# Patient Record
Sex: MALE | Race: WHITE | Employment: OTHER
[De-identification: names, ages, dates, MRNs, and addresses within clinical notes are randomized per-mention and may not be internally consistent; named-entity substitution may affect disease eponyms.]

---

## 2017-01-03 RX ORDER — NITROGLYCERIN 0.4 MG/1
TABLET SUBLINGUAL
Qty: 100 TABLET | Refills: 3 | Status: SHIPPED | OUTPATIENT
Start: 2017-01-03 | End: 2017-02-20 | Stop reason: SDUPTHER

## 2017-01-04 ENCOUNTER — TELEPHONE (OUTPATIENT)
Dept: CARDIOLOGY | Facility: CLINIC | Age: 77
End: 2017-01-04

## 2017-01-04 RX ORDER — METOPROLOL SUCCINATE 50 MG/1
50 TABLET, EXTENDED RELEASE ORAL DAILY
Qty: 14 TABLET | Refills: 0 | Status: SHIPPED | OUTPATIENT
Start: 2017-01-04 | End: 2017-01-21 | Stop reason: SDUPTHER

## 2017-01-04 RX ORDER — METOPROLOL SUCCINATE 50 MG/1
50 TABLET, EXTENDED RELEASE ORAL DAILY
Qty: 90 TABLET | Refills: 3 | Status: SHIPPED | OUTPATIENT
Start: 2017-01-04 | End: 2017-02-17

## 2017-01-12 ENCOUNTER — TELEPHONE (OUTPATIENT)
Dept: CARDIOLOGY | Facility: CLINIC | Age: 77
End: 2017-01-12

## 2017-01-19 ENCOUNTER — TELEPHONE (OUTPATIENT)
Dept: GASTROENTEROLOGY | Facility: CLINIC | Age: 77
End: 2017-01-19

## 2017-01-24 ENCOUNTER — OFFICE VISIT (OUTPATIENT)
Dept: CARDIOLOGY | Facility: CLINIC | Age: 77
End: 2017-01-24

## 2017-01-24 VITALS
BODY MASS INDEX: 25.33 KG/M2 | OXYGEN SATURATION: 98 % | RESPIRATION RATE: 16 BRPM | DIASTOLIC BLOOD PRESSURE: 65 MMHG | HEART RATE: 89 BPM | SYSTOLIC BLOOD PRESSURE: 105 MMHG | HEIGHT: 72 IN | WEIGHT: 187 LBS

## 2017-01-24 DIAGNOSIS — I50.43 ACUTE ON CHRONIC COMBINED SYSTOLIC AND DIASTOLIC CONGESTIVE HEART FAILURE, NYHA CLASS 4 (HCC): Primary | ICD-10-CM

## 2017-01-24 DIAGNOSIS — I42.8 NON-ISCHEMIC CARDIOMYOPATHY (HCC): ICD-10-CM

## 2017-01-24 DIAGNOSIS — J90 PLEURAL EFFUSION, RIGHT: ICD-10-CM

## 2017-01-24 PROCEDURE — G8427 DOCREV CUR MEDS BY ELIG CLIN: HCPCS | Performed by: FAMILY MEDICINE

## 2017-01-24 PROCEDURE — G8484 FLU IMMUNIZE NO ADMIN: HCPCS | Performed by: FAMILY MEDICINE

## 2017-01-24 PROCEDURE — 4040F PNEUMOC VAC/ADMIN/RCVD: CPT | Performed by: FAMILY MEDICINE

## 2017-01-24 PROCEDURE — 1036F TOBACCO NON-USER: CPT | Performed by: FAMILY MEDICINE

## 2017-01-24 PROCEDURE — 93289 INTERROG DEVICE EVAL HEART: CPT | Performed by: FAMILY MEDICINE

## 2017-01-24 PROCEDURE — 3017F COLORECTAL CA SCREEN DOC REV: CPT | Performed by: FAMILY MEDICINE

## 2017-01-24 PROCEDURE — 1123F ACP DISCUSS/DSCN MKR DOCD: CPT | Performed by: FAMILY MEDICINE

## 2017-01-24 PROCEDURE — G8598 ASA/ANTIPLAT THER USED: HCPCS | Performed by: FAMILY MEDICINE

## 2017-01-24 PROCEDURE — G8420 CALC BMI NORM PARAMETERS: HCPCS | Performed by: FAMILY MEDICINE

## 2017-01-24 PROCEDURE — 99214 OFFICE O/P EST MOD 30 MIN: CPT | Performed by: FAMILY MEDICINE

## 2017-01-24 RX ORDER — FUROSEMIDE 20 MG/1
20 TABLET ORAL DAILY
Qty: 30 TABLET | Refills: 3 | Status: SHIPPED | OUTPATIENT
Start: 2017-01-24 | End: 2017-02-17

## 2017-01-26 DIAGNOSIS — Z95.810 BIVENTRICULAR ICD (IMPLANTABLE CARDIOVERTER-DEFIBRILLATOR) IN PLACE: ICD-10-CM

## 2017-01-26 DIAGNOSIS — I42.8 NON-ISCHEMIC CARDIOMYOPATHY (HCC): Primary | ICD-10-CM

## 2017-01-31 ENCOUNTER — OFFICE VISIT (OUTPATIENT)
Dept: CARDIOLOGY | Facility: CLINIC | Age: 77
End: 2017-01-31

## 2017-01-31 VITALS
BODY MASS INDEX: 23.84 KG/M2 | OXYGEN SATURATION: 100 % | HEART RATE: 106 BPM | RESPIRATION RATE: 16 BRPM | SYSTOLIC BLOOD PRESSURE: 110 MMHG | HEIGHT: 72 IN | WEIGHT: 176 LBS | DIASTOLIC BLOOD PRESSURE: 70 MMHG

## 2017-01-31 DIAGNOSIS — I42.9 IDIOPATHIC CARDIOMYOPATHY (HCC): ICD-10-CM

## 2017-01-31 DIAGNOSIS — I50.41: Primary | ICD-10-CM

## 2017-01-31 DIAGNOSIS — I20.9 ANGINA, CLASS II (HCC): ICD-10-CM

## 2017-01-31 PROCEDURE — G8598 ASA/ANTIPLAT THER USED: HCPCS | Performed by: FAMILY MEDICINE

## 2017-01-31 PROCEDURE — 3017F COLORECTAL CA SCREEN DOC REV: CPT | Performed by: FAMILY MEDICINE

## 2017-01-31 PROCEDURE — G8484 FLU IMMUNIZE NO ADMIN: HCPCS | Performed by: FAMILY MEDICINE

## 2017-01-31 PROCEDURE — 1036F TOBACCO NON-USER: CPT | Performed by: FAMILY MEDICINE

## 2017-01-31 PROCEDURE — G8427 DOCREV CUR MEDS BY ELIG CLIN: HCPCS | Performed by: FAMILY MEDICINE

## 2017-01-31 PROCEDURE — 99215 OFFICE O/P EST HI 40 MIN: CPT | Performed by: FAMILY MEDICINE

## 2017-01-31 PROCEDURE — 4040F PNEUMOC VAC/ADMIN/RCVD: CPT | Performed by: FAMILY MEDICINE

## 2017-01-31 PROCEDURE — G8420 CALC BMI NORM PARAMETERS: HCPCS | Performed by: FAMILY MEDICINE

## 2017-01-31 PROCEDURE — 1123F ACP DISCUSS/DSCN MKR DOCD: CPT | Performed by: FAMILY MEDICINE

## 2017-01-31 RX ORDER — LISINOPRIL 5 MG/1
5 TABLET ORAL DAILY
Qty: 45 TABLET | Refills: 3 | Status: SHIPPED | OUTPATIENT
Start: 2017-01-31 | End: 2017-02-17

## 2017-02-20 ENCOUNTER — OFFICE VISIT (OUTPATIENT)
Dept: CARDIOLOGY | Facility: CLINIC | Age: 77
End: 2017-02-20

## 2017-02-20 VITALS
BODY MASS INDEX: 24.46 KG/M2 | WEIGHT: 180.6 LBS | HEART RATE: 105 BPM | OXYGEN SATURATION: 95 % | SYSTOLIC BLOOD PRESSURE: 116 MMHG | HEIGHT: 72 IN | RESPIRATION RATE: 16 BRPM | DIASTOLIC BLOOD PRESSURE: 73 MMHG

## 2017-02-20 DIAGNOSIS — I50.43 ACUTE ON CHRONIC COMBINED SYSTOLIC AND DIASTOLIC CONGESTIVE HEART FAILURE, NYHA CLASS 4 (HCC): Primary | ICD-10-CM

## 2017-02-20 DIAGNOSIS — J90 RECURRENT RIGHT PLEURAL EFFUSION: ICD-10-CM

## 2017-02-20 DIAGNOSIS — I42.8 NON-ISCHEMIC CARDIOMYOPATHY (HCC): ICD-10-CM

## 2017-02-20 PROBLEM — R10.13 EPIGASTRIC PAIN: Status: ACTIVE | Noted: 2017-02-20

## 2017-02-20 PROCEDURE — G8427 DOCREV CUR MEDS BY ELIG CLIN: HCPCS | Performed by: FAMILY MEDICINE

## 2017-02-20 PROCEDURE — 1036F TOBACCO NON-USER: CPT | Performed by: FAMILY MEDICINE

## 2017-02-20 PROCEDURE — G8598 ASA/ANTIPLAT THER USED: HCPCS | Performed by: FAMILY MEDICINE

## 2017-02-20 PROCEDURE — 1123F ACP DISCUSS/DSCN MKR DOCD: CPT | Performed by: FAMILY MEDICINE

## 2017-02-20 PROCEDURE — G8484 FLU IMMUNIZE NO ADMIN: HCPCS | Performed by: FAMILY MEDICINE

## 2017-02-20 PROCEDURE — 99214 OFFICE O/P EST MOD 30 MIN: CPT | Performed by: FAMILY MEDICINE

## 2017-02-20 PROCEDURE — 3017F COLORECTAL CA SCREEN DOC REV: CPT | Performed by: FAMILY MEDICINE

## 2017-02-20 PROCEDURE — 4040F PNEUMOC VAC/ADMIN/RCVD: CPT | Performed by: FAMILY MEDICINE

## 2017-02-20 PROCEDURE — 93000 ELECTROCARDIOGRAM COMPLETE: CPT | Performed by: FAMILY MEDICINE

## 2017-02-20 PROCEDURE — G8420 CALC BMI NORM PARAMETERS: HCPCS | Performed by: FAMILY MEDICINE

## 2017-02-20 RX ORDER — LISINOPRIL 2.5 MG/1
2.5 TABLET ORAL DAILY
Qty: 90 TABLET | Refills: 3 | Status: ON HOLD
Start: 2017-02-20 | End: 2017-02-24

## 2017-02-20 RX ORDER — NITROGLYCERIN 0.4 MG/1
TABLET SUBLINGUAL
Qty: 100 TABLET | Refills: 3 | Status: SHIPPED | OUTPATIENT
Start: 2017-02-20 | End: 2018-03-20 | Stop reason: SDUPTHER

## 2017-02-23 ENCOUNTER — TELEPHONE (OUTPATIENT)
Dept: CARDIOLOGY | Facility: CLINIC | Age: 77
End: 2017-02-23

## 2017-02-24 ENCOUNTER — OFFICE VISIT (OUTPATIENT)
Dept: CARDIOLOGY | Facility: CLINIC | Age: 77
End: 2017-02-24

## 2017-02-24 VITALS
WEIGHT: 184.2 LBS | SYSTOLIC BLOOD PRESSURE: 110 MMHG | HEIGHT: 72 IN | DIASTOLIC BLOOD PRESSURE: 75 MMHG | BODY MASS INDEX: 24.95 KG/M2 | HEART RATE: 95 BPM | OXYGEN SATURATION: 99 %

## 2017-02-24 DIAGNOSIS — J90 RECURRENT RIGHT PLEURAL EFFUSION: ICD-10-CM

## 2017-02-24 DIAGNOSIS — I50.43 ACUTE ON CHRONIC COMBINED SYSTOLIC AND DIASTOLIC CONGESTIVE HEART FAILURE, NYHA CLASS 4 (HCC): Primary | ICD-10-CM

## 2017-02-24 PROCEDURE — G8598 ASA/ANTIPLAT THER USED: HCPCS | Performed by: FAMILY MEDICINE

## 2017-02-24 PROCEDURE — 99215 OFFICE O/P EST HI 40 MIN: CPT | Performed by: FAMILY MEDICINE

## 2017-02-24 PROCEDURE — 4040F PNEUMOC VAC/ADMIN/RCVD: CPT | Performed by: FAMILY MEDICINE

## 2017-02-24 PROCEDURE — G8420 CALC BMI NORM PARAMETERS: HCPCS | Performed by: FAMILY MEDICINE

## 2017-02-24 PROCEDURE — 1123F ACP DISCUSS/DSCN MKR DOCD: CPT | Performed by: FAMILY MEDICINE

## 2017-02-24 PROCEDURE — G8427 DOCREV CUR MEDS BY ELIG CLIN: HCPCS | Performed by: FAMILY MEDICINE

## 2017-02-24 PROCEDURE — 1036F TOBACCO NON-USER: CPT | Performed by: FAMILY MEDICINE

## 2017-02-24 PROCEDURE — G8484 FLU IMMUNIZE NO ADMIN: HCPCS | Performed by: FAMILY MEDICINE

## 2017-02-24 PROCEDURE — 3017F COLORECTAL CA SCREEN DOC REV: CPT | Performed by: FAMILY MEDICINE

## 2017-02-27 ENCOUNTER — HOSPITAL ENCOUNTER (INPATIENT)
Age: 77
LOS: 3 days | Discharge: HOME | DRG: 265 | End: 2017-03-02
Attending: THORACIC SURGERY (CARDIOTHORACIC VASCULAR SURGERY) | Admitting: THORACIC SURGERY (CARDIOTHORACIC VASCULAR SURGERY)
Payer: MEDICARE

## 2017-02-27 ENCOUNTER — TELEPHONE (OUTPATIENT)
Dept: CARDIOLOGY | Facility: CLINIC | Age: 77
End: 2017-02-27

## 2017-02-27 DIAGNOSIS — I20.9 ANGINA, CLASS IV (HCC): ICD-10-CM

## 2017-02-27 DIAGNOSIS — I50.42 CHRONIC COMBINED SYSTOLIC AND DIASTOLIC CHF, NYHA CLASS 3 (HCC): ICD-10-CM

## 2017-02-27 DIAGNOSIS — I42.8 NON-ISCHEMIC CARDIOMYOPATHY (HCC): Primary | ICD-10-CM

## 2017-02-27 LAB
ABSOLUTE EOS #: 0.1 K/UL (ref 0–0.4)
ABSOLUTE LYMPH #: 0.7 K/UL (ref 1–4.8)
ABSOLUTE MONO #: 0.4 K/UL (ref 0.1–1.2)
ALBUMIN SERPL-MCNC: 3.8 G/DL (ref 3.5–5.2)
ALBUMIN/GLOBULIN RATIO: 1.2 (ref 1–2.5)
ALP BLD-CCNC: 79 U/L (ref 40–129)
ALT SERPL-CCNC: 18 U/L (ref 5–41)
ANION GAP SERPL CALCULATED.3IONS-SCNC: 11 MMOL/L (ref 9–17)
AST SERPL-CCNC: 23 U/L
BASOPHILS # BLD: 0 % (ref 0–2)
BASOPHILS ABSOLUTE: 0 K/UL (ref 0–0.2)
BILIRUB SERPL-MCNC: 1.13 MG/DL (ref 0.3–1.2)
BILIRUBIN URINE: NEGATIVE
BLOOD BANK SPECIMEN: NORMAL
BUN BLDV-MCNC: 23 MG/DL (ref 8–23)
BUN/CREAT BLD: ABNORMAL (ref 9–20)
CALCIUM SERPL-MCNC: 9 MG/DL (ref 8.6–10.4)
CHLORIDE BLD-SCNC: 99 MMOL/L (ref 98–107)
CO2: 27 MMOL/L (ref 20–31)
COLOR: YELLOW
COMMENT UA: NORMAL
CREAT SERPL-MCNC: 1.22 MG/DL (ref 0.7–1.2)
DIFFERENTIAL TYPE: ABNORMAL
DIRECT EXAM: NORMAL
EOSINOPHILS RELATIVE PERCENT: 2 % (ref 1–4)
GFR AFRICAN AMERICAN: >60 ML/MIN
GFR NON-AFRICAN AMERICAN: 58 ML/MIN
GFR SERPL CREATININE-BSD FRML MDRD: ABNORMAL ML/MIN/{1.73_M2}
GFR SERPL CREATININE-BSD FRML MDRD: ABNORMAL ML/MIN/{1.73_M2}
GLUCOSE BLD-MCNC: 251 MG/DL (ref 70–99)
GLUCOSE URINE: NEGATIVE
HCT VFR BLD CALC: 37.6 % (ref 41–53)
HEMOGLOBIN: 12.4 G/DL (ref 13.5–17.5)
KETONES, URINE: NEGATIVE
LEUKOCYTE ESTERASE, URINE: NEGATIVE
LYMPHOCYTES # BLD: 12 % (ref 24–44)
Lab: NORMAL
MCH RBC QN AUTO: 28.9 PG (ref 26–34)
MCHC RBC AUTO-ENTMCNC: 33 G/DL (ref 31–37)
MCV RBC AUTO: 87.7 FL (ref 80–100)
MONOCYTES # BLD: 7 % (ref 2–11)
NITRITE, URINE: NEGATIVE
PARTIAL THROMBOPLASTIN TIME: 22.8 SEC (ref 21.3–31.3)
PDW BLD-RTO: 18.8 % (ref 12.5–15.4)
PH UA: 5 (ref 5–8)
PLATELET # BLD: 247 K/UL (ref 140–450)
PLATELET ESTIMATE: ABNORMAL
PMV BLD AUTO: 9.1 FL (ref 6–12)
POTASSIUM SERPL-SCNC: 4.2 MMOL/L (ref 3.7–5.3)
PROTEIN UA: NEGATIVE
RBC # BLD: 4.29 M/UL (ref 4.5–5.9)
RBC # BLD: ABNORMAL 10*6/UL
SEG NEUTROPHILS: 79 % (ref 36–66)
SEGMENTED NEUTROPHILS ABSOLUTE COUNT: 4.2 K/UL (ref 1.8–7.7)
SODIUM BLD-SCNC: 137 MMOL/L (ref 135–144)
SPECIFIC GRAVITY UA: 1.01 (ref 1–1.03)
SPECIMEN DESCRIPTION: NORMAL
STATUS: NORMAL
TOTAL PROTEIN: 6.9 G/DL (ref 6.4–8.3)
TURBIDITY: CLEAR
URINE HGB: NEGATIVE
UROBILINOGEN, URINE: NORMAL
WBC # BLD: 5.4 K/UL (ref 3.5–11)
WBC # BLD: ABNORMAL 10*3/UL

## 2017-02-27 PROCEDURE — 2580000003 HC RX 258: Performed by: NURSE PRACTITIONER

## 2017-02-27 PROCEDURE — 36415 COLL VENOUS BLD VENIPUNCTURE: CPT

## 2017-02-27 PROCEDURE — 86920 COMPATIBILITY TEST SPIN: CPT

## 2017-02-27 PROCEDURE — 6360000002 HC RX W HCPCS: Performed by: INTERNAL MEDICINE

## 2017-02-27 PROCEDURE — 2060000000 HC ICU INTERMEDIATE R&B

## 2017-02-27 PROCEDURE — 6370000000 HC RX 637 (ALT 250 FOR IP): Performed by: INTERNAL MEDICINE

## 2017-02-27 PROCEDURE — 85730 THROMBOPLASTIN TIME PARTIAL: CPT

## 2017-02-27 PROCEDURE — 86900 BLOOD TYPING SEROLOGIC ABO: CPT

## 2017-02-27 PROCEDURE — 86901 BLOOD TYPING SEROLOGIC RH(D): CPT

## 2017-02-27 PROCEDURE — 86850 RBC ANTIBODY SCREEN: CPT

## 2017-02-27 PROCEDURE — 80053 COMPREHEN METABOLIC PANEL: CPT

## 2017-02-27 PROCEDURE — 87641 MR-STAPH DNA AMP PROBE: CPT

## 2017-02-27 PROCEDURE — 81003 URINALYSIS AUTO W/O SCOPE: CPT

## 2017-02-27 PROCEDURE — 85025 COMPLETE CBC W/AUTO DIFF WBC: CPT

## 2017-02-27 PROCEDURE — 6370000000 HC RX 637 (ALT 250 FOR IP): Performed by: NURSE PRACTITIONER

## 2017-02-27 RX ORDER — GLIMEPIRIDE 2 MG/1
4 TABLET ORAL 2 TIMES DAILY
Status: DISCONTINUED | OUTPATIENT
Start: 2017-02-27 | End: 2017-03-02 | Stop reason: HOSPADM

## 2017-02-27 RX ORDER — LISINOPRIL 2.5 MG/1
2.5 TABLET ORAL DAILY
Status: DISCONTINUED | OUTPATIENT
Start: 2017-02-27 | End: 2017-03-02 | Stop reason: HOSPADM

## 2017-02-27 RX ORDER — SODIUM CHLORIDE 0.9 % (FLUSH) 0.9 %
10 SYRINGE (ML) INJECTION PRN
Status: DISCONTINUED | OUTPATIENT
Start: 2017-02-27 | End: 2017-02-28

## 2017-02-27 RX ORDER — CETIRIZINE HYDROCHLORIDE 10 MG/1
10 TABLET ORAL DAILY
Status: DISCONTINUED | OUTPATIENT
Start: 2017-02-27 | End: 2017-03-02 | Stop reason: HOSPADM

## 2017-02-27 RX ORDER — FINASTERIDE 5 MG/1
5 TABLET, FILM COATED ORAL DAILY
Status: DISCONTINUED | OUTPATIENT
Start: 2017-02-27 | End: 2017-03-02 | Stop reason: HOSPADM

## 2017-02-27 RX ORDER — SIMVASTATIN 40 MG
40 TABLET ORAL NIGHTLY
Status: DISCONTINUED | OUTPATIENT
Start: 2017-02-27 | End: 2017-03-02 | Stop reason: HOSPADM

## 2017-02-27 RX ORDER — TAMSULOSIN HYDROCHLORIDE 0.4 MG/1
0.4 CAPSULE ORAL DAILY
Status: DISCONTINUED | OUTPATIENT
Start: 2017-02-27 | End: 2017-02-28

## 2017-02-27 RX ORDER — ZOLPIDEM TARTRATE 5 MG/1
5 TABLET ORAL NIGHTLY PRN
Status: DISCONTINUED | OUTPATIENT
Start: 2017-02-27 | End: 2017-03-02 | Stop reason: HOSPADM

## 2017-02-27 RX ORDER — SODIUM CHLORIDE 9 MG/ML
INJECTION, SOLUTION INTRAVENOUS CONTINUOUS
Status: DISCONTINUED | OUTPATIENT
Start: 2017-02-27 | End: 2017-02-28

## 2017-02-27 RX ORDER — ASPIRIN 81 MG/1
81 TABLET, CHEWABLE ORAL DAILY
Status: DISCONTINUED | OUTPATIENT
Start: 2017-02-27 | End: 2017-03-02 | Stop reason: HOSPADM

## 2017-02-27 RX ORDER — LISINOPRIL 2.5 MG/1
2.5 TABLET ORAL DAILY
Qty: 90 TABLET | Refills: 3 | Status: ON HOLD | OUTPATIENT
Start: 2017-02-27 | End: 2017-03-02

## 2017-02-27 RX ORDER — DICYCLOMINE HYDROCHLORIDE 10 MG/1
20 CAPSULE ORAL
Status: DISCONTINUED | OUTPATIENT
Start: 2017-02-27 | End: 2017-03-02 | Stop reason: HOSPADM

## 2017-02-27 RX ORDER — FUROSEMIDE 40 MG/1
40 TABLET ORAL DAILY
Qty: 90 TABLET | Refills: 3 | Status: ON HOLD | OUTPATIENT
Start: 2017-02-27 | End: 2017-03-02

## 2017-02-27 RX ORDER — ACETAMINOPHEN 325 MG/1
650 TABLET ORAL EVERY 4 HOURS PRN
Status: DISCONTINUED | OUTPATIENT
Start: 2017-02-27 | End: 2017-03-02 | Stop reason: HOSPADM

## 2017-02-27 RX ORDER — FUROSEMIDE 10 MG/ML
40 INJECTION INTRAMUSCULAR; INTRAVENOUS 2 TIMES DAILY
Status: DISCONTINUED | OUTPATIENT
Start: 2017-02-27 | End: 2017-03-02 | Stop reason: HOSPADM

## 2017-02-27 RX ORDER — CHLORHEXIDINE GLUCONATE 0.12 MG/ML
15 RINSE ORAL ONCE
Status: COMPLETED | OUTPATIENT
Start: 2017-02-27 | End: 2017-02-28

## 2017-02-27 RX ORDER — NITROGLYCERIN 0.4 MG/1
0.4 TABLET SUBLINGUAL EVERY 5 MIN PRN
Status: DISCONTINUED | OUTPATIENT
Start: 2017-02-27 | End: 2017-03-02 | Stop reason: HOSPADM

## 2017-02-27 RX ORDER — ASPIRIN 81 MG/1
81 TABLET ORAL DAILY
Status: DISCONTINUED | OUTPATIENT
Start: 2017-02-27 | End: 2017-02-27

## 2017-02-27 RX ORDER — SODIUM CHLORIDE 0.9 % (FLUSH) 0.9 %
10 SYRINGE (ML) INJECTION EVERY 12 HOURS SCHEDULED
Status: DISCONTINUED | OUTPATIENT
Start: 2017-02-27 | End: 2017-03-02 | Stop reason: HOSPADM

## 2017-02-27 RX ORDER — ONDANSETRON 2 MG/ML
4 INJECTION INTRAMUSCULAR; INTRAVENOUS EVERY 6 HOURS PRN
Status: DISCONTINUED | OUTPATIENT
Start: 2017-02-27 | End: 2017-03-02 | Stop reason: HOSPADM

## 2017-02-27 RX ADMIN — TAMSULOSIN HYDROCHLORIDE 0.4 MG: 0.4 CAPSULE ORAL at 18:21

## 2017-02-27 RX ADMIN — SODIUM CHLORIDE: 9 INJECTION, SOLUTION INTRAVENOUS at 18:21

## 2017-02-27 RX ADMIN — GLIMEPIRIDE 4 MG: 2 TABLET ORAL at 22:39

## 2017-02-27 RX ADMIN — Medication 10 ML: at 22:40

## 2017-02-27 RX ADMIN — FUROSEMIDE 40 MG: 10 INJECTION, SOLUTION INTRAMUSCULAR; INTRAVENOUS at 18:18

## 2017-02-27 RX ADMIN — DICYCLOMINE HYDROCHLORIDE 20 MG: 10 CAPSULE ORAL at 22:39

## 2017-02-27 RX ADMIN — METOPROLOL TARTRATE 12.5 MG: 25 TABLET ORAL at 18:21

## 2017-02-27 RX ADMIN — SIMVASTATIN 40 MG: 40 TABLET, FILM COATED ORAL at 22:39

## 2017-02-27 RX ADMIN — LISINOPRIL 2.5 MG: 2.5 TABLET ORAL at 18:21

## 2017-02-27 RX ADMIN — FINASTERIDE 5 MG: 5 TABLET, FILM COATED ORAL at 18:21

## 2017-02-27 RX ADMIN — ZOLPIDEM TARTRATE 5 MG: 5 TABLET, FILM COATED ORAL at 22:39

## 2017-02-27 RX ADMIN — Medication 10 ML: at 18:22

## 2017-02-27 RX ADMIN — DICYCLOMINE HYDROCHLORIDE 20 MG: 10 CAPSULE ORAL at 18:21

## 2017-02-27 ASSESSMENT — PAIN SCALES - GENERAL
PAINLEVEL_OUTOF10: 0
PAINLEVEL_OUTOF10: 0

## 2017-02-28 ENCOUNTER — CARE COORDINATOR VISIT (OUTPATIENT)
Dept: CASE MANAGEMENT | Age: 77
End: 2017-02-28

## 2017-02-28 ENCOUNTER — APPOINTMENT (OUTPATIENT)
Dept: GENERAL RADIOLOGY | Age: 77
DRG: 265 | End: 2017-02-28
Attending: THORACIC SURGERY (CARDIOTHORACIC VASCULAR SURGERY)
Payer: MEDICARE

## 2017-02-28 ENCOUNTER — SURGERY (OUTPATIENT)
Age: 77
End: 2017-02-28

## 2017-02-28 ENCOUNTER — ANESTHESIA (OUTPATIENT)
Dept: OPERATING ROOM | Age: 77
DRG: 265 | End: 2017-02-28
Payer: MEDICARE

## 2017-02-28 ENCOUNTER — ANESTHESIA EVENT (OUTPATIENT)
Dept: OPERATING ROOM | Age: 77
DRG: 265 | End: 2017-02-28
Payer: MEDICARE

## 2017-02-28 VITALS — SYSTOLIC BLOOD PRESSURE: 94 MMHG | DIASTOLIC BLOOD PRESSURE: 64 MMHG | OXYGEN SATURATION: 90 %

## 2017-02-28 LAB
ABO/RH: NORMAL
ANION GAP SERPL CALCULATED.3IONS-SCNC: 15 MMOL/L (ref 9–17)
ANTIBODY SCREEN: NEGATIVE
ARM BAND NUMBER: NORMAL
BLD PROD TYP BPU: NORMAL
BUN BLDV-MCNC: 27 MG/DL (ref 8–23)
BUN/CREAT BLD: ABNORMAL (ref 9–20)
CALCIUM SERPL-MCNC: 8.8 MG/DL (ref 8.6–10.4)
CHLORIDE BLD-SCNC: 102 MMOL/L (ref 98–107)
CO2: 27 MMOL/L (ref 20–31)
CREAT SERPL-MCNC: 1.33 MG/DL (ref 0.7–1.2)
CROSSMATCH RESULT: NORMAL
DISPENSE STATUS BLOOD BANK: NORMAL
EXPIRATION DATE: NORMAL
FIO2: ABNORMAL
GFR AFRICAN AMERICAN: >60 ML/MIN
GFR NON-AFRICAN AMERICAN: 52 ML/MIN
GFR SERPL CREATININE-BSD FRML MDRD: ABNORMAL ML/MIN/{1.73_M2}
GFR SERPL CREATININE-BSD FRML MDRD: ABNORMAL ML/MIN/{1.73_M2}
GLUCOSE BLD-MCNC: 141 MG/DL (ref 74–106)
GLUCOSE BLD-MCNC: 147 MG/DL (ref 70–99)
HCT VFR BLD CALC: 34.9 % (ref 41–53)
HEMOGLOBIN: 11.4 G/DL (ref 13.5–17.5)
MCH RBC QN AUTO: 28.3 PG (ref 26–34)
MCHC RBC AUTO-ENTMCNC: 32.7 G/DL (ref 31–37)
MCV RBC AUTO: 86.6 FL (ref 80–100)
NEGATIVE BASE EXCESS, ART: ABNORMAL (ref 0–2)
O2 DEVICE/FLOW/%: ABNORMAL
PATIENT TEMP: ABNORMAL
PDW BLD-RTO: 18.1 % (ref 12.5–15.4)
PLATELET # BLD: 225 K/UL (ref 140–450)
PMV BLD AUTO: 9.5 FL (ref 6–12)
POC HCO3: 28.5 MMOL/L (ref 22–27)
POC HEMATOCRIT: 31 % (ref 41–53)
POC HEMOGLOBIN: 10.5 GM/DL (ref 13.5–17.5)
POC IONIZED CALCIUM: 1.09 MMOL/L (ref 1.13–1.33)
POC O2 SATURATION: 100 %
POC PCO2 TEMP: ABNORMAL MM HG
POC PCO2: 42 MM HG (ref 32–45)
POC PH TEMP: ABNORMAL
POC PH: 7.44 (ref 7.35–7.45)
POC PO2 TEMP: ABNORMAL MM HG
POC PO2: 437 MM HG (ref 75–95)
POC POTASSIUM: 3.3 MMOL/L (ref 3.5–5.1)
POC SODIUM: 143 MMOL/L (ref 136–145)
POSITIVE BASE EXCESS, ART: 4 (ref 0–2)
POTASSIUM SERPL-SCNC: 3.9 MMOL/L (ref 3.7–5.3)
RBC # BLD: 4.03 M/UL (ref 4.5–5.9)
SODIUM BLD-SCNC: 144 MMOL/L (ref 135–144)
TCO2 (CALC), ART: 30 MM HG (ref 23–28)
TRANSFUSION STATUS: NORMAL
UNIT DIVISION: 0
UNIT NUMBER: NORMAL
WBC # BLD: 5.1 K/UL (ref 3.5–11)

## 2017-02-28 PROCEDURE — 2500000003 HC RX 250 WO HCPCS: Performed by: NURSE ANESTHETIST, CERTIFIED REGISTERED

## 2017-02-28 PROCEDURE — 0W9B00Z DRAINAGE OF LEFT PLEURAL CAVITY WITH DRAINAGE DEVICE, OPEN APPROACH: ICD-10-PCS | Performed by: THORACIC SURGERY (CARDIOTHORACIC VASCULAR SURGERY)

## 2017-02-28 PROCEDURE — 85027 COMPLETE CBC AUTOMATED: CPT

## 2017-02-28 PROCEDURE — 2000000000 HC ICU R&B

## 2017-02-28 PROCEDURE — 6360000002 HC RX W HCPCS: Performed by: INTERNAL MEDICINE

## 2017-02-28 PROCEDURE — 3600000013 HC SURGERY LEVEL 3 ADDTL 15MIN: Performed by: THORACIC SURGERY (CARDIOTHORACIC VASCULAR SURGERY)

## 2017-02-28 PROCEDURE — 36415 COLL VENOUS BLD VENIPUNCTURE: CPT

## 2017-02-28 PROCEDURE — 71010 XR CHEST PORTABLE: CPT

## 2017-02-28 PROCEDURE — 82947 ASSAY GLUCOSE BLOOD QUANT: CPT

## 2017-02-28 PROCEDURE — 2580000003 HC RX 258: Performed by: THORACIC SURGERY (CARDIOTHORACIC VASCULAR SURGERY)

## 2017-02-28 PROCEDURE — 6360000002 HC RX W HCPCS: Performed by: THORACIC SURGERY (CARDIOTHORACIC VASCULAR SURGERY)

## 2017-02-28 PROCEDURE — 6360000002 HC RX W HCPCS: Performed by: NURSE ANESTHETIST, CERTIFIED REGISTERED

## 2017-02-28 PROCEDURE — 82330 ASSAY OF CALCIUM: CPT

## 2017-02-28 PROCEDURE — 80048 BASIC METABOLIC PNL TOTAL CA: CPT

## 2017-02-28 PROCEDURE — 6360000002 HC RX W HCPCS: Performed by: ANESTHESIOLOGY

## 2017-02-28 PROCEDURE — C1729 CATH, DRAINAGE: HCPCS | Performed by: THORACIC SURGERY (CARDIOTHORACIC VASCULAR SURGERY)

## 2017-02-28 PROCEDURE — 2580000003 HC RX 258: Performed by: NURSE ANESTHETIST, CERTIFIED REGISTERED

## 2017-02-28 PROCEDURE — 85014 HEMATOCRIT: CPT

## 2017-02-28 PROCEDURE — 6370000000 HC RX 637 (ALT 250 FOR IP): Performed by: NURSE PRACTITIONER

## 2017-02-28 PROCEDURE — 3700000000 HC ANESTHESIA ATTENDED CARE: Performed by: THORACIC SURGERY (CARDIOTHORACIC VASCULAR SURGERY)

## 2017-02-28 PROCEDURE — 82803 BLOOD GASES ANY COMBINATION: CPT

## 2017-02-28 PROCEDURE — 02PA0MZ REMOVAL OF CARDIAC LEAD FROM HEART, OPEN APPROACH: ICD-10-PCS | Performed by: THORACIC SURGERY (CARDIOTHORACIC VASCULAR SURGERY)

## 2017-02-28 PROCEDURE — 3700000001 HC ADD 15 MINUTES (ANESTHESIA): Performed by: THORACIC SURGERY (CARDIOTHORACIC VASCULAR SURGERY)

## 2017-02-28 PROCEDURE — 02HN0KZ INSERTION OF DEFIBRILLATOR LEAD INTO PERICARDIUM, OPEN APPROACH: ICD-10-PCS | Performed by: THORACIC SURGERY (CARDIOTHORACIC VASCULAR SURGERY)

## 2017-02-28 PROCEDURE — 6370000000 HC RX 637 (ALT 250 FOR IP): Performed by: INTERNAL MEDICINE

## 2017-02-28 PROCEDURE — 2720000010 HC SURG SUPPLY STERILE: Performed by: THORACIC SURGERY (CARDIOTHORACIC VASCULAR SURGERY)

## 2017-02-28 PROCEDURE — 3600000003 HC SURGERY LEVEL 3 BASE: Performed by: THORACIC SURGERY (CARDIOTHORACIC VASCULAR SURGERY)

## 2017-02-28 PROCEDURE — 84132 ASSAY OF SERUM POTASSIUM: CPT

## 2017-02-28 PROCEDURE — C1779 LEAD, PMKR, TRANSVENOUS VDD: HCPCS | Performed by: THORACIC SURGERY (CARDIOTHORACIC VASCULAR SURGERY)

## 2017-02-28 PROCEDURE — 84295 ASSAY OF SERUM SODIUM: CPT

## 2017-02-28 PROCEDURE — 6360000002 HC RX W HCPCS

## 2017-02-28 PROCEDURE — 6370000000 HC RX 637 (ALT 250 FOR IP): Performed by: THORACIC SURGERY (CARDIOTHORACIC VASCULAR SURGERY)

## 2017-02-28 PROCEDURE — 6370000000 HC RX 637 (ALT 250 FOR IP): Performed by: ANESTHESIOLOGY

## 2017-02-28 PROCEDURE — 84484 ASSAY OF TROPONIN QUANT: CPT

## 2017-02-28 PROCEDURE — 93005 ELECTROCARDIOGRAM TRACING: CPT

## 2017-02-28 PROCEDURE — 2580000003 HC RX 258: Performed by: NURSE PRACTITIONER

## 2017-02-28 PROCEDURE — 6360000002 HC RX W HCPCS: Performed by: PHYSICIAN ASSISTANT

## 2017-02-28 PROCEDURE — 2500000003 HC RX 250 WO HCPCS

## 2017-02-28 DEVICE — IMPLANTABLE DEVICE: Type: IMPLANTABLE DEVICE | Site: HEART | Status: FUNCTIONAL

## 2017-02-28 RX ORDER — FENTANYL CITRATE 50 UG/ML
50 INJECTION, SOLUTION INTRAMUSCULAR; INTRAVENOUS EVERY 5 MIN PRN
Status: DISCONTINUED | OUTPATIENT
Start: 2017-02-28 | End: 2017-02-28 | Stop reason: HOSPADM

## 2017-02-28 RX ORDER — KETOROLAC TROMETHAMINE 15 MG/ML
15 INJECTION, SOLUTION INTRAMUSCULAR; INTRAVENOUS
Status: COMPLETED | OUTPATIENT
Start: 2017-02-28 | End: 2017-02-28

## 2017-02-28 RX ORDER — ONDANSETRON 2 MG/ML
4 INJECTION INTRAMUSCULAR; INTRAVENOUS EVERY 6 HOURS PRN
Status: DISCONTINUED | OUTPATIENT
Start: 2017-02-28 | End: 2017-03-02 | Stop reason: HOSPADM

## 2017-02-28 RX ORDER — ACETAMINOPHEN 325 MG/1
650 TABLET ORAL EVERY 4 HOURS PRN
Status: DISCONTINUED | OUTPATIENT
Start: 2017-02-28 | End: 2017-03-02 | Stop reason: HOSPADM

## 2017-02-28 RX ORDER — SODIUM CHLORIDE 0.9 % (FLUSH) 0.9 %
10 SYRINGE (ML) INJECTION PRN
Status: DISCONTINUED | OUTPATIENT
Start: 2017-02-28 | End: 2017-03-02 | Stop reason: HOSPADM

## 2017-02-28 RX ORDER — PROPOFOL 10 MG/ML
INJECTION, EMULSION INTRAVENOUS PRN
Status: DISCONTINUED | OUTPATIENT
Start: 2017-02-28 | End: 2017-02-28 | Stop reason: SDUPTHER

## 2017-02-28 RX ORDER — FENTANYL CITRATE 50 UG/ML
25 INJECTION, SOLUTION INTRAMUSCULAR; INTRAVENOUS EVERY 5 MIN PRN
Status: DISCONTINUED | OUTPATIENT
Start: 2017-02-28 | End: 2017-02-28 | Stop reason: HOSPADM

## 2017-02-28 RX ORDER — GLYCOPYRROLATE 0.2 MG/ML
INJECTION INTRAMUSCULAR; INTRAVENOUS PRN
Status: DISCONTINUED | OUTPATIENT
Start: 2017-02-28 | End: 2017-02-28 | Stop reason: SDUPTHER

## 2017-02-28 RX ORDER — MIDAZOLAM HYDROCHLORIDE 1 MG/ML
INJECTION INTRAMUSCULAR; INTRAVENOUS PRN
Status: DISCONTINUED | OUTPATIENT
Start: 2017-02-28 | End: 2017-02-28 | Stop reason: SDUPTHER

## 2017-02-28 RX ORDER — SODIUM CHLORIDE, SODIUM LACTATE, POTASSIUM CHLORIDE, CALCIUM CHLORIDE 600; 310; 30; 20 MG/100ML; MG/100ML; MG/100ML; MG/100ML
INJECTION, SOLUTION INTRAVENOUS CONTINUOUS PRN
Status: DISCONTINUED | OUTPATIENT
Start: 2017-02-28 | End: 2017-02-28 | Stop reason: SDUPTHER

## 2017-02-28 RX ORDER — MAGNESIUM HYDROXIDE 1200 MG/15ML
LIQUID ORAL CONTINUOUS PRN
Status: DISCONTINUED | OUTPATIENT
Start: 2017-02-28 | End: 2017-02-28 | Stop reason: HOSPADM

## 2017-02-28 RX ORDER — FENTANYL CITRATE 50 UG/ML
INJECTION, SOLUTION INTRAMUSCULAR; INTRAVENOUS PRN
Status: DISCONTINUED | OUTPATIENT
Start: 2017-02-28 | End: 2017-02-28 | Stop reason: SDUPTHER

## 2017-02-28 RX ORDER — LIDOCAINE HYDROCHLORIDE 10 MG/ML
INJECTION, SOLUTION EPIDURAL; INFILTRATION; INTRACAUDAL; PERINEURAL PRN
Status: DISCONTINUED | OUTPATIENT
Start: 2017-02-28 | End: 2017-02-28 | Stop reason: SDUPTHER

## 2017-02-28 RX ORDER — MORPHINE SULFATE 2 MG/ML
2 INJECTION, SOLUTION INTRAMUSCULAR; INTRAVENOUS
Status: DISCONTINUED | OUTPATIENT
Start: 2017-02-28 | End: 2017-03-02 | Stop reason: HOSPADM

## 2017-02-28 RX ORDER — EPHEDRINE SULFATE 50 MG/ML
INJECTION, SOLUTION INTRAVENOUS PRN
Status: DISCONTINUED | OUTPATIENT
Start: 2017-02-28 | End: 2017-02-28 | Stop reason: SDUPTHER

## 2017-02-28 RX ORDER — DOCUSATE SODIUM 100 MG/1
100 CAPSULE, LIQUID FILLED ORAL 2 TIMES DAILY
Status: DISCONTINUED | OUTPATIENT
Start: 2017-02-28 | End: 2017-03-02 | Stop reason: HOSPADM

## 2017-02-28 RX ORDER — ONDANSETRON 2 MG/ML
4 INJECTION INTRAMUSCULAR; INTRAVENOUS
Status: DISCONTINUED | OUTPATIENT
Start: 2017-02-28 | End: 2017-02-28 | Stop reason: HOSPADM

## 2017-02-28 RX ORDER — DIPHENHYDRAMINE HYDROCHLORIDE 50 MG/ML
12.5 INJECTION INTRAMUSCULAR; INTRAVENOUS
Status: DISCONTINUED | OUTPATIENT
Start: 2017-02-28 | End: 2017-02-28 | Stop reason: HOSPADM

## 2017-02-28 RX ORDER — KETOROLAC TROMETHAMINE 30 MG/ML
INJECTION, SOLUTION INTRAMUSCULAR; INTRAVENOUS
Status: COMPLETED
Start: 2017-02-28 | End: 2017-02-28

## 2017-02-28 RX ORDER — ACETAMINOPHEN 10 MG/ML
1000 INJECTION, SOLUTION INTRAVENOUS EVERY 8 HOURS PRN
Status: DISPENSED | OUTPATIENT
Start: 2017-02-28 | End: 2017-03-02

## 2017-02-28 RX ORDER — FENTANYL CITRATE 50 UG/ML
INJECTION, SOLUTION INTRAMUSCULAR; INTRAVENOUS
Status: COMPLETED
Start: 2017-02-28 | End: 2017-02-28

## 2017-02-28 RX ORDER — MIDAZOLAM HYDROCHLORIDE 1 MG/ML
1 INJECTION INTRAMUSCULAR; INTRAVENOUS ONCE
Status: COMPLETED | OUTPATIENT
Start: 2017-02-28 | End: 2017-02-28

## 2017-02-28 RX ORDER — DEXTROSE AND SODIUM CHLORIDE 5; .45 G/100ML; G/100ML
INJECTION, SOLUTION INTRAVENOUS CONTINUOUS
Status: DISCONTINUED | OUTPATIENT
Start: 2017-02-28 | End: 2017-03-02 | Stop reason: HOSPADM

## 2017-02-28 RX ORDER — OXYCODONE HYDROCHLORIDE AND ACETAMINOPHEN 5; 325 MG/1; MG/1
1 TABLET ORAL EVERY 4 HOURS PRN
Status: DISCONTINUED | OUTPATIENT
Start: 2017-02-28 | End: 2017-03-01

## 2017-02-28 RX ORDER — MORPHINE SULFATE 4 MG/ML
4 INJECTION, SOLUTION INTRAMUSCULAR; INTRAVENOUS
Status: DISCONTINUED | OUTPATIENT
Start: 2017-02-28 | End: 2017-03-02 | Stop reason: HOSPADM

## 2017-02-28 RX ORDER — LABETALOL HYDROCHLORIDE 5 MG/ML
5 INJECTION, SOLUTION INTRAVENOUS EVERY 10 MIN PRN
Status: DISCONTINUED | OUTPATIENT
Start: 2017-02-28 | End: 2017-02-28 | Stop reason: HOSPADM

## 2017-02-28 RX ORDER — MORPHINE SULFATE 4 MG/ML
INJECTION, SOLUTION INTRAMUSCULAR; INTRAVENOUS
Status: COMPLETED
Start: 2017-02-28 | End: 2017-02-28

## 2017-02-28 RX ORDER — MIDAZOLAM HYDROCHLORIDE 1 MG/ML
INJECTION INTRAMUSCULAR; INTRAVENOUS
Status: COMPLETED
Start: 2017-02-28 | End: 2017-02-28

## 2017-02-28 RX ORDER — HYDRALAZINE HYDROCHLORIDE 20 MG/ML
5 INJECTION INTRAMUSCULAR; INTRAVENOUS EVERY 10 MIN PRN
Status: DISCONTINUED | OUTPATIENT
Start: 2017-02-28 | End: 2017-02-28 | Stop reason: HOSPADM

## 2017-02-28 RX ORDER — SODIUM CHLORIDE 9 MG/ML
INJECTION, SOLUTION INTRAVENOUS CONTINUOUS PRN
Status: DISCONTINUED | OUTPATIENT
Start: 2017-02-28 | End: 2017-02-28 | Stop reason: SDUPTHER

## 2017-02-28 RX ORDER — NEOSTIGMINE METHYLSULFATE 1 MG/ML
INJECTION, SOLUTION INTRAVENOUS PRN
Status: DISCONTINUED | OUTPATIENT
Start: 2017-02-28 | End: 2017-02-28 | Stop reason: SDUPTHER

## 2017-02-28 RX ORDER — ALPRAZOLAM 0.5 MG/1
0.5 TABLET ORAL 2 TIMES DAILY PRN
Status: DISCONTINUED | OUTPATIENT
Start: 2017-02-28 | End: 2017-03-02 | Stop reason: HOSPADM

## 2017-02-28 RX ORDER — ROCURONIUM BROMIDE 10 MG/ML
INJECTION, SOLUTION INTRAVENOUS PRN
Status: DISCONTINUED | OUTPATIENT
Start: 2017-02-28 | End: 2017-02-28 | Stop reason: SDUPTHER

## 2017-02-28 RX ADMIN — FENTANYL CITRATE 50 MCG: 50 INJECTION, SOLUTION INTRAMUSCULAR; INTRAVENOUS at 14:00

## 2017-02-28 RX ADMIN — CHLORHEXIDINE GLUCONATE 15 ML: 1.2 RINSE ORAL at 08:46

## 2017-02-28 RX ADMIN — FENTANYL CITRATE 50 MCG: 50 INJECTION INTRAMUSCULAR; INTRAVENOUS at 12:35

## 2017-02-28 RX ADMIN — ROCURONIUM BROMIDE 50 MG: 10 INJECTION, SOLUTION INTRAVENOUS at 12:04

## 2017-02-28 RX ADMIN — ROCURONIUM BROMIDE 20 MG: 10 INJECTION, SOLUTION INTRAVENOUS at 12:38

## 2017-02-28 RX ADMIN — FENTANYL CITRATE 25 MCG: 50 INJECTION INTRAMUSCULAR; INTRAVENOUS at 13:45

## 2017-02-28 RX ADMIN — CETIRIZINE HYDROCHLORIDE 10 MG: 10 TABLET ORAL at 08:47

## 2017-02-28 RX ADMIN — SODIUM CHLORIDE 1000 ML: 900 IRRIGANT IRRIGATION at 13:04

## 2017-02-28 RX ADMIN — FENTANYL CITRATE 50 MCG: 50 INJECTION INTRAMUSCULAR; INTRAVENOUS at 13:50

## 2017-02-28 RX ADMIN — DICYCLOMINE HYDROCHLORIDE 20 MG: 10 CAPSULE ORAL at 08:46

## 2017-02-28 RX ADMIN — FENTANYL CITRATE 25 MCG: 50 INJECTION INTRAMUSCULAR; INTRAVENOUS at 13:48

## 2017-02-28 RX ADMIN — DEXTROSE MONOHYDRATE 2 G: 50 INJECTION, SOLUTION INTRAVENOUS at 19:53

## 2017-02-28 RX ADMIN — SODIUM CHLORIDE 1000 ML: 900 IRRIGANT IRRIGATION at 13:08

## 2017-02-28 RX ADMIN — Medication 2 G: at 12:19

## 2017-02-28 RX ADMIN — KETOROLAC TROMETHAMINE 30 MG: 30 INJECTION, SOLUTION INTRAMUSCULAR at 22:07

## 2017-02-28 RX ADMIN — DEXTROSE AND SODIUM CHLORIDE: 5; 450 INJECTION, SOLUTION INTRAVENOUS at 16:53

## 2017-02-28 RX ADMIN — FUROSEMIDE 40 MG: 10 INJECTION, SOLUTION INTRAMUSCULAR; INTRAVENOUS at 08:46

## 2017-02-28 RX ADMIN — NEOSTIGMINE METHYLSULFATE 4 MG: 1 INJECTION INTRAVENOUS at 13:25

## 2017-02-28 RX ADMIN — FINASTERIDE 5 MG: 5 TABLET, FILM COATED ORAL at 08:47

## 2017-02-28 RX ADMIN — MORPHINE SULFATE 4 MG: 4 INJECTION, SOLUTION INTRAMUSCULAR; INTRAVENOUS at 22:05

## 2017-02-28 RX ADMIN — GLYCOPYRROLATE 0.8 MG: 0.2 INJECTION INTRAMUSCULAR; INTRAVENOUS at 13:25

## 2017-02-28 RX ADMIN — Medication 10 ML: at 08:47

## 2017-02-28 RX ADMIN — ONDANSETRON 4 MG: 2 INJECTION INTRAMUSCULAR; INTRAVENOUS at 13:08

## 2017-02-28 RX ADMIN — ASPIRIN 81 MG: 81 TABLET, CHEWABLE ORAL at 08:46

## 2017-02-28 RX ADMIN — LIDOCAINE HYDROCHLORIDE 50 MG: 10 INJECTION, SOLUTION EPIDURAL; INFILTRATION; INTRACAUDAL; PERINEURAL at 12:04

## 2017-02-28 RX ADMIN — EPHEDRINE SULFATE 20 MG: 50 INJECTION INTRAMUSCULAR; INTRAVENOUS; SUBCUTANEOUS at 12:48

## 2017-02-28 RX ADMIN — MUPIROCIN: 20 OINTMENT TOPICAL at 10:58

## 2017-02-28 RX ADMIN — OXYCODONE HYDROCHLORIDE AND ACETAMINOPHEN 1 TABLET: 5; 325 TABLET ORAL at 20:55

## 2017-02-28 RX ADMIN — DOCUSATE SODIUM 100 MG: 100 CAPSULE ORAL at 19:53

## 2017-02-28 RX ADMIN — GLIMEPIRIDE 4 MG: 2 TABLET ORAL at 08:46

## 2017-02-28 RX ADMIN — NITROGLYCERIN 0.4 MG: 0.4 TABLET SUBLINGUAL at 21:56

## 2017-02-28 RX ADMIN — FUROSEMIDE 40 MG: 10 INJECTION, SOLUTION INTRAMUSCULAR; INTRAVENOUS at 19:14

## 2017-02-28 RX ADMIN — FENTANYL CITRATE 100 MCG: 50 INJECTION INTRAMUSCULAR; INTRAVENOUS at 12:04

## 2017-02-28 RX ADMIN — EPHEDRINE SULFATE 20 MG: 50 INJECTION INTRAMUSCULAR; INTRAVENOUS; SUBCUTANEOUS at 12:51

## 2017-02-28 RX ADMIN — KETOROLAC TROMETHAMINE 15 MG: 15 INJECTION, SOLUTION INTRAMUSCULAR; INTRAVENOUS at 22:05

## 2017-02-28 RX ADMIN — PHENYLEPHRINE HYDROCHLORIDE 200 MCG: 10 INJECTION INTRAMUSCULAR; INTRAVENOUS; SUBCUTANEOUS at 12:50

## 2017-02-28 RX ADMIN — SODIUM CHLORIDE, POTASSIUM CHLORIDE, SODIUM LACTATE AND CALCIUM CHLORIDE: 600; 310; 30; 20 INJECTION, SOLUTION INTRAVENOUS at 12:12

## 2017-02-28 RX ADMIN — OXYCODONE HYDROCHLORIDE AND ACETAMINOPHEN 1 TABLET: 5; 325 TABLET ORAL at 16:48

## 2017-02-28 RX ADMIN — ENOXAPARIN SODIUM 40 MG: 40 INJECTION SUBCUTANEOUS at 16:53

## 2017-02-28 RX ADMIN — FENTANYL CITRATE 50 MCG: 50 INJECTION INTRAMUSCULAR; INTRAVENOUS at 14:10

## 2017-02-28 RX ADMIN — MIDAZOLAM HYDROCHLORIDE 1 MG: 1 INJECTION, SOLUTION INTRAMUSCULAR; INTRAVENOUS at 22:05

## 2017-02-28 RX ADMIN — ACETAMINOPHEN 1000 MG: 10 INJECTION, SOLUTION INTRAVENOUS at 15:13

## 2017-02-28 RX ADMIN — LISINOPRIL 2.5 MG: 2.5 TABLET ORAL at 08:47

## 2017-02-28 RX ADMIN — MIDAZOLAM 0.5 MG: 1 INJECTION INTRAMUSCULAR; INTRAVENOUS at 13:49

## 2017-02-28 RX ADMIN — MIDAZOLAM HYDROCHLORIDE 1 MG: 1 INJECTION INTRAMUSCULAR; INTRAVENOUS at 22:05

## 2017-02-28 RX ADMIN — SODIUM CHLORIDE: 9 INJECTION, SOLUTION INTRAVENOUS at 11:57

## 2017-02-28 RX ADMIN — PROPOFOL 150 MG: 10 INJECTION, EMULSION INTRAVENOUS at 12:04

## 2017-02-28 RX ADMIN — SIMVASTATIN 40 MG: 40 TABLET, FILM COATED ORAL at 19:53

## 2017-02-28 RX ADMIN — SODIUM CHLORIDE 500 ML: 900 IRRIGANT IRRIGATION at 13:11

## 2017-02-28 RX ADMIN — DICYCLOMINE HYDROCHLORIDE 20 MG: 10 CAPSULE ORAL at 10:58

## 2017-02-28 RX ADMIN — NITROGLYCERIN 0.4 MG: 0.4 TABLET SUBLINGUAL at 22:14

## 2017-02-28 RX ADMIN — TAMSULOSIN HYDROCHLORIDE 0.4 MG: 0.4 CAPSULE ORAL at 08:46

## 2017-02-28 RX ADMIN — GLIMEPIRIDE 4 MG: 2 TABLET ORAL at 19:53

## 2017-02-28 RX ADMIN — ZOLPIDEM TARTRATE 5 MG: 5 TABLET, FILM COATED ORAL at 20:56

## 2017-02-28 RX ADMIN — ALPRAZOLAM 0.5 MG: 0.5 TABLET ORAL at 08:39

## 2017-02-28 RX ADMIN — GLYCOPYRROLATE 0.2 MG: 0.2 INJECTION INTRAMUSCULAR; INTRAVENOUS at 12:03

## 2017-02-28 ASSESSMENT — ENCOUNTER SYMPTOMS: SHORTNESS OF BREATH: 1

## 2017-02-28 ASSESSMENT — PAIN SCALES - GENERAL
PAINLEVEL_OUTOF10: 6
PAINLEVEL_OUTOF10: 10

## 2017-03-01 LAB
EKG ATRIAL RATE: 93 BPM
EKG P AXIS: 83 DEGREES
EKG P-R INTERVAL: 128 MS
EKG Q-T INTERVAL: 494 MS
EKG QRS DURATION: 168 MS
EKG QTC CALCULATION (BAZETT): 614 MS
EKG R AXIS: -96 DEGREES
EKG T AXIS: -32 DEGREES
EKG VENTRICULAR RATE: 93 BPM
HCT VFR BLD CALC: 34.7 % (ref 41–53)
HEMOGLOBIN: 11.6 G/DL (ref 13.5–17.5)
MCH RBC QN AUTO: 28.9 PG (ref 26–34)
MCHC RBC AUTO-ENTMCNC: 33.3 G/DL (ref 31–37)
MCV RBC AUTO: 86.7 FL (ref 80–100)
PDW BLD-RTO: 18 % (ref 12.5–15.4)
PLATELET # BLD: 201 K/UL (ref 140–450)
PMV BLD AUTO: 9.6 FL (ref 6–12)
RBC # BLD: 4 M/UL (ref 4.5–5.9)
TROPONIN INTERP: ABNORMAL
TROPONIN T: 0.03 NG/ML
WBC # BLD: 12 K/UL (ref 3.5–11)

## 2017-03-01 PROCEDURE — 6370000000 HC RX 637 (ALT 250 FOR IP): Performed by: THORACIC SURGERY (CARDIOTHORACIC VASCULAR SURGERY)

## 2017-03-01 PROCEDURE — 36415 COLL VENOUS BLD VENIPUNCTURE: CPT

## 2017-03-01 PROCEDURE — 6360000002 HC RX W HCPCS: Performed by: THORACIC SURGERY (CARDIOTHORACIC VASCULAR SURGERY)

## 2017-03-01 PROCEDURE — 6360000002 HC RX W HCPCS: Performed by: PHYSICIAN ASSISTANT

## 2017-03-01 PROCEDURE — 2580000003 HC RX 258: Performed by: NURSE PRACTITIONER

## 2017-03-01 PROCEDURE — 6370000000 HC RX 637 (ALT 250 FOR IP): Performed by: NURSE PRACTITIONER

## 2017-03-01 PROCEDURE — 6360000002 HC RX W HCPCS: Performed by: INTERNAL MEDICINE

## 2017-03-01 PROCEDURE — 85027 COMPLETE CBC AUTOMATED: CPT

## 2017-03-01 PROCEDURE — 2140000001 HC CVICU INTERMEDIATE R&B

## 2017-03-01 PROCEDURE — 2580000003 HC RX 258: Performed by: THORACIC SURGERY (CARDIOTHORACIC VASCULAR SURGERY)

## 2017-03-01 PROCEDURE — 6370000000 HC RX 637 (ALT 250 FOR IP): Performed by: INTERNAL MEDICINE

## 2017-03-01 PROCEDURE — 93306 TTE W/DOPPLER COMPLETE: CPT

## 2017-03-01 RX ORDER — OXYCODONE HYDROCHLORIDE AND ACETAMINOPHEN 5; 325 MG/1; MG/1
2 TABLET ORAL EVERY 4 HOURS PRN
Status: DISCONTINUED | OUTPATIENT
Start: 2017-03-01 | End: 2017-03-02 | Stop reason: HOSPADM

## 2017-03-01 RX ADMIN — GLIMEPIRIDE 4 MG: 2 TABLET ORAL at 20:33

## 2017-03-01 RX ADMIN — DEXTROSE MONOHYDRATE 2 G: 50 INJECTION, SOLUTION INTRAVENOUS at 03:45

## 2017-03-01 RX ADMIN — MORPHINE SULFATE 2 MG: 2 INJECTION, SOLUTION INTRAMUSCULAR; INTRAVENOUS at 03:47

## 2017-03-01 RX ADMIN — OXYCODONE HYDROCHLORIDE AND ACETAMINOPHEN 2 TABLET: 5; 325 TABLET ORAL at 20:39

## 2017-03-01 RX ADMIN — FUROSEMIDE 40 MG: 10 INJECTION, SOLUTION INTRAMUSCULAR; INTRAVENOUS at 09:47

## 2017-03-01 RX ADMIN — FINASTERIDE 5 MG: 5 TABLET, FILM COATED ORAL at 09:39

## 2017-03-01 RX ADMIN — DOCUSATE SODIUM 100 MG: 100 CAPSULE ORAL at 20:33

## 2017-03-01 RX ADMIN — ACETAMINOPHEN 650 MG: 325 TABLET ORAL at 17:08

## 2017-03-01 RX ADMIN — MORPHINE SULFATE 2 MG: 2 INJECTION, SOLUTION INTRAMUSCULAR; INTRAVENOUS at 18:03

## 2017-03-01 RX ADMIN — GLIMEPIRIDE 4 MG: 2 TABLET ORAL at 09:25

## 2017-03-01 RX ADMIN — LISINOPRIL 2.5 MG: 2.5 TABLET ORAL at 09:48

## 2017-03-01 RX ADMIN — DOCUSATE SODIUM 100 MG: 100 CAPSULE ORAL at 09:38

## 2017-03-01 RX ADMIN — ALPRAZOLAM 0.5 MG: 0.5 TABLET ORAL at 11:15

## 2017-03-01 RX ADMIN — ASPIRIN 81 MG: 81 TABLET, CHEWABLE ORAL at 09:45

## 2017-03-01 RX ADMIN — FUROSEMIDE 40 MG: 10 INJECTION, SOLUTION INTRAMUSCULAR; INTRAVENOUS at 18:02

## 2017-03-01 RX ADMIN — SIMVASTATIN 40 MG: 40 TABLET, FILM COATED ORAL at 20:33

## 2017-03-01 RX ADMIN — ENOXAPARIN SODIUM 40 MG: 40 INJECTION SUBCUTANEOUS at 09:47

## 2017-03-01 RX ADMIN — Medication 10 ML: at 20:34

## 2017-03-01 ASSESSMENT — PAIN SCALES - GENERAL
PAINLEVEL_OUTOF10: 9
PAINLEVEL_OUTOF10: 8
PAINLEVEL_OUTOF10: 5

## 2017-03-02 ENCOUNTER — APPOINTMENT (OUTPATIENT)
Dept: GENERAL RADIOLOGY | Age: 77
DRG: 265 | End: 2017-03-02
Attending: THORACIC SURGERY (CARDIOTHORACIC VASCULAR SURGERY)
Payer: MEDICARE

## 2017-03-02 ENCOUNTER — APPOINTMENT (OUTPATIENT)
Dept: ULTRASOUND IMAGING | Age: 77
DRG: 265 | End: 2017-03-02
Attending: THORACIC SURGERY (CARDIOTHORACIC VASCULAR SURGERY)
Payer: MEDICARE

## 2017-03-02 VITALS
WEIGHT: 170.86 LBS | TEMPERATURE: 97.6 F | HEART RATE: 99 BPM | BODY MASS INDEX: 23.14 KG/M2 | OXYGEN SATURATION: 95 % | SYSTOLIC BLOOD PRESSURE: 119 MMHG | HEIGHT: 72 IN | RESPIRATION RATE: 17 BRPM | DIASTOLIC BLOOD PRESSURE: 70 MMHG

## 2017-03-02 LAB
ANION GAP SERPL CALCULATED.3IONS-SCNC: 15 MMOL/L (ref 9–17)
BUN BLDV-MCNC: 34 MG/DL (ref 8–23)
BUN/CREAT BLD: ABNORMAL (ref 9–20)
CALCIUM SERPL-MCNC: 8.1 MG/DL (ref 8.6–10.4)
CHLORIDE BLD-SCNC: 95 MMOL/L (ref 98–107)
CO2: 26 MMOL/L (ref 20–31)
CREAT SERPL-MCNC: 1.88 MG/DL (ref 0.7–1.2)
GFR AFRICAN AMERICAN: 43 ML/MIN
GFR NON-AFRICAN AMERICAN: 35 ML/MIN
GFR SERPL CREATININE-BSD FRML MDRD: ABNORMAL ML/MIN/{1.73_M2}
GFR SERPL CREATININE-BSD FRML MDRD: ABNORMAL ML/MIN/{1.73_M2}
GLUCOSE BLD-MCNC: 93 MG/DL (ref 70–99)
HCT VFR BLD CALC: 35.5 % (ref 41–53)
HEMOGLOBIN: 11.7 G/DL (ref 13.5–17.5)
MCH RBC QN AUTO: 28.6 PG (ref 26–34)
MCHC RBC AUTO-ENTMCNC: 33 G/DL (ref 31–37)
MCV RBC AUTO: 86.7 FL (ref 80–100)
PDW BLD-RTO: 18.3 % (ref 12.5–15.4)
PLATELET # BLD: 183 K/UL (ref 140–450)
PMV BLD AUTO: 8.9 FL (ref 6–12)
POTASSIUM SERPL-SCNC: 3.6 MMOL/L (ref 3.7–5.3)
RBC # BLD: 4.09 M/UL (ref 4.5–5.9)
SODIUM BLD-SCNC: 136 MMOL/L (ref 135–144)
WBC # BLD: 10.4 K/UL (ref 3.5–11)

## 2017-03-02 PROCEDURE — 76770 US EXAM ABDO BACK WALL COMP: CPT

## 2017-03-02 PROCEDURE — 6360000002 HC RX W HCPCS: Performed by: THORACIC SURGERY (CARDIOTHORACIC VASCULAR SURGERY)

## 2017-03-02 PROCEDURE — 6370000000 HC RX 637 (ALT 250 FOR IP): Performed by: NURSE PRACTITIONER

## 2017-03-02 PROCEDURE — 6360000002 HC RX W HCPCS: Performed by: INTERNAL MEDICINE

## 2017-03-02 PROCEDURE — 6370000000 HC RX 637 (ALT 250 FOR IP): Performed by: THORACIC SURGERY (CARDIOTHORACIC VASCULAR SURGERY)

## 2017-03-02 PROCEDURE — 36415 COLL VENOUS BLD VENIPUNCTURE: CPT

## 2017-03-02 PROCEDURE — 94620 HC 6-MINUTE WALK TEST/PULM STRESS TEST SIMPLE: CPT

## 2017-03-02 PROCEDURE — 80048 BASIC METABOLIC PNL TOTAL CA: CPT

## 2017-03-02 PROCEDURE — 2580000003 HC RX 258: Performed by: NURSE PRACTITIONER

## 2017-03-02 PROCEDURE — 71010 XR CHEST PORTABLE: CPT

## 2017-03-02 PROCEDURE — 85027 COMPLETE CBC AUTOMATED: CPT

## 2017-03-02 PROCEDURE — 6370000000 HC RX 637 (ALT 250 FOR IP): Performed by: INTERNAL MEDICINE

## 2017-03-02 RX ORDER — POTASSIUM CHLORIDE 20 MEQ/1
40 TABLET, EXTENDED RELEASE ORAL ONCE
Status: COMPLETED | OUTPATIENT
Start: 2017-03-02 | End: 2017-03-02

## 2017-03-02 RX ORDER — FUROSEMIDE 40 MG/1
40 TABLET ORAL 2 TIMES DAILY
Qty: 90 TABLET | Refills: 3 | Status: SHIPPED | OUTPATIENT
Start: 2017-03-02 | End: 2017-03-06 | Stop reason: SDUPTHER

## 2017-03-02 RX ORDER — OXYCODONE HYDROCHLORIDE AND ACETAMINOPHEN 5; 325 MG/1; MG/1
1-2 TABLET ORAL EVERY 4 HOURS PRN
Qty: 60 TABLET | Refills: 0 | Status: SHIPPED | OUTPATIENT
Start: 2017-03-02 | End: 2017-03-09

## 2017-03-02 RX ORDER — POTASSIUM CHLORIDE 20 MEQ/1
20 TABLET, EXTENDED RELEASE ORAL 2 TIMES DAILY
Qty: 60 TABLET | Refills: 3 | Status: SHIPPED | OUTPATIENT
Start: 2017-03-02 | End: 2017-03-06 | Stop reason: SDUPTHER

## 2017-03-02 RX ORDER — PSEUDOEPHEDRINE HCL 30 MG
100 TABLET ORAL 2 TIMES DAILY
COMMUNITY
Start: 2017-03-02 | End: 2017-06-15 | Stop reason: ALTCHOICE

## 2017-03-02 RX ORDER — LISINOPRIL 2.5 MG/1
2.5 TABLET ORAL DAILY
Qty: 90 TABLET | Refills: 3 | Status: SHIPPED | OUTPATIENT
Start: 2017-03-02 | End: 2018-03-26 | Stop reason: SDUPTHER

## 2017-03-02 RX ORDER — TAMSULOSIN HYDROCHLORIDE 0.4 MG/1
0.4 CAPSULE ORAL DAILY
Status: DISCONTINUED | OUTPATIENT
Start: 2017-03-02 | End: 2017-03-02 | Stop reason: HOSPADM

## 2017-03-02 RX ADMIN — OXYCODONE HYDROCHLORIDE AND ACETAMINOPHEN 2 TABLET: 5; 325 TABLET ORAL at 08:34

## 2017-03-02 RX ADMIN — TAMSULOSIN HYDROCHLORIDE 0.4 MG: 0.4 CAPSULE ORAL at 08:35

## 2017-03-02 RX ADMIN — DOCUSATE SODIUM 100 MG: 100 CAPSULE ORAL at 08:23

## 2017-03-02 RX ADMIN — ASPIRIN 81 MG: 81 TABLET, CHEWABLE ORAL at 08:23

## 2017-03-02 RX ADMIN — POTASSIUM CHLORIDE 40 MEQ: 20 TABLET, EXTENDED RELEASE ORAL at 08:21

## 2017-03-02 RX ADMIN — ENOXAPARIN SODIUM 40 MG: 40 INJECTION SUBCUTANEOUS at 08:25

## 2017-03-02 RX ADMIN — Medication 10 ML: at 08:24

## 2017-03-02 RX ADMIN — GLIMEPIRIDE 4 MG: 2 TABLET ORAL at 08:23

## 2017-03-02 RX ADMIN — FUROSEMIDE 40 MG: 10 INJECTION, SOLUTION INTRAMUSCULAR; INTRAVENOUS at 08:25

## 2017-03-02 RX ADMIN — FINASTERIDE 5 MG: 5 TABLET, FILM COATED ORAL at 08:23

## 2017-03-02 RX ADMIN — ALPRAZOLAM 0.5 MG: 0.5 TABLET ORAL at 08:35

## 2017-03-02 ASSESSMENT — PAIN SCALES - GENERAL: PAINLEVEL_OUTOF10: 6

## 2017-03-03 ENCOUNTER — TELEPHONE (OUTPATIENT)
Dept: PHARMACY | Age: 77
End: 2017-03-03

## 2017-03-03 ENCOUNTER — CARE COORDINATION (OUTPATIENT)
Dept: CASE MANAGEMENT | Age: 77
End: 2017-03-03

## 2017-03-06 ENCOUNTER — EPISODE CHANGES (OUTPATIENT)
Dept: CASE MANAGEMENT | Age: 77
End: 2017-03-06

## 2017-03-06 ENCOUNTER — TELEPHONE (OUTPATIENT)
Dept: CARDIOLOGY | Facility: CLINIC | Age: 77
End: 2017-03-06

## 2017-03-06 ENCOUNTER — OFFICE VISIT (OUTPATIENT)
Dept: CARDIOLOGY | Facility: CLINIC | Age: 77
End: 2017-03-06

## 2017-03-06 VITALS
SYSTOLIC BLOOD PRESSURE: 98 MMHG | BODY MASS INDEX: 22.62 KG/M2 | RESPIRATION RATE: 16 BRPM | OXYGEN SATURATION: 95 % | DIASTOLIC BLOOD PRESSURE: 64 MMHG | HEART RATE: 97 BPM | WEIGHT: 167 LBS | HEIGHT: 72 IN

## 2017-03-06 DIAGNOSIS — R06.02 SHORTNESS OF BREATH: ICD-10-CM

## 2017-03-06 DIAGNOSIS — E86.0 DEHYDRATION, MILD: ICD-10-CM

## 2017-03-06 DIAGNOSIS — J90 PLEURAL EFFUSION, LEFT: ICD-10-CM

## 2017-03-06 DIAGNOSIS — I50.43 ACUTE ON CHRONIC COMBINED SYSTOLIC AND DIASTOLIC CHF, NYHA CLASS 3 (HCC): Primary | ICD-10-CM

## 2017-03-06 PROCEDURE — G8484 FLU IMMUNIZE NO ADMIN: HCPCS | Performed by: FAMILY MEDICINE

## 2017-03-06 PROCEDURE — 3017F COLORECTAL CA SCREEN DOC REV: CPT | Performed by: FAMILY MEDICINE

## 2017-03-06 PROCEDURE — 99213 OFFICE O/P EST LOW 20 MIN: CPT | Performed by: FAMILY MEDICINE

## 2017-03-06 PROCEDURE — G8427 DOCREV CUR MEDS BY ELIG CLIN: HCPCS | Performed by: FAMILY MEDICINE

## 2017-03-06 PROCEDURE — 4040F PNEUMOC VAC/ADMIN/RCVD: CPT | Performed by: FAMILY MEDICINE

## 2017-03-06 PROCEDURE — 1123F ACP DISCUSS/DSCN MKR DOCD: CPT | Performed by: FAMILY MEDICINE

## 2017-03-06 PROCEDURE — G8419 CALC BMI OUT NRM PARAM NOF/U: HCPCS | Performed by: FAMILY MEDICINE

## 2017-03-06 PROCEDURE — 1036F TOBACCO NON-USER: CPT | Performed by: FAMILY MEDICINE

## 2017-03-06 PROCEDURE — 1111F DSCHRG MED/CURRENT MED MERGE: CPT | Performed by: FAMILY MEDICINE

## 2017-03-06 PROCEDURE — G8598 ASA/ANTIPLAT THER USED: HCPCS | Performed by: FAMILY MEDICINE

## 2017-03-06 RX ORDER — TRAMADOL HYDROCHLORIDE 50 MG/1
50 TABLET ORAL 3 TIMES DAILY
COMMUNITY
End: 2017-04-01 | Stop reason: SDUPTHER

## 2017-03-06 RX ORDER — FUROSEMIDE 40 MG/1
40 TABLET ORAL DAILY
Qty: 90 TABLET | Refills: 3
Start: 2017-03-06 | End: 2017-03-20 | Stop reason: SDUPTHER

## 2017-03-06 RX ORDER — POTASSIUM CHLORIDE 20 MEQ/1
20 TABLET, EXTENDED RELEASE ORAL DAILY
Qty: 60 TABLET | Refills: 3
Start: 2017-03-06 | End: 2017-03-20

## 2017-03-07 ENCOUNTER — TELEPHONE (OUTPATIENT)
Dept: CARDIOLOGY | Facility: CLINIC | Age: 77
End: 2017-03-07

## 2017-03-13 ENCOUNTER — HOSPITAL ENCOUNTER (EMERGENCY)
Age: 77
Discharge: HOME | End: 2017-03-13
Attending: FAMILY MEDICINE
Payer: MEDICARE

## 2017-03-13 ENCOUNTER — APPOINTMENT (OUTPATIENT)
Dept: GENERAL RADIOLOGY | Age: 77
End: 2017-03-13
Payer: MEDICARE

## 2017-03-13 VITALS
HEART RATE: 105 BPM | WEIGHT: 162 LBS | BODY MASS INDEX: 21.97 KG/M2 | TEMPERATURE: 96.8 F | OXYGEN SATURATION: 96 % | SYSTOLIC BLOOD PRESSURE: 96 MMHG | RESPIRATION RATE: 18 BRPM | DIASTOLIC BLOOD PRESSURE: 70 MMHG

## 2017-03-13 DIAGNOSIS — R00.0 TACHYCARDIA: ICD-10-CM

## 2017-03-13 DIAGNOSIS — R42 DIZZINESS: Primary | ICD-10-CM

## 2017-03-13 LAB
-: NORMAL
ABSOLUTE EOS #: 0.1 K/UL (ref 0–0.4)
ABSOLUTE LYMPH #: 0.4 K/UL (ref 1–4.8)
ABSOLUTE MONO #: 0.7 K/UL (ref 0–1)
AMORPHOUS: NORMAL
ANION GAP SERPL CALCULATED.3IONS-SCNC: 12 MMOL/L (ref 9–17)
BACTERIA: NORMAL
BASOPHILS # BLD: 0 % (ref 0–2)
BASOPHILS ABSOLUTE: 0 K/UL (ref 0–0.2)
BILIRUBIN URINE: NEGATIVE
BUN BLDV-MCNC: 48 MG/DL (ref 8–23)
BUN/CREAT BLD: 33 (ref 9–20)
CALCIUM SERPL-MCNC: 9 MG/DL (ref 8.6–10.4)
CASTS UA: NORMAL /LPF
CHLORIDE BLD-SCNC: 98 MMOL/L (ref 98–107)
CO2: 26 MMOL/L (ref 20–31)
COLOR: YELLOW
COMMENT UA: ABNORMAL
CREAT SERPL-MCNC: 1.47 MG/DL (ref 0.7–1.2)
CRYSTALS, UA: NORMAL /HPF
DIFFERENTIAL TYPE: ABNORMAL
EOSINOPHILS RELATIVE PERCENT: 2 % (ref 0–8)
EPITHELIAL CELLS UA: NORMAL /HPF (ref 0–5)
GFR AFRICAN AMERICAN: 57 ML/MIN
GFR NON-AFRICAN AMERICAN: 47 ML/MIN
GFR SERPL CREATININE-BSD FRML MDRD: ABNORMAL ML/MIN/{1.73_M2}
GFR SERPL CREATININE-BSD FRML MDRD: ABNORMAL ML/MIN/{1.73_M2}
GLUCOSE BLD-MCNC: 185 MG/DL (ref 70–99)
GLUCOSE URINE: NEGATIVE
HCT VFR BLD CALC: 32.7 % (ref 41–53)
HEMOGLOBIN: 10.5 G/DL (ref 13.5–17)
INR BLD: 1.2 (ref 0.9–1.2)
KETONES, URINE: NEGATIVE
LEUKOCYTE ESTERASE, URINE: NEGATIVE
LYMPHOCYTES # BLD: 5 % (ref 24–44)
MCH RBC QN AUTO: 27.9 PG (ref 26–34)
MCHC RBC AUTO-ENTMCNC: 32.1 G/DL (ref 31–37)
MCV RBC AUTO: 86.8 FL (ref 80–100)
MONOCYTES # BLD: 9 % (ref 0–12)
MUCUS: NORMAL
NITRITE, URINE: NEGATIVE
OTHER OBSERVATIONS UA: NORMAL
PDW BLD-RTO: 17.6 % (ref 12.1–15.2)
PH UA: 5 (ref 5–9)
PLATELET # BLD: 301 K/UL (ref 140–450)
PLATELET ESTIMATE: ABNORMAL
PMV BLD AUTO: 9.9 FL (ref 6–12)
POTASSIUM SERPL-SCNC: 4.8 MMOL/L (ref 3.7–5.3)
PROTEIN UA: NEGATIVE
PROTHROMBIN TIME: 12.2 SEC (ref 9.7–12.2)
RBC # BLD: 3.77 M/UL (ref 4.5–5.9)
RBC # BLD: ABNORMAL 10*6/UL
RBC UA: NORMAL /HPF (ref 0–2)
RENAL EPITHELIAL, UA: NORMAL /HPF
SEG NEUTROPHILS: 84 % (ref 36–66)
SEGMENTED NEUTROPHILS ABSOLUTE COUNT: 6.9 K/UL (ref 1.8–7.7)
SODIUM BLD-SCNC: 136 MMOL/L (ref 135–144)
SPECIFIC GRAVITY UA: 1.01 (ref 1.01–1.02)
TRICHOMONAS: NORMAL
TROPONIN INTERP: NORMAL
TROPONIN T: <0.03 NG/ML
TURBIDITY: CLEAR
URINE HGB: ABNORMAL
UROBILINOGEN, URINE: NORMAL
WBC # BLD: 8.1 K/UL (ref 3.5–11)
WBC # BLD: ABNORMAL 10*3/UL
WBC UA: NORMAL /HPF (ref 0–5)
YEAST: NORMAL

## 2017-03-13 PROCEDURE — 2580000003 HC RX 258: Performed by: FAMILY MEDICINE

## 2017-03-13 PROCEDURE — 93005 ELECTROCARDIOGRAM TRACING: CPT

## 2017-03-13 PROCEDURE — 81001 URINALYSIS AUTO W/SCOPE: CPT

## 2017-03-13 PROCEDURE — 99284 EMERGENCY DEPT VISIT MOD MDM: CPT

## 2017-03-13 PROCEDURE — 80048 BASIC METABOLIC PNL TOTAL CA: CPT

## 2017-03-13 PROCEDURE — 71010 XR CHEST LIMITED ONE VIEW: CPT

## 2017-03-13 PROCEDURE — 84484 ASSAY OF TROPONIN QUANT: CPT

## 2017-03-13 PROCEDURE — 85025 COMPLETE CBC W/AUTO DIFF WBC: CPT

## 2017-03-13 PROCEDURE — 85610 PROTHROMBIN TIME: CPT

## 2017-03-13 RX ORDER — 0.9 % SODIUM CHLORIDE 0.9 %
500 INTRAVENOUS SOLUTION INTRAVENOUS ONCE
Status: COMPLETED | OUTPATIENT
Start: 2017-03-13 | End: 2017-03-13

## 2017-03-13 RX ORDER — OXYCODONE HYDROCHLORIDE AND ACETAMINOPHEN 5; 325 MG/1; MG/1
2 TABLET ORAL EVERY 4 HOURS PRN
COMMUNITY
End: 2017-03-16 | Stop reason: ALTCHOICE

## 2017-03-13 RX ORDER — 0.9 % SODIUM CHLORIDE 0.9 %
250 INTRAVENOUS SOLUTION INTRAVENOUS ONCE
Status: COMPLETED | OUTPATIENT
Start: 2017-03-13 | End: 2017-03-13

## 2017-03-13 RX ADMIN — SODIUM CHLORIDE 500 ML: 9 INJECTION, SOLUTION INTRAVENOUS at 10:59

## 2017-03-13 RX ADMIN — SODIUM CHLORIDE 250 ML: 9 INJECTION, SOLUTION INTRAVENOUS at 13:11

## 2017-03-13 ASSESSMENT — PAIN SCALES - GENERAL: PAINLEVEL_OUTOF10: 7

## 2017-03-13 ASSESSMENT — PAIN DESCRIPTION - LOCATION: LOCATION: SHOULDER

## 2017-03-13 ASSESSMENT — PAIN DESCRIPTION - ORIENTATION: ORIENTATION: LEFT

## 2017-03-13 ASSESSMENT — PAIN DESCRIPTION - DESCRIPTORS: DESCRIPTORS: DULL

## 2017-03-13 ASSESSMENT — PAIN DESCRIPTION - PAIN TYPE: TYPE: ACUTE PAIN

## 2017-03-14 ENCOUNTER — TELEPHONE (OUTPATIENT)
Dept: CARDIOTHORACIC SURGERY | Age: 77
End: 2017-03-14

## 2017-03-14 DIAGNOSIS — Z98.890 S/P THORACOTOMY: Primary | ICD-10-CM

## 2017-03-14 LAB
EKG ATRIAL RATE: 104 BPM
EKG P AXIS: 59 DEGREES
EKG Q-T INTERVAL: 416 MS
EKG QRS DURATION: 160 MS
EKG QTC CALCULATION (BAZETT): 547 MS
EKG R AXIS: -42 DEGREES
EKG T AXIS: 75 DEGREES
EKG VENTRICULAR RATE: 104 BPM

## 2017-03-15 ENCOUNTER — HOSPITAL ENCOUNTER (OUTPATIENT)
Dept: GENERAL RADIOLOGY | Age: 77
Discharge: HOME | End: 2017-03-15
Payer: MEDICARE

## 2017-03-15 ENCOUNTER — HOSPITAL ENCOUNTER (OUTPATIENT)
Age: 77
Discharge: HOME | End: 2017-03-15
Payer: MEDICARE

## 2017-03-15 ENCOUNTER — OFFICE VISIT (OUTPATIENT)
Dept: CARDIOTHORACIC SURGERY | Age: 77
End: 2017-03-15

## 2017-03-15 VITALS
HEIGHT: 73 IN | SYSTOLIC BLOOD PRESSURE: 110 MMHG | HEART RATE: 96 BPM | OXYGEN SATURATION: 98 % | TEMPERATURE: 97.4 F | RESPIRATION RATE: 16 BRPM | DIASTOLIC BLOOD PRESSURE: 65 MMHG | WEIGHT: 173 LBS | BODY MASS INDEX: 22.93 KG/M2

## 2017-03-15 DIAGNOSIS — Z98.890 S/P THORACOTOMY: ICD-10-CM

## 2017-03-15 DIAGNOSIS — Z09 FOLLOW UP: Primary | ICD-10-CM

## 2017-03-15 DIAGNOSIS — Z98.890 STATUS POST THORACOTOMY: ICD-10-CM

## 2017-03-15 PROCEDURE — 99024 POSTOP FOLLOW-UP VISIT: CPT | Performed by: NURSE PRACTITIONER

## 2017-03-15 PROCEDURE — 71020 XR CHEST STANDARD TWO VW: CPT

## 2017-03-15 PROCEDURE — 1036F TOBACCO NON-USER: CPT | Performed by: NURSE PRACTITIONER

## 2017-03-20 ENCOUNTER — OFFICE VISIT (OUTPATIENT)
Dept: CARDIOLOGY | Age: 77
End: 2017-03-20
Payer: MEDICARE

## 2017-03-20 VITALS
WEIGHT: 169 LBS | SYSTOLIC BLOOD PRESSURE: 101 MMHG | DIASTOLIC BLOOD PRESSURE: 59 MMHG | RESPIRATION RATE: 16 BRPM | HEART RATE: 105 BPM | OXYGEN SATURATION: 94 % | BODY MASS INDEX: 22.89 KG/M2 | HEIGHT: 72 IN

## 2017-03-20 DIAGNOSIS — I42.8 NON-ISCHEMIC CARDIOMYOPATHY (HCC): ICD-10-CM

## 2017-03-20 DIAGNOSIS — I20.9 ANGINA, CLASS III (HCC): ICD-10-CM

## 2017-03-20 DIAGNOSIS — I50.42 CHRONIC COMBINED SYSTOLIC AND DIASTOLIC CHF, NYHA CLASS 3 (HCC): Primary | ICD-10-CM

## 2017-03-20 PROCEDURE — 3017F COLORECTAL CA SCREEN DOC REV: CPT | Performed by: FAMILY MEDICINE

## 2017-03-20 PROCEDURE — 1123F ACP DISCUSS/DSCN MKR DOCD: CPT | Performed by: FAMILY MEDICINE

## 2017-03-20 PROCEDURE — G8484 FLU IMMUNIZE NO ADMIN: HCPCS | Performed by: FAMILY MEDICINE

## 2017-03-20 PROCEDURE — 1111F DSCHRG MED/CURRENT MED MERGE: CPT | Performed by: FAMILY MEDICINE

## 2017-03-20 PROCEDURE — G8598 ASA/ANTIPLAT THER USED: HCPCS | Performed by: FAMILY MEDICINE

## 2017-03-20 PROCEDURE — 1036F TOBACCO NON-USER: CPT | Performed by: FAMILY MEDICINE

## 2017-03-20 PROCEDURE — 4040F PNEUMOC VAC/ADMIN/RCVD: CPT | Performed by: FAMILY MEDICINE

## 2017-03-20 PROCEDURE — 99214 OFFICE O/P EST MOD 30 MIN: CPT | Performed by: FAMILY MEDICINE

## 2017-03-20 PROCEDURE — G8419 CALC BMI OUT NRM PARAM NOF/U: HCPCS | Performed by: FAMILY MEDICINE

## 2017-03-20 PROCEDURE — G8427 DOCREV CUR MEDS BY ELIG CLIN: HCPCS | Performed by: FAMILY MEDICINE

## 2017-03-20 RX ORDER — FUROSEMIDE 40 MG/1
20 TABLET ORAL DAILY
Qty: 90 TABLET | Refills: 3
Start: 2017-03-20 | End: 2017-04-10 | Stop reason: SDUPTHER

## 2017-03-20 RX ORDER — METOPROLOL SUCCINATE 25 MG/1
25 TABLET, EXTENDED RELEASE ORAL DAILY
Qty: 90 TABLET | Refills: 3 | Status: ON HOLD | OUTPATIENT
Start: 2017-03-20 | End: 2017-06-16 | Stop reason: HOSPADM

## 2017-03-28 ENCOUNTER — CARE COORDINATOR VISIT (OUTPATIENT)
Dept: CASE MANAGEMENT | Age: 77
End: 2017-03-28

## 2017-03-28 ENCOUNTER — TELEPHONE (OUTPATIENT)
Dept: CARDIOLOGY | Age: 77
End: 2017-03-28

## 2017-04-10 ENCOUNTER — OFFICE VISIT (OUTPATIENT)
Dept: CARDIOLOGY | Age: 77
End: 2017-04-10
Payer: MEDICARE

## 2017-04-10 ENCOUNTER — APPOINTMENT (OUTPATIENT)
Dept: GENERAL RADIOLOGY | Age: 77
End: 2017-04-10
Payer: MEDICARE

## 2017-04-10 ENCOUNTER — HOSPITAL ENCOUNTER (OUTPATIENT)
Dept: GENERAL RADIOLOGY | Age: 77
Discharge: HOME | End: 2017-04-10
Payer: MEDICARE

## 2017-04-10 ENCOUNTER — HOSPITAL ENCOUNTER (OUTPATIENT)
Age: 77
Discharge: HOME | End: 2017-04-10
Payer: MEDICARE

## 2017-04-10 VITALS
DIASTOLIC BLOOD PRESSURE: 50 MMHG | WEIGHT: 170 LBS | OXYGEN SATURATION: 99 % | HEART RATE: 80 BPM | BODY MASS INDEX: 23.03 KG/M2 | SYSTOLIC BLOOD PRESSURE: 94 MMHG | RESPIRATION RATE: 16 BRPM | HEIGHT: 72 IN

## 2017-04-10 DIAGNOSIS — J90 RECURRENT LEFT PLEURAL EFFUSION: ICD-10-CM

## 2017-04-10 DIAGNOSIS — R06.02 SHORTNESS OF BREATH: ICD-10-CM

## 2017-04-10 DIAGNOSIS — I50.42 CHRONIC COMBINED SYSTOLIC AND DIASTOLIC CHF, NYHA CLASS 3 (HCC): Primary | ICD-10-CM

## 2017-04-10 PROCEDURE — 93283 PRGRMG EVAL IMPLANTABLE DFB: CPT | Performed by: FAMILY MEDICINE

## 2017-04-10 PROCEDURE — 99214 OFFICE O/P EST MOD 30 MIN: CPT | Performed by: FAMILY MEDICINE

## 2017-04-10 PROCEDURE — G8428 CUR MEDS NOT DOCUMENT: HCPCS | Performed by: FAMILY MEDICINE

## 2017-04-10 PROCEDURE — 1036F TOBACCO NON-USER: CPT | Performed by: FAMILY MEDICINE

## 2017-04-10 PROCEDURE — 3017F COLORECTAL CA SCREEN DOC REV: CPT | Performed by: FAMILY MEDICINE

## 2017-04-10 PROCEDURE — G8420 CALC BMI NORM PARAMETERS: HCPCS | Performed by: FAMILY MEDICINE

## 2017-04-10 PROCEDURE — 1123F ACP DISCUSS/DSCN MKR DOCD: CPT | Performed by: FAMILY MEDICINE

## 2017-04-10 PROCEDURE — 4040F PNEUMOC VAC/ADMIN/RCVD: CPT | Performed by: FAMILY MEDICINE

## 2017-04-10 PROCEDURE — G8598 ASA/ANTIPLAT THER USED: HCPCS | Performed by: FAMILY MEDICINE

## 2017-04-10 PROCEDURE — 71020 XR CHEST STANDARD TWO VW: CPT

## 2017-04-10 PROCEDURE — 93000 ELECTROCARDIOGRAM COMPLETE: CPT | Performed by: FAMILY MEDICINE

## 2017-04-10 RX ORDER — POTASSIUM CHLORIDE 1500 MG/1
20 TABLET, FILM COATED, EXTENDED RELEASE ORAL DAILY
COMMUNITY
End: 2021-09-28

## 2017-04-10 RX ORDER — FUROSEMIDE 40 MG/1
40 TABLET ORAL DAILY
Qty: 90 TABLET | Refills: 3
Start: 2017-04-10 | End: 2017-06-08

## 2017-04-11 DIAGNOSIS — I42.8 NON-ISCHEMIC CARDIOMYOPATHY (HCC): Primary | ICD-10-CM

## 2017-04-11 DIAGNOSIS — Z95.810 BIVENTRICULAR ICD (IMPLANTABLE CARDIOVERTER-DEFIBRILLATOR) IN PLACE: ICD-10-CM

## 2017-04-25 ENCOUNTER — TELEPHONE (OUTPATIENT)
Dept: CARDIOLOGY | Age: 77
End: 2017-04-25

## 2017-06-05 ENCOUNTER — HOSPITAL ENCOUNTER (OUTPATIENT)
Dept: GENERAL RADIOLOGY | Age: 77
Discharge: HOME | End: 2017-06-05
Payer: MEDICARE

## 2017-06-05 ENCOUNTER — HOSPITAL ENCOUNTER (OUTPATIENT)
Age: 77
Discharge: HOME | End: 2017-06-05
Payer: MEDICARE

## 2017-06-05 DIAGNOSIS — Z95.810 S/P ICD (INTERNAL CARDIAC DEFIBRILLATOR) PROCEDURE: Primary | ICD-10-CM

## 2017-06-05 DIAGNOSIS — Z95.810 S/P ICD (INTERNAL CARDIAC DEFIBRILLATOR) PROCEDURE: ICD-10-CM

## 2017-06-05 PROCEDURE — 71020 XR CHEST STANDARD TWO VW: CPT

## 2017-06-07 ENCOUNTER — HOSPITAL ENCOUNTER (OUTPATIENT)
Age: 77
Discharge: HOME | End: 2017-06-07
Payer: MEDICARE

## 2017-06-07 ENCOUNTER — HOSPITAL ENCOUNTER (OUTPATIENT)
Dept: GENERAL RADIOLOGY | Age: 77
Discharge: HOME | End: 2017-06-07
Payer: MEDICARE

## 2017-06-07 ENCOUNTER — OFFICE VISIT (OUTPATIENT)
Dept: CARDIOLOGY | Age: 77
End: 2017-06-07
Payer: MEDICARE

## 2017-06-07 VITALS
HEIGHT: 72 IN | BODY MASS INDEX: 23.43 KG/M2 | DIASTOLIC BLOOD PRESSURE: 61 MMHG | HEART RATE: 76 BPM | OXYGEN SATURATION: 96 % | SYSTOLIC BLOOD PRESSURE: 101 MMHG | WEIGHT: 173 LBS | RESPIRATION RATE: 20 BRPM

## 2017-06-07 DIAGNOSIS — I42.8 NON-ISCHEMIC CARDIOMYOPATHY (HCC): ICD-10-CM

## 2017-06-07 DIAGNOSIS — T82.118A ICD (IMPLANTABLE CARDIOVERTER-DEFIBRILLATOR) MALFUNCTION, INITIAL ENCOUNTER: Primary | ICD-10-CM

## 2017-06-07 DIAGNOSIS — T82.118A ICD (IMPLANTABLE CARDIOVERTER-DEFIBRILLATOR) MALFUNCTION, INITIAL ENCOUNTER: ICD-10-CM

## 2017-06-07 PROCEDURE — 1123F ACP DISCUSS/DSCN MKR DOCD: CPT | Performed by: FAMILY MEDICINE

## 2017-06-07 PROCEDURE — 1036F TOBACCO NON-USER: CPT | Performed by: FAMILY MEDICINE

## 2017-06-07 PROCEDURE — 93289 INTERROG DEVICE EVAL HEART: CPT | Performed by: FAMILY MEDICINE

## 2017-06-07 PROCEDURE — 71010 XR CHEST LIMITED ONE VIEW: CPT

## 2017-06-07 PROCEDURE — G8427 DOCREV CUR MEDS BY ELIG CLIN: HCPCS | Performed by: FAMILY MEDICINE

## 2017-06-07 PROCEDURE — 99214 OFFICE O/P EST MOD 30 MIN: CPT | Performed by: FAMILY MEDICINE

## 2017-06-07 PROCEDURE — G8420 CALC BMI NORM PARAMETERS: HCPCS | Performed by: FAMILY MEDICINE

## 2017-06-07 PROCEDURE — G8598 ASA/ANTIPLAT THER USED: HCPCS | Performed by: FAMILY MEDICINE

## 2017-06-07 PROCEDURE — 4040F PNEUMOC VAC/ADMIN/RCVD: CPT | Performed by: FAMILY MEDICINE

## 2017-06-08 ENCOUNTER — HOSPITAL ENCOUNTER (OUTPATIENT)
Dept: CARDIAC CATH/INVASIVE PROCEDURES | Age: 77
Discharge: HOME | End: 2017-06-08
Payer: MEDICARE

## 2017-06-08 VITALS
WEIGHT: 173 LBS | SYSTOLIC BLOOD PRESSURE: 106 MMHG | BODY MASS INDEX: 23.43 KG/M2 | HEIGHT: 72 IN | RESPIRATION RATE: 16 BRPM | HEART RATE: 72 BPM | OXYGEN SATURATION: 100 % | TEMPERATURE: 96.9 F | DIASTOLIC BLOOD PRESSURE: 59 MMHG

## 2017-06-08 PROCEDURE — 75820 VEIN X-RAY ARM/LEG: CPT | Performed by: FAMILY MEDICINE

## 2017-06-08 PROCEDURE — 36005 INJECTION EXT VENOGRAPHY: CPT | Performed by: FAMILY MEDICINE

## 2017-06-08 RX ORDER — FUROSEMIDE 40 MG/1
80 TABLET ORAL DAILY
Qty: 90 TABLET | Refills: 3 | Status: SHIPPED | OUTPATIENT
Start: 2017-06-08 | End: 2017-07-11 | Stop reason: SDUPTHER

## 2017-06-08 RX ORDER — SODIUM CHLORIDE 0.9 % (FLUSH) 0.9 %
10 SYRINGE (ML) INJECTION EVERY 12 HOURS SCHEDULED
Status: DISCONTINUED | OUTPATIENT
Start: 2017-06-08 | End: 2017-06-09 | Stop reason: HOSPADM

## 2017-06-08 RX ORDER — SODIUM CHLORIDE 0.9 % (FLUSH) 0.9 %
10 SYRINGE (ML) INJECTION PRN
Status: DISCONTINUED | OUTPATIENT
Start: 2017-06-08 | End: 2017-06-09 | Stop reason: HOSPADM

## 2017-06-08 RX ORDER — NITROGLYCERIN 0.4 MG/1
0.4 TABLET SUBLINGUAL EVERY 5 MIN PRN
Status: DISCONTINUED | OUTPATIENT
Start: 2017-06-08 | End: 2017-06-09 | Stop reason: HOSPADM

## 2017-06-08 RX ORDER — SODIUM CHLORIDE 9 MG/ML
INJECTION, SOLUTION INTRAVENOUS CONTINUOUS
Status: DISCONTINUED | OUTPATIENT
Start: 2017-06-08 | End: 2017-06-09 | Stop reason: HOSPADM

## 2017-06-08 RX ORDER — DIPHENHYDRAMINE HCL 25 MG
50 TABLET ORAL ONCE
Status: DISCONTINUED | OUTPATIENT
Start: 2017-06-08 | End: 2017-06-09 | Stop reason: HOSPADM

## 2017-06-08 RX ORDER — ACETAMINOPHEN 325 MG/1
650 TABLET ORAL EVERY 4 HOURS PRN
Status: DISCONTINUED | OUTPATIENT
Start: 2017-06-08 | End: 2017-06-09 | Stop reason: HOSPADM

## 2017-06-13 ENCOUNTER — HOSPITAL ENCOUNTER (OUTPATIENT)
Age: 77
Discharge: HOME | End: 2017-06-13
Payer: MEDICARE

## 2017-06-13 DIAGNOSIS — Z95.810 ICD (IMPLANTABLE CARDIOVERTER-DEFIBRILLATOR) IN PLACE: ICD-10-CM

## 2017-06-13 DIAGNOSIS — E11.9 TYPE 2 DIABETES MELLITUS WITHOUT COMPLICATION, WITHOUT LONG-TERM CURRENT USE OF INSULIN (HCC): Chronic | ICD-10-CM

## 2017-06-13 DIAGNOSIS — I42.8 NON-ISCHEMIC CARDIOMYOPATHY (HCC): ICD-10-CM

## 2017-06-13 DIAGNOSIS — T82.118A ICD (IMPLANTABLE CARDIOVERTER-DEFIBRILLATOR) MALFUNCTION, INITIAL ENCOUNTER: Primary | ICD-10-CM

## 2017-06-13 LAB
CREATININE URINE: 63.2 MG/DL (ref 39–259)
ESTIMATED AVERAGE GLUCOSE: 146 MG/DL
HBA1C MFR BLD: 6.7 % (ref 4.8–5.9)
MICROALBUMIN/CREAT 24H UR: <12 MG/L
MICROALBUMIN/CREAT UR-RTO: 19 MCG/MG CREAT

## 2017-06-13 PROCEDURE — 82043 UR ALBUMIN QUANTITATIVE: CPT

## 2017-06-13 PROCEDURE — 82570 ASSAY OF URINE CREATININE: CPT

## 2017-06-13 PROCEDURE — 36415 COLL VENOUS BLD VENIPUNCTURE: CPT

## 2017-06-13 PROCEDURE — 83036 HEMOGLOBIN GLYCOSYLATED A1C: CPT

## 2017-06-15 ENCOUNTER — HOSPITAL ENCOUNTER (OUTPATIENT)
Dept: CARDIAC CATH/INVASIVE PROCEDURES | Age: 77
LOS: 1 days | Discharge: HOME | End: 2017-06-16
Attending: INTERNAL MEDICINE | Admitting: INTERNAL MEDICINE
Payer: MEDICARE

## 2017-06-15 LAB
GFR NON-AFRICAN AMERICAN: 34 ML/MIN
GFR SERPL CREATININE-BSD FRML MDRD: 41 ML/MIN
GFR SERPL CREATININE-BSD FRML MDRD: ABNORMAL ML/MIN/{1.73_M2}
GLUCOSE BLD-MCNC: 120 MG/DL (ref 74–100)
PLATELET # BLD: 206 K/UL (ref 140–450)
POC CHLORIDE: 106 MMOL/L (ref 98–107)
POC CREATININE: 1.93 MG/DL (ref 0.51–1.19)
POC HEMATOCRIT: 38 % (ref 41–53)
POC HEMOGLOBIN: 12.8 G/DL (ref 13.5–17.5)
POC POTASSIUM: 4.5 MMOL/L (ref 3.5–4.5)
POC SODIUM: 142 MMOL/L (ref 138–146)

## 2017-06-15 PROCEDURE — C1894 INTRO/SHEATH, NON-LASER: HCPCS

## 2017-06-15 PROCEDURE — 84295 ASSAY OF SERUM SODIUM: CPT

## 2017-06-15 PROCEDURE — 33216 INSERT 1 ELECTRODE PM-DEFIB: CPT | Performed by: INTERNAL MEDICINE

## 2017-06-15 PROCEDURE — 82947 ASSAY GLUCOSE BLOOD QUANT: CPT

## 2017-06-15 PROCEDURE — 6360000002 HC RX W HCPCS

## 2017-06-15 PROCEDURE — 2580000003 HC RX 258

## 2017-06-15 PROCEDURE — 2720000001 HC MISC SURG SUPPLY STERILE $51-500

## 2017-06-15 PROCEDURE — 85049 AUTOMATED PLATELET COUNT: CPT

## 2017-06-15 PROCEDURE — 33244 REMOVE ELCTRD TRANSVENOUSLY: CPT | Performed by: INTERNAL MEDICINE

## 2017-06-15 PROCEDURE — C1769 GUIDE WIRE: HCPCS

## 2017-06-15 PROCEDURE — 82565 ASSAY OF CREATININE: CPT

## 2017-06-15 PROCEDURE — C1777 LEAD, AICD, ENDO SINGLE COIL: HCPCS

## 2017-06-15 PROCEDURE — 84132 ASSAY OF SERUM POTASSIUM: CPT

## 2017-06-15 PROCEDURE — 85014 HEMATOCRIT: CPT

## 2017-06-15 PROCEDURE — 6370000000 HC RX 637 (ALT 250 FOR IP): Performed by: INTERNAL MEDICINE

## 2017-06-15 PROCEDURE — 2500000003 HC RX 250 WO HCPCS

## 2017-06-15 PROCEDURE — 82435 ASSAY OF BLOOD CHLORIDE: CPT

## 2017-06-15 RX ORDER — METOPROLOL SUCCINATE 25 MG/1
25 TABLET, EXTENDED RELEASE ORAL DAILY
Status: DISCONTINUED | OUTPATIENT
Start: 2017-06-15 | End: 2017-06-16 | Stop reason: HOSPADM

## 2017-06-15 RX ORDER — SODIUM CHLORIDE 9 MG/ML
INJECTION, SOLUTION INTRAVENOUS CONTINUOUS
Status: DISCONTINUED | OUTPATIENT
Start: 2017-06-15 | End: 2017-06-16 | Stop reason: HOSPADM

## 2017-06-15 RX ORDER — ALPRAZOLAM 0.25 MG/1
0.5 TABLET ORAL NIGHTLY PRN
Status: DISCONTINUED | OUTPATIENT
Start: 2017-06-15 | End: 2017-06-16 | Stop reason: HOSPADM

## 2017-06-15 RX ORDER — SIMVASTATIN 40 MG
40 TABLET ORAL NIGHTLY
Status: DISCONTINUED | OUTPATIENT
Start: 2017-06-15 | End: 2017-06-16 | Stop reason: HOSPADM

## 2017-06-15 RX ORDER — FUROSEMIDE 40 MG/1
80 TABLET ORAL DAILY
Status: DISCONTINUED | OUTPATIENT
Start: 2017-06-15 | End: 2017-06-16 | Stop reason: HOSPADM

## 2017-06-15 RX ORDER — POTASSIUM CHLORIDE 600 MG/1
20 TABLET, FILM COATED, EXTENDED RELEASE ORAL DAILY
Status: DISCONTINUED | OUTPATIENT
Start: 2017-06-15 | End: 2017-06-16 | Stop reason: HOSPADM

## 2017-06-15 RX ORDER — SODIUM CHLORIDE 9 MG/ML
INJECTION, SOLUTION INTRAVENOUS CONTINUOUS
Status: DISCONTINUED | OUTPATIENT
Start: 2017-06-15 | End: 2017-06-15

## 2017-06-15 RX ORDER — LISINOPRIL 2.5 MG/1
2.5 TABLET ORAL DAILY
Status: DISCONTINUED | OUTPATIENT
Start: 2017-06-15 | End: 2017-06-16 | Stop reason: HOSPADM

## 2017-06-15 RX ORDER — ASPIRIN 81 MG/1
81 TABLET, CHEWABLE ORAL DAILY
Status: DISCONTINUED | OUTPATIENT
Start: 2017-06-15 | End: 2017-06-16 | Stop reason: HOSPADM

## 2017-06-15 RX ORDER — NITROGLYCERIN 0.4 MG/1
0.4 TABLET SUBLINGUAL EVERY 5 MIN PRN
Status: DISCONTINUED | OUTPATIENT
Start: 2017-06-15 | End: 2017-06-16 | Stop reason: HOSPADM

## 2017-06-15 RX ADMIN — ALPRAZOLAM 0.5 MG: 0.25 TABLET ORAL at 23:02

## 2017-06-15 RX ADMIN — SODIUM CHLORIDE: 9 INJECTION, SOLUTION INTRAVENOUS at 11:13

## 2017-06-16 ENCOUNTER — APPOINTMENT (OUTPATIENT)
Dept: GENERAL RADIOLOGY | Age: 77
End: 2017-06-16
Attending: INTERNAL MEDICINE
Payer: MEDICARE

## 2017-06-16 VITALS
HEART RATE: 94 BPM | WEIGHT: 165.57 LBS | SYSTOLIC BLOOD PRESSURE: 104 MMHG | HEIGHT: 72 IN | OXYGEN SATURATION: 100 % | RESPIRATION RATE: 17 BRPM | DIASTOLIC BLOOD PRESSURE: 46 MMHG | TEMPERATURE: 97.4 F | BODY MASS INDEX: 22.43 KG/M2

## 2017-06-16 PROCEDURE — 71020 XR CHEST STANDARD TWO VW: CPT

## 2017-06-16 RX ORDER — ACETAMINOPHEN 325 MG/1
650 TABLET ORAL EVERY 4 HOURS PRN
Status: DISCONTINUED | OUTPATIENT
Start: 2017-06-16 | End: 2017-06-16 | Stop reason: HOSPADM

## 2017-06-17 ENCOUNTER — CARE COORDINATION (OUTPATIENT)
Dept: CASE MANAGEMENT | Age: 77
End: 2017-06-17

## 2017-06-21 ENCOUNTER — OFFICE VISIT (OUTPATIENT)
Dept: CARDIOLOGY | Age: 77
End: 2017-06-21
Payer: MEDICARE

## 2017-06-21 VITALS
OXYGEN SATURATION: 94 % | SYSTOLIC BLOOD PRESSURE: 101 MMHG | BODY MASS INDEX: 23.4 KG/M2 | HEART RATE: 80 BPM | HEIGHT: 72 IN | WEIGHT: 172.8 LBS | RESPIRATION RATE: 16 BRPM | DIASTOLIC BLOOD PRESSURE: 66 MMHG

## 2017-06-21 DIAGNOSIS — I42.8 NON-ISCHEMIC CARDIOMYOPATHY (HCC): Primary | ICD-10-CM

## 2017-06-21 DIAGNOSIS — Z95.810 BIVENTRICULAR ICD (IMPLANTABLE CARDIOVERTER-DEFIBRILLATOR) IN PLACE: ICD-10-CM

## 2017-06-21 PROCEDURE — G8420 CALC BMI NORM PARAMETERS: HCPCS | Performed by: FAMILY MEDICINE

## 2017-06-21 PROCEDURE — 1123F ACP DISCUSS/DSCN MKR DOCD: CPT | Performed by: FAMILY MEDICINE

## 2017-06-21 PROCEDURE — 99212 OFFICE O/P EST SF 10 MIN: CPT | Performed by: FAMILY MEDICINE

## 2017-06-21 PROCEDURE — 1036F TOBACCO NON-USER: CPT | Performed by: FAMILY MEDICINE

## 2017-06-21 PROCEDURE — 1111F DSCHRG MED/CURRENT MED MERGE: CPT | Performed by: FAMILY MEDICINE

## 2017-06-21 PROCEDURE — G8598 ASA/ANTIPLAT THER USED: HCPCS | Performed by: FAMILY MEDICINE

## 2017-06-21 PROCEDURE — 4040F PNEUMOC VAC/ADMIN/RCVD: CPT | Performed by: FAMILY MEDICINE

## 2017-06-21 PROCEDURE — G8428 CUR MEDS NOT DOCUMENT: HCPCS | Performed by: FAMILY MEDICINE

## 2017-07-11 ENCOUNTER — OFFICE VISIT (OUTPATIENT)
Dept: CARDIOLOGY | Age: 77
End: 2017-07-11
Payer: MEDICARE

## 2017-07-11 VITALS
HEIGHT: 72 IN | RESPIRATION RATE: 16 BRPM | WEIGHT: 173.8 LBS | HEART RATE: 88 BPM | SYSTOLIC BLOOD PRESSURE: 98 MMHG | BODY MASS INDEX: 23.54 KG/M2 | OXYGEN SATURATION: 99 % | DIASTOLIC BLOOD PRESSURE: 55 MMHG

## 2017-07-11 DIAGNOSIS — E86.0 MILD DEHYDRATION: ICD-10-CM

## 2017-07-11 DIAGNOSIS — I42.8 NON-ISCHEMIC CARDIOMYOPATHY (HCC): ICD-10-CM

## 2017-07-11 DIAGNOSIS — I20.9 ANGINA, CLASS III (HCC): Primary | ICD-10-CM

## 2017-07-11 PROCEDURE — G8427 DOCREV CUR MEDS BY ELIG CLIN: HCPCS | Performed by: FAMILY MEDICINE

## 2017-07-11 PROCEDURE — 1111F DSCHRG MED/CURRENT MED MERGE: CPT | Performed by: FAMILY MEDICINE

## 2017-07-11 PROCEDURE — 1123F ACP DISCUSS/DSCN MKR DOCD: CPT | Performed by: FAMILY MEDICINE

## 2017-07-11 PROCEDURE — G8420 CALC BMI NORM PARAMETERS: HCPCS | Performed by: FAMILY MEDICINE

## 2017-07-11 PROCEDURE — 99213 OFFICE O/P EST LOW 20 MIN: CPT | Performed by: FAMILY MEDICINE

## 2017-07-11 PROCEDURE — G8598 ASA/ANTIPLAT THER USED: HCPCS | Performed by: FAMILY MEDICINE

## 2017-07-11 PROCEDURE — 4040F PNEUMOC VAC/ADMIN/RCVD: CPT | Performed by: FAMILY MEDICINE

## 2017-07-11 PROCEDURE — 93289 INTERROG DEVICE EVAL HEART: CPT | Performed by: FAMILY MEDICINE

## 2017-07-11 PROCEDURE — 1036F TOBACCO NON-USER: CPT | Performed by: FAMILY MEDICINE

## 2017-07-11 RX ORDER — FUROSEMIDE 40 MG/1
40 TABLET ORAL DAILY
Qty: 90 TABLET | Refills: 3
Start: 2017-07-11 | End: 2018-01-02 | Stop reason: SDUPTHER

## 2017-07-13 DIAGNOSIS — Z95.810 BIVENTRICULAR ICD (IMPLANTABLE CARDIOVERTER-DEFIBRILLATOR) IN PLACE: Primary | ICD-10-CM

## 2017-07-13 DIAGNOSIS — I42.8 NON-ISCHEMIC CARDIOMYOPATHY (HCC): ICD-10-CM

## 2017-08-01 ENCOUNTER — TELEPHONE (OUTPATIENT)
Dept: CARDIOLOGY | Age: 77
End: 2017-08-01

## 2017-08-01 DIAGNOSIS — I50.42 CHRONIC COMBINED SYSTOLIC AND DIASTOLIC CHF, NYHA CLASS 3 (HCC): Primary | ICD-10-CM

## 2017-08-01 DIAGNOSIS — R06.02 SHORTNESS OF BREATH: ICD-10-CM

## 2017-08-02 ENCOUNTER — HOSPITAL ENCOUNTER (OUTPATIENT)
Age: 77
Discharge: HOME | End: 2017-08-02
Payer: MEDICARE

## 2017-08-02 DIAGNOSIS — R06.02 SHORTNESS OF BREATH: ICD-10-CM

## 2017-08-02 DIAGNOSIS — I50.42 CHRONIC COMBINED SYSTOLIC AND DIASTOLIC CHF, NYHA CLASS 3 (HCC): ICD-10-CM

## 2017-08-02 LAB
ANION GAP SERPL CALCULATED.3IONS-SCNC: 11 MMOL/L (ref 9–17)
BUN BLDV-MCNC: 31 MG/DL (ref 8–23)
BUN/CREAT BLD: 21 (ref 9–20)
CALCIUM SERPL-MCNC: 8.9 MG/DL (ref 8.6–10.4)
CHLORIDE BLD-SCNC: 98 MMOL/L (ref 98–107)
CO2: 26 MMOL/L (ref 20–31)
CREAT SERPL-MCNC: 1.47 MG/DL (ref 0.7–1.2)
GFR AFRICAN AMERICAN: 56 ML/MIN
GFR NON-AFRICAN AMERICAN: 47 ML/MIN
GFR SERPL CREATININE-BSD FRML MDRD: ABNORMAL ML/MIN/{1.73_M2}
GFR SERPL CREATININE-BSD FRML MDRD: ABNORMAL ML/MIN/{1.73_M2}
GLUCOSE BLD-MCNC: 206 MG/DL (ref 70–99)
POTASSIUM SERPL-SCNC: 4.4 MMOL/L (ref 3.7–5.3)
SODIUM BLD-SCNC: 135 MMOL/L (ref 135–144)

## 2017-08-02 PROCEDURE — 80048 BASIC METABOLIC PNL TOTAL CA: CPT

## 2017-08-02 PROCEDURE — 36415 COLL VENOUS BLD VENIPUNCTURE: CPT

## 2017-08-03 ENCOUNTER — TELEPHONE (OUTPATIENT)
Dept: CARDIOLOGY | Age: 77
End: 2017-08-03

## 2017-08-23 ENCOUNTER — HOSPITAL ENCOUNTER (OUTPATIENT)
Dept: LAB | Age: 77
Discharge: HOME | End: 2017-08-23
Payer: MEDICARE

## 2017-08-23 DIAGNOSIS — E78.5 HYPERLIPIDEMIA, UNSPECIFIED HYPERLIPIDEMIA TYPE: ICD-10-CM

## 2017-08-23 LAB
CHOLESTEROL/HDL RATIO: 2.4
CHOLESTEROL: 180 MG/DL
HDLC SERPL-MCNC: 74 MG/DL
LDL CHOLESTEROL: 87 MG/DL (ref 0–130)
TRIGL SERPL-MCNC: 95 MG/DL
VLDLC SERPL CALC-MCNC: NORMAL MG/DL (ref 1–30)

## 2017-08-23 PROCEDURE — 36415 COLL VENOUS BLD VENIPUNCTURE: CPT

## 2017-08-23 PROCEDURE — 80061 LIPID PANEL: CPT

## 2017-09-15 ENCOUNTER — HOSPITAL ENCOUNTER (OUTPATIENT)
Age: 77
Discharge: HOME | End: 2017-09-15
Payer: MEDICARE

## 2017-09-15 DIAGNOSIS — E78.5 HYPERLIPIDEMIA, UNSPECIFIED HYPERLIPIDEMIA TYPE: Chronic | ICD-10-CM

## 2017-09-15 DIAGNOSIS — E11.9 TYPE 2 DIABETES MELLITUS WITHOUT COMPLICATION, WITHOUT LONG-TERM CURRENT USE OF INSULIN (HCC): Chronic | ICD-10-CM

## 2017-09-15 DIAGNOSIS — Z12.5 SCREENING FOR PROSTATE CANCER: ICD-10-CM

## 2017-09-15 LAB
-: ABNORMAL
ALT SERPL-CCNC: 16 U/L (ref 5–41)
AMORPHOUS: ABNORMAL
AST SERPL-CCNC: 20 U/L
BACTERIA: ABNORMAL
BILIRUBIN URINE: NEGATIVE
CASTS UA: ABNORMAL /LPF
COLOR: YELLOW
COMMENT UA: ABNORMAL
CRYSTALS, UA: ABNORMAL /HPF
EPITHELIAL CELLS UA: ABNORMAL /HPF (ref 0–5)
ESTIMATED AVERAGE GLUCOSE: 137 MG/DL
GLUCOSE URINE: NEGATIVE
HBA1C MFR BLD: 6.4 % (ref 4.8–5.9)
KETONES, URINE: NEGATIVE
LEUKOCYTE ESTERASE, URINE: NEGATIVE
MUCUS: ABNORMAL
NITRITE, URINE: NEGATIVE
OTHER OBSERVATIONS UA: ABNORMAL
PH UA: 5.5 (ref 5–9)
PROSTATE SPECIFIC ANTIGEN: 1.84 UG/L
PROTEIN UA: NEGATIVE
RBC UA: ABNORMAL /HPF (ref 0–2)
RENAL EPITHELIAL, UA: ABNORMAL /HPF
SPECIFIC GRAVITY UA: 1.01 (ref 1.01–1.02)
TRICHOMONAS: ABNORMAL
TURBIDITY: CLEAR
URINE HGB: ABNORMAL
UROBILINOGEN, URINE: NORMAL
WBC UA: ABNORMAL /HPF (ref 0–5)
YEAST: ABNORMAL

## 2017-09-15 PROCEDURE — 84450 TRANSFERASE (AST) (SGOT): CPT

## 2017-09-15 PROCEDURE — 81001 URINALYSIS AUTO W/SCOPE: CPT

## 2017-09-15 PROCEDURE — 83036 HEMOGLOBIN GLYCOSYLATED A1C: CPT

## 2017-09-15 PROCEDURE — 84460 ALANINE AMINO (ALT) (SGPT): CPT

## 2017-09-15 PROCEDURE — G0103 PSA SCREENING: HCPCS

## 2017-09-15 PROCEDURE — 36415 COLL VENOUS BLD VENIPUNCTURE: CPT

## 2017-09-18 ENCOUNTER — HOSPITAL ENCOUNTER (OUTPATIENT)
Dept: GENERAL RADIOLOGY | Age: 77
Discharge: HOME | End: 2017-09-18
Payer: MEDICARE

## 2017-09-18 DIAGNOSIS — R07.9 CHEST PAIN, UNSPECIFIED TYPE: ICD-10-CM

## 2017-09-18 PROCEDURE — 71020 XR CHEST STANDARD TWO VW: CPT

## 2017-09-20 ENCOUNTER — TELEPHONE (OUTPATIENT)
Dept: CARDIOLOGY | Age: 77
End: 2017-09-20

## 2017-09-25 ENCOUNTER — TELEPHONE (OUTPATIENT)
Dept: CARDIOLOGY | Age: 77
End: 2017-09-25

## 2017-09-27 ENCOUNTER — OFFICE VISIT (OUTPATIENT)
Dept: CARDIOLOGY | Age: 77
End: 2017-09-27
Payer: MEDICARE

## 2017-09-27 ENCOUNTER — HOSPITAL ENCOUNTER (OUTPATIENT)
Dept: LAB | Age: 77
Discharge: HOME | End: 2017-09-27
Payer: MEDICARE

## 2017-09-27 VITALS
HEART RATE: 102 BPM | WEIGHT: 177 LBS | HEIGHT: 72 IN | DIASTOLIC BLOOD PRESSURE: 53 MMHG | BODY MASS INDEX: 23.98 KG/M2 | RESPIRATION RATE: 16 BRPM | SYSTOLIC BLOOD PRESSURE: 96 MMHG | OXYGEN SATURATION: 99 %

## 2017-09-27 DIAGNOSIS — I20.9 ANGINA, CLASS III (HCC): ICD-10-CM

## 2017-09-27 DIAGNOSIS — C83.12: Chronic | ICD-10-CM

## 2017-09-27 DIAGNOSIS — I50.42 CHRONIC COMBINED SYSTOLIC AND DIASTOLIC CHF, NYHA CLASS 3 (HCC): Primary | ICD-10-CM

## 2017-09-27 DIAGNOSIS — Z45.018 BIVENTRICULAR PACEMAKER CHECK: ICD-10-CM

## 2017-09-27 LAB
ABSOLUTE EOS #: 0.2 K/UL (ref 0–0.4)
ABSOLUTE LYMPH #: 0.9 K/UL (ref 1–4.8)
ABSOLUTE MONO #: 0.5 K/UL (ref 0–1)
ALBUMIN SERPL-MCNC: 4.1 G/DL (ref 3.5–5.2)
ALBUMIN/GLOBULIN RATIO: 1.1 (ref 1–2.5)
ALP BLD-CCNC: 64 U/L (ref 40–129)
ALT SERPL-CCNC: 21 U/L (ref 5–41)
ANION GAP SERPL CALCULATED.3IONS-SCNC: 11 MMOL/L (ref 9–17)
AST SERPL-CCNC: 23 U/L
BASOPHILS # BLD: 0 %
BASOPHILS ABSOLUTE: 0 K/UL (ref 0–0.2)
BILIRUB SERPL-MCNC: 0.34 MG/DL (ref 0.3–1.2)
BUN BLDV-MCNC: 39 MG/DL (ref 8–23)
BUN/CREAT BLD: 25 (ref 9–20)
CALCIUM SERPL-MCNC: 9.3 MG/DL (ref 8.6–10.4)
CHLORIDE BLD-SCNC: 95 MMOL/L (ref 98–107)
CO2: 28 MMOL/L (ref 20–31)
CREAT SERPL-MCNC: 1.56 MG/DL (ref 0.7–1.2)
DIFFERENTIAL TYPE: ABNORMAL
EOSINOPHILS RELATIVE PERCENT: 2 %
GFR AFRICAN AMERICAN: 53 ML/MIN
GFR NON-AFRICAN AMERICAN: 43 ML/MIN
GFR SERPL CREATININE-BSD FRML MDRD: ABNORMAL ML/MIN/{1.73_M2}
GFR SERPL CREATININE-BSD FRML MDRD: ABNORMAL ML/MIN/{1.73_M2}
GLUCOSE BLD-MCNC: 193 MG/DL (ref 70–99)
HCT VFR BLD CALC: 38.3 % (ref 41–53)
HEMOGLOBIN: 12.8 G/DL (ref 13.5–17)
LACTATE DEHYDROGENASE: 181 U/L (ref 135–225)
LYMPHOCYTES # BLD: 14 %
MCH RBC QN AUTO: 30.2 PG (ref 26–34)
MCHC RBC AUTO-ENTMCNC: 33.4 G/DL (ref 31–37)
MCV RBC AUTO: 90.2 FL (ref 80–100)
MONOCYTES # BLD: 8 %
PDW BLD-RTO: 14.6 % (ref 12.1–15.2)
PLATELET # BLD: 238 K/UL (ref 140–450)
PLATELET ESTIMATE: ABNORMAL
PMV BLD AUTO: 9.5 FL (ref 6–12)
POTASSIUM SERPL-SCNC: 4.4 MMOL/L (ref 3.7–5.3)
RBC # BLD: 4.24 M/UL (ref 4.5–5.9)
RBC # BLD: ABNORMAL 10*6/UL
SEG NEUTROPHILS: 76 %
SEGMENTED NEUTROPHILS ABSOLUTE COUNT: 4.8 K/UL (ref 1.8–7.7)
SODIUM BLD-SCNC: 134 MMOL/L (ref 135–144)
TOTAL PROTEIN: 7.8 G/DL (ref 6.4–8.3)
WBC # BLD: 6.4 K/UL (ref 3.5–11)
WBC # BLD: ABNORMAL 10*3/UL

## 2017-09-27 PROCEDURE — 4040F PNEUMOC VAC/ADMIN/RCVD: CPT | Performed by: FAMILY MEDICINE

## 2017-09-27 PROCEDURE — 80053 COMPREHEN METABOLIC PANEL: CPT

## 2017-09-27 PROCEDURE — 99214 OFFICE O/P EST MOD 30 MIN: CPT | Performed by: FAMILY MEDICINE

## 2017-09-27 PROCEDURE — 36415 COLL VENOUS BLD VENIPUNCTURE: CPT

## 2017-09-27 PROCEDURE — G8598 ASA/ANTIPLAT THER USED: HCPCS | Performed by: FAMILY MEDICINE

## 2017-09-27 PROCEDURE — 85025 COMPLETE CBC W/AUTO DIFF WBC: CPT

## 2017-09-27 PROCEDURE — 1036F TOBACCO NON-USER: CPT | Performed by: FAMILY MEDICINE

## 2017-09-27 PROCEDURE — G8420 CALC BMI NORM PARAMETERS: HCPCS | Performed by: FAMILY MEDICINE

## 2017-09-27 PROCEDURE — 83615 LACTATE (LD) (LDH) ENZYME: CPT

## 2017-09-27 PROCEDURE — 1123F ACP DISCUSS/DSCN MKR DOCD: CPT | Performed by: FAMILY MEDICINE

## 2017-09-27 PROCEDURE — G8427 DOCREV CUR MEDS BY ELIG CLIN: HCPCS | Performed by: FAMILY MEDICINE

## 2017-09-29 PROCEDURE — 93299 PR REM INTERROG ICPMS/SCRMS <30 D TECH REVIEW: CPT | Performed by: FAMILY MEDICINE

## 2017-09-29 PROCEDURE — 93297 REM INTERROG DEV EVAL ICPMS: CPT | Performed by: FAMILY MEDICINE

## 2017-10-03 DIAGNOSIS — Z95.810 BIVENTRICULAR ICD (IMPLANTABLE CARDIOVERTER-DEFIBRILLATOR) IN PLACE: ICD-10-CM

## 2017-10-03 DIAGNOSIS — I50.42 CHRONIC COMBINED SYSTOLIC AND DIASTOLIC CHF, NYHA CLASS 3 (HCC): ICD-10-CM

## 2017-10-03 DIAGNOSIS — I42.8 NON-ISCHEMIC CARDIOMYOPATHY (HCC): Primary | ICD-10-CM

## 2017-10-11 ENCOUNTER — OFFICE VISIT (OUTPATIENT)
Dept: ONCOLOGY | Age: 77
End: 2017-10-11
Payer: MEDICARE

## 2017-10-11 VITALS
HEIGHT: 72 IN | BODY MASS INDEX: 24.38 KG/M2 | WEIGHT: 180 LBS | TEMPERATURE: 97.6 F | DIASTOLIC BLOOD PRESSURE: 51 MMHG | SYSTOLIC BLOOD PRESSURE: 102 MMHG | HEART RATE: 85 BPM | RESPIRATION RATE: 16 BRPM

## 2017-10-11 DIAGNOSIS — C83.12: Primary | Chronic | ICD-10-CM

## 2017-10-11 PROCEDURE — G8420 CALC BMI NORM PARAMETERS: HCPCS | Performed by: INTERNAL MEDICINE

## 2017-10-11 PROCEDURE — 4040F PNEUMOC VAC/ADMIN/RCVD: CPT | Performed by: INTERNAL MEDICINE

## 2017-10-11 PROCEDURE — G8598 ASA/ANTIPLAT THER USED: HCPCS | Performed by: INTERNAL MEDICINE

## 2017-10-11 PROCEDURE — 99214 OFFICE O/P EST MOD 30 MIN: CPT | Performed by: INTERNAL MEDICINE

## 2017-10-11 PROCEDURE — G8484 FLU IMMUNIZE NO ADMIN: HCPCS | Performed by: INTERNAL MEDICINE

## 2017-10-11 PROCEDURE — 1123F ACP DISCUSS/DSCN MKR DOCD: CPT | Performed by: INTERNAL MEDICINE

## 2017-10-11 PROCEDURE — 1036F TOBACCO NON-USER: CPT | Performed by: INTERNAL MEDICINE

## 2017-10-11 PROCEDURE — G8427 DOCREV CUR MEDS BY ELIG CLIN: HCPCS | Performed by: INTERNAL MEDICINE

## 2017-10-11 NOTE — PROGRESS NOTES
Reason for the visit:   Chief Complaint   Patient presents with    Lymphoma       Pertinent Clinical Problems/ Treatments:    1. Stage III mantle cell lymphoma diagnosed in 2008  2. S/p Velcade and rituximab induction followed by maintenance rituximab till 2010  3. Cardiomyopathy with low ejection fraction,s/p AICD,doing well   4. Latest evaluation: no evidence of relapse     Summary of the case:  A 69year-old gentleman with mantle cell non-Hodgkin's lymphoma in remission after Velcade and Rituxan from May 2008, through October 2008, followed by consolidative maintenance Rituxan given every 6 months x4 cycles with the last Rituxan in August 2010. Original disease was diagnosed April 2008, per left axillary lymph node biopsy and it was CD20 positive and cyclin D1 positive, consistent with mantle cell lymphoma by both histology and flow cytometry. Patient also has moderate to severe cardiopathy and required placement of AICD  He has been followed over the years without any evidence of recurrence    Interim HX:  Mr. Rosy Horton is a pleasant, 68year-old gentleman with a history of lymphoma,  here for follow up. Location of patient's disease previously included diffuse lymphadenopathy  in the left supraclavicular neck, axillary, chest, pelvis and inguinal  areas. Severity was stage III disease and the timing of the diagnosis was  back in spring of 2008. Modifying factors are that he did have treatment  with Velcade and Rituxan, followed by maintenance Rituxan    Patient is here today for routine follow-up visit. He has chronic complaints of cardiac disease and chronic diarrhea. He has been followed by multiple cardiologists. Last note from his cardiologist was reviewed, it seems that he has active coronary artery disease and congestive heart failure, he has been through multiple therapies and underwent multiple procedures and ICD insertion.   Most recently they have been trying to manage him conservatively with medication and according to the note, his condition seems to have been improving. She and states that he continues to have anginal pain with minimal activity. He is using nitroglycerin as needed. His diarrhea, he follows with GI. He has been on multiple medication. And he is asking if he can use Imodium as needed. I think it would be okay. No GI bleeding. Patient denies any fever or chills, no night sweats   no evidence of recurrence    Lab Results   Component Value Date    WBC 6.4 09/27/2017    HGB 12.8 (L) 09/27/2017    HCT 38.3 (L) 09/27/2017    MCV 90.2 09/27/2017     09/27/2017       Past Medical History   has a past medical history of Abnormal echocardiogram; Abnormal resting ECG findings; Biventricular ICD (implantable cardiac defibrillator) in place; Biventricular ICD (implantable cardioverter-defibrillator) in place; Biventricular pacemaker check; CAD (coronary artery disease); Diabetes mellitus ([address] Esophageal reflux; Hx of blood clots; Hx of left heart catheterization by ventricular puncture; Hyperlipidemia; Hypertension; Mantle cell lymphoma, unspecified site, extranodal and solid organ sites; Nonischemic dilated cardiomyopathy ([address] Pain in joint, lower leg; Pain in limb; S/P cardiac catheterization; Thoracic or lumbosacral neuritis or radiculitis, unspecified; and Unspecified acute reaction to stress. Surgical History   has a past surgical history that includes Elbow surgery (2000); Neck surgery (1993); Foot surgery (1993); Bladder surgery (2008); Cardiac catheterization; Cardiac pacemaker placement; pacemaker placement; Skin cancer excision (9/11/13); Diagnostic Cardiac Cath Lab Procedure (04/19/13); Colonoscopy (10/15/15); Upper gastrointestinal endoscopy (10/15/15); lobectomy (N/A, 2/28/2017); and thoracotomy (02/28/2017).       Home Medications  has a current medication list which includes the following prescription(s): alprazolam, furosemide, simvastatin, metoprolol tartrate, seen in the blood pool, myocardium,    reticuloendothelial system, genitourinary system and gastrointestinal    system. IMPRESSION: No abnormal FDG accumulation                Assessment:    3 41-year-old patient who has stage III mantle cell lymphoma diagnosed in 2008, treated with Velcade rituximab followed by consolidation of rituximab, ended in 2010. Patient continues to be in remission according to my clinical examination   2. Cardiomyopathy with an AICD:  chest pain as discussed above, symptomatically treated. 3. Chronic skin damage from sun exposure: is under surveillance by dermatologist  4. GI: continues to follow with Dr. Danita Cedeno, symptomatic management of his chronic diarrhea. No GI bleeding, patient to watch for any GI bleed, mantle cell lymphoma is known to cause lymphomatous  colonic polyps  5. Chronic renal insufficiency secondary to DM, CAD. Plan:     1. Labs reviewed and discussed. No evidence of progression or recurrence / progression   2. The patient is clinically doing well and he is in clinical remission, however, he has significant symptoms related to his cardiac disease which is his biggest problem at this point. 3. Weighing the risk of progression versus the risk of death from coronary artery disease. His cardiac problems is his most important part. I think we will continue to observe him on an annual basis, no need for any future CT or PET CT scan as long as he is asymptomatic. 4. Unfortunately, overall performance status and multiple symptoms will be prohibitive to give any aggressive therapy. But if he relapses,Ibrutinib might be his best or only option  5. Patient to call for any night sweats or fever or chills or palpable adenopathy   6. RV PRN.                                     Alban Sandhoff Al-Nsour,MD  Hematologist/Medical Oncologist  [facility]  Cell: [phone]

## 2017-10-12 PROCEDURE — 93296 REM INTERROG EVL PM/IDS: CPT | Performed by: FAMILY MEDICINE

## 2017-10-12 PROCEDURE — 93295 DEV INTERROG REMOTE 1/2/MLT: CPT | Performed by: FAMILY MEDICINE

## 2017-10-16 ENCOUNTER — TELEPHONE (OUTPATIENT)
Dept: CARDIOLOGY | Age: 77
End: 2017-10-16

## 2017-10-16 DIAGNOSIS — I42.8 NON-ISCHEMIC CARDIOMYOPATHY (HCC): ICD-10-CM

## 2017-10-16 DIAGNOSIS — Z95.810 BIVENTRICULAR ICD (IMPLANTABLE CARDIOVERTER-DEFIBRILLATOR) IN PLACE: Primary | ICD-10-CM

## 2017-10-16 NOTE — TELEPHONE ENCOUNTER
Called patient and let him know that per Dr Beverley Taylor he looked over home monitor transmission and it looked ok. Pt verbalized understanding.

## 2017-11-16 PROCEDURE — 93299 PR REM INTERROG ICPMS/SCRMS <30 D TECH REVIEW: CPT | Performed by: FAMILY MEDICINE

## 2017-11-16 PROCEDURE — 93297 REM INTERROG DEV EVAL ICPMS: CPT | Performed by: FAMILY MEDICINE

## 2017-11-22 ENCOUNTER — TELEPHONE (OUTPATIENT)
Dept: CARDIOLOGY | Age: 77
End: 2017-11-22

## 2017-11-22 DIAGNOSIS — Z95.810 BIVENTRICULAR ICD (IMPLANTABLE CARDIOVERTER-DEFIBRILLATOR) IN PLACE: Primary | ICD-10-CM

## 2017-11-22 DIAGNOSIS — I42.8 NON-ISCHEMIC CARDIOMYOPATHY (HCC): ICD-10-CM

## 2017-12-18 ENCOUNTER — TELEPHONE (OUTPATIENT)
Dept: CARDIOLOGY | Age: 77
End: 2017-12-18

## 2017-12-21 DIAGNOSIS — Z95.810 BIVENTRICULAR ICD (IMPLANTABLE CARDIOVERTER-DEFIBRILLATOR) IN PLACE: ICD-10-CM

## 2017-12-21 DIAGNOSIS — I42.8 NON-ISCHEMIC CARDIOMYOPATHY (HCC): Primary | ICD-10-CM

## 2017-12-21 DIAGNOSIS — I50.42 CHRONIC COMBINED SYSTOLIC AND DIASTOLIC CHF, NYHA CLASS 3 (HCC): ICD-10-CM

## 2018-01-02 ENCOUNTER — OFFICE VISIT (OUTPATIENT)
Dept: CARDIOLOGY | Age: 78
End: 2018-01-02
Payer: MEDICARE

## 2018-01-02 VITALS
BODY MASS INDEX: 24.22 KG/M2 | HEIGHT: 72 IN | DIASTOLIC BLOOD PRESSURE: 63 MMHG | RESPIRATION RATE: 16 BRPM | HEART RATE: 95 BPM | SYSTOLIC BLOOD PRESSURE: 109 MMHG | WEIGHT: 178.8 LBS | OXYGEN SATURATION: 98 %

## 2018-01-02 DIAGNOSIS — I42.8 NON-ISCHEMIC CARDIOMYOPATHY (HCC): ICD-10-CM

## 2018-01-02 DIAGNOSIS — I50.42 CHRONIC COMBINED SYSTOLIC AND DIASTOLIC CHF, NYHA CLASS 3 (HCC): ICD-10-CM

## 2018-01-02 DIAGNOSIS — I20.9 ANGINA, CLASS III (HCC): Primary | ICD-10-CM

## 2018-01-02 PROCEDURE — 99214 OFFICE O/P EST MOD 30 MIN: CPT | Performed by: FAMILY MEDICINE

## 2018-01-02 RX ORDER — METOPROLOL SUCCINATE 50 MG/1
50 TABLET, EXTENDED RELEASE ORAL DAILY
Qty: 90 TABLET | Refills: 3 | Status: SHIPPED | OUTPATIENT
Start: 2018-01-02 | End: 2019-02-05 | Stop reason: SDUPTHER

## 2018-01-02 RX ORDER — FUROSEMIDE 20 MG/1
20 TABLET ORAL DAILY
Qty: 90 TABLET | Refills: 3 | Status: SHIPPED | OUTPATIENT
Start: 2018-01-02 | End: 2019-02-05 | Stop reason: SDUPTHER

## 2018-01-02 NOTE — PROGRESS NOTES
(H) 09/27/2017    CO2 28 09/27/2017    TSH 3.30 02/24/2017    PSA 1.84 09/15/2017    INR 1.2 03/13/2017    LABA1C 6.4 (H) 09/15/2017    LABMICR 19 (H) 06/13/2017     (H) 02/03/2013       ASSESSMENT:  1. Angina, class III ([facility]    2. Chronic combined systolic and diastolic CHF, NYHA class 3 ([facility]    3. Non-ischemic cardiomyopathy (H[facility])       PLAN:  Angina: [place] Class III: Most likely related to his cardiomyopathy. · Beta Blocker Therapy: Stop metoprolol tartrate and start Metoprolol Succinate 50 mg once daily    · Statin Therapy: Continue simvastatin (Zocor)  . · Anti-Anginal's: Nitroglycerin PRN. · Counseling:Counseled him to come to the emergency room if he develops persistent or worsening chest pain or worsening shortness of breath at any point. Chronic Combined Systolic and Diastolic Heart Failure: Echo on 3/1/2017 Ejection Fraction: 5-10%. · Beta blocker and/or calcium channel blocker: Continue Metoprolol Tartrate 25 mg bid  · ACE/ARB: Continue Lisinopril  · Diuretics: Decrease Lasix from 40 mg daily to 20 mg daily      In the meantime, I encouraged Mr. Ashanti Márquez to continue to take his current medications and follow up with you as previously scheduled. FOLLOW UP:   I told Mr. Ashanti Márquez to call my office if he had any problems, but otherwise told him to Return in about 4 months (around 5/2/2018). However, I would be happy to see him sooner should the need arise. Once again, thank you for allowing me to participate in this patients care. Please do not hesitate to contact me could I be of further assistance. Sincerely,  Satya Astudillo. Joel COTTO, MS, F.A.C.C. [facility]   [facility]  [facility], [address] [place] [address]  Phone: [phone], Fax: [phone]    I believe that the risk of significant morbidity and mortality related to the patient's current medical conditions are: Intermediate.       The documentation recorded by the scribe, accurately and completely reflects the services I personally performed and the decisions made by me. Eloy Márquez.  Tiffanie Phillip MD, MS, F.A.C.C. 1/2/2018

## 2018-01-11 PROCEDURE — 93299 PR REM INTERROG ICPMS/SCRMS <30 D TECH REVIEW: CPT | Performed by: FAMILY MEDICINE

## 2018-01-11 PROCEDURE — 93297 REM INTERROG DEV EVAL ICPMS: CPT | Performed by: FAMILY MEDICINE

## 2018-01-15 ENCOUNTER — TELEPHONE (OUTPATIENT)
Dept: CARDIOLOGY | Age: 78
End: 2018-01-15

## 2018-01-16 DIAGNOSIS — I42.8 NON-ISCHEMIC CARDIOMYOPATHY (HCC): Primary | ICD-10-CM

## 2018-01-16 DIAGNOSIS — I50.42 CHRONIC COMBINED SYSTOLIC AND DIASTOLIC CHF, NYHA CLASS 3 (HCC): ICD-10-CM

## 2018-01-23 ENCOUNTER — OFFICE VISIT (OUTPATIENT)
Dept: GASTROENTEROLOGY | Age: 78
End: 2018-01-23
Payer: MEDICARE

## 2018-01-23 VITALS
HEIGHT: 72 IN | WEIGHT: 184.4 LBS | SYSTOLIC BLOOD PRESSURE: 96 MMHG | RESPIRATION RATE: 18 BRPM | DIASTOLIC BLOOD PRESSURE: 59 MMHG | HEART RATE: 79 BPM | TEMPERATURE: 97.2 F | BODY MASS INDEX: 24.98 KG/M2

## 2018-01-23 DIAGNOSIS — K21.9 GASTROESOPHAGEAL REFLUX DISEASE, ESOPHAGITIS PRESENCE NOT SPECIFIED: ICD-10-CM

## 2018-01-23 DIAGNOSIS — R10.84 GENERALIZED ABDOMINAL PAIN: ICD-10-CM

## 2018-01-23 DIAGNOSIS — K57.30 DIVERTICULOSIS OF LARGE INTESTINE WITHOUT HEMORRHAGE: Primary | ICD-10-CM

## 2018-01-23 PROCEDURE — G8598 ASA/ANTIPLAT THER USED: HCPCS | Performed by: INTERNAL MEDICINE

## 2018-01-23 PROCEDURE — 1036F TOBACCO NON-USER: CPT | Performed by: INTERNAL MEDICINE

## 2018-01-23 PROCEDURE — G8427 DOCREV CUR MEDS BY ELIG CLIN: HCPCS | Performed by: INTERNAL MEDICINE

## 2018-01-23 PROCEDURE — 4040F PNEUMOC VAC/ADMIN/RCVD: CPT | Performed by: INTERNAL MEDICINE

## 2018-01-23 PROCEDURE — G8419 CALC BMI OUT NRM PARAM NOF/U: HCPCS | Performed by: INTERNAL MEDICINE

## 2018-01-23 PROCEDURE — 1123F ACP DISCUSS/DSCN MKR DOCD: CPT | Performed by: INTERNAL MEDICINE

## 2018-01-23 PROCEDURE — 99214 OFFICE O/P EST MOD 30 MIN: CPT | Performed by: INTERNAL MEDICINE

## 2018-01-23 PROCEDURE — G8484 FLU IMMUNIZE NO ADMIN: HCPCS | Performed by: INTERNAL MEDICINE

## 2018-01-23 NOTE — PROGRESS NOTES
EGD by me in 2015 revealed diverticulosis, 2 benign diminutive colon polyps and prominent gastric rugae as well as again demonstrating the duodenal heterotopic gastric mucosa. He had been advised on the appropriate use of omeprazole to be taken on an empty stomach before breakfast and dinner in 2013 after I became aware of the endoscopy/pH study from Dr. Sarah Beth Young. Past Medical History:   Diagnosis Date    Abnormal echocardiogram 5/2/12    EF 15%. mildly dilated LV cavity.  Abnormal resting ECG findings 4/25/12    atrial sensed, ventricular paced rhythm at 73 beats per minute. Wide QRS of 183 ms.  Biventricular ICD (implantable cardiac defibrillator) in place 8/26/11    [facility]. CRT-D    Biventricular ICD (implantable cardioverter-defibrillator) in place 10/29/2015    [employer]tr[employer]- Bi V ICD (Battery change)    Biventricular pacemaker check 5/12 1/13    LV lead turned of 1/13 due to diaphragmatic pacing.  CAD (coronary artery disease)     Diabetes mellitus ([address]     type 2    Esophageal reflux     Hx of blood clots     Hx of left heart catheterization by ventricular puncture 04/19/2013    LAD-20-30%, LCX & RCA-10-20%, Normal LVEDP,     Hyperlipidemia     Hypertension     Mantle cell lymphoma, unspecified site, extranodal and solid organ sites     Nonischemic dilated cardiomyopathy ([address]     Pain in joint, lower leg     Pain in limb     S/P cardiac catheterization 12/13/10    No significant coronary disease. EF estimated at 35%.     Thoracic or lumbosacral neuritis or radiculitis, unspecified     Unspecified acute reaction to stress         Past Surgical History:   Procedure Laterality Date    BLADDER SURGERY  2008    CARDIAC CATHETERIZATION      Patient states has had 3 in [facility] and one in [facility] last one being in [facility] 3 years ago Dr. John Kendall  10/15/15    -polyps,diverticulosis,hemorrhoids    alert, oriented to person, place, and time, normal mood, behavior, speech, dress, motor activity, and thought processes    Eyes - pupils equal and reactive, extraocular eye movements intact, sclera anicteric, conjunctiva and eyelids normal    Neck - supple, no significant adenopathy, trachea midline, thyroid not enlarged     Respiratory - clear to auscultation, no wheezes, rales or rhonchi, symmetric air entry, no tachypnea, retractions or cyanosis, no evidence of increased respiratory effort     Cardiovascular - normal rate, regular rhythm, normal S1, S2, no murmurs, rubs, clicks or gallops, normal bilateral carotid upstroke without bruits, no JVD     Gastrointestinal - soft, nontender, nondistended, no masses or organomegaly, bowel sounds normal, no hernias noted     Musculoskeletal - no joint tenderness, deformity or swelling, no muscular tenderness noted, normal range of motion     Extremities - peripheral pulses normal, no pedal edema, no clubbing or cyanosis     Skin - normal coloration and turgor, no rashes, no suspicious skin lesions noted    His lower abdominal cramping relieved by dicyclomine is related to spastic diverticulosis and appears to be under reasonably good control. I did suggest that he increase his fiber intake gradually and take a fourth dose of dicyclomine at bedtime. The upper abdominal discomfort and better mouth taste in the morning is likely related to GERD which has been documented in the past.  He is taking his omeprazole on a full stomach which dramatically decreases its effectiveness and likely explains his ongoing symptoms which had responded to omeprazole past when it was being taken appropriately. I advised him to take the medication 30-40 minutes before breakfast and dinner. We discussed whether or not to repeat an EGD at this time to rule out other upper GI lesions but he is comfortable proceeding with reevaluation.   I have no objection to this since I think it would be of

## 2018-01-25 ENCOUNTER — TELEPHONE (OUTPATIENT)
Dept: CARDIOLOGY | Age: 78
End: 2018-01-25

## 2018-02-15 ENCOUNTER — NURSE ONLY (OUTPATIENT)
Dept: CARDIOLOGY | Age: 78
End: 2018-02-15
Payer: MEDICARE

## 2018-02-15 DIAGNOSIS — Z95.810 BIVENTRICULAR ICD (IMPLANTABLE CARDIOVERTER-DEFIBRILLATOR) IN PLACE: ICD-10-CM

## 2018-02-15 DIAGNOSIS — I50.42 CHRONIC COMBINED SYSTOLIC AND DIASTOLIC CHF, NYHA CLASS 3 (HCC): Primary | ICD-10-CM

## 2018-02-15 PROCEDURE — 93296 REM INTERROG EVL PM/IDS: CPT | Performed by: FAMILY MEDICINE

## 2018-02-15 PROCEDURE — 93295 DEV INTERROG REMOTE 1/2/MLT: CPT | Performed by: FAMILY MEDICINE

## 2018-02-16 ENCOUNTER — TELEPHONE (OUTPATIENT)
Dept: CARDIOLOGY | Age: 78
End: 2018-02-16

## 2018-02-27 ENCOUNTER — HOSPITAL ENCOUNTER (OUTPATIENT)
Dept: LAB | Age: 78
Discharge: HOME | End: 2018-02-27
Payer: MEDICARE

## 2018-02-27 DIAGNOSIS — E11.9 TYPE 2 DIABETES MELLITUS WITHOUT COMPLICATION, WITHOUT LONG-TERM CURRENT USE OF INSULIN (HCC): Chronic | ICD-10-CM

## 2018-02-27 DIAGNOSIS — E78.5 HYPERLIPIDEMIA, UNSPECIFIED HYPERLIPIDEMIA TYPE: ICD-10-CM

## 2018-02-27 LAB
CHOLESTEROL, FASTING: 158 MG/DL
CHOLESTEROL/HDL RATIO: 2.9
ESTIMATED AVERAGE GLUCOSE: 151 MG/DL
HBA1C MFR BLD: 6.9 % (ref 4.8–5.9)
HDLC SERPL-MCNC: 54 MG/DL
LDL CHOLESTEROL: 80 MG/DL (ref 0–130)
TRIGLYCERIDE, FASTING: 118 MG/DL
VLDLC SERPL CALC-MCNC: NORMAL MG/DL (ref 1–30)

## 2018-02-27 PROCEDURE — 83036 HEMOGLOBIN GLYCOSYLATED A1C: CPT

## 2018-02-27 PROCEDURE — 36415 COLL VENOUS BLD VENIPUNCTURE: CPT

## 2018-02-27 PROCEDURE — 80061 LIPID PANEL: CPT

## 2018-03-20 DIAGNOSIS — I42.8 NON-ISCHEMIC CARDIOMYOPATHY (HCC): ICD-10-CM

## 2018-03-20 RX ORDER — NITROGLYCERIN 0.4 MG/1
TABLET SUBLINGUAL
Qty: 100 TABLET | Refills: 3 | Status: SHIPPED | OUTPATIENT
Start: 2018-03-20 | End: 2019-04-03 | Stop reason: SDUPTHER

## 2018-03-26 NOTE — TELEPHONE ENCOUNTER
Pt received letter in the mail stating that lisinopril was denied by prescriber and they were not able to fill it for him. Had shown to Dr. Reuben Carvalho and Dr Reuben Carvalho had stated to route it to him and he would sign refill. Called patient to verify dose and pharmacy. Pt stated it was Lisinopril 2.5 mg daily and pharmacy was [facility] pharmacy(right source). Routed medication refill to Dr. Reuben Carvalho.

## 2018-03-27 RX ORDER — LISINOPRIL 2.5 MG/1
2.5 TABLET ORAL DAILY
Qty: 90 TABLET | Refills: 3 | Status: SHIPPED | OUTPATIENT
Start: 2018-03-27 | End: 2019-02-24 | Stop reason: SDUPTHER

## 2018-04-20 ENCOUNTER — TELEPHONE (OUTPATIENT)
Dept: CARDIOLOGY | Age: 78
End: 2018-04-20

## 2018-05-02 ENCOUNTER — OFFICE VISIT (OUTPATIENT)
Dept: CARDIOLOGY | Age: 78
End: 2018-05-02
Payer: MEDICARE

## 2018-05-02 VITALS
OXYGEN SATURATION: 97 % | WEIGHT: 186 LBS | HEIGHT: 72 IN | BODY MASS INDEX: 25.19 KG/M2 | SYSTOLIC BLOOD PRESSURE: 110 MMHG | DIASTOLIC BLOOD PRESSURE: 67 MMHG | HEART RATE: 77 BPM | RESPIRATION RATE: 20 BRPM

## 2018-05-02 DIAGNOSIS — I42.8 NON-ISCHEMIC CARDIOMYOPATHY (HCC): ICD-10-CM

## 2018-05-02 DIAGNOSIS — I20.9 ANGINA, CLASS III (HCC): Primary | ICD-10-CM

## 2018-05-02 DIAGNOSIS — Z95.810 BIVENTRICULAR ICD (IMPLANTABLE CARDIOVERTER-DEFIBRILLATOR) IN PLACE: ICD-10-CM

## 2018-05-02 DIAGNOSIS — I50.42 CHRONIC COMBINED SYSTOLIC AND DIASTOLIC CHF, NYHA CLASS 3 (HCC): ICD-10-CM

## 2018-05-02 PROCEDURE — 4040F PNEUMOC VAC/ADMIN/RCVD: CPT | Performed by: FAMILY MEDICINE

## 2018-05-02 PROCEDURE — G8419 CALC BMI OUT NRM PARAM NOF/U: HCPCS | Performed by: FAMILY MEDICINE

## 2018-05-02 PROCEDURE — G8427 DOCREV CUR MEDS BY ELIG CLIN: HCPCS | Performed by: FAMILY MEDICINE

## 2018-05-02 PROCEDURE — G8598 ASA/ANTIPLAT THER USED: HCPCS | Performed by: FAMILY MEDICINE

## 2018-05-02 PROCEDURE — 99213 OFFICE O/P EST LOW 20 MIN: CPT | Performed by: FAMILY MEDICINE

## 2018-05-02 PROCEDURE — 1123F ACP DISCUSS/DSCN MKR DOCD: CPT | Performed by: FAMILY MEDICINE

## 2018-05-02 PROCEDURE — 1036F TOBACCO NON-USER: CPT | Performed by: FAMILY MEDICINE

## 2018-05-03 ENCOUNTER — NURSE ONLY (OUTPATIENT)
Dept: CARDIOLOGY | Age: 78
End: 2018-05-03
Payer: MEDICARE

## 2018-05-03 DIAGNOSIS — I50.42 CHRONIC COMBINED SYSTOLIC AND DIASTOLIC CHF, NYHA CLASS 3 (HCC): ICD-10-CM

## 2018-05-03 DIAGNOSIS — I42.8 NON-ISCHEMIC CARDIOMYOPATHY (HCC): Primary | ICD-10-CM

## 2018-05-03 DIAGNOSIS — Z95.810 BIVENTRICULAR ICD (IMPLANTABLE CARDIOVERTER-DEFIBRILLATOR) IN PLACE: ICD-10-CM

## 2018-05-04 PROCEDURE — 93299 PR REM INTERROG ICPMS/SCRMS <30 D TECH REVIEW: CPT | Performed by: FAMILY MEDICINE

## 2018-05-04 PROCEDURE — 93297 REM INTERROG DEV EVAL ICPMS: CPT | Performed by: FAMILY MEDICINE

## 2018-05-07 ENCOUNTER — TELEPHONE (OUTPATIENT)
Dept: CARDIOLOGY | Age: 78
End: 2018-05-07

## 2018-05-12 ENCOUNTER — HOSPITAL ENCOUNTER (OUTPATIENT)
Dept: LAB | Age: 78
Discharge: HOME | End: 2018-05-12
Payer: MEDICARE

## 2018-05-12 DIAGNOSIS — E78.5 HYPERLIPIDEMIA, UNSPECIFIED HYPERLIPIDEMIA TYPE: Chronic | ICD-10-CM

## 2018-05-12 DIAGNOSIS — E11.9 TYPE 2 DIABETES MELLITUS WITHOUT COMPLICATION, WITHOUT LONG-TERM CURRENT USE OF INSULIN (HCC): Chronic | ICD-10-CM

## 2018-05-12 LAB
CHOLESTEROL, FASTING: 220 MG/DL
CHOLESTEROL/HDL RATIO: 4.6
CREATININE URINE: 76.3 MG/DL (ref 39–259)
ESTIMATED AVERAGE GLUCOSE: 146 MG/DL
HBA1C MFR BLD: 6.7 % (ref 4.8–5.9)
HDLC SERPL-MCNC: 48 MG/DL
LDL CHOLESTEROL: 142 MG/DL (ref 0–130)
MICROALBUMIN/CREAT 24H UR: <12 MG/L
MICROALBUMIN/CREAT UR-RTO: NORMAL MCG/MG CREAT
TRIGLYCERIDE, FASTING: 150 MG/DL
VLDLC SERPL CALC-MCNC: ABNORMAL MG/DL (ref 1–30)

## 2018-05-12 PROCEDURE — 80061 LIPID PANEL: CPT

## 2018-05-12 PROCEDURE — 36415 COLL VENOUS BLD VENIPUNCTURE: CPT

## 2018-05-12 PROCEDURE — 82043 UR ALBUMIN QUANTITATIVE: CPT

## 2018-05-12 PROCEDURE — 83036 HEMOGLOBIN GLYCOSYLATED A1C: CPT

## 2018-05-12 PROCEDURE — 82570 ASSAY OF URINE CREATININE: CPT

## 2018-05-16 ENCOUNTER — TELEPHONE (OUTPATIENT)
Dept: CARDIOLOGY | Age: 78
End: 2018-05-16

## 2018-06-07 ENCOUNTER — NURSE ONLY (OUTPATIENT)
Dept: CARDIOLOGY | Age: 78
End: 2018-06-07
Payer: MEDICARE

## 2018-06-07 DIAGNOSIS — I42.8 NON-ISCHEMIC CARDIOMYOPATHY (HCC): ICD-10-CM

## 2018-06-07 DIAGNOSIS — I50.42 CHRONIC COMBINED SYSTOLIC AND DIASTOLIC CHF, NYHA CLASS 3 (HCC): Primary | ICD-10-CM

## 2018-06-07 DIAGNOSIS — Z95.810 BIVENTRICULAR ICD (IMPLANTABLE CARDIOVERTER-DEFIBRILLATOR) IN PLACE: ICD-10-CM

## 2018-06-13 PROCEDURE — 93299 PR REM INTERROG ICPMS/SCRMS <30 D TECH REVIEW: CPT | Performed by: FAMILY MEDICINE

## 2018-06-13 PROCEDURE — 93297 REM INTERROG DEV EVAL ICPMS: CPT | Performed by: FAMILY MEDICINE

## 2018-06-14 ENCOUNTER — TELEPHONE (OUTPATIENT)
Dept: CARDIOLOGY | Age: 78
End: 2018-06-14

## 2018-08-01 ENCOUNTER — NURSE ONLY (OUTPATIENT)
Dept: CARDIOLOGY | Age: 78
End: 2018-08-01
Payer: MEDICARE

## 2018-08-01 DIAGNOSIS — Z95.810 CARDIAC DEFIBRILLATOR IN PLACE: Primary | ICD-10-CM

## 2018-08-01 DIAGNOSIS — I42.8 NON-ISCHEMIC CARDIOMYOPATHY (HCC): ICD-10-CM

## 2018-08-01 PROCEDURE — 93289 INTERROG DEVICE EVAL HEART: CPT | Performed by: FAMILY MEDICINE

## 2018-08-09 ENCOUNTER — NURSE ONLY (OUTPATIENT)
Dept: CARDIOLOGY | Age: 78
End: 2018-08-09
Payer: MEDICARE

## 2018-08-09 DIAGNOSIS — I50.42 CHRONIC COMBINED SYSTOLIC AND DIASTOLIC CHF, NYHA CLASS 3 (HCC): Primary | ICD-10-CM

## 2018-08-09 DIAGNOSIS — Z95.810 ICD (IMPLANTABLE CARDIOVERTER-DEFIBRILLATOR) IN PLACE: ICD-10-CM

## 2018-08-09 PROCEDURE — 93299 PR REM INTERROG ICPMS/SCRMS <30 D TECH REVIEW: CPT | Performed by: FAMILY MEDICINE

## 2018-08-09 PROCEDURE — 93297 REM INTERROG DEV EVAL ICPMS: CPT | Performed by: FAMILY MEDICINE

## 2018-08-15 ENCOUNTER — TELEPHONE (OUTPATIENT)
Dept: CARDIOLOGY | Age: 78
End: 2018-08-15

## 2018-08-27 ENCOUNTER — OFFICE VISIT (OUTPATIENT)
Dept: CARDIOLOGY | Age: 78
End: 2018-08-27
Payer: MEDICARE

## 2018-08-27 VITALS
HEART RATE: 93 BPM | RESPIRATION RATE: 16 BRPM | WEIGHT: 185 LBS | SYSTOLIC BLOOD PRESSURE: 111 MMHG | BODY MASS INDEX: 25.08 KG/M2 | DIASTOLIC BLOOD PRESSURE: 71 MMHG

## 2018-08-27 DIAGNOSIS — I48.0 PAF (PAROXYSMAL ATRIAL FIBRILLATION) (HCC): ICD-10-CM

## 2018-08-27 DIAGNOSIS — I50.42 CHRONIC COMBINED SYSTOLIC AND DIASTOLIC CHF, NYHA CLASS 3 (HCC): ICD-10-CM

## 2018-08-27 DIAGNOSIS — I42.8 NON-ISCHEMIC CARDIOMYOPATHY (HCC): Primary | ICD-10-CM

## 2018-08-27 PROCEDURE — 1036F TOBACCO NON-USER: CPT | Performed by: FAMILY MEDICINE

## 2018-08-27 PROCEDURE — 4040F PNEUMOC VAC/ADMIN/RCVD: CPT | Performed by: FAMILY MEDICINE

## 2018-08-27 PROCEDURE — G8419 CALC BMI OUT NRM PARAM NOF/U: HCPCS | Performed by: FAMILY MEDICINE

## 2018-08-27 PROCEDURE — 1123F ACP DISCUSS/DSCN MKR DOCD: CPT | Performed by: FAMILY MEDICINE

## 2018-08-27 PROCEDURE — 99214 OFFICE O/P EST MOD 30 MIN: CPT | Performed by: FAMILY MEDICINE

## 2018-08-27 PROCEDURE — 1101F PT FALLS ASSESS-DOCD LE1/YR: CPT | Performed by: FAMILY MEDICINE

## 2018-08-27 PROCEDURE — G8598 ASA/ANTIPLAT THER USED: HCPCS | Performed by: FAMILY MEDICINE

## 2018-08-27 PROCEDURE — G8427 DOCREV CUR MEDS BY ELIG CLIN: HCPCS | Performed by: FAMILY MEDICINE

## 2018-08-27 RX ORDER — GLIMEPIRIDE 4 MG/1
4 TABLET ORAL 2 TIMES DAILY
COMMUNITY
Start: 2018-07-31 | End: 2018-12-22 | Stop reason: SDUPTHER

## 2018-08-27 RX ORDER — GLIMEPIRIDE 2 MG/1
2 TABLET ORAL 2 TIMES DAILY
COMMUNITY
End: 2018-08-27 | Stop reason: DRUGHIGH

## 2018-08-27 NOTE — PROGRESS NOTES
breath, bleeding problems, problems with his medications or any other concerns at this time. Past Medical History:   Diagnosis Date    Abnormal echocardiogram 5/2/12    EF 15%. mildly dilated LV cavity.  Abnormal resting ECG findings 4/25/12    atrial sensed, ventricular paced rhythm at 73 beats per minute. Wide QRS of 183 ms.  Biventricular ICD (implantable cardiac defibrillator) in place 8/26/11    [facility] [employer]. CRT-D    Biventricular ICD (implantable cardioverter-defibrillator) in place 10/29/2015    [employer]tr[employer]- Bi V ICD (Battery change)    Biventricular pacemaker check 5/12 1/13    LV lead turned of 1/13 due to diaphragmatic pacing.  CAD (coronary artery disease)     Diabetes mellitus ([facility]     type 2    Esophageal reflux     Hx of blood clots     Hx of left heart catheterization by ventricular puncture 04/19/2013    LAD-20-30%, LCX & RCA-10-20%, Normal LVEDP,     Hyperlipidemia     Hypertension     Mantle cell lymphoma, unspecified site, extranodal and solid organ sites     Nonischemic dilated cardiomyopathy ([address]     Pain in joint, lower leg     Pain in limb     S/P cardiac catheterization 12/13/10    No significant coronary disease. EF estimated at 35%.  Thoracic or lumbosacral neuritis or radiculitis, unspecified     Unspecified acute reaction to stress        CURRENT ALLERGIES: Seasonal REVIEW OF SYSTEMS: 10 systems were reviewed. Pertinent positives and negatives as above, all else negative.      Past Surgical History:   Procedure Laterality Date    BLADDER SURGERY  2008    CARDIAC CATHETERIZATION      Patient states has had 3 in [place] and one in [facility] last one being in [place] 3 years ago Dr. Sushil Alarcon  10/15/15    -polyps,diverticulosis,hemorrhoids    DIAGNOSTIC CARDIAC CATH LAB PROCEDURE  04/19/13    ELBOW SURGERY  2000    FOOT SURGERY  1993    LOBECTOMY N/A 2/28/2017    LEFT MINI THORCOTOMY WITH LEFT VENTRICULAR LEAD PLACEMENT performed by Diamond Mark MD at [facility] [address] SKIN CANCER EXCISION  9/11/13    basal cell on chest.    THORACOTOMY  02/28/2017    Left Mini    UPPER GASTROINTESTINAL ENDOSCOPY  10/15/15    -    Social History:  Social History   Substance Use Topics    Smoking status: Former Smoker     Packs/day: 0.00     Years: 30.00     Types: Cigarettes     Quit date: 4/25/2008    Smokeless tobacco: Never Used      Comment: Patient only smoked when he drank beer    Alcohol use 0.0 oz/week     3 - 4 Cans of beer per week        CURRENT MEDICATIONS:  Outpatient Prescriptions Marked as Taking for the 8/27/18 encounter (Office Visit) with Salvador Jimenez MD   Medication Sig Dispense Refill    glimepiride (AMARYL) 4 MG tablet Take 4 mg by mouth 2 times daily       apixaban (ELIQUIS) 5 MG TABS tablet Take 1 tablet by mouth 2 times daily 180 tablet 3    simvastatin (ZOCOR) 40 MG tablet TAKE 1 TABLET EVERY NIGHT 90 tablet 3    ALPRAZolam (XANAX) 0.5 MG tablet TAKE ONE TABLET BY MOUTH TWICE DAILY AS NEEDED. (Patient taking differently: 0.25 mg. Nightly as needed.) 60 tablet 5    lisinopril (PRINIVIL;ZESTRIL) 2.5 MG tablet Take 1 tablet by mouth daily 90 tablet 3    nitroGLYCERIN (NITROSTAT) 0.4 MG SL tablet USE 1 TAB UNDER TONGUE FOR CHEST PAIN. REPEAT EVERY 5 MIN UP TO MAX OF 3 TOTAL DOSES.   IF NO RELIEF AFTER 3 TABS, CALL 911 100 tablet 3    tamsulosin (FLOMAX) 0.4 MG capsule TAKE 1 CAPSULE EVERY DAY 90 capsule 3    dicyclomine (BENTYL) 20 MG tablet TAKE 1 TABLET THREE TIMES DAILY (Patient taking differently: 4 times daily TAKE 1 TABLET THREE TIMES DAILY) 270 tablet 3    furosemide (LASIX) 20 MG tablet Take 1 tablet by mouth daily 90 tablet 3    metoprolol succinate (TOPROL XL) 50 MG extended release tablet Take 1 tablet by mouth daily 90 tablet 3    finasteride (PROSCAR) 5 MG tablet TAKE 1 TABLET EVERY DAY 90 ALT 21 09/27/2017    AST 23 09/27/2017     (L) 09/27/2017    K 4.4 09/27/2017    CL 95 (L) 09/27/2017    CREATININE 1.56 (H) 09/27/2017    BUN 39 (H) 09/27/2017    CO2 28 09/27/2017    TSH 3.30 02/24/2017    PSA 1.84 09/15/2017    INR 1.2 03/13/2017    LABA1C 7.1 08/20/2018    LABMICR CANNOT BE CALCULATED 05/12/2018     (H) 02/03/2013       ASSESSMENT:  1. Non-ischemic cardiomyopathy ([address]    2. PAF (paroxysmal atrial fibrillation) ([address]    3. Chronic combined systolic and diastolic CHF, NYHA class 3 ([facility])       PLAN:  · Paroxysmal Atrial Fibrillation: Rate control  · Beta Blocker: Continue metoprolol succinate (Toprol XL) 50 mg once daily. · Calcium Channel Blocker: Not indicated      · Rhythm Control Agent: Not indicated       · Monitoring: Not applicable   · Stroke Risk: His CHADS2-VASc score is greater than 2 (2.2% stroke risk)  · Anticoagulation: Start Apixaban (Eliquis) 5 mg every 12 hours. I also reminded him to watch for signs of blood in his stool or black tarry stools and stop the medication immediately if this develops as this could be life threatening. · Testing: None     · Angina: [place] Class III: Stable. Will monitor at least for now   · Beta Blocker Therapy: Continue Metoprolol Succinate 50 mg once daily    · Antiplatelet: Continue ASA 81 mg daily  · Statin Therapy: Continue simvastatin (Zocor) 40 mg nightly. · Anti-Anginal's: Nitroglycerin PRN. · Counseling:Counseled him to come to the emergency room if he develops persistent or worsening chest pain or worsening shortness of breath at any point. · Chronic Combined Systolic and Diastolic Heart Failure: Echo on 3/1/2017 Ejection Fraction: 5-10%: Currently well controlled. · Beta blocker and/or calcium channel blocker: Continue Metoprolol Succinate 50 mg once daily    · ACE/ARB: Continue Lisinopril 2.5 mg daily  · Diuretics: Continue Furosemide (Lasix) 20 mg daily   · Counseling:  Will continue to monitor his Optivol Fluid

## 2018-09-06 ENCOUNTER — NURSE ONLY (OUTPATIENT)
Dept: CARDIOLOGY | Age: 78
End: 2018-09-06

## 2018-09-06 DIAGNOSIS — I50.42 CHRONIC COMBINED SYSTOLIC AND DIASTOLIC CHF, NYHA CLASS 3 (HCC): Primary | ICD-10-CM

## 2018-09-10 ENCOUNTER — TELEPHONE (OUTPATIENT)
Dept: CARDIOLOGY | Age: 78
End: 2018-09-10

## 2018-09-14 ENCOUNTER — TELEPHONE (OUTPATIENT)
Dept: CARDIOLOGY | Age: 78
End: 2018-09-14

## 2018-09-21 ENCOUNTER — HOSPITAL ENCOUNTER (OUTPATIENT)
Dept: LAB | Age: 78
Discharge: HOME | End: 2018-09-21
Payer: MEDICARE

## 2018-09-21 DIAGNOSIS — E78.00 PURE HYPERCHOLESTEROLEMIA: Chronic | ICD-10-CM

## 2018-09-21 DIAGNOSIS — E78.5 HYPERLIPIDEMIA, UNSPECIFIED HYPERLIPIDEMIA TYPE: ICD-10-CM

## 2018-09-21 DIAGNOSIS — Z12.5 SCREENING PSA (PROSTATE SPECIFIC ANTIGEN): ICD-10-CM

## 2018-09-21 LAB
ALT SERPL-CCNC: 16 U/L (ref 5–41)
AST SERPL-CCNC: 18 U/L
CHOLESTEROL, FASTING: 141 MG/DL
CHOLESTEROL/HDL RATIO: 2.8
HDLC SERPL-MCNC: 50 MG/DL
LDL CHOLESTEROL: 73 MG/DL (ref 0–130)
PROSTATE SPECIFIC ANTIGEN: 3.22 UG/L
TRIGLYCERIDE, FASTING: 91 MG/DL
VLDLC SERPL CALC-MCNC: NORMAL MG/DL (ref 1–30)

## 2018-09-21 PROCEDURE — 36415 COLL VENOUS BLD VENIPUNCTURE: CPT

## 2018-09-21 PROCEDURE — 84450 TRANSFERASE (AST) (SGOT): CPT

## 2018-09-21 PROCEDURE — 80061 LIPID PANEL: CPT

## 2018-09-21 PROCEDURE — G0103 PSA SCREENING: HCPCS

## 2018-09-21 PROCEDURE — 84460 ALANINE AMINO (ALT) (SGPT): CPT

## 2018-10-04 ENCOUNTER — NURSE ONLY (OUTPATIENT)
Dept: CARDIOLOGY | Age: 78
End: 2018-10-04
Payer: MEDICARE

## 2018-10-04 DIAGNOSIS — I50.42 CHRONIC COMBINED SYSTOLIC AND DIASTOLIC CHF, NYHA CLASS 3 (HCC): Primary | ICD-10-CM

## 2018-10-04 PROCEDURE — 93297 REM INTERROG DEV EVAL ICPMS: CPT | Performed by: FAMILY MEDICINE

## 2018-10-04 PROCEDURE — 93299 PR REM INTERROG ICPMS/SCRMS <30 D TECH REVIEW: CPT | Performed by: FAMILY MEDICINE

## 2018-10-09 ENCOUNTER — TELEPHONE (OUTPATIENT)
Dept: CARDIOLOGY | Age: 78
End: 2018-10-09

## 2018-11-05 ENCOUNTER — OFFICE VISIT (OUTPATIENT)
Dept: CARDIOLOGY | Age: 78
End: 2018-11-05
Payer: MEDICARE

## 2018-11-05 VITALS
OXYGEN SATURATION: 98 % | HEART RATE: 61 BPM | SYSTOLIC BLOOD PRESSURE: 109 MMHG | WEIGHT: 190 LBS | RESPIRATION RATE: 14 BRPM | HEIGHT: 72 IN | DIASTOLIC BLOOD PRESSURE: 60 MMHG | BODY MASS INDEX: 25.73 KG/M2

## 2018-11-05 DIAGNOSIS — I50.42 CHRONIC COMBINED SYSTOLIC AND DIASTOLIC CHF (CONGESTIVE HEART FAILURE) (HCC): Primary | ICD-10-CM

## 2018-11-05 DIAGNOSIS — I42.8 NON-ISCHEMIC CARDIOMYOPATHY (HCC): ICD-10-CM

## 2018-11-05 DIAGNOSIS — Z01.810 PREOP CARDIOVASCULAR EXAM: ICD-10-CM

## 2018-11-05 DIAGNOSIS — I48.0 PAF (PAROXYSMAL ATRIAL FIBRILLATION) (HCC): ICD-10-CM

## 2018-11-05 DIAGNOSIS — I20.9 ANGINA, CLASS III (HCC): ICD-10-CM

## 2018-11-05 PROCEDURE — G8598 ASA/ANTIPLAT THER USED: HCPCS | Performed by: FAMILY MEDICINE

## 2018-11-05 PROCEDURE — G8419 CALC BMI OUT NRM PARAM NOF/U: HCPCS | Performed by: FAMILY MEDICINE

## 2018-11-05 PROCEDURE — G8427 DOCREV CUR MEDS BY ELIG CLIN: HCPCS | Performed by: FAMILY MEDICINE

## 2018-11-05 PROCEDURE — 4040F PNEUMOC VAC/ADMIN/RCVD: CPT | Performed by: FAMILY MEDICINE

## 2018-11-05 PROCEDURE — 1036F TOBACCO NON-USER: CPT | Performed by: FAMILY MEDICINE

## 2018-11-05 PROCEDURE — 93000 ELECTROCARDIOGRAM COMPLETE: CPT | Performed by: FAMILY MEDICINE

## 2018-11-05 PROCEDURE — 1123F ACP DISCUSS/DSCN MKR DOCD: CPT | Performed by: FAMILY MEDICINE

## 2018-11-05 PROCEDURE — 1101F PT FALLS ASSESS-DOCD LE1/YR: CPT | Performed by: FAMILY MEDICINE

## 2018-11-05 PROCEDURE — 99214 OFFICE O/P EST MOD 30 MIN: CPT | Performed by: FAMILY MEDICINE

## 2018-11-05 PROCEDURE — G8482 FLU IMMUNIZE ORDER/ADMIN: HCPCS | Performed by: FAMILY MEDICINE

## 2018-11-05 RX ORDER — OMEPRAZOLE 20 MG/1
20 CAPSULE, DELAYED RELEASE ORAL DAILY
COMMUNITY
End: 2019-04-03 | Stop reason: SDUPTHER

## 2018-11-05 NOTE — PROGRESS NOTES
I, Katerine Wisdom am scribing for and in the presence of Jackie Hernandez MD.    Patient: Adrian Jasso  : 1940  Date of Visit: 2018    REASON FOR VISIT / CONSULTATION: 6 Month Follow-Up (Hx: CHF, Angina, PAF. Pt states that he has been doing good since last visit. Pt had colonscopy done in [place] had 5 polyps removed, and had another polyp that was in small intestine but is having this done with [facility] by Dr. Cristina Pruett but stated he needed preop clearance for this first. Pt states that only time he has CP nightly after climbing stairs and has to take nitro for this but other than that it does come and go but not as bad as it was before. SOb w/ stairs. Denies palpitations, ) and Other (weight at last visit was 185 lb and todays weight is 190 lbs. )      Dear Ted Gilliam MD and Dr. Shan Vizcarra,     I had the pleasure of seeing your patient Adrian Jasso in follow up today status post ICD RV lead extraction and new RV lead placement. As you know, Mr. Luis Alberto Cat is a 69 y.o. male with a history of systolic heart failure with a reduced EF of around 35% that recently declined to around 15% leading to a biventricular ICD which required placement of a epicardial lead due to a history of diaphragmatic pacing with his previous LV lead. Also it was found that his right ventricular ICD cable appeared to have significantly fluctuating impedances with sitting and standing ranging from the seventies to the 140's as well as a possible malfunctioning RV ICD coil. 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 Therefore in  he underwent an RV lead extraction with RV lead replacement. In , we did a home ICD check which showed 28 runs of paroxysmal atrial fibrillation, the longest being 13 minutes in duration.  His echocardiogram done on 3/1/2017 Surgical History:   Procedure Laterality Date    BLADDER SURGERY  2008    CARDIAC CATHETERIZATION      Patient states has had 3 in [place] and one in [facility] last one being in [place] 3 years ago Dr. Ana Bae COLONOSCOPY  10/15/15    -polyps,diverticulosis,hemorrhoids    DIAGNOSTIC CARDIAC CATH LAB PROCEDURE  04/19/13    ELBOW SURGERY  2000    FOOT SURGERY  1993    LOBECTOMY N/A 2/28/2017    LEFT MINI THORCOTOMY WITH LEFT VENTRICULAR LEAD PLACEMENT performed by Latosha Landa MD at [facility]  9/11/13    basal cell on chest.    THORACOTOMY  02/28/2017    Left Mini    UPPER GASTROINTESTINAL ENDOSCOPY  10/15/15    -bx    Social History:  Social History   Substance Use Topics    Smoking status: Former Smoker     Packs/day: 0.00     Years: 30.00     Types: Cigarettes     Quit date: 4/25/2008    Smokeless tobacco: Never Used      Comment: Patient only smoked when he drank beer    Alcohol use 0.0 oz/week     3 - 4 Cans of beer per week        CURRENT MEDICATIONS:  Outpatient Prescriptions Marked as Taking for the 11/5/18 encounter (Office Visit) with Too Nguyen MD   Medication Sig Dispense Refill    Loperamide HCl (IMODIUM PO) Take by mouth      NONFORMULARY daily Ibgard for IBS      omeprazole (PRILOSEC) 20 MG delayed release capsule Take 20 mg by mouth daily      glimepiride (AMARYL) 4 MG tablet Take 4 mg by mouth 2 times daily       apixaban (ELIQUIS) 5 MG TABS tablet Take 1 tablet by mouth 2 times daily 180 tablet 3    simvastatin (ZOCOR) 40 MG tablet TAKE 1 TABLET EVERY NIGHT 90 tablet 3    ALPRAZolam (XANAX) 0.5 MG tablet TAKE ONE TABLET BY MOUTH TWICE DAILY AS NEEDED. (Patient taking differently: 0.25 mg.  Nightly as needed.) 60 tablet 5    lisinopril (PRINIVIL;ZESTRIL) 2.5 MG tablet Take 1 tablet by mouth daily 90 tablet 3    nitroGLYCERIN (NITROSTAT) 0.4 MG SL tablet USE 1 TAB UNDER TONGUE FOR CHEST PAIN. REPEAT EVERY 5 MIN UP TO MAX OF 3 TOTAL DOSES. IF NO RELIEF AFTER 3 TABS, CALL 911 100 tablet 3    tamsulosin (FLOMAX) 0.4 MG capsule TAKE 1 CAPSULE EVERY DAY 90 capsule 3    dicyclomine (BENTYL) 20 MG tablet TAKE 1 TABLET THREE TIMES DAILY (Patient taking differently: 4 times daily TAKE 1 TABLET THREE TIMES DAILY) 270 tablet 3    furosemide (LASIX) 20 MG tablet Take 1 tablet by mouth daily 90 tablet 3    metoprolol succinate (TOPROL XL) 50 MG extended release tablet Take 1 tablet by mouth daily 90 tablet 3    finasteride (PROSCAR) 5 MG tablet TAKE 1 TABLET EVERY DAY 90 tablet 3    glucose blood VI test strips (ASCENSIA AUTODISC VI;ONE TOUCH ULTRA TEST VI) strip 1 each by In Vitro route daily. As needed.  Loratadine (CLARITIN) 10 MG CAPS Take 1 tablet by mouth daily. FAMILY HISTORY: family history includes Cancer in his father, sister, and sister; Diabetes in his brother; Heart Disease in his mother. PHYSICAL EXAM:   /60 (Site: Right Upper Arm, Position: Sitting, Cuff Size: Medium Adult)   Pulse 61   Resp 14   Ht 6' (1.829 m)   Wt 190 lb (86.2 kg)   SpO2 98%   BMI 25.77 kg/m²  Body mass index is 25.77 kg/m². Constitutional: He is oriented to person, place, and time. He appears well-developed and well-nourished. In no acute distress. HEENT: Normocephalic and atraumatic. No JVD present. Carotid bruit is not present. No mass and no thyromegaly present. No lymphadenopathy present. Cardiovascular: Normal rate, regular rhythm, normal heart sounds and intact distal pulses. Exam reveals no gallop and no friction rub. A I/VI systolic murmur was heard maximally at the apex. Pulmonary/Chest: Effort normal and breath sounds normal. No respiratory distress. He has no wheezes, rhonchi or rales. Abdominal: Soft, non-tender. Bowel sounds and aorta are normal. He exhibits no organomegaly, mass or bruit.    Extremities:  No edema,

## 2018-11-21 ENCOUNTER — HOSPITAL ENCOUNTER (OUTPATIENT)
Dept: LAB | Age: 78
Discharge: HOME | End: 2018-11-21
Payer: MEDICARE

## 2018-11-21 DIAGNOSIS — E11.9 TYPE 2 DIABETES MELLITUS WITHOUT COMPLICATION, WITHOUT LONG-TERM CURRENT USE OF INSULIN (HCC): Chronic | ICD-10-CM

## 2018-11-21 LAB
-: ABNORMAL
AMORPHOUS: ABNORMAL
ANION GAP SERPL CALCULATED.3IONS-SCNC: 10 MMOL/L (ref 9–17)
BACTERIA: ABNORMAL
BILIRUBIN URINE: NEGATIVE
BUN BLDV-MCNC: 29 MG/DL (ref 8–23)
BUN/CREAT BLD: 21 (ref 9–20)
CALCIUM SERPL-MCNC: 8.9 MG/DL (ref 8.6–10.4)
CASTS UA: ABNORMAL /LPF
CHLORIDE BLD-SCNC: 102 MMOL/L (ref 98–107)
CO2: 27 MMOL/L (ref 20–31)
COLOR: YELLOW
COMMENT UA: ABNORMAL
CREAT SERPL-MCNC: 1.41 MG/DL (ref 0.7–1.2)
CRYSTALS, UA: ABNORMAL /HPF
EPITHELIAL CELLS UA: ABNORMAL /HPF (ref 0–5)
GFR AFRICAN AMERICAN: 59 ML/MIN
GFR NON-AFRICAN AMERICAN: 49 ML/MIN
GFR SERPL CREATININE-BSD FRML MDRD: ABNORMAL ML/MIN/{1.73_M2}
GFR SERPL CREATININE-BSD FRML MDRD: ABNORMAL ML/MIN/{1.73_M2}
GLUCOSE FASTING: 149 MG/DL (ref 70–99)
GLUCOSE URINE: NEGATIVE
KETONES, URINE: NEGATIVE
LEUKOCYTE ESTERASE, URINE: NEGATIVE
MUCUS: ABNORMAL
NITRITE, URINE: NEGATIVE
OTHER OBSERVATIONS UA: ABNORMAL
PH UA: 6 (ref 5–9)
POTASSIUM SERPL-SCNC: 4.2 MMOL/L (ref 3.7–5.3)
PROTEIN UA: NEGATIVE
RBC UA: ABNORMAL /HPF (ref 0–2)
RENAL EPITHELIAL, UA: ABNORMAL /HPF
SODIUM BLD-SCNC: 139 MMOL/L (ref 135–144)
SPECIFIC GRAVITY UA: 1.02 (ref 1.01–1.02)
TRICHOMONAS: ABNORMAL
TURBIDITY: CLEAR
URINE HGB: NEGATIVE
UROBILINOGEN, URINE: NORMAL
WBC UA: ABNORMAL /HPF (ref 0–5)
YEAST: ABNORMAL

## 2018-11-21 PROCEDURE — 80048 BASIC METABOLIC PNL TOTAL CA: CPT

## 2018-11-21 PROCEDURE — 81001 URINALYSIS AUTO W/SCOPE: CPT

## 2018-11-21 PROCEDURE — 36415 COLL VENOUS BLD VENIPUNCTURE: CPT

## 2018-11-23 ENCOUNTER — HOSPITAL ENCOUNTER (OUTPATIENT)
Dept: NON INVASIVE DIAGNOSTICS | Age: 78
Discharge: HOME | End: 2018-11-23
Payer: MEDICARE

## 2018-11-23 DIAGNOSIS — I20.9 ANGINA, CLASS III (HCC): ICD-10-CM

## 2018-11-23 DIAGNOSIS — Z01.810 PREOP CARDIOVASCULAR EXAM: ICD-10-CM

## 2018-11-23 DIAGNOSIS — I48.0 PAF (PAROXYSMAL ATRIAL FIBRILLATION) (HCC): ICD-10-CM

## 2018-11-23 DIAGNOSIS — I50.42 CHRONIC COMBINED SYSTOLIC AND DIASTOLIC CHF (CONGESTIVE HEART FAILURE) (HCC): ICD-10-CM

## 2018-11-23 DIAGNOSIS — I42.8 NON-ISCHEMIC CARDIOMYOPATHY (HCC): ICD-10-CM

## 2018-11-23 LAB
LV EF: 15 %
LVEF MODALITY: NORMAL

## 2018-11-23 PROCEDURE — 93306 TTE W/DOPPLER COMPLETE: CPT

## 2018-11-26 ENCOUNTER — TELEPHONE (OUTPATIENT)
Dept: CARDIOLOGY | Age: 78
End: 2018-11-26

## 2018-12-26 ENCOUNTER — HOSPITAL ENCOUNTER (OUTPATIENT)
Dept: LAB | Age: 78
Discharge: HOME | End: 2018-12-26
Payer: MEDICARE

## 2018-12-26 DIAGNOSIS — R79.89 ELEVATED SERUM CREATININE: ICD-10-CM

## 2018-12-26 DIAGNOSIS — E78.5 HYPERLIPIDEMIA, UNSPECIFIED HYPERLIPIDEMIA TYPE: ICD-10-CM

## 2018-12-26 DIAGNOSIS — R79.9 ELEVATED BUN: ICD-10-CM

## 2018-12-26 LAB
ALBUMIN SERPL-MCNC: 4.1 G/DL (ref 3.5–5.2)
ANION GAP SERPL CALCULATED.3IONS-SCNC: 10 MMOL/L (ref 9–17)
BILIRUBIN URINE: NEGATIVE
BUN BLDV-MCNC: 44 MG/DL (ref 8–23)
BUN/CREAT BLD: 22 (ref 9–20)
CALCIUM SERPL-MCNC: 9.2 MG/DL (ref 8.6–10.4)
CHLORIDE BLD-SCNC: 99 MMOL/L (ref 98–107)
CHOLESTEROL, FASTING: 138 MG/DL
CHOLESTEROL/HDL RATIO: 2.9
CO2: 26 MMOL/L (ref 20–31)
COLOR: YELLOW
COMMENT UA: NORMAL
CREAT SERPL-MCNC: 2.03 MG/DL (ref 0.7–1.2)
GFR AFRICAN AMERICAN: 39 ML/MIN
GFR NON-AFRICAN AMERICAN: 32 ML/MIN
GFR SERPL CREATININE-BSD FRML MDRD: ABNORMAL ML/MIN/{1.73_M2}
GFR SERPL CREATININE-BSD FRML MDRD: ABNORMAL ML/MIN/{1.73_M2}
GLUCOSE BLD-MCNC: 324 MG/DL (ref 70–99)
GLUCOSE URINE: NEGATIVE
HCT VFR BLD CALC: 39.6 % (ref 40.7–50.3)
HDLC SERPL-MCNC: 48 MG/DL
HEMOGLOBIN: 12.5 G/DL (ref 13–17)
KETONES, URINE: NEGATIVE
LDL CHOLESTEROL: 72 MG/DL (ref 0–130)
LEUKOCYTE ESTERASE, URINE: NEGATIVE
MCH RBC QN AUTO: 30.1 PG (ref 25.2–33.5)
MCHC RBC AUTO-ENTMCNC: 31.6 G/DL (ref 28.4–34.8)
MCV RBC AUTO: 95.4 FL (ref 82.6–102.9)
NITRITE, URINE: NEGATIVE
NRBC AUTOMATED: 0 PER 100 WBC
PDW BLD-RTO: 13.8 % (ref 11.8–14.4)
PH UA: 5.5 (ref 5–9)
PHOSPHORUS: 3.8 MG/DL (ref 2.5–4.5)
PLATELET # BLD: 231 K/UL (ref 138–453)
PMV BLD AUTO: 11.7 FL (ref 8.1–13.5)
POTASSIUM SERPL-SCNC: 4.6 MMOL/L (ref 3.7–5.3)
PROTEIN UA: NEGATIVE
RBC # BLD: 4.15 M/UL (ref 4.21–5.77)
SODIUM BLD-SCNC: 135 MMOL/L (ref 135–144)
SPECIFIC GRAVITY UA: 1.01 (ref 1.01–1.02)
TRIGLYCERIDE, FASTING: 91 MG/DL
TURBIDITY: CLEAR
URINE HGB: NEGATIVE
UROBILINOGEN, URINE: NORMAL
VLDLC SERPL CALC-MCNC: NORMAL MG/DL (ref 1–30)
WBC # BLD: 5.6 K/UL (ref 3.5–11.3)

## 2018-12-26 PROCEDURE — 81003 URINALYSIS AUTO W/O SCOPE: CPT

## 2018-12-26 PROCEDURE — 80069 RENAL FUNCTION PANEL: CPT

## 2018-12-26 PROCEDURE — 85027 COMPLETE CBC AUTOMATED: CPT

## 2018-12-26 PROCEDURE — 80061 LIPID PANEL: CPT

## 2018-12-26 PROCEDURE — 36415 COLL VENOUS BLD VENIPUNCTURE: CPT

## 2019-02-06 RX ORDER — METOPROLOL SUCCINATE 50 MG/1
50 TABLET, EXTENDED RELEASE ORAL DAILY
Qty: 90 TABLET | Refills: 3 | Status: SHIPPED | OUTPATIENT
Start: 2019-02-06 | End: 2020-02-10

## 2019-02-06 RX ORDER — FUROSEMIDE 20 MG/1
20 TABLET ORAL DAILY
Qty: 90 TABLET | Refills: 3 | Status: SHIPPED | OUTPATIENT
Start: 2019-02-06 | End: 2020-03-03

## 2019-02-07 ENCOUNTER — NURSE ONLY (OUTPATIENT)
Dept: CARDIOLOGY | Age: 79
End: 2019-02-07
Payer: MEDICARE

## 2019-02-07 DIAGNOSIS — I42.8 NON-ISCHEMIC CARDIOMYOPATHY (HCC): ICD-10-CM

## 2019-02-07 DIAGNOSIS — Z95.810 ICD (IMPLANTABLE CARDIOVERTER-DEFIBRILLATOR) IN PLACE: Primary | ICD-10-CM

## 2019-02-07 PROCEDURE — 93295 DEV INTERROG REMOTE 1/2/MLT: CPT | Performed by: FAMILY MEDICINE

## 2019-02-07 PROCEDURE — 93296 REM INTERROG EVL PM/IDS: CPT | Performed by: FAMILY MEDICINE

## 2019-02-08 ENCOUNTER — TELEPHONE (OUTPATIENT)
Dept: CARDIOLOGY | Age: 79
End: 2019-02-08

## 2019-02-15 ENCOUNTER — HOSPITAL ENCOUNTER (OUTPATIENT)
Dept: LAB | Age: 79
Discharge: HOME | End: 2019-02-15
Payer: MEDICARE

## 2019-02-15 ENCOUNTER — HOSPITAL ENCOUNTER (OUTPATIENT)
Dept: ULTRASOUND IMAGING | Age: 79
Discharge: HOME | End: 2019-02-17
Payer: MEDICARE

## 2019-02-15 DIAGNOSIS — E08.21 DIABETES DUE TO UNDERLYING CONDITION W DIABETIC NEPHROPATHY (HCC): ICD-10-CM

## 2019-02-15 DIAGNOSIS — I12.9 RENAL HYPERTENSION: ICD-10-CM

## 2019-02-15 DIAGNOSIS — N18.30 CHRONIC KIDNEY DISEASE, STAGE III (MODERATE) (HCC): ICD-10-CM

## 2019-02-15 DIAGNOSIS — I42.0 NONISCHEMIC DILATED CARDIOMYOPATHY (HCC): ICD-10-CM

## 2019-02-15 LAB
-: ABNORMAL
ALBUMIN SERPL-MCNC: 4.3 G/DL (ref 3.5–5.2)
AMORPHOUS: ABNORMAL
ANION GAP SERPL CALCULATED.3IONS-SCNC: 10 MMOL/L (ref 9–17)
BACTERIA: ABNORMAL
BILIRUBIN URINE: NEGATIVE
BUN BLDV-MCNC: 41 MG/DL (ref 8–23)
BUN/CREAT BLD: 24 (ref 9–20)
CALCIUM SERPL-MCNC: 9.2 MG/DL (ref 8.6–10.4)
CASTS UA: ABNORMAL /LPF
CHLORIDE BLD-SCNC: 101 MMOL/L (ref 98–107)
CO2: 26 MMOL/L (ref 20–31)
COLOR: YELLOW
COMMENT UA: ABNORMAL
CREAT SERPL-MCNC: 1.74 MG/DL (ref 0.7–1.2)
CREAT SERPL-MCNC: 1.74 MG/DL (ref 0.7–1.2)
CREATININE CLEARANCE: 55.6 ML/MIN/BSA (ref 71–151)
CREATININE URINE: 114 MG/DL (ref 39–259)
CREATININE URINE: 68 MG/DL (ref 39–259)
CRYSTALS, UA: ABNORMAL /HPF
EPITHELIAL CELLS UA: ABNORMAL /HPF (ref 0–5)
FOLATE: 15.2 NG/ML
GFR AFRICAN AMERICAN: 46 ML/MIN
GFR NON-AFRICAN AMERICAN: 38 ML/MIN
GFR SERPL CREATININE-BSD FRML MDRD: ABNORMAL ML/MIN/{1.73_M2}
GFR SERPL CREATININE-BSD FRML MDRD: ABNORMAL ML/MIN/{1.73_M2}
GLUCOSE BLD-MCNC: 143 MG/DL (ref 70–99)
GLUCOSE URINE: NEGATIVE
HCT VFR BLD CALC: 39.4 % (ref 40.7–50.3)
HEMOGLOBIN: 12.4 G/DL (ref 13–17)
HOURS COLLECTED: 24 H
IRON SATURATION: 24 % (ref 20–55)
IRON: 64 UG/DL (ref 59–158)
KETONES, URINE: NEGATIVE
LENGTH OF COLLECTION: 24 H
LEUKOCYTE ESTERASE, URINE: NEGATIVE
MAGNESIUM: 2.1 MG/DL (ref 1.6–2.6)
MCH RBC QN AUTO: 30.3 PG (ref 25.2–33.5)
MCHC RBC AUTO-ENTMCNC: 31.5 G/DL (ref 28.4–34.8)
MCV RBC AUTO: 96.3 FL (ref 82.6–102.9)
MUCUS: ABNORMAL
NITRITE, URINE: NEGATIVE
NRBC AUTOMATED: 0 PER 100 WBC
OTHER OBSERVATIONS UA: ABNORMAL
PATIENT HEIGHT: 183 CM
PATIENT WEIGHT: 86.5 KG
PDW BLD-RTO: 14 % (ref 11.8–14.4)
PH UA: 5.5 (ref 5–9)
PHOSPHORUS: 4 MG/DL (ref 2.5–4.5)
PLATELET # BLD: 203 K/UL (ref 138–453)
PMV BLD AUTO: 10.9 FL (ref 8.1–13.5)
POTASSIUM SERPL-SCNC: 4.9 MMOL/L (ref 3.7–5.3)
PROTEIN 24 HOUR URINE: 59 MG/24 H
PROTEIN UA: NEGATIVE
PROTEIN,TOT TIMED UR: NORMAL MG/X H
PTH INTACT: 64.29 PG/ML (ref 15–65)
RBC # BLD: 4.09 M/UL (ref 4.21–5.77)
RBC UA: ABNORMAL /HPF (ref 0–2)
RENAL EPITHELIAL, UA: ABNORMAL /HPF
SODIUM BLD-SCNC: 137 MMOL/L (ref 135–144)
SPECIFIC GRAVITY UA: <1.005 (ref 1.01–1.02)
TOTAL IRON BINDING CAPACITY: 270 UG/DL (ref 250–450)
TOTAL PROTEIN, URINE: <4 MG/DL
TRICHOMONAS: ABNORMAL
TSH SERPL DL<=0.05 MIU/L-ACNC: 2.74 MIU/L (ref 0.3–5)
TURBIDITY: CLEAR
UNSATURATED IRON BINDING CAPACITY: 206.3 UG/DL (ref 112–347)
URIC ACID: 6.7 MG/DL (ref 3.4–7)
URINE HGB: NEGATIVE
URINE TOTAL PROTEIN: 4 MG/DL
UROBILINOGEN, URINE: NORMAL
VITAMIN B-12: 389 PG/ML (ref 232–1245)
VOLUME: 1475 ML
VOLUME: 1475 ML
WBC # BLD: 4.8 K/UL (ref 3.5–11.3)
WBC UA: ABNORMAL /HPF (ref 0–5)
YEAST: ABNORMAL

## 2019-02-15 PROCEDURE — 82607 VITAMIN B-12: CPT

## 2019-02-15 PROCEDURE — 83540 ASSAY OF IRON: CPT

## 2019-02-15 PROCEDURE — 85027 COMPLETE CBC AUTOMATED: CPT

## 2019-02-15 PROCEDURE — 83735 ASSAY OF MAGNESIUM: CPT

## 2019-02-15 PROCEDURE — 82746 ASSAY OF FOLIC ACID SERUM: CPT

## 2019-02-15 PROCEDURE — 80069 RENAL FUNCTION PANEL: CPT

## 2019-02-15 PROCEDURE — 82570 ASSAY OF URINE CREATININE: CPT

## 2019-02-15 PROCEDURE — 84443 ASSAY THYROID STIM HORMONE: CPT

## 2019-02-15 PROCEDURE — 83970 ASSAY OF PARATHORMONE: CPT

## 2019-02-15 PROCEDURE — 84156 ASSAY OF PROTEIN URINE: CPT

## 2019-02-15 PROCEDURE — 81001 URINALYSIS AUTO W/SCOPE: CPT

## 2019-02-15 PROCEDURE — 36415 COLL VENOUS BLD VENIPUNCTURE: CPT

## 2019-02-15 PROCEDURE — 76770 US EXAM ABDO BACK WALL COMP: CPT

## 2019-02-15 PROCEDURE — 84550 ASSAY OF BLOOD/URIC ACID: CPT

## 2019-02-15 PROCEDURE — 83550 IRON BINDING TEST: CPT

## 2019-02-15 PROCEDURE — 82575 CREATININE CLEARANCE TEST: CPT

## 2019-02-25 RX ORDER — LISINOPRIL 2.5 MG/1
TABLET ORAL
Qty: 90 TABLET | Refills: 3 | Status: SHIPPED | OUTPATIENT
Start: 2019-02-25 | End: 2020-03-03

## 2019-03-07 ENCOUNTER — NURSE ONLY (OUTPATIENT)
Dept: CARDIOLOGY | Age: 79
End: 2019-03-07
Payer: MEDICARE

## 2019-03-07 DIAGNOSIS — I50.43 ACUTE ON CHRONIC COMBINED SYSTOLIC AND DIASTOLIC CONGESTIVE HEART FAILURE, NYHA CLASS 4 (HCC): ICD-10-CM

## 2019-03-07 PROCEDURE — 93297 REM INTERROG DEV EVAL ICPMS: CPT | Performed by: FAMILY MEDICINE

## 2019-03-08 ENCOUNTER — TELEPHONE (OUTPATIENT)
Dept: CARDIOLOGY | Age: 79
End: 2019-03-08

## 2019-03-27 ENCOUNTER — HOSPITAL ENCOUNTER (OUTPATIENT)
Dept: LAB | Age: 79
Discharge: HOME | End: 2019-03-27
Payer: MEDICARE

## 2019-03-27 DIAGNOSIS — E78.5 HYPERLIPIDEMIA, UNSPECIFIED HYPERLIPIDEMIA TYPE: ICD-10-CM

## 2019-03-27 DIAGNOSIS — E11.9 TYPE 2 DIABETES MELLITUS WITHOUT COMPLICATION, WITHOUT LONG-TERM CURRENT USE OF INSULIN (HCC): ICD-10-CM

## 2019-03-27 LAB
CHOLESTEROL, FASTING: 111 MG/DL
CHOLESTEROL/HDL RATIO: 2.4
CREATININE URINE: 103.7 MG/DL (ref 39–259)
HDLC SERPL-MCNC: 47 MG/DL
LDL CHOLESTEROL: 51 MG/DL (ref 0–130)
MICROALBUMIN/CREAT 24H UR: <12 MG/L
MICROALBUMIN/CREAT UR-RTO: NORMAL MCG/MG CREAT
TRIGLYCERIDE, FASTING: 66 MG/DL
VLDLC SERPL CALC-MCNC: NORMAL MG/DL (ref 1–30)

## 2019-03-27 PROCEDURE — 82570 ASSAY OF URINE CREATININE: CPT

## 2019-03-27 PROCEDURE — 36415 COLL VENOUS BLD VENIPUNCTURE: CPT

## 2019-03-27 PROCEDURE — 82043 UR ALBUMIN QUANTITATIVE: CPT

## 2019-03-27 PROCEDURE — 80061 LIPID PANEL: CPT

## 2019-04-16 ENCOUNTER — NURSE ONLY (OUTPATIENT)
Dept: CARDIOLOGY | Age: 79
End: 2019-04-16
Payer: MEDICARE

## 2019-04-16 DIAGNOSIS — I50.43 ACUTE ON CHRONIC COMBINED SYSTOLIC AND DIASTOLIC CONGESTIVE HEART FAILURE, NYHA CLASS 4 (HCC): ICD-10-CM

## 2019-04-16 PROCEDURE — 93297 REM INTERROG DEV EVAL ICPMS: CPT | Performed by: FAMILY MEDICINE

## 2019-04-22 ENCOUNTER — TELEPHONE (OUTPATIENT)
Dept: CARDIOLOGY | Age: 79
End: 2019-04-22

## 2019-04-26 ENCOUNTER — HOSPITAL ENCOUNTER (OUTPATIENT)
Dept: LAB | Age: 79
Discharge: HOME | End: 2019-04-26
Payer: MEDICARE

## 2019-04-26 LAB
-: ABNORMAL
ALBUMIN SERPL-MCNC: 4.1 G/DL (ref 3.5–5.2)
AMORPHOUS: ABNORMAL
ANION GAP SERPL CALCULATED.3IONS-SCNC: 12 MMOL/L (ref 9–17)
BACTERIA: ABNORMAL
BILIRUBIN URINE: NEGATIVE
BUN BLDV-MCNC: 40 MG/DL (ref 8–23)
BUN/CREAT BLD: 23 (ref 9–20)
CALCIUM SERPL-MCNC: 8.9 MG/DL (ref 8.6–10.4)
CASTS UA: ABNORMAL /LPF
CHLORIDE BLD-SCNC: 100 MMOL/L (ref 98–107)
CO2: 25 MMOL/L (ref 20–31)
COLOR: YELLOW
COMMENT UA: ABNORMAL
CREAT SERPL-MCNC: 1.77 MG/DL (ref 0.7–1.2)
CREATININE URINE: 84.4 MG/DL (ref 39–259)
CRYSTALS, UA: ABNORMAL /HPF
EPITHELIAL CELLS UA: ABNORMAL /HPF (ref 0–5)
GFR AFRICAN AMERICAN: 45 ML/MIN
GFR NON-AFRICAN AMERICAN: 37 ML/MIN
GFR SERPL CREATININE-BSD FRML MDRD: ABNORMAL ML/MIN/{1.73_M2}
GFR SERPL CREATININE-BSD FRML MDRD: ABNORMAL ML/MIN/{1.73_M2}
GLUCOSE BLD-MCNC: 171 MG/DL (ref 70–99)
GLUCOSE URINE: NEGATIVE
HCT VFR BLD CALC: 39.4 % (ref 40.7–50.3)
HEMOGLOBIN: 12.3 G/DL (ref 13–17)
KETONES, URINE: NEGATIVE
LEUKOCYTE ESTERASE, URINE: NEGATIVE
MAGNESIUM: 2.1 MG/DL (ref 1.6–2.6)
MCH RBC QN AUTO: 30.5 PG (ref 25.2–33.5)
MCHC RBC AUTO-ENTMCNC: 31.2 G/DL (ref 28.4–34.8)
MCV RBC AUTO: 97.8 FL (ref 82.6–102.9)
MUCUS: ABNORMAL
NITRITE, URINE: NEGATIVE
NRBC AUTOMATED: 0 PER 100 WBC
OTHER OBSERVATIONS UA: ABNORMAL
PDW BLD-RTO: 13.8 % (ref 11.8–14.4)
PH UA: 5.5 (ref 5–9)
PHOSPHORUS: 2.9 MG/DL (ref 2.5–4.5)
PLATELET # BLD: 197 K/UL (ref 138–453)
PMV BLD AUTO: 11.1 FL (ref 8.1–13.5)
POTASSIUM SERPL-SCNC: 5 MMOL/L (ref 3.7–5.3)
PROTEIN UA: NEGATIVE
PTH INTACT: 60.49 PG/ML (ref 15–65)
RBC # BLD: 4.03 M/UL (ref 4.21–5.77)
RBC UA: ABNORMAL /HPF (ref 0–2)
RENAL EPITHELIAL, UA: ABNORMAL /HPF
SODIUM BLD-SCNC: 137 MMOL/L (ref 135–144)
SPECIFIC GRAVITY UA: <1.005 (ref 1.01–1.02)
TOTAL PROTEIN, URINE: 4 MG/DL
TRICHOMONAS: ABNORMAL
TURBIDITY: CLEAR
URIC ACID: 7.8 MG/DL (ref 3.4–7)
URINE HGB: NEGATIVE
UROBILINOGEN, URINE: NORMAL
WBC # BLD: 3.7 K/UL (ref 3.5–11.3)
WBC UA: ABNORMAL /HPF (ref 0–5)
YEAST: ABNORMAL

## 2019-04-26 PROCEDURE — 85027 COMPLETE CBC AUTOMATED: CPT

## 2019-04-26 PROCEDURE — 84156 ASSAY OF PROTEIN URINE: CPT

## 2019-04-26 PROCEDURE — 82570 ASSAY OF URINE CREATININE: CPT

## 2019-04-26 PROCEDURE — 81001 URINALYSIS AUTO W/SCOPE: CPT

## 2019-04-26 PROCEDURE — 84550 ASSAY OF BLOOD/URIC ACID: CPT

## 2019-04-26 PROCEDURE — 36415 COLL VENOUS BLD VENIPUNCTURE: CPT

## 2019-04-26 PROCEDURE — 80069 RENAL FUNCTION PANEL: CPT

## 2019-04-26 PROCEDURE — 83735 ASSAY OF MAGNESIUM: CPT

## 2019-04-26 PROCEDURE — 83970 ASSAY OF PARATHORMONE: CPT

## 2019-05-01 ENCOUNTER — OFFICE VISIT (OUTPATIENT)
Dept: CARDIOLOGY | Age: 79
End: 2019-05-01
Payer: MEDICARE

## 2019-05-01 VITALS
HEART RATE: 67 BPM | OXYGEN SATURATION: 98 % | SYSTOLIC BLOOD PRESSURE: 108 MMHG | DIASTOLIC BLOOD PRESSURE: 68 MMHG | RESPIRATION RATE: 18 BRPM | WEIGHT: 188 LBS | BODY MASS INDEX: 25.47 KG/M2 | HEIGHT: 72 IN

## 2019-05-01 DIAGNOSIS — I42.8 NON-ISCHEMIC CARDIOMYOPATHY (HCC): ICD-10-CM

## 2019-05-01 DIAGNOSIS — I20.9 ANGINA, CLASS III (HCC): ICD-10-CM

## 2019-05-01 DIAGNOSIS — K59.09 OTHER CONSTIPATION: ICD-10-CM

## 2019-05-01 DIAGNOSIS — I50.42 CHRONIC COMBINED SYSTOLIC AND DIASTOLIC CHF, NYHA CLASS 3 (HCC): Primary | ICD-10-CM

## 2019-05-01 DIAGNOSIS — I48.0 PAF (PAROXYSMAL ATRIAL FIBRILLATION) (HCC): ICD-10-CM

## 2019-05-01 DIAGNOSIS — Z95.810 BIVENTRICULAR ICD (IMPLANTABLE CARDIOVERTER-DEFIBRILLATOR) IN PLACE: ICD-10-CM

## 2019-05-01 PROCEDURE — 1123F ACP DISCUSS/DSCN MKR DOCD: CPT | Performed by: FAMILY MEDICINE

## 2019-05-01 PROCEDURE — 99214 OFFICE O/P EST MOD 30 MIN: CPT | Performed by: FAMILY MEDICINE

## 2019-05-01 PROCEDURE — 4040F PNEUMOC VAC/ADMIN/RCVD: CPT | Performed by: FAMILY MEDICINE

## 2019-05-01 PROCEDURE — G8598 ASA/ANTIPLAT THER USED: HCPCS | Performed by: FAMILY MEDICINE

## 2019-05-01 PROCEDURE — G8419 CALC BMI OUT NRM PARAM NOF/U: HCPCS | Performed by: FAMILY MEDICINE

## 2019-05-01 PROCEDURE — G8427 DOCREV CUR MEDS BY ELIG CLIN: HCPCS | Performed by: FAMILY MEDICINE

## 2019-05-01 PROCEDURE — 1036F TOBACCO NON-USER: CPT | Performed by: FAMILY MEDICINE

## 2019-05-01 RX ORDER — NITROGLYCERIN 0.4 MG/1
0.4 TABLET SUBLINGUAL EVERY 5 MIN PRN
Qty: 100 TABLET | Refills: 3 | Status: SHIPPED | OUTPATIENT
Start: 2019-05-01 | End: 2020-05-06 | Stop reason: SDUPTHER

## 2019-05-01 NOTE — PATIENT INSTRUCTIONS
SURVEY:    You may be receiving a survey from [employer] regarding your visit today. Please complete the survey to enable us to provide the highest quality of care to you and your family. If you cannot score us a very good on any question, please call the office to discuss how we could have made your experience a very good one. Thank you.

## 2019-05-01 NOTE — PROGRESS NOTES
Johnny Tidwell am scribing for and in the presence of Melo Gonzales MD.    Patient: Sarah Beth Loomis  : 1940  Date of Visit: May 1, 2019    REASON FOR VISIT / CONSULTATION: Follow-up (HX: CHF, PAF, NICM, ICD. Pt states he is doing ok. C/o: CP, sharp, every day multiple times day, 4-5 minutes. Palpitaitons at times as well. SOB with exertion. Deneis: Lighteaded/dizziness. )      Dear Sai Green MD,    I had the pleasure of seeing your patient Sarah Beth Loomis in follow up today status post ICD RV lead extraction and new RV lead placement. As you know, Mr. Heriberto Reilly is a 69 y.o. male with a history of systolic heart failure with a reduced EF of around 35% that recently declined to around 15% leading to a biventricular ICD which required placement of a epicardial lead due to a history of diaphragmatic pacing with his previous LV lead. Also it was found that his right ventricular ICD cable appeared to have significantly fluctuating impedances with sitting and standing ranging from the seventies to the 140's as well as a possible malfunctioning RV ICD coil. 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 Therefore in  he underwent an RV lead extraction with RV lead replacement. In , we did a home ICD check which showed 28 runs of paroxysmal atrial fibrillation, the longest being 13 minutes in duration. His echocardiogram done on 3/1/2017 showed an ejection fraction of 5-10%.     Exercise Tolerance: Mr. Heriberto Reilly reports that he has an excellent exercise tolerance. His says that he could walk 1 mile without developing chest discomfort or significant shortness of breath. Since the last time I saw Mr. Heriberto Reilly, he reports doing relatively well, however he continues to have chest discomfort nightly after walking upstairs.  He says he takes nitro for this and it goes away. Mr. Yeny Eli reports that this has not changed since last visit. He reports using about two nitro pills a week. He denied any worsening shortness of breath, bleeding problems, problems with his medications or any other concerns at this time. He reports having chronic constipation and he reports he hasn't had a bowl movemen in about a two weeks. Past Medical History:   Diagnosis Date    Abnormal echocardiogram 5/2/12    EF 15%. mildly dilated LV cavity.  Abnormal resting ECG findings 4/25/12    atrial sensed, ventricular paced rhythm at 73 beats per minute. Wide QRS of 183 ms.  Biventricular ICD (implantable cardiac defibrillator) in place 8/26/11    [facility]. CRT-D    Biventricular ICD (implantable cardioverter-defibrillator) in place 10/29/2015    [employer]- Bi V ICD (Battery change)    Biventricular pacemaker check 5/12 1/13    LV lead turned of 1/13 due to diaphragmatic pacing.  CAD (coronary artery disease)     Diabetes mellitus ([address]     type 2    Esophageal reflux     History of echocardiogram 11/23/2018    EF 15%. Mildly increased LV wall thickness w/ severely increased LA cavity size. Severe global hypokinesis w/ no segmental variation. LA is moderately dilated w/ LA volume index of 35. Mild mitral and tricuspid regurg. Evidence of moderate diastolic dysfunction seen.  Hx of blood clots     Hx of left heart catheterization by ventricular puncture 04/19/2013    LAD-20-30%, LCX & RCA-10-20%, Normal LVEDP,     Hyperlipidemia     Hypertension     Mantle cell lymphoma, unspecified site, extranodal and solid organ sites     Nonischemic dilated cardiomyopathy ([address]     Pain in joint, lower leg     Pain in limb     S/P cardiac catheterization 12/13/10    No significant coronary disease. EF estimated at 35%.     Thoracic or lumbosacral neuritis or radiculitis, unspecified     Unspecified acute reaction to stress        CURRENT ALLERGIES: Seasonal REVIEW OF SYSTEMS: 14 systems were reviewed. Pertinent positives and negatives as above, all else negative. Past Surgical History:   Procedure Laterality Date    BLADDER SURGERY      CARDIAC CATHETERIZATION      Patient states has had 3 in [facility] and one in [facility] last one being in [facility] 3 years ago Dr. Enedelia Carr  10/15/15    -polyps,diverticulosis,hemorrhoids    DIAGNOSTIC CARDIAC CATH LAB PROCEDURE  13    ELBOW SURGERY      FOOT SURGERY      LOBECTOMY N/A 2017    LEFT MINI THORCOTOMY WITH LEFT VENTRICULAR LEAD PLACEMENT performed by Loyda Arteaga MD at [address] [place] [facility]  13    basal cell on chest.    THORACOTOMY  2017    Left Mini    UPPER GASTROINTESTINAL ENDOSCOPY  10/15/15    -bx    Social History:  Social History     Tobacco Use    Smoking status: Former Smoker     Packs/day: 0.00     Years: 30.00     Pack years: 0.00     Types: Cigarettes     Last attempt to quit: 2008     Years since quittin.0    Smokeless tobacco: Never Used    Tobacco comment: Patient only smoked when he drank beer   Substance Use Topics    Alcohol use: Yes     Alcohol/week: 0.0 oz     Types: 3 - 4 Cans of beer per week    Drug use: No        CURRENT MEDICATIONS:  Outpatient Medications Marked as Taking for the 19 encounter (Office Visit) with Esme Perkins MD   Medication Sig Dispense Refill    dicyclomine (BENTYL) 20 MG tablet TAKE 1 TABLET THREE TIMES DAILY 270 tablet 3    omeprazole (PRILOSEC) 20 MG delayed release capsule TAKE 1 CAPSULE TWICE DAILY 180 capsule 3    nitroGLYCERIN (NITROSTAT) 0.4 MG SL tablet USE 1 TAB UNDER TONGUE FOR CHEST PAIN. REPEAT EVERY 5 MIN UP TO MAX OF 3 TOTAL DOSES.  IF NO RELIEF AFTER 3 TABS, CALL 911 100 tablet 3    lisinopril (PRINIVIL;ZESTRIL) 2.5 MG tablet TAKE 1 TABLET EVERY DAY 90 has no wheezes, rhonchi or rales. Abdominal: Soft, non-tender. Bowel sounds and aorta are normal. He exhibits no organomegaly, mass or bruit. Extremities:  No edema, cyanosis, or clubbing. Pulses are 2+ radial/carotid/dorsalis pedis and posterior tibial bilaterally. Neurological: He is alert and oriented to person, place, and time. No evidence of gross cranial nerve deficit. Coordination appeared normal.   Skin: Skin is warm and dry. There is no rash or diaphoresis. Psychiatric: He has a normal mood and affect. His speech is normal and behavior is normal.      MOST RECENT LABS ON RECORD:   Lab Results   Component Value Date    WBC 3.7 04/26/2019    HGB 12.3 (L) 04/26/2019    HCT 39.4 (L) 04/26/2019     04/26/2019    CHOL 180 08/23/2017    TRIG 95 08/23/2017    HDL 47 03/27/2019    ALT 16 09/21/2018    AST 18 09/21/2018     04/26/2019    K 5.0 04/26/2019     04/26/2019    CREATININE 1.77 (H) 04/26/2019    BUN 40 (H) 04/26/2019    CO2 25 04/26/2019    TSH 2.74 02/15/2019    PSA 3.22 09/21/2018    INR 1.2 03/13/2017    GLUF 149 (H) 11/21/2018    LABA1C 7.0 03/26/2019    LABMICR CANNOT BE CALCULATED 03/27/2019     (H) 02/03/2013       ASSESSMENT:  1. Chronic combined systolic and diastolic CHF, NYHA class 3 ([address]    2. Angina, class III ([address]    3. PAF (paroxysmal atrial fibrillation) ([address]    4. Biventricular ICD (implantable cardioverter-defibrillator) in place    5. Non-ischemic cardiomyopathy (HCC)    6. Other constipation       PLAN:  · Chronic Combined Systolic and Diastolic Heart Failure: Echo on 3/1/2017 Ejection Fraction: 5-10%:  · Beta blocker Continue Metoprolol Succinate 50 mg once daily    · ACE/ARB: Continue Lisinopril 2.5 mg daily  · Diuretics: Continue Furosemide (Lasix) 20 mg daily      · Angina: [place] Class III: Stable. · Beta Blocker Therapy: Continue Metoprolol Succinate 50 mg once daily   · Statin Therapy: Continue simvastatin (Zocor) 40 mg nightly. · Anti-Anginal's: Nitroglycerin PRN. · Counseling: I asked him to come to the emergency room if he develops persistent or worsening chest pain or worsening shortness of breath. · Paroxysmal Atrial Fibrillation: Rate control  · Beta Blocker: Continue metoprolol succinate (Toprol XL) 50 mg once daily. · Stroke Risk: His CHADS2-VASc score is greater than 2 (2.2% stroke risk)  · Anticoagulation: Continue Apixaban (Eliquis) 5 mg every 12 hours. I also reminded him to watch for signs of blood in his stool or black tarry stools and stop the medication immediately if this develops as this could be life threatening. Bi-Ventricular Implantable Cardioverter Defibrillator:  Indication for Device Placement: Cardiomyopathy with ejection fraction <35%   Interrogation Findings: Within normal limits and therefore no changes were made. · Constipation:   · I did advise Mr. Tammy Swartz to try and use Colace 100 mg once a day or twice a day if he needs to or to help him go it is over the counter. We did discuss that he can take the Imodium when he needs it and not every day. · I also advised that if he needs to he can hold his lasix for a couple days to help him go better. His legs are good and he does not mention any significant shortness of breath. I also told him to increase his fluid intake. In the meantime, I encouraged Mr. Tammy Swartz to continue to take his current medications and follow up with you as previously scheduled. FOLLOW UP:    I told Mr. Tammy Swartz to call my office if he had any problems, but otherwise told him to Return in about 6 months (around 11/1/2019). However, I would be happy to see him sooner should the need arise. Sincerely,  Catrachita Morin. Joel COTTO, MS, F.A.C.C.   [facility] Specialist  [address], [address] [place] [address]  Phone: [phone], Fax: [phone]    I believe that the risk of significant morbidity and mortality related to the patient's current medical conditions

## 2019-06-25 ENCOUNTER — HOSPITAL ENCOUNTER (OUTPATIENT)
Age: 79
Discharge: HOME | End: 2019-06-25
Payer: MEDICARE

## 2019-06-25 DIAGNOSIS — E11.9 TYPE 2 DIABETES MELLITUS WITHOUT COMPLICATION, WITHOUT LONG-TERM CURRENT USE OF INSULIN (HCC): ICD-10-CM

## 2019-06-25 LAB
ANION GAP SERPL CALCULATED.3IONS-SCNC: 10 MMOL/L (ref 9–17)
BUN BLDV-MCNC: 39 MG/DL (ref 8–23)
BUN/CREAT BLD: 19 (ref 9–20)
CALCIUM SERPL-MCNC: 8.7 MG/DL (ref 8.6–10.4)
CHLORIDE BLD-SCNC: 105 MMOL/L (ref 98–107)
CO2: 26 MMOL/L (ref 20–31)
CREAT SERPL-MCNC: 2.06 MG/DL (ref 0.7–1.2)
GFR AFRICAN AMERICAN: 38 ML/MIN
GFR NON-AFRICAN AMERICAN: 31 ML/MIN
GFR SERPL CREATININE-BSD FRML MDRD: ABNORMAL ML/MIN/{1.73_M2}
GFR SERPL CREATININE-BSD FRML MDRD: ABNORMAL ML/MIN/{1.73_M2}
GLUCOSE BLD-MCNC: 52 MG/DL (ref 70–99)
POTASSIUM SERPL-SCNC: 4 MMOL/L (ref 3.7–5.3)
SODIUM BLD-SCNC: 141 MMOL/L (ref 135–144)

## 2019-06-25 PROCEDURE — 36415 COLL VENOUS BLD VENIPUNCTURE: CPT

## 2019-06-25 PROCEDURE — 80048 BASIC METABOLIC PNL TOTAL CA: CPT

## 2019-07-18 ENCOUNTER — HOSPITAL ENCOUNTER (OUTPATIENT)
Dept: LAB | Age: 79
Discharge: HOME | End: 2019-07-18
Payer: MEDICARE

## 2019-07-18 LAB
-: ABNORMAL
ALBUMIN SERPL-MCNC: 4.1 G/DL (ref 3.5–5.2)
AMORPHOUS: ABNORMAL
ANION GAP SERPL CALCULATED.3IONS-SCNC: 6 MMOL/L (ref 9–17)
BACTERIA: ABNORMAL
BILIRUBIN URINE: NEGATIVE
BUN BLDV-MCNC: 39 MG/DL (ref 8–23)
BUN/CREAT BLD: 25 (ref 9–20)
CALCIUM SERPL-MCNC: 8.7 MG/DL (ref 8.6–10.4)
CASTS UA: ABNORMAL /LPF
CHLORIDE BLD-SCNC: 102 MMOL/L (ref 98–107)
CO2: 28 MMOL/L (ref 20–31)
COLOR: YELLOW
COMMENT UA: ABNORMAL
CREAT SERPL-MCNC: 1.56 MG/DL (ref 0.7–1.2)
CREATININE URINE: 79.6 MG/DL (ref 39–259)
CRYSTALS, UA: ABNORMAL /HPF
EPITHELIAL CELLS UA: ABNORMAL /HPF (ref 0–5)
GFR AFRICAN AMERICAN: 52 ML/MIN
GFR NON-AFRICAN AMERICAN: 43 ML/MIN
GFR SERPL CREATININE-BSD FRML MDRD: ABNORMAL ML/MIN/{1.73_M2}
GFR SERPL CREATININE-BSD FRML MDRD: ABNORMAL ML/MIN/{1.73_M2}
GLUCOSE BLD-MCNC: 185 MG/DL (ref 70–99)
GLUCOSE URINE: NEGATIVE
HCT VFR BLD CALC: 38.3 % (ref 40.7–50.3)
HEMOGLOBIN: 11.9 G/DL (ref 13–17)
KETONES, URINE: NEGATIVE
LEUKOCYTE ESTERASE, URINE: NEGATIVE
MAGNESIUM: 2.1 MG/DL (ref 1.6–2.6)
MCH RBC QN AUTO: 30.4 PG (ref 25.2–33.5)
MCHC RBC AUTO-ENTMCNC: 31.1 G/DL (ref 28.4–34.8)
MCV RBC AUTO: 97.7 FL (ref 82.6–102.9)
MUCUS: ABNORMAL
NITRITE, URINE: NEGATIVE
NRBC AUTOMATED: 0 PER 100 WBC
OTHER OBSERVATIONS UA: ABNORMAL
PDW BLD-RTO: 13.6 % (ref 11.8–14.4)
PH UA: 5.5 (ref 5–9)
PHOSPHORUS: 3.6 MG/DL (ref 2.5–4.5)
PLATELET # BLD: 194 K/UL (ref 138–453)
PMV BLD AUTO: 11.3 FL (ref 8.1–13.5)
POTASSIUM SERPL-SCNC: 4.6 MMOL/L (ref 3.7–5.3)
PROTEIN UA: NEGATIVE
RBC # BLD: 3.92 M/UL (ref 4.21–5.77)
RBC UA: ABNORMAL /HPF (ref 0–2)
RENAL EPITHELIAL, UA: ABNORMAL /HPF
SODIUM BLD-SCNC: 136 MMOL/L (ref 135–144)
SPECIFIC GRAVITY UA: 1.01 (ref 1.01–1.02)
TRICHOMONAS: ABNORMAL
TURBIDITY: CLEAR
URIC ACID: 6.7 MG/DL (ref 3.4–7)
URINE HGB: NEGATIVE
UROBILINOGEN, URINE: NORMAL
WBC # BLD: 5.3 K/UL (ref 3.5–11.3)
WBC UA: ABNORMAL /HPF (ref 0–5)
YEAST: ABNORMAL

## 2019-07-18 PROCEDURE — 80069 RENAL FUNCTION PANEL: CPT

## 2019-07-18 PROCEDURE — 36415 COLL VENOUS BLD VENIPUNCTURE: CPT

## 2019-07-18 PROCEDURE — 84550 ASSAY OF BLOOD/URIC ACID: CPT

## 2019-07-18 PROCEDURE — 85027 COMPLETE CBC AUTOMATED: CPT

## 2019-07-18 PROCEDURE — 82570 ASSAY OF URINE CREATININE: CPT

## 2019-07-18 PROCEDURE — 83970 ASSAY OF PARATHORMONE: CPT

## 2019-07-18 PROCEDURE — 81001 URINALYSIS AUTO W/SCOPE: CPT

## 2019-07-18 PROCEDURE — 83735 ASSAY OF MAGNESIUM: CPT

## 2019-07-19 LAB — PTH INTACT: 74.27 PG/ML (ref 15–65)

## 2019-07-23 ENCOUNTER — NURSE ONLY (OUTPATIENT)
Dept: CARDIOLOGY | Age: 79
End: 2019-07-23
Payer: MEDICARE

## 2019-07-23 DIAGNOSIS — I50.9 CONGESTIVE HEART FAILURE, UNSPECIFIED HF CHRONICITY, UNSPECIFIED HEART FAILURE TYPE (HCC): Primary | ICD-10-CM

## 2019-07-23 PROCEDURE — 93297 REM INTERROG DEV EVAL ICPMS: CPT | Performed by: FAMILY MEDICINE

## 2019-07-24 ENCOUNTER — TELEPHONE (OUTPATIENT)
Dept: CARDIOLOGY | Age: 79
End: 2019-07-24

## 2019-08-27 ENCOUNTER — NURSE ONLY (OUTPATIENT)
Dept: CARDIOLOGY | Age: 79
End: 2019-08-27
Payer: MEDICARE

## 2019-08-27 DIAGNOSIS — I42.8 NON-ISCHEMIC CARDIOMYOPATHY (HCC): ICD-10-CM

## 2019-08-27 DIAGNOSIS — Z95.810 BIVENTRICULAR ICD (IMPLANTABLE CARDIOVERTER-DEFIBRILLATOR) IN PLACE: ICD-10-CM

## 2019-08-27 DIAGNOSIS — Z95.810 ICD (IMPLANTABLE CARDIOVERTER-DEFIBRILLATOR) IN PLACE: Primary | ICD-10-CM

## 2019-08-27 PROCEDURE — 93295 DEV INTERROG REMOTE 1/2/MLT: CPT | Performed by: FAMILY MEDICINE

## 2019-08-27 PROCEDURE — 93296 REM INTERROG EVL PM/IDS: CPT | Performed by: FAMILY MEDICINE

## 2019-08-27 RX ORDER — APIXABAN 5 MG/1
TABLET, FILM COATED ORAL
Qty: 180 TABLET | Refills: 3 | Status: SHIPPED | OUTPATIENT
Start: 2019-08-27 | End: 2020-02-10

## 2019-08-28 ENCOUNTER — TELEPHONE (OUTPATIENT)
Dept: CARDIOLOGY | Age: 79
End: 2019-08-28

## 2019-09-23 ENCOUNTER — HOSPITAL ENCOUNTER (OUTPATIENT)
Dept: LAB | Age: 79
Discharge: HOME | End: 2019-09-23
Payer: MEDICARE

## 2019-09-23 DIAGNOSIS — E78.5 HYPERLIPIDEMIA, UNSPECIFIED HYPERLIPIDEMIA TYPE: Chronic | ICD-10-CM

## 2019-09-23 LAB
ALT SERPL-CCNC: 16 U/L (ref 5–41)
AST SERPL-CCNC: 16 U/L

## 2019-09-23 PROCEDURE — 36415 COLL VENOUS BLD VENIPUNCTURE: CPT

## 2019-09-23 PROCEDURE — 84460 ALANINE AMINO (ALT) (SGPT): CPT

## 2019-09-23 PROCEDURE — 84450 TRANSFERASE (AST) (SGOT): CPT

## 2019-10-01 ENCOUNTER — NURSE ONLY (OUTPATIENT)
Dept: CARDIOLOGY | Age: 79
End: 2019-10-01
Payer: MEDICARE

## 2019-10-01 DIAGNOSIS — I50.9 CONGESTIVE HEART FAILURE, UNSPECIFIED HF CHRONICITY, UNSPECIFIED HEART FAILURE TYPE (HCC): Primary | ICD-10-CM

## 2019-10-01 PROCEDURE — 93297 REM INTERROG DEV EVAL ICPMS: CPT | Performed by: FAMILY MEDICINE

## 2019-10-01 PROCEDURE — 93299 PR REM INTERROG ICPMS/SCRMS <30 D TECH REVIEW: CPT | Performed by: FAMILY MEDICINE

## 2019-10-03 ENCOUNTER — TELEPHONE (OUTPATIENT)
Dept: CARDIOLOGY | Age: 79
End: 2019-10-03

## 2019-10-16 ENCOUNTER — HOSPITAL ENCOUNTER (OUTPATIENT)
Dept: LAB | Age: 79
Discharge: HOME | End: 2019-10-16
Payer: MEDICARE

## 2019-10-16 DIAGNOSIS — E78.00 PURE HYPERCHOLESTEROLEMIA: ICD-10-CM

## 2019-10-16 DIAGNOSIS — Z12.5 SCREENING FOR PROSTATE CANCER: ICD-10-CM

## 2019-10-16 DIAGNOSIS — E78.5 HYPERLIPIDEMIA, UNSPECIFIED HYPERLIPIDEMIA TYPE: ICD-10-CM

## 2019-10-16 LAB
ALT SERPL-CCNC: 15 U/L (ref 5–41)
AST SERPL-CCNC: 18 U/L
CHOLESTEROL, FASTING: 131 MG/DL
CHOLESTEROL/HDL RATIO: 2.7
HDLC SERPL-MCNC: 49 MG/DL
LDL CHOLESTEROL: 63 MG/DL (ref 0–130)
PROSTATE SPECIFIC ANTIGEN: 2.23 UG/L
TRIGLYCERIDE, FASTING: 94 MG/DL
VLDLC SERPL CALC-MCNC: NORMAL MG/DL (ref 1–30)

## 2019-10-16 PROCEDURE — 84460 ALANINE AMINO (ALT) (SGPT): CPT

## 2019-10-16 PROCEDURE — G0103 PSA SCREENING: HCPCS

## 2019-10-16 PROCEDURE — 80061 LIPID PANEL: CPT

## 2019-10-16 PROCEDURE — 36415 COLL VENOUS BLD VENIPUNCTURE: CPT

## 2019-10-16 PROCEDURE — 84450 TRANSFERASE (AST) (SGOT): CPT

## 2019-11-05 ENCOUNTER — NURSE ONLY (OUTPATIENT)
Dept: CARDIOLOGY | Age: 79
End: 2019-11-05
Payer: MEDICARE

## 2019-11-05 ENCOUNTER — OFFICE VISIT (OUTPATIENT)
Dept: CARDIOLOGY | Age: 79
End: 2019-11-05
Payer: MEDICARE

## 2019-11-05 VITALS
SYSTOLIC BLOOD PRESSURE: 103 MMHG | HEART RATE: 72 BPM | HEIGHT: 72 IN | DIASTOLIC BLOOD PRESSURE: 56 MMHG | BODY MASS INDEX: 25.38 KG/M2 | WEIGHT: 187.4 LBS | RESPIRATION RATE: 16 BRPM | OXYGEN SATURATION: 98 %

## 2019-11-05 DIAGNOSIS — I48.0 PAF (PAROXYSMAL ATRIAL FIBRILLATION) (HCC): ICD-10-CM

## 2019-11-05 DIAGNOSIS — I20.9 ANGINA, CLASS III (HCC): ICD-10-CM

## 2019-11-05 DIAGNOSIS — I50.42 CHRONIC COMBINED SYSTOLIC AND DIASTOLIC CONGESTIVE HEART FAILURE (HCC): Primary | ICD-10-CM

## 2019-11-05 DIAGNOSIS — Z95.0 CARDIAC PACEMAKER IN SITU: ICD-10-CM

## 2019-11-05 DIAGNOSIS — I50.9 CONGESTIVE HEART FAILURE, UNSPECIFIED HF CHRONICITY, UNSPECIFIED HEART FAILURE TYPE (HCC): Primary | ICD-10-CM

## 2019-11-05 PROCEDURE — 1036F TOBACCO NON-USER: CPT | Performed by: FAMILY MEDICINE

## 2019-11-05 PROCEDURE — 93297 REM INTERROG DEV EVAL ICPMS: CPT | Performed by: FAMILY MEDICINE

## 2019-11-05 PROCEDURE — 99213 OFFICE O/P EST LOW 20 MIN: CPT | Performed by: FAMILY MEDICINE

## 2019-11-05 PROCEDURE — 93299 PR REM INTERROG ICPMS/SCRMS <30 D TECH REVIEW: CPT | Performed by: FAMILY MEDICINE

## 2019-11-05 PROCEDURE — G8482 FLU IMMUNIZE ORDER/ADMIN: HCPCS | Performed by: FAMILY MEDICINE

## 2019-11-05 PROCEDURE — G8598 ASA/ANTIPLAT THER USED: HCPCS | Performed by: FAMILY MEDICINE

## 2019-11-05 PROCEDURE — 4040F PNEUMOC VAC/ADMIN/RCVD: CPT | Performed by: FAMILY MEDICINE

## 2019-11-05 PROCEDURE — 93000 ELECTROCARDIOGRAM COMPLETE: CPT | Performed by: FAMILY MEDICINE

## 2019-11-05 PROCEDURE — G8427 DOCREV CUR MEDS BY ELIG CLIN: HCPCS | Performed by: FAMILY MEDICINE

## 2019-11-05 PROCEDURE — 1123F ACP DISCUSS/DSCN MKR DOCD: CPT | Performed by: FAMILY MEDICINE

## 2019-11-05 PROCEDURE — G8417 CALC BMI ABV UP PARAM F/U: HCPCS | Performed by: FAMILY MEDICINE

## 2019-11-06 ENCOUNTER — TELEPHONE (OUTPATIENT)
Dept: CARDIOLOGY | Age: 79
End: 2019-11-06

## 2019-11-08 ENCOUNTER — HOSPITAL ENCOUNTER (OUTPATIENT)
Dept: LAB | Age: 79
Discharge: HOME | End: 2019-11-08
Payer: MEDICARE

## 2019-11-08 LAB
-: NORMAL
ALBUMIN SERPL-MCNC: 4 G/DL (ref 3.5–5.2)
AMORPHOUS: NORMAL
ANION GAP SERPL CALCULATED.3IONS-SCNC: 10 MMOL/L (ref 9–17)
BACTERIA: NORMAL
BILIRUBIN URINE: NEGATIVE
BUN BLDV-MCNC: 34 MG/DL (ref 8–23)
BUN/CREAT BLD: 25 (ref 9–20)
CALCIUM SERPL-MCNC: 8.9 MG/DL (ref 8.6–10.4)
CASTS UA: NORMAL /LPF
CHLORIDE BLD-SCNC: 103 MMOL/L (ref 98–107)
CO2: 24 MMOL/L (ref 20–31)
COLOR: YELLOW
COMMENT UA: NORMAL
CREAT SERPL-MCNC: 1.38 MG/DL (ref 0.7–1.2)
CREATININE URINE: 46.7 MG/DL (ref 39–259)
CRYSTALS, UA: NORMAL /HPF
EPITHELIAL CELLS UA: NORMAL /HPF (ref 0–5)
GFR AFRICAN AMERICAN: >60 ML/MIN
GFR NON-AFRICAN AMERICAN: 50 ML/MIN
GFR SERPL CREATININE-BSD FRML MDRD: ABNORMAL ML/MIN/{1.73_M2}
GFR SERPL CREATININE-BSD FRML MDRD: ABNORMAL ML/MIN/{1.73_M2}
GLUCOSE BLD-MCNC: 194 MG/DL (ref 70–99)
GLUCOSE URINE: NEGATIVE
HCT VFR BLD CALC: 37.9 % (ref 40.7–50.3)
HEMOGLOBIN: 12 G/DL (ref 13–17)
KETONES, URINE: NEGATIVE
LEUKOCYTE ESTERASE, URINE: NEGATIVE
MAGNESIUM: 1.9 MG/DL (ref 1.6–2.6)
MCH RBC QN AUTO: 30.7 PG (ref 25.2–33.5)
MCHC RBC AUTO-ENTMCNC: 31.7 G/DL (ref 28.4–34.8)
MCV RBC AUTO: 96.9 FL (ref 82.6–102.9)
MUCUS: NORMAL
NITRITE, URINE: NEGATIVE
NRBC AUTOMATED: 0 PER 100 WBC
OTHER OBSERVATIONS UA: NORMAL
PDW BLD-RTO: 13.5 % (ref 11.8–14.4)
PH UA: 5.5 (ref 5–9)
PHOSPHORUS: 2.6 MG/DL (ref 2.5–4.5)
PLATELET # BLD: 228 K/UL (ref 138–453)
PMV BLD AUTO: 11 FL (ref 8.1–13.5)
POTASSIUM SERPL-SCNC: 4.4 MMOL/L (ref 3.7–5.3)
PROTEIN UA: NEGATIVE
PTH INTACT: 48.55 PG/ML (ref 15–65)
RBC # BLD: 3.91 M/UL (ref 4.21–5.77)
RBC UA: NORMAL /HPF (ref 0–2)
RENAL EPITHELIAL, UA: NORMAL /HPF
SODIUM BLD-SCNC: 137 MMOL/L (ref 135–144)
SPECIFIC GRAVITY UA: 1.01 (ref 1.01–1.02)
TOTAL PROTEIN, URINE: 7 MG/DL
TRICHOMONAS: NORMAL
TURBIDITY: CLEAR
URIC ACID: 6.9 MG/DL (ref 3.4–7)
URINE HGB: NEGATIVE
UROBILINOGEN, URINE: NORMAL
WBC # BLD: 5.2 K/UL (ref 3.5–11.3)
WBC UA: NORMAL /HPF (ref 0–5)
YEAST: NORMAL

## 2019-11-08 PROCEDURE — 83970 ASSAY OF PARATHORMONE: CPT

## 2019-11-08 PROCEDURE — 84156 ASSAY OF PROTEIN URINE: CPT

## 2019-11-08 PROCEDURE — 84550 ASSAY OF BLOOD/URIC ACID: CPT

## 2019-11-08 PROCEDURE — 36415 COLL VENOUS BLD VENIPUNCTURE: CPT

## 2019-11-08 PROCEDURE — 83735 ASSAY OF MAGNESIUM: CPT

## 2019-11-08 PROCEDURE — 85027 COMPLETE CBC AUTOMATED: CPT

## 2019-11-08 PROCEDURE — 82570 ASSAY OF URINE CREATININE: CPT

## 2019-11-08 PROCEDURE — 81001 URINALYSIS AUTO W/SCOPE: CPT

## 2019-11-08 PROCEDURE — 80069 RENAL FUNCTION PANEL: CPT

## 2019-12-10 ENCOUNTER — NURSE ONLY (OUTPATIENT)
Dept: CARDIOLOGY | Age: 79
End: 2019-12-10
Payer: MEDICARE

## 2019-12-10 DIAGNOSIS — Z95.810 BIVENTRICULAR ICD (IMPLANTABLE CARDIOVERTER-DEFIBRILLATOR) IN PLACE: ICD-10-CM

## 2019-12-10 DIAGNOSIS — I42.8 NON-ISCHEMIC CARDIOMYOPATHY (HCC): ICD-10-CM

## 2019-12-10 PROCEDURE — 93296 REM INTERROG EVL PM/IDS: CPT | Performed by: FAMILY MEDICINE

## 2019-12-10 PROCEDURE — 93295 DEV INTERROG REMOTE 1/2/MLT: CPT | Performed by: FAMILY MEDICINE

## 2019-12-12 ENCOUNTER — TELEPHONE (OUTPATIENT)
Dept: CARDIOLOGY | Age: 79
End: 2019-12-12

## 2019-12-30 ENCOUNTER — HOSPITAL ENCOUNTER (OUTPATIENT)
Dept: GENERAL RADIOLOGY | Age: 79
Discharge: HOME | End: 2020-01-01
Payer: MEDICARE

## 2019-12-30 ENCOUNTER — HOSPITAL ENCOUNTER (OUTPATIENT)
Age: 79
Discharge: HOME | End: 2020-01-01
Payer: MEDICARE

## 2019-12-30 PROCEDURE — 71100 X-RAY EXAM RIBS UNI 2 VIEWS: CPT

## 2019-12-30 PROCEDURE — 72072 X-RAY EXAM THORAC SPINE 3VWS: CPT

## 2019-12-30 PROCEDURE — 71046 X-RAY EXAM CHEST 2 VIEWS: CPT

## 2020-01-14 ENCOUNTER — NURSE ONLY (OUTPATIENT)
Dept: CARDIOLOGY | Age: 80
End: 2020-01-14
Payer: MEDICARE

## 2020-01-14 PROCEDURE — 93297 REM INTERROG DEV EVAL ICPMS: CPT | Performed by: FAMILY MEDICINE

## 2020-01-16 ENCOUNTER — TELEPHONE (OUTPATIENT)
Dept: CARDIOLOGY | Age: 80
End: 2020-01-16

## 2020-02-10 RX ORDER — METOPROLOL SUCCINATE 50 MG/1
50 TABLET, EXTENDED RELEASE ORAL DAILY
Qty: 90 TABLET | Refills: 3 | Status: SHIPPED | OUTPATIENT
Start: 2020-02-10 | End: 2020-11-23

## 2020-02-18 ENCOUNTER — NURSE ONLY (OUTPATIENT)
Dept: CARDIOLOGY | Age: 80
End: 2020-02-18
Payer: MEDICARE

## 2020-02-18 PROCEDURE — 93297 REM INTERROG DEV EVAL ICPMS: CPT | Performed by: FAMILY MEDICINE

## 2020-02-19 ENCOUNTER — TELEPHONE (OUTPATIENT)
Dept: CARDIOLOGY | Age: 80
End: 2020-02-19

## 2020-03-03 RX ORDER — FUROSEMIDE 20 MG/1
20 TABLET ORAL DAILY
Qty: 90 TABLET | Refills: 3 | Status: SHIPPED | OUTPATIENT
Start: 2020-03-03 | End: 2021-01-25

## 2020-03-03 RX ORDER — LISINOPRIL 2.5 MG/1
TABLET ORAL
Qty: 90 TABLET | Refills: 3 | Status: SHIPPED | OUTPATIENT
Start: 2020-03-03 | End: 2021-01-25

## 2020-03-06 ENCOUNTER — HOSPITAL ENCOUNTER (OUTPATIENT)
Dept: LAB | Age: 80
Discharge: HOME | End: 2020-03-06
Payer: MEDICARE

## 2020-03-06 LAB
-: NORMAL
ALBUMIN SERPL-MCNC: 4.3 G/DL (ref 3.5–5.2)
AMORPHOUS: NORMAL
ANION GAP SERPL CALCULATED.3IONS-SCNC: 8 MMOL/L (ref 9–17)
BACTERIA: NORMAL
BILIRUBIN URINE: NEGATIVE
BUN BLDV-MCNC: 31 MG/DL (ref 8–23)
BUN/CREAT BLD: 19 (ref 9–20)
CALCIUM SERPL-MCNC: 8.8 MG/DL (ref 8.6–10.4)
CASTS UA: NORMAL /LPF
CHLORIDE BLD-SCNC: 103 MMOL/L (ref 98–107)
CO2: 25 MMOL/L (ref 20–31)
COLOR: YELLOW
COMMENT UA: NORMAL
CREAT SERPL-MCNC: 1.62 MG/DL (ref 0.7–1.2)
CREATININE URINE: 88.5 MG/DL (ref 39–259)
CRYSTALS, UA: NORMAL /HPF
EPITHELIAL CELLS UA: NORMAL /HPF (ref 0–5)
GFR AFRICAN AMERICAN: 50 ML/MIN
GFR NON-AFRICAN AMERICAN: 41 ML/MIN
GFR SERPL CREATININE-BSD FRML MDRD: ABNORMAL ML/MIN/{1.73_M2}
GFR SERPL CREATININE-BSD FRML MDRD: ABNORMAL ML/MIN/{1.73_M2}
GLUCOSE BLD-MCNC: 178 MG/DL (ref 70–99)
GLUCOSE URINE: NEGATIVE
HCT VFR BLD CALC: 40.1 % (ref 40.7–50.3)
HEMOGLOBIN: 12.5 G/DL (ref 13–17)
KETONES, URINE: NEGATIVE
LEUKOCYTE ESTERASE, URINE: NEGATIVE
MAGNESIUM: 2.2 MG/DL (ref 1.6–2.6)
MCH RBC QN AUTO: 30.9 PG (ref 25.2–33.5)
MCHC RBC AUTO-ENTMCNC: 31.2 G/DL (ref 28.4–34.8)
MCV RBC AUTO: 99 FL (ref 82.6–102.9)
MUCUS: NORMAL
NITRITE, URINE: NEGATIVE
NRBC AUTOMATED: 0 PER 100 WBC
OTHER OBSERVATIONS UA: NORMAL
PDW BLD-RTO: 13.4 % (ref 11.8–14.4)
PH UA: 5.5 (ref 5–9)
PHOSPHORUS: 2.7 MG/DL (ref 2.5–4.5)
PLATELET # BLD: 234 K/UL (ref 138–453)
PMV BLD AUTO: 11.3 FL (ref 8.1–13.5)
POTASSIUM SERPL-SCNC: 5.1 MMOL/L (ref 3.7–5.3)
PROTEIN UA: NEGATIVE
PTH INTACT: 78.22 PG/ML (ref 15–65)
RBC # BLD: 4.05 M/UL (ref 4.21–5.77)
RBC UA: NORMAL /HPF (ref 0–2)
RENAL EPITHELIAL, UA: NORMAL /HPF
SODIUM BLD-SCNC: 136 MMOL/L (ref 135–144)
SPECIFIC GRAVITY UA: 1.02 (ref 1.01–1.02)
TOTAL PROTEIN, URINE: <4 MG/DL
TRICHOMONAS: NORMAL
TURBIDITY: CLEAR
URIC ACID: 8.6 MG/DL (ref 3.4–7)
URINE HGB: NEGATIVE
UROBILINOGEN, URINE: NORMAL
WBC # BLD: 4.4 K/UL (ref 3.5–11.3)
WBC UA: NORMAL /HPF (ref 0–5)
YEAST: NORMAL

## 2020-03-06 PROCEDURE — 80069 RENAL FUNCTION PANEL: CPT

## 2020-03-06 PROCEDURE — 83970 ASSAY OF PARATHORMONE: CPT

## 2020-03-06 PROCEDURE — 36415 COLL VENOUS BLD VENIPUNCTURE: CPT

## 2020-03-06 PROCEDURE — 84550 ASSAY OF BLOOD/URIC ACID: CPT

## 2020-03-06 PROCEDURE — 81001 URINALYSIS AUTO W/SCOPE: CPT

## 2020-03-06 PROCEDURE — 85027 COMPLETE CBC AUTOMATED: CPT

## 2020-03-06 PROCEDURE — 82570 ASSAY OF URINE CREATININE: CPT

## 2020-03-06 PROCEDURE — 83735 ASSAY OF MAGNESIUM: CPT

## 2020-03-06 PROCEDURE — 84156 ASSAY OF PROTEIN URINE: CPT

## 2020-03-26 ENCOUNTER — TELEPHONE (OUTPATIENT)
Dept: CARDIOLOGY | Age: 80
End: 2020-03-26

## 2020-04-07 ENCOUNTER — TELEPHONE (OUTPATIENT)
Dept: CARDIOLOGY | Age: 80
End: 2020-04-07

## 2020-04-07 NOTE — TELEPHONE ENCOUNTER
Patient does not want optivol checks any longer due to him changing insurance companies and being charged monthly for this. He would only like to be checked every 6 months.  Cancelled optivol monthly checks

## 2020-04-10 ENCOUNTER — TELEPHONE (OUTPATIENT)
Dept: CARDIOLOGY | Age: 80
End: 2020-04-10

## 2020-04-10 NOTE — TELEPHONE ENCOUNTER
That is fine about optivol, but he should have ICD checks every 3 months and we will see the optivol on each of those although not bill for those at all.

## 2020-04-21 ENCOUNTER — HOSPITAL ENCOUNTER (OUTPATIENT)
Dept: LAB | Age: 80
Discharge: HOME | End: 2020-04-21
Payer: MEDICARE

## 2020-04-21 LAB
CHOLESTEROL, FASTING: 139 MG/DL
CHOLESTEROL/HDL RATIO: 2.8
HDLC SERPL-MCNC: 49 MG/DL
LDL CHOLESTEROL: 74 MG/DL (ref 0–130)
TRIGLYCERIDE, FASTING: 78 MG/DL
VLDLC SERPL CALC-MCNC: NORMAL MG/DL (ref 1–30)

## 2020-04-21 PROCEDURE — 80061 LIPID PANEL: CPT

## 2020-04-21 PROCEDURE — 36415 COLL VENOUS BLD VENIPUNCTURE: CPT

## 2020-05-06 ENCOUNTER — OFFICE VISIT (OUTPATIENT)
Dept: CARDIOLOGY | Age: 80
End: 2020-05-06
Payer: MEDICARE

## 2020-05-06 VITALS
HEIGHT: 72 IN | OXYGEN SATURATION: 96 % | DIASTOLIC BLOOD PRESSURE: 67 MMHG | RESPIRATION RATE: 18 BRPM | HEART RATE: 72 BPM | SYSTOLIC BLOOD PRESSURE: 119 MMHG | BODY MASS INDEX: 25.06 KG/M2 | WEIGHT: 185 LBS

## 2020-05-06 PROCEDURE — 1036F TOBACCO NON-USER: CPT | Performed by: FAMILY MEDICINE

## 2020-05-06 PROCEDURE — 99214 OFFICE O/P EST MOD 30 MIN: CPT | Performed by: FAMILY MEDICINE

## 2020-05-06 PROCEDURE — 93289 INTERROG DEVICE EVAL HEART: CPT | Performed by: FAMILY MEDICINE

## 2020-05-06 PROCEDURE — G8427 DOCREV CUR MEDS BY ELIG CLIN: HCPCS | Performed by: FAMILY MEDICINE

## 2020-05-06 PROCEDURE — 1123F ACP DISCUSS/DSCN MKR DOCD: CPT | Performed by: FAMILY MEDICINE

## 2020-05-06 PROCEDURE — G8417 CALC BMI ABV UP PARAM F/U: HCPCS | Performed by: FAMILY MEDICINE

## 2020-05-06 PROCEDURE — 4040F PNEUMOC VAC/ADMIN/RCVD: CPT | Performed by: FAMILY MEDICINE

## 2020-05-06 RX ORDER — NITROGLYCERIN 0.4 MG/1
0.4 TABLET SUBLINGUAL EVERY 5 MIN PRN
Qty: 100 TABLET | Refills: 3 | Status: SHIPPED | OUTPATIENT
Start: 2020-05-06 | End: 2021-06-10 | Stop reason: SDUPTHER

## 2020-05-06 NOTE — PROGRESS NOTES
Patrice Rosas am scribing for and in the presence of Elia Sung. Joel COTTO, MS, F.A.C.C. Patient: Rafi Gifford  : 1940  Date of Visit: May 6, 2020    REASON FOR VISIT / CONSULTATION: Follow-up (HX: CHF, PAF, Angina Class III, Bi-V ICD, Cardiomyopahty. Pt states he is doing ok. C/o: CP does take Nitro about once a week. Palpitions at times. Balance Issues. Lighteaded. Denies: SOB.)      Dear Stan Doe MD,    I had the pleasure of seeing your patient Rafi Gifford in follow up today status post ICD RV lead extraction and new RV lead placement. As you know, Mr. Haile Logno is a 67 y.o. male with a history of systolic heart failure with a reduced EF of around 35% that recently declined to around 15% leading to a biventricular ICD which required placement of a epicardial lead due to a history of diaphragmatic pacing with his previous LV lead. Also it was found that his right ventricular ICD cable appeared to have significantly fluctuating impedances with sitting and standing ranging from the seventies to the 140's as well as a possible malfunctioning RV ICD coil. 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 Therefore in  he underwent an RV lead extraction with RV lead replacement. In , we did a home ICD check which showed 28 runs of paroxysmal atrial fibrillation, the longest being 13 minutes in duration. His echocardiogram done on 3/1/2017 showed an ejection fraction of 5-10%. Echocardiogram done on 2018 showed EF of 15%. Mildly increased LV wall thickness with a severely increased LV cavity size. Severe global hypokineses. LA is moderately dilated. Mild mitral & tricuspid regurgitation. Evidence of moderate diastolic dysfunction is seen.       Exercise Tolerance: Mr. Haiel Longo reports that he has an excellent exercise tolerance. His says that he thinks he could walk 1 mile without developing chest discomfort or significant shortness of breath. Since the last time I saw Mr. Gretta Talley, he reports doing relatively well, however he continues to have chest discomfort but it is stable at this time. He has been taking his Nitro about 1-2 times a week. He reports feeling lightheaded and dizzy at times however no falls or near falls and it is infrequent. He denied any worsening shortness of breath, palpitations, bleeding problems, problems with his medications or any other concerns at this time. Past Medical History:   Diagnosis Date    Abnormal echocardiogram 5/2/12    EF 15%. mildly dilated LV cavity.  Abnormal resting ECG findings 4/25/12    atrial sensed, ventricular paced rhythm at 73 beats per minute. Wide QRS of 183 ms.  Biventricular ICD (implantable cardiac defibrillator) in place 8/26/11    [facility] [employer]. CRT-D    Biventricular ICD (implantable cardioverter-defibrillator) in place 10/29/2015    Medtronic- Bi V ICD (Battery change)    Biventricular pacemaker check 5/12 1/13    LV lead turned of 1/13 due to diaphragmatic pacing.  CAD (coronary artery disease)     Diabetes mellitus ([address]     type 2    Esophageal reflux     History of echocardiogram 11/23/2018    EF 15%. Mildly increased LV wall thickness w/ severely increased LA cavity size. Severe global hypokinesis w/ no segmental variation. LA is moderately dilated w/ LA volume index of 35. Mild mitral and tricuspid regurg. Evidence of moderate diastolic dysfunction seen.     Hx of blood clots     Hx of left heart catheterization by ventricular puncture 04/19/2013    LAD-20-30%, LCX & RCA-10-20%, Normal LVEDP,     Hyperlipidemia     Hypertension     Mantle cell lymphoma, unspecified site, extranodal and solid organ sites     Nonischemic dilated cardiomyopathy ([address]     Pain in joint, lower leg     Pain in limb     S/P cardiac catheterization 12/13/10 No significant coronary disease. EF estimated at 35%.  Thoracic or lumbosacral neuritis or radiculitis, unspecified     Unspecified acute reaction to stress        CURRENT ALLERGIES: Seasonal REVIEW OF SYSTEMS: 14 systems were reviewed. Pertinent positives and negatives as above, all else negative. Past Surgical History:   Procedure Laterality Date    BLADDER SURGERY      CARDIAC CATHETERIZATION      Patient states has had 3 in [place] and one in [facility] last one being in [place] 3 years ago Dr. Tunde Preston  10/15/15    -polyps,diverticulosis,hemorrhoids    DIAGNOSTIC CARDIAC CATH LAB PROCEDURE  13    ELBOW SURGERY      FOOT SURGERY      LOBECTOMY N/A 2017    LEFT MINI THORCOTOMY WITH LEFT VENTRICULAR LEAD PLACEMENT performed by Missy Morrison MD at [facility]  13    basal cell on chest.    THORACOTOMY  2017    Left Mini    UPPER GASTROINTESTINAL ENDOSCOPY  10/15/15    -bx    Social History:  Social History     Tobacco Use    Smoking status: Former Smoker     Packs/day: 0.00     Years: 30.00     Pack years: 0.00     Types: Cigarettes     Last attempt to quit: 2008     Years since quittin.0    Smokeless tobacco: Never Used    Tobacco comment: Patient only smoked when he drank beer   Substance Use Topics    Alcohol use:  Yes     Alcohol/week: 0.0 standard drinks     Types: 3 - 4 Cans of beer per week    Drug use: No        CURRENT MEDICATIONS:  Outpatient Medications Marked as Taking for the 20 encounter (Office Visit) with Nilton Bae MD   Medication Sig Dispense Refill    simvastatin (ZOCOR) 40 MG tablet TAKE 1 TABLET EVERY NIGHT 90 tablet 3    glimepiride (AMARYL) 4 MG tablet TAKE 1 TABLET TWICE DAILY 180 tablet 3    finasteride (PROSCAR) 5 MG tablet TAKE 1 TABLET EVERY DAY 90 tablet 3    furosemide (LASIX) 20 MG tablet Take 1 tablet by mouth daily 90 tablet 3    lisinopril (PRINIVIL;ZESTRIL) 2.5 MG tablet TAKE 1 TABLET EVERY DAY 90 tablet 3    tamsulosin (FLOMAX) 0.4 MG capsule TAKE 1 CAPSULE EVERY DAY 90 capsule 3    metoprolol succinate (TOPROL XL) 50 MG extended release tablet Take 1 tablet by mouth daily 90 tablet 3    apixaban (ELIQUIS) 5 MG TABS tablet TAKE 1 TABLET TWICE DAILY 180 tablet 3    ALPRAZolam (XANAX) 0.5 MG tablet TAKE 1 TABLET BY MOUTH TWICE DAILY AS NEEDED 180 tablet 1    nitroGLYCERIN (NITROSTAT) 0.4 MG SL tablet Place 1 tablet under the tongue every 5 minutes as needed for Chest pain up to max of 3 total doses. If no relief after 1 dose, call 911. 100 tablet 3    dicyclomine (BENTYL) 20 MG tablet TAKE 1 TABLET THREE TIMES DAILY 270 tablet 3    omeprazole (PRILOSEC) 20 MG delayed release capsule TAKE 1 CAPSULE TWICE DAILY (Patient taking differently: 20 mg as needed ) 180 capsule 3    NONFORMULARY daily Ibgard for IBS      potassium chloride (KLOR-CON M) 20 MEQ TBCR extended release tablet Take 20 mEq by mouth daily      glucose blood VI test strips (ASCENSIA AUTODISC VI;ONE TOUCH ULTRA TEST VI) strip 1 each by In Vitro route daily. As needed. FAMILY HISTORY: family history includes Cancer in his father, sister, and sister; Diabetes in his brother; Heart Disease in his mother. PHYSICAL EXAM:   /67 (Site: Left Upper Arm, Position: Sitting, Cuff Size: Medium Adult)   Pulse 72   Resp 18   Ht 6' (1.829 m)   Wt 185 lb (83.9 kg)   SpO2 96%   BMI 25.09 kg/m²  Body mass index is 25.09 kg/m². Constitutional: He is oriented to person, place, and time. He appears well-developed and well-nourished. In no acute distress. HEENT: Normocephalic and atraumatic. No JVD present. Carotid bruit is not present. No mass and no thyromegaly present. No lymphadenopathy present. Cardiovascular: Normal rate, regular rhythm, normal heart sounds.  Exam reveals no gallop and no friction rubs. 1/6 systolic murmur, 5th intercostal space on the LEFT in the mid-clavicular line (cardiac apex). Pulmonary/Chest: Effort normal and breath sounds normal. No respiratory distress. He has no wheezes, rhonchi or rales. Small diastolic murmur noted at the [place]. Abdominal: Soft, non-tender. Bowel sounds and aorta are normal. He exhibits no organomegaly, mass or bruit. Extremities: None. No cyanosis or clubbing. 2+ radial and carotid pulses. Distal extremity pulses: 2+ bilaterally. Neurological: He is alert and oriented to person, place, and time. No evidence of gross cranial nerve deficit. Coordination appeared normal.   Skin: Skin is warm and dry. There is no rash or diaphoresis. Psychiatric: He has a normal mood and affect. His speech is normal and behavior is normal.      MOST RECENT LABS ON RECORD:   Lab Results   Component Value Date    WBC 4.4 03/06/2020    HGB 12.5 (L) 03/06/2020    HCT 40.1 (L) 03/06/2020     03/06/2020    CHOL 180 08/23/2017    TRIG 95 08/23/2017    HDL 49 04/21/2020    ALT 15 10/16/2019    AST 18 10/16/2019     03/06/2020    K 5.1 03/06/2020     03/06/2020    CREATININE 1.62 (H) 03/06/2020    BUN 31 (H) 03/06/2020    CO2 25 03/06/2020    TSH 2.74 02/15/2019    PSA 2.23 10/16/2019    INR 1.2 03/13/2017    GLUF 149 (H) 11/21/2018    LABA1C 7.4 02/05/2020    LABMICR CANNOT BE CALCULATED 03/27/2019     (H) 02/03/2013       ASSESSMENT:  1. Chronic combined systolic and diastolic CHF, NYHA class 3 ([address]    2. Angina, class III ([address]    3. PAF (paroxysmal atrial fibrillation) ([address]    4. Essential hypertension    5. Pure hypercholesterolemia    6. Biventricular ICD (implantable cardioverter-defibrillator) in place    7. Non-ischemic cardiomyopathy ([address]       PLAN:   · Chronic Combined Systolic and Diastolic Heart Failure: Echo on 11/23/2018 showed an EF of 15%  · Beta Blocker: Continue Metoprolol succinate (Toprol XL) 50 mg daily.    · ACE Inibitor/ARB: Continue lisinopril 5 mg, 1/2 tab daily. · Diuretics: Continue furosemide (Lasix) 20 mg every morning. · Angina: [place] Class III: Stable   · Beta Blocker: Continue Metoprolol succinate (Toprol XL) 50 mg daily. · Cholesterol Reduction Therapy: Continue simvastatin (Zocor) 40 mg daily. · Anti-anginal medications: Continue nitroglycerin 0.4 mg tablets as needed for chest pain. · Counseling: I asked him to come to the emergency room if he develops persistent or worsening chest pain or worsening shortness of breath. He is using about two nitro a week. · Paroxysmal Atrial Fibrillation: Rate control  · Beta Blocker: Continue Metoprolol succinate (Toprol XL) 50 mg daily. · Stroke Risk: His CHADS2-VASc score is greater than 2 (2.2% stroke risk)   · Anticoagulation: Continue Apixaban (Eliquis) 5 mg every 12 hours.  Essential Hypertension: Controlled  · Beta Blocker: Continue Metoprolol succinate (Toprol XL) 50 mg daily. · ACE Inibitor/ARB: Continue lisinopril 5 mg, 1/2 tab daily. · Diuretics: Continue furosemide (Lasix) 20 mg every morning. · Pure Hypercholesterolemia: LDL done on 4/2020 was 74 mg/dL  · Cholesterol Reduction Therapy: Continue simvastatin (Zocor) 40 mg daily. Bi-Ventricular Implantable Cardioverter Defibrillator:  Indication for Device Placement: Cardiomyopathy with ejection fraction <35%   Interrogation Findings: Within normal limits and therefore no changes were made. We did discuss that his insurance did change at the first of the year and he is now being charged $20 a month for his machine at home. I did tell him to bring out the bill to see what we can do through his insurance to help him with this issue. In the meantime, I encouraged Mr. Angie Limon to continue to take his current medications and follow up with you as previously scheduled.     FOLLOW UP:    I told Mr. Angie Limon to call my office if he had any problems, but otherwise told him to Return in about 6

## 2020-05-08 ENCOUNTER — TELEPHONE (OUTPATIENT)
Dept: CARDIOLOGY | Age: 80
End: 2020-05-08

## 2020-09-17 ENCOUNTER — TELEPHONE (OUTPATIENT)
Dept: CARDIOLOGY | Age: 80
End: 2020-09-17

## 2020-09-17 NOTE — TELEPHONE ENCOUNTER
Mr. Jessi Matthews called into the office this AM stating that he no longer wants to be billed for his home checks. He no longer wants his home monitor device at all due to him now getting billed and he is not going to pay them. I let Dr. Clarence Wong know this information and Dr. Clarence Wong stated that he would like him to keep the machine by his bed and we will no longer have him do any at home checks. However we will then have to check him in the office instead. Also I informed him that if he were to get any dangerous rhythms we would still be alerted and he would not be charged for alerts. Pt verbalized understanding. Thanks!

## 2020-11-09 ENCOUNTER — TELEPHONE (OUTPATIENT)
Dept: CARDIOLOGY | Age: 80
End: 2020-11-09

## 2020-11-09 ENCOUNTER — OFFICE VISIT (OUTPATIENT)
Dept: CARDIOLOGY | Age: 80
End: 2020-11-09
Payer: MEDICARE

## 2020-11-09 ENCOUNTER — HOSPITAL ENCOUNTER (OUTPATIENT)
Age: 80
Discharge: HOME | End: 2020-11-09
Payer: MEDICARE

## 2020-11-09 VITALS
RESPIRATION RATE: 18 BRPM | DIASTOLIC BLOOD PRESSURE: 63 MMHG | SYSTOLIC BLOOD PRESSURE: 104 MMHG | BODY MASS INDEX: 25.33 KG/M2 | OXYGEN SATURATION: 98 % | HEART RATE: 83 BPM | WEIGHT: 187 LBS | HEIGHT: 72 IN

## 2020-11-09 LAB
ALBUMIN SERPL-MCNC: 4.1 G/DL (ref 3.5–5.2)
ALBUMIN/GLOBULIN RATIO: 1.3 (ref 1–2.5)
ALP BLD-CCNC: 58 U/L (ref 40–129)
ALT SERPL-CCNC: 18 U/L (ref 5–41)
ANION GAP SERPL CALCULATED.3IONS-SCNC: 9 MMOL/L (ref 9–17)
AST SERPL-CCNC: 22 U/L
BILIRUB SERPL-MCNC: 0.3 MG/DL (ref 0.3–1.2)
BUN BLDV-MCNC: 33 MG/DL (ref 8–23)
BUN/CREAT BLD: 23 (ref 9–20)
CALCIUM SERPL-MCNC: 9.3 MG/DL (ref 8.6–10.4)
CHLORIDE BLD-SCNC: 106 MMOL/L (ref 98–107)
CO2: 23 MMOL/L (ref 20–31)
CREAT SERPL-MCNC: 1.43 MG/DL (ref 0.7–1.2)
GFR AFRICAN AMERICAN: 58 ML/MIN
GFR NON-AFRICAN AMERICAN: 48 ML/MIN
GFR SERPL CREATININE-BSD FRML MDRD: ABNORMAL ML/MIN/{1.73_M2}
GFR SERPL CREATININE-BSD FRML MDRD: ABNORMAL ML/MIN/{1.73_M2}
GLUCOSE BLD-MCNC: 161 MG/DL (ref 70–99)
HCT VFR BLD CALC: 38.4 % (ref 40.7–50.3)
HEMOGLOBIN: 11.8 G/DL (ref 13–17)
MCH RBC QN AUTO: 29.9 PG (ref 25.2–33.5)
MCHC RBC AUTO-ENTMCNC: 30.7 G/DL (ref 28.4–34.8)
MCV RBC AUTO: 97.5 FL (ref 82.6–102.9)
NRBC AUTOMATED: 0 PER 100 WBC
PDW BLD-RTO: 13.8 % (ref 11.8–14.4)
PLATELET # BLD: 190 K/UL (ref 138–453)
PMV BLD AUTO: 11 FL (ref 8.1–13.5)
POTASSIUM SERPL-SCNC: 5 MMOL/L (ref 3.7–5.3)
RBC # BLD: 3.94 M/UL (ref 4.21–5.77)
SODIUM BLD-SCNC: 138 MMOL/L (ref 135–144)
TOTAL PROTEIN: 7.3 G/DL (ref 6.4–8.3)
WBC # BLD: 3.8 K/UL (ref 3.5–11.3)

## 2020-11-09 PROCEDURE — 80053 COMPREHEN METABOLIC PANEL: CPT

## 2020-11-09 PROCEDURE — G8484 FLU IMMUNIZE NO ADMIN: HCPCS | Performed by: FAMILY MEDICINE

## 2020-11-09 PROCEDURE — 99214 OFFICE O/P EST MOD 30 MIN: CPT | Performed by: FAMILY MEDICINE

## 2020-11-09 PROCEDURE — G8427 DOCREV CUR MEDS BY ELIG CLIN: HCPCS | Performed by: FAMILY MEDICINE

## 2020-11-09 PROCEDURE — 1123F ACP DISCUSS/DSCN MKR DOCD: CPT | Performed by: FAMILY MEDICINE

## 2020-11-09 PROCEDURE — G8417 CALC BMI ABV UP PARAM F/U: HCPCS | Performed by: FAMILY MEDICINE

## 2020-11-09 PROCEDURE — 93289 INTERROG DEVICE EVAL HEART: CPT | Performed by: FAMILY MEDICINE

## 2020-11-09 PROCEDURE — 4040F PNEUMOC VAC/ADMIN/RCVD: CPT | Performed by: FAMILY MEDICINE

## 2020-11-09 PROCEDURE — 85027 COMPLETE CBC AUTOMATED: CPT

## 2020-11-09 PROCEDURE — 36415 COLL VENOUS BLD VENIPUNCTURE: CPT

## 2020-11-09 PROCEDURE — 1036F TOBACCO NON-USER: CPT | Performed by: FAMILY MEDICINE

## 2020-11-09 NOTE — PATIENT INSTRUCTIONS
SURVEY:    You may be receiving a survey from [facility] regarding your visit today. Please complete the survey to enable us to provide the highest quality of care to you and your family. If you cannot score us a very good on any question, please call the office to discuss how we could have made your experience a very good one. Thank you.

## 2020-11-09 NOTE — PROGRESS NOTES
Damaris Co am scribing for and in the presence of Lana Neva. Joel COTTO, MS, F.A.C.C. Patient: Gwyn Seymour  : 1940  Date of Visit: 2020    REASON FOR VISIT / CONSULTATION: 6 Month Follow-Up (HX: CHF, PAF, NICM, HTN. Pt states he is doing well. C/o: CP takes nitro that occurs every AM. Denies: Palpitations, Lightaeded/dizziness, SOB. )      Dear Maik Ross MD,    I had the pleasure of seeing your patient Gwyn Seymour in follow up today status post ICD RV lead extraction and new RV lead placement. As you know, Mr. Marino Izquierdo is a 79 y.o. male with a history of systolic heart failure with a reduced EF of around 35% that recently declined to around 15% leading to a biventricular ICD which required placement of a epicardial lead due to a history of diaphragmatic pacing with his previous LV lead. Also it was found that his right ventricular ICD cable appeared to have significantly fluctuating impedances with sitting and standing ranging from the seventies to the 140's as well as a possible malfunctioning RV ICD coil. 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 Therefore in  he underwent an RV lead extraction with RV lead replacement. In , we did a home ICD check which showed 28 runs of paroxysmal atrial fibrillation, the longest being 13 minutes in duration. His echocardiogram done on 3/1/2017 showed an ejection fraction of 5-10%. Echocardiogram done on 2018 showed EF of 15%. Mildly increased LV wall thickness with a severely increased LV cavity size. Severe global hypokineses. LA is moderately dilated. Mild mitral & tricuspid regurgitation. Evidence of moderate diastolic dysfunction is seen.      Since the last time I saw Mr. Marino Izquierdo, he reports doing relatively well, however he continues to have chest discomfort but it is stable at this time. He has been taking Nitro every morning. He denied any lightheaded or dizziness. He denied any worsening shortness of breath, palpitations, bleeding problems, problems with his medications or any other concerns at this time. He also has been having some sleep issues. He is unable to fall asleep and stay asleep. Exercise Tolerance: Mr. Pedro Davis reports that he has a fairly good exercise tolerance. His says that he could walk 1 mile without developing chest discomfort or significant shortness of breath. He recently has been going to the [facility] for exercise and as long as he takes his nitro in the AM he does not get chest pain while exercising. Past Medical History:   Diagnosis Date    Abnormal echocardiogram 5/2/12    EF 15%. mildly dilated LV cavity.  Abnormal resting ECG findings 4/25/12    atrial sensed, ventricular paced rhythm at 73 beats per minute. Wide QRS of 183 ms.  Biventricular ICD (implantable cardiac defibrillator) in place 8/26/11    [facility] [employer]. CRT-D    Biventricular ICD (implantable cardioverter-defibrillator) in place 10/29/2015    Medtronic- Bi V ICD (Battery change)    Biventricular pacemaker check 5/12 1/13    LV lead turned of 1/13 due to diaphragmatic pacing.  CAD (coronary artery disease)     Diabetes mellitus ([address]     type 2    Esophageal reflux     History of echocardiogram 11/23/2018    EF 15%. Mildly increased LV wall thickness w/ severely increased LA cavity size. Severe global hypokinesis w/ no segmental variation. LA is moderately dilated w/ LA volume index of 35. Mild mitral and tricuspid regurg. Evidence of moderate diastolic dysfunction seen.     Hx of blood clots     Hx of left heart catheterization by ventricular puncture 04/19/2013    LAD-20-30%, LCX & RCA-10-20%, Normal LVEDP,     Hyperlipidemia     Hypertension     Mantle cell lymphoma, unspecified site, extranodal and solid organ sites     Nonischemic dilated cardiomyopathy ([address]     Pain in joint, lower leg     Pain in limb     S/P cardiac catheterization 12/13/10    No significant coronary disease. EF estimated at 35%.  Thoracic or lumbosacral neuritis or radiculitis, unspecified     Unspecified acute reaction to stress        CURRENT ALLERGIES: Seasonal REVIEW OF SYSTEMS: 14 systems were reviewed. Pertinent positives and negatives as above, all else negative. Past Surgical History:   Procedure Laterality Date    BLADDER SURGERY      CARDIAC CATHETERIZATION      Patient states has had 3 in [facility] and one in [facility] last one being in [facility] 3 years ago Dr. Lexii Aldrich  10/15/15    -polyps,diverticulosis,hemorrhoids    DIAGNOSTIC CARDIAC CATH LAB PROCEDURE  13    ELBOW SURGERY      FOOT SURGERY      LOBECTOMY N/A 2017    LEFT MINI THORCOTOMY WITH LEFT VENTRICULAR LEAD PLACEMENT performed by Danyel See MD at [facility]  13    basal cell on chest.    THORACOTOMY  2017    Left Mini    UPPER GASTROINTESTINAL ENDOSCOPY  10/15/15    -bx    Social History:  Social History     Tobacco Use    Smoking status: Former Smoker     Packs/day: 0.00     Years: 30.00     Pack years: 0.00     Types: Cigarettes     Last attempt to quit: 2008     Years since quittin.5    Smokeless tobacco: Never Used    Tobacco comment: Patient only smoked when he drank beer   Substance Use Topics    Alcohol use:  Yes     Alcohol/week: 0.0 standard drinks     Types: 3 - 4 Cans of beer per week    Drug use: No        CURRENT MEDICATIONS:  Outpatient Medications Marked as Taking for the 20 encounter (Office Visit) with Brennen Rodríguez MD   Medication Sig Dispense Refill    ALPRAZolam (XANAX) 0.5 MG tablet TAKE 1 TABLET BY MOUTH  TWICE A DAY AS NEEDED 180 tablet 1    nitroGLYCERIN (NITROSTAT) 0.4 MG SL tablet Place 1 tablet under the tongue every 5 minutes as needed for Chest pain up to max of 3 total doses. If no relief after 1 dose, call 911. 100 tablet 3    simvastatin (ZOCOR) 40 MG tablet TAKE 1 TABLET EVERY NIGHT 90 tablet 3    glimepiride (AMARYL) 4 MG tablet TAKE 1 TABLET TWICE DAILY 180 tablet 3    finasteride (PROSCAR) 5 MG tablet TAKE 1 TABLET EVERY DAY 90 tablet 3    furosemide (LASIX) 20 MG tablet Take 1 tablet by mouth daily 90 tablet 3    lisinopril (PRINIVIL;ZESTRIL) 2.5 MG tablet TAKE 1 TABLET EVERY DAY 90 tablet 3    tamsulosin (FLOMAX) 0.4 MG capsule TAKE 1 CAPSULE EVERY DAY 90 capsule 3    metoprolol succinate (TOPROL XL) 50 MG extended release tablet Take 1 tablet by mouth daily 90 tablet 3    apixaban (ELIQUIS) 5 MG TABS tablet TAKE 1 TABLET TWICE DAILY 180 tablet 3    omeprazole (PRILOSEC) 20 MG delayed release capsule TAKE 1 CAPSULE TWICE DAILY (Patient taking differently: 20 mg as needed ) 180 capsule 3    NONFORMULARY daily Ibgard for IBS      potassium chloride (KLOR-CON M) 20 MEQ TBCR extended release tablet Take 20 mEq by mouth daily      glucose blood VI test strips (ASCENSIA AUTODISC VI;ONE TOUCH ULTRA TEST VI) strip 1 each by In Vitro route daily. As needed.  Loratadine (CLARITIN) 10 MG CAPS Take 1 tablet by mouth as needed          FAMILY HISTORY: family history includes Cancer in his father, sister, and sister; Diabetes in his brother; Heart Disease in his mother. PHYSICAL EXAM:   /63 (Site: Left Upper Arm, Position: Sitting, Cuff Size: Medium Adult)   Pulse 83   Resp 18   Ht 6' (1.829 m)   Wt 187 lb (84.8 kg)   SpO2 98%   BMI 25.36 kg/m²  Body mass index is 25.36 kg/m². Constitutional: He is oriented to person, place, and time. He appears well-developed and well-nourished. In no acute distress. HEENT: Normocephalic and atraumatic. No JVD present. Carotid bruit is not present. No mass and no thyromegaly present.  No lymphadenopathy present. Cardiovascular: Normal rate, regular rhythm, normal heart sounds. Exam reveals no gallop and no friction rubs. 1/6 systolic murmur, 5th intercostal space on the LEFT in the mid-clavicular line (cardiac apex). Pulmonary/Chest: Effort normal and breath sounds normal. No respiratory distress. He has no wheezes, rhonchi or rales. Small diastolic murmur noted at the [place]. Abdominal: Soft, non-tender. Bowel sounds and aorta are normal. He exhibits no organomegaly, mass or bruit. Extremities: None. No cyanosis or clubbing. 2+ radial and carotid pulses. Distal extremity pulses: 2+ bilaterally. Neurological: He is alert and oriented to person, place, and time. No evidence of gross cranial nerve deficit. Coordination appeared normal.   Skin: Skin is warm and dry. There is no rash or diaphoresis. Psychiatric: He has a normal mood and affect. His speech is normal and behavior is normal.      MOST RECENT LABS ON RECORD:   Lab Results   Component Value Date    WBC 4.4 03/06/2020    HGB 12.5 (L) 03/06/2020    HCT 40.1 (L) 03/06/2020     03/06/2020    CHOL 180 08/23/2017    TRIG 95 08/23/2017    HDL 49 04/21/2020    ALT 15 10/16/2019    AST 18 10/16/2019     07/13/2020    K 5.1 (H) 07/13/2020     07/13/2020    CREATININE 1.51 (H) 07/13/2020    BUN 34 (H) 07/13/2020    CO2 24 07/13/2020    TSH 2.74 02/15/2019    PSA 2.23 10/16/2019    INR 1.2 03/13/2017    GLUF 149 (H) 11/21/2018    LABA1C 7.0 06/10/2020    LABMICR CANNOT BE CALCULATED 03/27/2019     (H) 02/03/2013       ASSESSMENT:  1. Angina, class III ([address]    2. Chronic combined systolic and diastolic CHF, NYHA class 3 ([address]    3. NICM (nonischemic cardiomyopathy) ([address]    4. PAF (paroxysmal atrial fibrillation) ([address]    5. Essential hypertension    6. Pure hypercholesterolemia    7. Biventricular ICD (implantable cardioverter-defibrillator) in place    8. Sleep trouble    9.  Constipation, unspecified constipation type PLAN:   · Chronic Combined Systolic and Diastolic Heart Failure/ NICM: Echo on 11/23/2018 showed an EF of 15% Currently appears stable  · Beta Blocker: Continue Metoprolol succinate (Toprol XL) 50 mg daily. · ACE Inibitor/ARB: Continue lisinopril 5 mg, 1/2 tab daily. · Diuretics: Continue furosemide (Lasix) 20 mg every morning. · Additional Testing: I Ordered an Echocardiogram to assess Mr. Rosemary Manuel ejection fraction and to look for significant valvular heart disease as a source of Mr. Latnoia Oneal symptoms    · Angina [place] Class III: Stable, Taking one nitro a day most days which is a bit more than the last time I saw him. No imaging in >2 yeras  · Beta Blocker: Continue Metoprolol succinate (Toprol XL) 50 mg daily. · Cholesterol Reduction Therapy: Continue simvastatin (Zocor) 40 mg daily. · Anti-anginal medications: Continue nitroglycerin 0.4 mg tablets as needed for chest pain. · Counseling: I asked him to come to the emergency room if he develops persistent or worsening chest pain or worsening shortness of breath. · Additional Testing: Because of his chest discomfort, I ordered a echocardiogram to better assess for the etiology of this problem. · Paroxysmal Atrial Fibrillation: Rate control  · Beta Blocker: Continue Metoprolol succinate (Toprol XL) 50 mg daily. [id number] Score for Atrial Fibrillation Stroke Risk   Risk   Factors  Component Value   C CHF Yes 1   H HTN Yes 1   A2 Age >= 76 Yes,  (76 y.o.) 2   D DM Yes 1   S2 Prior Stroke/TIA No 0   V Vascular Disease No 0   A Age 74-69 No,  (76 y.o.) 0   Sc Sex male 0    [id number]  Score  5   Score last updated 16/7/83 1:16 PM EST  Click here for a link to the UpToDate guideline \"Atrial Fibrillation: Anticoagulation therapy to prevent embolization  · Disclaimer: Risk Score calculation is dependent on accuracy of patient problem list and past encounter diagnosis.   · His CHADS2 score is 5/9(6.7% stroke risk)  · Anticoagulation: Continue Apixaban (Eliquis) 5 mg every 12 hours. bleeding problems including blood in his stool, black tarry stools or blood in his urine. · Because of this, I also took the liberty of ordering a Complete Blood Count (CBC) and CMP for now to assess his Hgb and WBC count.  Essential Hypertension: Controlled  · Beta Blocker: Continue Metoprolol succinate (Toprol XL) 50 mg daily. · ACE Inibitor/ARB: Continue lisinopril 5 mg, 1/2 tab daily. · Diuretics: Continue furosemide (Lasix) 20 mg every morning. · Pure Hypercholesterolemia: LDL done on 4/2020 was 74 mg/dL  · Cholesterol Reduction Therapy: Continue simvastatin (Zocor) 40 mg daily. Bi-Ventricular Implantable Cardioverter Defibrillator: He has nine more months of battery life and we will discuss upon follow up. Indication for Device Placement: Cardiomyopathy with ejection fraction <35%   Interrogation Findings: Abnormalities were seen including a 4 minute episode of Afib in 8/2020, however, no changes were made to the device. Also 9 months left on battery life. · Sleeping Troubles: Susan Bell with him using his sleep pill agents and continue follow up with Dr. Maggi Fink. · Constipation: Continue follow up with Gastroenterologist in [place] on this issue. In the meantime, I encouraged Mr. Bijan Knox to continue to take his current medications and follow up with you as previously scheduled. FOLLOW UP:    I told Mr. Bijan Knox to call my office if he had any problems, but otherwise told him to Return in about 4 months (around 3/9/2021). However, I would be happy to see him sooner should the need arise. Sincerely,  Adebayo Pedroza. Joel COTTO, MS, F.A.C.C. [facility]  [address] [facility], [address] [place] [address]  Phone: [phone], Fax: [phone]    I believe that the risk of significant morbidity and mortality related to the patient's current medical conditions are: Intermediate.     The documentation recorded by the scribe, accurately

## 2020-11-10 ENCOUNTER — TELEPHONE (OUTPATIENT)
Dept: CARDIOLOGY | Age: 80
End: 2020-11-10

## 2020-11-10 ENCOUNTER — HOSPITAL ENCOUNTER (OUTPATIENT)
Dept: LAB | Age: 80
Setting detail: SPECIMEN
Discharge: HOME | End: 2020-11-10
Payer: MEDICARE

## 2020-11-10 PROCEDURE — U0003 INFECTIOUS AGENT DETECTION BY NUCLEIC ACID (DNA OR RNA); SEVERE ACUTE RESPIRATORY SYNDROME CORONAVIRUS 2 (SARS-COV-2) (CORONAVIRUS DISEASE [COVID-19]), AMPLIFIED PROBE TECHNIQUE, MAKING USE OF HIGH THROUGHPUT TECHNOLOGIES AS DESCRIBED BY CMS-2020-01-R: HCPCS

## 2020-11-10 PROCEDURE — C9803 HOPD COVID-19 SPEC COLLECT: HCPCS

## 2020-11-10 NOTE — TELEPHONE ENCOUNTER
Patient called office stating that he forgot to ask yesterday at his appointment if he could cut his lasix in half and only take 10 mg a day. Pt states that at the current dose of 20 mg a day he is up all night going to the bathroom and that when he has to go it is urgent. Patient states he takes the lasix in the morning but still will be up all night going to the bathroom.

## 2020-11-11 NOTE — TELEPHONE ENCOUNTER
Please let patient know that lasix wears off in 4-6 hours so if he is peeing at night it is not because of the lasix but I suppose he can try.

## 2020-11-12 LAB — SARS-COV-2, NAA: DETECTED

## 2020-11-13 ENCOUNTER — TELEPHONE (OUTPATIENT)
Dept: PRIMARY CARE CLINIC | Age: 80
End: 2020-11-13

## 2020-11-23 RX ORDER — METOPROLOL SUCCINATE 50 MG/1
50 TABLET, EXTENDED RELEASE ORAL DAILY
Qty: 90 TABLET | Refills: 3 | Status: SHIPPED | OUTPATIENT
Start: 2020-11-23 | End: 2021-11-18

## 2020-12-02 ENCOUNTER — HOSPITAL ENCOUNTER (OUTPATIENT)
Dept: NON INVASIVE DIAGNOSTICS | Age: 80
Discharge: HOME | End: 2020-12-02
Payer: MEDICARE

## 2020-12-02 LAB
LV EF: 18 %
LVEF MODALITY: NORMAL

## 2020-12-02 PROCEDURE — 93306 TTE W/DOPPLER COMPLETE: CPT

## 2020-12-03 ENCOUNTER — TELEPHONE (OUTPATIENT)
Dept: CARDIOLOGY | Age: 80
End: 2020-12-03

## 2020-12-07 ENCOUNTER — TELEPHONE (OUTPATIENT)
Dept: CARDIOLOGY | Age: 80
End: 2020-12-07

## 2021-01-25 RX ORDER — FUROSEMIDE 20 MG/1
20 TABLET ORAL DAILY
Qty: 90 TABLET | Refills: 3 | Status: SHIPPED | OUTPATIENT
Start: 2021-01-25 | End: 2021-03-30 | Stop reason: SDUPTHER

## 2021-01-25 RX ORDER — LISINOPRIL 2.5 MG/1
TABLET ORAL
Qty: 90 TABLET | Refills: 3 | Status: SHIPPED | OUTPATIENT
Start: 2021-01-25 | End: 2021-03-30 | Stop reason: SDUPTHER

## 2021-03-08 ENCOUNTER — OFFICE VISIT (OUTPATIENT)
Dept: CARDIOLOGY | Age: 81
End: 2021-03-08
Payer: MEDICARE

## 2021-03-08 VITALS
HEART RATE: 70 BPM | OXYGEN SATURATION: 99 % | RESPIRATION RATE: 17 BRPM | SYSTOLIC BLOOD PRESSURE: 107 MMHG | HEIGHT: 72 IN | BODY MASS INDEX: 25.06 KG/M2 | DIASTOLIC BLOOD PRESSURE: 58 MMHG | WEIGHT: 185 LBS

## 2021-03-08 DIAGNOSIS — I20.9 ANGINA, CLASS III (HCC): ICD-10-CM

## 2021-03-08 DIAGNOSIS — I42.8 NICM (NONISCHEMIC CARDIOMYOPATHY) (HCC): ICD-10-CM

## 2021-03-08 DIAGNOSIS — I10 ESSENTIAL HYPERTENSION: ICD-10-CM

## 2021-03-08 DIAGNOSIS — Z95.810 ICD (IMPLANTABLE CARDIOVERTER-DEFIBRILLATOR) IN PLACE: ICD-10-CM

## 2021-03-08 DIAGNOSIS — I48.0 PAF (PAROXYSMAL ATRIAL FIBRILLATION) (HCC): ICD-10-CM

## 2021-03-08 DIAGNOSIS — E78.00 PURE HYPERCHOLESTEROLEMIA: ICD-10-CM

## 2021-03-08 DIAGNOSIS — I50.42 CHRONIC COMBINED SYSTOLIC AND DIASTOLIC HEART FAILURE (HCC): Primary | ICD-10-CM

## 2021-03-08 PROCEDURE — 99214 OFFICE O/P EST MOD 30 MIN: CPT | Performed by: FAMILY MEDICINE

## 2021-03-08 PROCEDURE — G8484 FLU IMMUNIZE NO ADMIN: HCPCS | Performed by: FAMILY MEDICINE

## 2021-03-08 PROCEDURE — 93289 INTERROG DEVICE EVAL HEART: CPT | Performed by: FAMILY MEDICINE

## 2021-03-08 PROCEDURE — 1123F ACP DISCUSS/DSCN MKR DOCD: CPT | Performed by: FAMILY MEDICINE

## 2021-03-08 PROCEDURE — G8417 CALC BMI ABV UP PARAM F/U: HCPCS | Performed by: FAMILY MEDICINE

## 2021-03-08 PROCEDURE — G8427 DOCREV CUR MEDS BY ELIG CLIN: HCPCS | Performed by: FAMILY MEDICINE

## 2021-03-08 PROCEDURE — 4040F PNEUMOC VAC/ADMIN/RCVD: CPT | Performed by: FAMILY MEDICINE

## 2021-03-08 PROCEDURE — 1036F TOBACCO NON-USER: CPT | Performed by: FAMILY MEDICINE

## 2021-03-08 NOTE — PROGRESS NOTES
an EF of 15-20%. LV cavity size is moderately enlarged. LV wall thickness is mildly increased. Severe global hypokinesis. Left atrium is mildly dilated. Mild mitral regurgitation. Moderate diastolic dysfunction is seen. Since the last time I saw Mr. Tiffany Menjivar, he reports doing relatively well, however he continues to have chest discomfort but it is stable at this time. He does have some dizziness that comes along with some shortness of breath. He has been having issues with his bowels, it goes back and forth between constipation and diarrhea. He denied having any palpitations. He denies any bleeding problems, problems with his medications or any other concerns at this time. No nausea or vomiting. No cough, fever or chills. Exercise Tolerance: Mr. Tiffany Menjivar reports that he has a fairly good exercise tolerance. His says that he could walk 1 mile without developing chest discomfort or significant shortness of breath. He recently has been going to the [facility] every day for exercise. He does take a Nitro in the morning before he goes and he doesn't have chest pain. Past Medical History:   Diagnosis Date    Abnormal echocardiogram 5/2/12    EF 15%. mildly dilated LV cavity.  Abnormal resting ECG findings 4/25/12    atrial sensed, ventricular paced rhythm at 73 beats per minute. Wide QRS of 183 ms.  Biventricular ICD (implantable cardiac defibrillator) in place 8/26/11    [facility] [employer]. CRT-D    Biventricular ICD (implantable cardioverter-defibrillator) in place 10/29/2015    Medtr[employer]- Bi V ICD (Battery change)    Biventricular pacemaker check 5/12 1/13    LV lead turned of 1/13 due to diaphragmatic pacing.  CAD (coronary artery disease)     Diabetes mellitus ([facility] [address]     type 2    Esophageal reflux     History of echocardiogram 11/23/2018    EF 15%. Mildly increased LV wall thickness w/ severely increased LA cavity size. Severe global hypokinesis w/ no segmental variation.  LA is moderately dilated w/ LA volume index of 35. Mild mitral and tricuspid regurg. Evidence of moderate diastolic dysfunction seen.  Hx of blood clots     Hx of left heart catheterization by ventricular puncture 2013    LAD-20-30%, LCX & RCA-10-20%, Normal LVEDP,     Hyperlipidemia     Hypertension     Mantle cell lymphoma, unspecified site, extranodal and solid organ sites     Nonischemic dilated cardiomyopathy ([address]     Pain in joint, lower leg     Pain in limb     S/P cardiac catheterization 12/13/10    No significant coronary disease. EF estimated at 35%.  Thoracic or lumbosacral neuritis or radiculitis, unspecified     Unspecified acute reaction to stress        CURRENT ALLERGIES: Seasonal REVIEW OF SYSTEMS: 14 systems were reviewed. Pertinent positives and negatives as above, all else negative.      Past Surgical History:   Procedure Laterality Date    BLADDER SURGERY      CARDIAC CATHETERIZATION      Patient states has had 3 in [facility] and one in [facility] [place] last one being in [facility] 3 years ago Dr. Concha Stoner  10/15/15    -polyps,diverticulosis,hemorrhoids    DIAGNOSTIC CARDIAC CATH LAB PROCEDURE  13    ELBOW SURGERY      FOOT SURGERY      LOBECTOMY N/A 2017    LEFT MINI THORCOTOMY WITH LEFT VENTRICULAR LEAD PLACEMENT performed by Diana Cervantes MD at [address] [place] [address]  13    basal cell on chest.    THORACOTOMY  2017    Left Mini    UPPER GASTROINTESTINAL ENDOSCOPY  10/15/15    -bx    Social History:  Social History     Tobacco Use    Smoking status: Former Smoker     Packs/day: 0.00     Years: 30.00     Pack years: 0.00     Types: Cigarettes     Quit date: 2008     Years since quittin.8    Smokeless tobacco: Never Used    Tobacco comment: Patient only smoked when he drank beer   Substance Use Topics    Alcohol use: Yes     Alcohol/week: 0.0 standard drinks     Types: 3 - 4 Cans of beer per week    Drug use: No        CURRENT MEDICATIONS:  Outpatient Medications Marked as Taking for the 3/8/21 encounter (Office Visit) with Rex Wolf MD   Medication Sig Dispense Refill    apixaban (ELIQUIS) 5 MG TABS tablet TAKE 1 TABLET BY MOUTH  TWICE A  tablet 3    simvastatin (ZOCOR) 40 MG tablet Take 1 tablet by mouth nightly TAKE 1 TABLET EVERY NIGHT 90 tablet 1    glimepiride (AMARYL) 4 MG tablet TAKE 1 TABLET TWICE DAILY 180 tablet 0    finasteride (PROSCAR) 5 MG tablet TAKE 1 TABLET EVERY DAY 90 tablet 0    Lancets MISC 1 each by Does not apply route daily Dx--E11.9 Non-insulin dependent 100 each 5    blood glucose test strips (ASCENSIA AUTODISC VI;ONE TOUCH ULTRA TEST VI) strip 1 each by In Vitro route daily As needed. ONE TOUCH VERIO. DX--E11.9 NON-INSULIN DEPENDENT 100 each 3    Blood Glucose Monitoring Suppl (ONE TOUCH ULTRA 2) w/Device KIT 1 kit by Does not apply route daily 1 kit 0    blood glucose monitor strips One Touch Ultra 2   Test qd  Dx--E11.9 non-insulin dependent 100 strip 5    blood glucose test strips (ASCENSIA AUTODISC VI;ONE TOUCH ULTRA TEST VI) strip 1 each by In Vitro route 2 times daily ON CALL EXPRESS TEST STRIPS DX--E11.9 NON-INSULIN DEPENDENT 100 each 5    ALPRAZolam (XANAX) 1 MG tablet Take 1 tablet by mouth nightly as needed for Sleep for up to 90 days. 90 tablet 0    furosemide (LASIX) 20 MG tablet TAKE 1 TABLET BY MOUTH  DAILY 90 tablet 3    lisinopril (PRINIVIL;ZESTRIL) 2.5 MG tablet TAKE 1 TABLET BY MOUTH  EVERY DAY 90 tablet 3    metoprolol succinate (TOPROL XL) 50 MG extended release tablet TAKE 1 TABLET BY MOUTH  DAILY 90 tablet 3    nitroGLYCERIN (NITROSTAT) 0.4 MG SL tablet Place 1 tablet under the tongue every 5 minutes as needed for Chest pain up to max of 3 total doses.  If no relief after 1 dose, call 911. 100 tablet 3    tamsulosin (FLOMAX) 0.4 MG capsule TAKE 1 CAPSULE EVERY DAY 90 capsule 3    NONFORMULARY daily Ibgard for IBS      potassium chloride (KLOR-CON M) 20 MEQ TBCR extended release tablet Take 20 mEq by mouth daily      Loratadine (CLARITIN) 10 MG CAPS Take 1 tablet by mouth as needed          FAMILY HISTORY: family history includes Cancer in his father, sister, and sister; Diabetes in his brother; Heart Disease in his mother. PHYSICAL EXAM:   BP (!) 107/58 (Site: Left Upper Arm, Position: Sitting, Cuff Size: Large Adult)   Pulse 70   Resp 17   Ht 6' (1.829 m)   Wt 185 lb (83.9 kg)   SpO2 99%   BMI 25.09 kg/m²  Body mass index is 25.09 kg/m². Constitutional: He is oriented to person, place, and time. He appears well-developed and well-nourished. In no acute distress. HEENT: Normocephalic and atraumatic. No JVD present. Carotid bruit is not present. No mass and no thyromegaly present. No lymphadenopathy present. Cardiovascular: Normal rate, regular rhythm, normal heart sounds. Exam reveals no gallop and no friction rubs. 1/6 systolic murmur, 5th intercostal space on the LEFT in the mid-clavicular line (cardiac apex). Pulmonary/Chest: Effort normal and breath sounds normal. No respiratory distress. He has no wheezes, rhonchi or rales. Abdominal: Soft, non-tender. Bowel sounds and aorta are normal. He exhibits no organomegaly, mass or bruit. Extremities: None. No cyanosis or clubbing. 2+ radial and carotid pulses. Distal extremity pulses: 2+ bilaterally. Neurological: He is alert and oriented to person, place, and time. No evidence of gross cranial nerve deficit. Coordination appeared normal.   Skin: Skin is warm and dry. There is no rash or diaphoresis. Psychiatric: He has a normal mood and affect.  His speech is normal and behavior is normal.      MOST RECENT LABS ON RECORD:   Lab Results   Component Value Date    WBC 4.5 12/30/2020    HGB 12.5 (L) 12/30/2020    HCT 37.3 (L) 12/30/2020     12/30/2020    CHOL 145 (L) 12/30/2020 Yes 1   H HTN Yes 1   A2 Age >= 76 Yes,  (76 y.o.) 2   D DM Yes 1   S2 Prior Stroke/TIA No 0   V Vascular Disease No 0   A Age 74-69 No,  (76 y.o.) 0   Sc Sex male 0    [id number]  Score  5   Score last updated 24/7/95 9:73 PM EST  Click here for a link to the UpToDate guideline \"Atrial Fibrillation: Anticoagulation therapy to prevent embolization  Disclaimer: Risk Score calculation is dependent on accuracy of patient problem list and past encounter diagnosis. · His CHADS2 score is 5/9(6.7% stroke risk)  · Anticoagulation: Continue Apixaban (Eliquis) 5 mg every 12 hours. bleeding problems including blood in his stool, black tarry stools or blood in his urine.  Essential Hypertension: Controlled  · Beta Blocker: Continue Metoprolol succinate (Toprol XL) 50 mg daily. · ACE Inibitor/ARB: Continue lisinopril 5 mg, 1/2 tab daily. · Diuretics: Continue furosemide (Lasix) 20 mg every morning. · Pure Hypercholesterolemia: Last LDL on 12/30/2020 was 84 mg/dL  · Cholesterol Reduction Therapy: Continue simvastatin (Zocor) 40 mg daily. Bi-Ventricular Implantable Cardioverter Defibrillator:   Indication for Device Placement: Cardiomyopathy with ejection fraction <35%   Interrogation Findings: No signifncant abnormalities were seen other than 2-3 months left on battery life. Will do a home check in 2 months. · Constipation/Diarrhea:   · Follow up with Gastroenterologist/JAI Stuart CNP     In the meantime, I encouraged Mr. Ignacio Parikh to continue to take his current medications and follow up with you as previously scheduled. FOLLOW UP:    I told Mr. Ignacio Parikh to call my office if he had any problems, but otherwise told him to Return in about 6 months (around 9/8/2021). However, I would be happy to see him sooner should the need arise. Sincerely,  Camelia Maldonado MD, MS, F.A.C.C.   [facility] Cardiology Specialist  [address]  [facility] [place], [address] [place] [address]  Phone: [phone], Fax: [phone]    I believe that the risk of significant morbidity and mortality related to the patient's current medical conditions are: Intermediate. The documentation recorded by the scribe, accurately and completely reflects the services I personally performed and the decisions made by me. Hector Webber MD, MS, F.A.C.C.  March 8, 2021

## 2021-03-09 LAB
ABSOLUTE BASO #: 0 X10E9/L (ref 0–0.2)
ABSOLUTE EOS #: 0.1 X10E9/L (ref 0–0.4)
ABSOLUTE LYMPH #: 0.9 X10E9/L (ref 1–3.5)
ABSOLUTE MONO #: 0.5 X10E9/L (ref 0–0.9)
ABSOLUTE NEUT #: 3.7 X10E9/L (ref 1.5–6.6)
ALBUMIN SERPL-MCNC: 4.1 G/DL (ref 3.2–5.3)
ALK PHOSPHATASE: 49 U/L (ref 39–130)
ALT SERPL-CCNC: 12 U/L (ref 0–40)
ANION GAP SERPL CALCULATED.3IONS-SCNC: 7 MMOL/L (ref 5–15)
AST SERPL-CCNC: 17 U/L (ref 0–41)
BASOPHILS RELATIVE PERCENT: 0.9 %
BILIRUB SERPL-MCNC: 0.3 MG/DL (ref 0.3–1.2)
BUN BLDV-MCNC: 51 MG/DL (ref 5–27)
CALCIUM SERPL-MCNC: 8.9 MG/DL (ref 8.5–10.5)
CHLORIDE BLD-SCNC: 110 MMOL/L (ref 98–109)
CO2: 23 MMOL/L (ref 22–32)
CREAT SERPL-MCNC: 1.47 MG/DL (ref 0.6–1.3)
EGFR AFRICAN AMERICAN: 56 ML/MIN/1.73SQ.M
EGFR IF NONAFRICAN AMERICAN: 46 ML/MIN/1.73SQ.M
EOSINOPHILS RELATIVE PERCENT: 1.9 %
GLUCOSE: 180 MG/DL (ref 65–99)
HCT VFR BLD CALC: 38.1 % (ref 39–49)
HEMOGLOBIN: 12.7 G/DL (ref 13–17)
LYMPHOCYTE %: 17 %
MCH RBC QN AUTO: 31.1 PG (ref 27–34)
MCHC RBC AUTO-ENTMCNC: 33.3 G/DL (ref 32–36)
MCV RBC AUTO: 93 FL (ref 80–100)
MONOCYTES # BLD: 9.2 %
NEUTROPHILS RELATIVE PERCENT: 71 %
PDW BLD-RTO: 14.8 % (ref 11.5–15)
PLATELETS: 233 X10E9/L (ref 150–450)
PMV BLD AUTO: 10.5 FL (ref 7–12)
POTASSIUM SERPL-SCNC: 4.5 MMOL/L (ref 3.5–5)
RBC: 4.08 X10E12/L (ref 4.1–5.7)
SODIUM BLD-SCNC: 140 MMOL/L (ref 134–146)
TOTAL PROTEIN: 7 G/DL (ref 6–8)
WBC: 5.2 X10E9/L (ref 4–11)

## 2021-03-10 ENCOUNTER — TELEPHONE (OUTPATIENT)
Dept: CARDIOLOGY | Age: 81
End: 2021-03-10

## 2021-03-10 NOTE — TELEPHONE ENCOUNTER
----- Message from Manny Peralta MD sent at 3/10/2021 12:14 AM EST -----  Let Mr. Carmen Riggins know their test result was ok. Thanks.

## 2021-07-19 ENCOUNTER — HOSPITAL ENCOUNTER (OUTPATIENT)
Dept: VASCULAR LAB | Age: 81
Discharge: HOME | End: 2021-07-21
Payer: MEDICARE

## 2021-07-19 DIAGNOSIS — I73.9 CLAUDICATION OF BOTH LOWER EXTREMITIES (HCC): ICD-10-CM

## 2021-07-19 PROCEDURE — 93923 UPR/LXTR ART STDY 3+ LVLS: CPT

## 2021-09-17 ENCOUNTER — VIRTUAL VISIT (OUTPATIENT)
Dept: CARDIOLOGY | Age: 81
End: 2021-09-17
Payer: MEDICARE

## 2021-09-17 DIAGNOSIS — I10 ESSENTIAL HYPERTENSION: ICD-10-CM

## 2021-09-17 DIAGNOSIS — I42.8 NICM (NONISCHEMIC CARDIOMYOPATHY) (HCC): ICD-10-CM

## 2021-09-17 DIAGNOSIS — I42.9 CARDIOMYOPATHY, UNSPECIFIED TYPE (HCC): ICD-10-CM

## 2021-09-17 DIAGNOSIS — I48.0 PAF (PAROXYSMAL ATRIAL FIBRILLATION) (HCC): ICD-10-CM

## 2021-09-17 DIAGNOSIS — I50.42 CHRONIC COMBINED SYSTOLIC AND DIASTOLIC HEART FAILURE (HCC): Primary | ICD-10-CM

## 2021-09-17 DIAGNOSIS — I20.9 ANGINA, CLASS III (HCC): ICD-10-CM

## 2021-09-17 DIAGNOSIS — K59.00 CONSTIPATION, UNSPECIFIED CONSTIPATION TYPE: ICD-10-CM

## 2021-09-17 DIAGNOSIS — Z01.818 PRE-OP TESTING: ICD-10-CM

## 2021-09-17 DIAGNOSIS — Z95.810 BIVENTRICULAR ICD (IMPLANTABLE CARDIOVERTER-DEFIBRILLATOR) IN PLACE: ICD-10-CM

## 2021-09-17 DIAGNOSIS — I24.8 OTHER FORMS OF ACUTE ISCHEMIC HEART DISEASE (HCC): ICD-10-CM

## 2021-09-17 PROCEDURE — 4040F PNEUMOC VAC/ADMIN/RCVD: CPT | Performed by: FAMILY MEDICINE

## 2021-09-17 PROCEDURE — 1036F TOBACCO NON-USER: CPT | Performed by: FAMILY MEDICINE

## 2021-09-17 PROCEDURE — 1123F ACP DISCUSS/DSCN MKR DOCD: CPT | Performed by: FAMILY MEDICINE

## 2021-09-17 PROCEDURE — G8420 CALC BMI NORM PARAMETERS: HCPCS | Performed by: FAMILY MEDICINE

## 2021-09-17 PROCEDURE — G8428 CUR MEDS NOT DOCUMENT: HCPCS | Performed by: FAMILY MEDICINE

## 2021-09-17 PROCEDURE — 99214 OFFICE O/P EST MOD 30 MIN: CPT | Performed by: FAMILY MEDICINE

## 2021-09-17 RX ORDER — FUROSEMIDE 20 MG/1
10 TABLET ORAL DAILY
Qty: 90 TABLET | Refills: 1 | Status: SHIPPED | OUTPATIENT
Start: 2021-09-17 | End: 2021-09-20 | Stop reason: DRUGHIGH

## 2021-09-17 NOTE — PROGRESS NOTES
Azar Anderson RN am scribing for and in the presence of Ariel Boykin. Joel COTTO, MS, F.A.C.C. Patient: Trisha Anderson  :   Date of Visit: 2021    REASON FOR VISIT / CONSULTATION: Follow-up (RRT of ICD battery. Pt reports that he is doing ok but does say he is taking too many medication. Denies lightheadedness, dizziness, palpitations  patient only taking one eliquis because he does not want to run out. on his med list it shows that there are three medications that are the same as other medications prescribed for him and would like to review this with you)      Dear Harriet Suh, APRN - CNP,    I had the pleasure of seeing your patient Trisha Anderson in follow up today status post ICD RV lead extraction and new RV lead placement. As you know, Mr. Nina Torres is a [de-identified] y.o. male with a history of systolic heart failure with a reduced EF of around 35% that recently declined to around 15% leading to a biventricular ICD which required placement of a epicardial lead due to a history of diaphragmatic pacing with his previous LV lead. Also it was found that his right ventricular ICD cable appeared to have significantly fluctuating impedances with sitting and standing ranging from the seventies to the 140's as well as a possible malfunctioning RV ICD coil. 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 Therefore in  he underwent an RV lead extraction with RV lead replacement. In , we did a home ICD check which showed 28 runs of paroxysmal atrial fibrillation, the longest being 13 minutes in duration. His echocardiogram done on 3/1/2017 showed an ejection fraction of 5-10%. Echocardiogram done on 2018 showed EF of 15%. Mildly increased LV wall thickness with a severely increased LV cavity size. Severe global hypokineses.  LA is moderately dilated. Mild mitral & tricuspid regurgitation. Evidence of moderate diastolic dysfunction is seen. Echocardiogram done on 12/2/2020 showed an EF of 15-20%. LV cavity size is moderately enlarged. LV wall thickness is mildly increased. Severe global hypokinesis. Left atrium is mildly dilated. Mild mitral regurgitation. Moderate diastolic dysfunction is seen. Since the last time I saw Mr. Al Don, he reports doing relatively well, however he continues to have chest discomfort but it is stable at this time. He has been shoveling truck loads of stone without associated symptoms. Does state Dr Adrian Ortega told him to limit his fluids to 32 ounces per day. He does have some dizziness that comes along with some shortness of breath. He denied having any palpitations. He denies any bleeding problems, problems with his medications or any other concerns at this time. No nausea or vomiting. No cough, fever or chills. Exercise Tolerance: Mr. Al Don reports that he has a fairly good exercise tolerance. His says that he could walk 1 mile without developing chest discomfort or significant shortness of breath. He recently has been going to the [facility] every day for exercise. He does take a Nitro in the morning before he goes and he doesn't have chest pain. Past Medical History:   Diagnosis Date    Abnormal echocardiogram 5/2/12    EF 15%. mildly dilated LV cavity.  Abnormal resting ECG findings 4/25/12    atrial sensed, ventricular paced rhythm at 73 beats per minute. Wide QRS of 183 ms.  Biventricular ICD (implantable cardiac defibrillator) in place 8/26/11    [facility] Medtronic. CRT-D    Biventricular ICD (implantable cardioverter-defibrillator) in place 10/29/2015    Medtronic- Bi V ICD (Battery change)    Biventricular pacemaker check 5/12 1/13    LV lead turned of 1/13 due to diaphragmatic pacing.     CAD (coronary artery disease)     Diabetes mellitus ([address]     type 2    Esophageal reflux     History of echocardiogram 11/23/2018    EF 15%. Mildly increased LV wall thickness w/ severely increased LA cavity size. Severe global hypokinesis w/ no segmental variation. LA is moderately dilated w/ LA volume index of 35. Mild mitral and tricuspid regurg. Evidence of moderate diastolic dysfunction seen.  Hx of blood clots     Hx of left heart catheterization by ventricular puncture 04/19/2013    LAD-20-30%, LCX & RCA-10-20%, Normal LVEDP,     Hyperlipidemia     Hypertension     Mantle cell lymphoma, unspecified site, extranodal and solid organ sites     Nonischemic dilated cardiomyopathy ([address]     Pain in joint, lower leg     Pain in limb     S/P cardiac catheterization 12/13/10    No significant coronary disease. EF estimated at 35%.  Thoracic or lumbosacral neuritis or radiculitis, unspecified     Unspecified acute reaction to stress        CURRENT ALLERGIES: Seasonal REVIEW OF SYSTEMS: 14 systems were reviewed. Pertinent positives and negatives as above, all else negative.      Past Surgical History:   Procedure Laterality Date    BLADDER SURGERY  2008    CARDIAC CATHETERIZATION      Patient states has had 3 in [place] and one in [facility] last one being in [place] 3 years ago Dr. Grupo Harris  10/15/15    -polyps,diverticulosis,hemorrhoids    DIAGNOSTIC CARDIAC CATH LAB PROCEDURE  04/19/13    ELBOW SURGERY  2000    FOOT SURGERY  1993    LOBECTOMY N/A 2/28/2017    LEFT MINI THORCOTOMY WITH LEFT VENTRICULAR LEAD PLACEMENT performed by Calin Bill MD at [facility]  9/11/13    basal cell on chest.    THORACOTOMY  02/28/2017    Left Mini    UPPER GASTROINTESTINAL ENDOSCOPY  10/15/15    -bx    Social History:  Social History     Tobacco Use    Smoking status: Former Smoker     Packs/day: 0.00     Years: 30.00     Pack years: 0.00     Types:  blood glucose monitor strips One Touch Ultra 2   Test qd  Dx--E11.9 non-insulin dependent 100 strip 5    blood glucose test strips (ASCENSIA AUTODISC VI;ONE TOUCH ULTRA TEST VI) strip 1 each by In Vitro route 2 times daily ON CALL EXPRESS TEST STRIPS DX--E11.9 NON-INSULIN DEPENDENT 100 each 5    metoprolol succinate (TOPROL XL) 50 MG extended release tablet TAKE 1 TABLET BY MOUTH  DAILY 90 tablet 3    NONFORMULARY daily Ibgard for IBS       potassium chloride (KLOR-CON M) 20 MEQ TBCR extended release tablet Take 20 mEq by mouth daily          FAMILY HISTORY: family history includes Cancer in his father, sister, and sister; Diabetes in his brother; Heart Disease in his mother. [ INSTRUCTIONS:  \"[x]\" Indicates a positive item  \"[]\" Indicates a negative item   Vital Signs: (As obtained by patient/caregiver or practitioner observation)  Patient-Reported Vitals 9/17/2021   Patient-Reported Weight 185.4 lb   Patient-Reported Height 6'   Patient-Reported Systolic 021   Patient-Reported Diastolic 63   Patient-Reported Pulse 91        Constitutional: [x] Appears well-developed and well-nourished [x] No apparent distress      [] Abnormal-   Mental status  [x] Alert and awake  [x] Oriented to person/place/time [x]Able to follow commands      Eyes:  EOM    [x]  Normal  [] Abnormal-  Sclera  [x]  Normal  [] Abnormal -         Discharge [x]  None visible  [] Abnormal -    HENT:   [x] Normocephalic, atraumatic.   [] Abnormal   [] Mouth/Throat: Mucous membranes are moist.     External Ears [x] Normal  [] Abnormal-     Neck: [x] No visualized mass     Pulmonary/Chest: [x] Respiratory effort normal.  [x] No visualized signs of difficulty breathing or respiratory distress        [] Abnormal-     Neurological:        [x] No Facial Asymmetry (Cranial nerve 7 motor function) (limited exam to video visit)          [] No gaze palsy        [] Abnormal-         Skin:        [x] No significant exanthematous lesions or discoloration noted on facial skin         [] Abnormal-            Psychiatric:       [x] Normal Affect [] No Hallucinations        [] Abnormal-     Other pertinent observable physical exam findings: None    PE: Performed in office by Rudy Vigil PA-C  Cardiovascular: Normal rate, regular rhythm, normal heart sounds. Exam reveals no gallop and no friction rubs. 2/6 systolic murmur, 2nd intercostal space on the RIGHT just lateral to the sternum. Extremity: No edema in bilateral lower extremities. Lungs: Normal rate, clear to auscultation. MOST RECENT LABS ON RECORD:   Lab Results   Component Value Date    WBC 5.2 03/08/2021    HGB 12.7 (L) 03/08/2021    HCT 38.1 (L) 03/08/2021     03/08/2021    CHOL 145 (L) 12/30/2020    TRIG 89 12/30/2020    HDL 43 12/30/2020    ALT 12 03/08/2021    AST 17 03/08/2021     03/08/2021    K 4.5 03/08/2021     (H) 03/08/2021    CREATININE 1.47 (H) 03/08/2021    BUN 51 (H) 03/08/2021    CO2 23 03/08/2021    TSH 2.74 02/15/2019    PSA 2.23 10/16/2019    INR 1.2 03/13/2017    GLUF 149 (H) 11/21/2018    LABA1C 6.9 03/30/2021    LABMICR CANNOT BE CALCULATED 03/27/2019     (H) 02/03/2013       ASSESSMENT:  1. Chronic combined systolic and diastolic heart failure ([address]    2. NICM (nonischemic cardiomyopathy) ([address]    3. Angina, class III ([address]    4. PAF (paroxysmal atrial fibrillation) ([address]    5. Essential hypertension    6. Biventricular ICD (implantable cardioverter-defibrillator) in place    7. Cardiomyopathy, unspecified type ([address]    8. Constipation, unspecified constipation type    9. Pre-op testing    10. Other forms of acute ischemic heart disease ([address]        PLAN:   · Chronic Combined Systolic and Diastolic Heart Failure/ NICM: Echo on 12/2/2020 showed an EF of 15-20% Currently appears stable  · Beta Blocker: Continue Metoprolol succinate (Toprol XL) 50 mg daily. · ACE Inibitor/ARB: Continue lisinopril 5 mg, 1/2 tab daily.   · Diuretics: DECREASE to furosemide (Lasix) 10 mg daily. · Angina [place] Class III: Stable - Taking one nitro to help with chest pain prior to exercise which I think is fine but I strongly encouraged him to avoid pushing through chest pain as this can be life threatening  · Beta Blocker: Continue Metoprolol succinate (Toprol XL) 50 mg daily. · Cholesterol Reduction Therapy: Continue simvastatin (Zocor) 40 mg daily. · Anti-anginal medications: Continue nitroglycerin 0.4 mg tablets as needed for chest pain. · Counseling: I asked him to come to the emergency room if he develops persistent or worsening chest pain or worsening shortness of breath. · Paroxysmal Atrial Fibrillation: Rate control  · Beta Blocker: Continue Metoprolol succinate (Toprol XL) 50 mg daily. · [id number] Score for Atrial Fibrillation Stroke Risk   Risk   Factors  Component Value   C CHF Yes 1   H HTN Yes 1   A2 Age >= 76 Yes,  [de-identified] y.o.) 2   D DM Yes 1   S2 Prior Stroke/TIA No 0   V Vascular Disease No 0   A Age 74-69 No,  [de-identified] y.o.) 0   Sc Sex male 0    [id number]  Score  5   Score last updated 31/5/75 2:50 PM EST  Click here for a link to the UpToDate guideline \"Atrial Fibrillation: Anticoagulation therapy to prevent embolization  Disclaimer: Risk Score calculation is dependent on accuracy of patient problem list and past encounter diagnosis. · His CHADS2 score is 5/9(6.7% stroke risk)  · Anticoagulation: Continue Apixaban (Eliquis) 5 mg every 12 hours. bleeding problems including blood in his stool, black tarry stools or blood in his urine. Due to insurance costing him $400 per month for Eliquis recommended he continues taking as directed until he runs out of medication, then switch to Xarelto 15 mg daily. He was given samples and a prescription card at today's visit until the cost of his medication becomes more reasonable.  Essential Hypertension: Controlled  · Beta Blocker: Continue Metoprolol succinate (Toprol XL) 50 mg daily.    · ACE Inibitor/ARB: Continue lisinopril 5 mg, 1/2 tab daily. · Diuretics: Continue furosemide (Lasix) 20 mg every morning. · Pure Hypercholesterolemia: Last LDL on 12/30/2020 was 84 mg/dL  · Cholesterol Reduction Therapy: Continue simvastatin (Zocor) 40 mg daily. Bi-Ventricular Implantable Cardioverter Defibrillator: RRT Met as of 9/16/2021  Indication for Device Placement: Cardiomyopathy with ejection fraction <35%   Refer for Bi-Ventricular Implantable Cardioverter Defibrillator: BATTERY CHANGE  Indication for Device Placement: Cardiomyopathy with ejection fraction <35%  Risks Benefits and Alternatives: I discussed the risks, benefits, and alternatives to device placement including the relatively small but potential risks of bleeding, infection, arrhythmia, a contrast induced allergy and/or kidney damage, damage to the lung including the possibility of collapse, stroke, heart attack, death, or the need for further procedures. Although I did not think it would be advisable, I also discussed the alternate treatment strategy of no treatment or continued treatment with medicines which would probably be of limited benefit. Mr. Oliva Maxwell verbalized understanding of the risks benefits and alternatives and stated that he would like to proceed with THE ICD battery replacement. Therefore I have referred Mr. Oliva Maxwell him to have his device placed by Dr. Hero Macdonald at [facility] on or around Thursday 9/23/2021. Discontinue Eliquis 2 days prior to procedure. I took the liberty of ordering a BMP for today to assess their potassium and renal function. I told them that they could get their lab work performed at the location of their choosing, unfortunately, if the lab work was not performed at a [facility]) facility I could not guarantee my ability to follow up with them on their results. and  I took the liberty of ordering a CBC.  I told them that they could get their lab work performed at the location of their choosing, unfortunately, if the lab work was not performed at a [facility]) facility I could not guarantee my ability to follow up with them on their results. In the meantime, I encouraged Mr. Marino Izquierdo to continue to take his current medications and follow up with you as previously scheduled. FOLLOW UP:    I told Mr. Marino Izquierdo to call my office if he had any problems, but otherwise told him to Return in about 2 weeks (around 10/1/2021). However, I would be happy to see him sooner should the need arise. Sincerely,  Ana Maldonado MD, MS, F.A.C.C. [facility]  [address] [place] [address]  Phone: [phone], Fax: [phone]    I believe that the risk of significant morbidity and mortality related to the patient's current medical conditions are: Intermediate. The documentation recorded by the scribe, accurately and completely reflects the services I personally performed and the decisions made by me. Kimberly Maharaj MD, MS, F.A.C.C.  September 17, 2021

## 2021-09-17 NOTE — PATIENT INSTRUCTIONS
SURVEY:    You may be receiving a survey from [employer] regarding your visit today. Please complete the survey to enable us to provide the highest quality of care to you and your family. If you cannot score us a very good on any question, please call the office to discuss how we could have made your experience a very good one. Thank you.     Natalia An RN

## 2021-09-18 LAB
ABSOLUTE BASO #: 0 X10E9/L (ref 0–0.2)
ABSOLUTE EOS #: 0.1 X10E9/L (ref 0–0.4)
ABSOLUTE LYMPH #: 1 X10E9/L (ref 1–3.5)
ABSOLUTE MONO #: 0.5 X10E9/L (ref 0–0.9)
ABSOLUTE NEUT #: 3.6 X10E9/L (ref 1.5–6.6)
ANION GAP SERPL CALCULATED.3IONS-SCNC: 7 MMOL/L (ref 5–15)
BASOPHILS RELATIVE PERCENT: 0.6 %
BUN BLDV-MCNC: 29 MG/DL (ref 5–27)
CALCIUM SERPL-MCNC: 8.7 MG/DL (ref 8.5–10.5)
CHLORIDE BLD-SCNC: 104 MMOL/L (ref 98–109)
CO2: 26 MMOL/L (ref 22–32)
CREAT SERPL-MCNC: 1.52 MG/DL (ref 0.6–1.3)
EGFR AFRICAN AMERICAN: 54 ML/MIN/1.73SQ.M
EGFR IF NONAFRICAN AMERICAN: 44 ML/MIN/1.73SQ.M
EOSINOPHILS RELATIVE PERCENT: 1.2 %
GLUCOSE: 206 MG/DL (ref 65–99)
HCT VFR BLD CALC: 35.5 % (ref 39–49)
HEMOGLOBIN: 11.8 G/DL (ref 13–17)
LYMPHOCYTE %: 19.1 %
MCH RBC QN AUTO: 31.1 PG (ref 27–34)
MCHC RBC AUTO-ENTMCNC: 33.2 G/DL (ref 32–36)
MCV RBC AUTO: 94 FL (ref 80–100)
MONOCYTES # BLD: 8.8 %
NEUTROPHILS RELATIVE PERCENT: 70.3 %
PDW BLD-RTO: 15.4 % (ref 11.5–15)
PLATELETS: 232 X10E9/L (ref 150–450)
PMV BLD AUTO: 9.6 FL (ref 7–12)
POTASSIUM SERPL-SCNC: 5.2 MMOL/L (ref 3.5–5)
RBC: 3.79 X10E12/L (ref 4.1–5.7)
SODIUM BLD-SCNC: 137 MMOL/L (ref 134–146)
WBC: 5.2 X10E9/L (ref 4–11)

## 2021-09-20 ENCOUNTER — TELEPHONE (OUTPATIENT)
Dept: CARDIOLOGY | Age: 81
End: 2021-09-20

## 2021-09-20 RX ORDER — FUROSEMIDE 20 MG/1
20 TABLET ORAL DAILY
COMMUNITY
End: 2021-09-28 | Stop reason: SINTOL

## 2021-09-20 NOTE — TELEPHONE ENCOUNTER
Per Dr.Bruhl BLANCA becerra, increase Lasix back up to 20 mg daily for now. Pt notified and verbalized understanding.

## 2021-09-23 ENCOUNTER — HOSPITAL ENCOUNTER (OUTPATIENT)
Dept: CARDIAC CATH/INVASIVE PROCEDURES | Age: 81
Discharge: HOME | End: 2021-09-23
Attending: FAMILY MEDICINE | Admitting: FAMILY MEDICINE
Payer: MEDICARE

## 2021-09-23 VITALS
TEMPERATURE: 97.3 F | OXYGEN SATURATION: 97 % | BODY MASS INDEX: 25.06 KG/M2 | DIASTOLIC BLOOD PRESSURE: 59 MMHG | HEIGHT: 72 IN | HEART RATE: 62 BPM | WEIGHT: 185 LBS | SYSTOLIC BLOOD PRESSURE: 116 MMHG | RESPIRATION RATE: 15 BRPM

## 2021-09-23 LAB — GLUCOSE BLD-MCNC: 87 MG/DL (ref 74–100)

## 2021-09-23 PROCEDURE — 6360000002 HC RX W HCPCS: Performed by: FAMILY MEDICINE

## 2021-09-23 PROCEDURE — 99152 MOD SED SAME PHYS/QHP 5/>YRS: CPT | Performed by: FAMILY MEDICINE

## 2021-09-23 PROCEDURE — 99153 MOD SED SAME PHYS/QHP EA: CPT | Performed by: FAMILY MEDICINE

## 2021-09-23 PROCEDURE — 33264 RMVL & RPLCMT DFB GEN MLT LD: CPT | Performed by: FAMILY MEDICINE

## 2021-09-23 PROCEDURE — 2500000003 HC RX 250 WO HCPCS

## 2021-09-23 PROCEDURE — 2780000010 HC IMPLANT OTHER

## 2021-09-23 PROCEDURE — 82947 ASSAY GLUCOSE BLOOD QUANT: CPT

## 2021-09-23 PROCEDURE — 2709999900 HC NON-CHARGEABLE SUPPLY

## 2021-09-23 PROCEDURE — 6360000002 HC RX W HCPCS

## 2021-09-23 PROCEDURE — C1882 AICD, OTHER THAN SING/DUAL: HCPCS

## 2021-09-23 RX ORDER — SODIUM CHLORIDE 9 MG/ML
25 INJECTION, SOLUTION INTRAVENOUS PRN
Status: DISCONTINUED | OUTPATIENT
Start: 2021-09-23 | End: 2021-09-23 | Stop reason: HOSPADM

## 2021-09-23 RX ORDER — SODIUM CHLORIDE 0.9 % (FLUSH) 0.9 %
5-40 SYRINGE (ML) INJECTION PRN
Status: DISCONTINUED | OUTPATIENT
Start: 2021-09-23 | End: 2021-09-23 | Stop reason: HOSPADM

## 2021-09-23 RX ORDER — ACETAMINOPHEN 325 MG/1
650 TABLET ORAL EVERY 4 HOURS PRN
Status: DISCONTINUED | OUTPATIENT
Start: 2021-09-23 | End: 2021-09-23 | Stop reason: HOSPADM

## 2021-09-23 RX ORDER — SODIUM CHLORIDE 450 MG/100ML
INJECTION, SOLUTION INTRAVENOUS CONTINUOUS
Status: DISCONTINUED | OUTPATIENT
Start: 2021-09-23 | End: 2021-09-23 | Stop reason: HOSPADM

## 2021-09-23 RX ORDER — SODIUM CHLORIDE 0.9 % (FLUSH) 0.9 %
10 SYRINGE (ML) INJECTION EVERY 12 HOURS SCHEDULED
Status: DISCONTINUED | OUTPATIENT
Start: 2021-09-23 | End: 2021-09-23 | Stop reason: HOSPADM

## 2021-09-23 RX ORDER — SODIUM CHLORIDE 0.9 % (FLUSH) 0.9 %
5-40 SYRINGE (ML) INJECTION EVERY 12 HOURS SCHEDULED
Status: DISCONTINUED | OUTPATIENT
Start: 2021-09-23 | End: 2021-09-23 | Stop reason: HOSPADM

## 2021-09-23 RX ORDER — SODIUM CHLORIDE 0.9 % (FLUSH) 0.9 %
10 SYRINGE (ML) INJECTION PRN
Status: DISCONTINUED | OUTPATIENT
Start: 2021-09-23 | End: 2021-09-23 | Stop reason: HOSPADM

## 2021-09-23 RX ADMIN — CEFAZOLIN 2000 MG: 10 INJECTION, POWDER, FOR SOLUTION INTRAVENOUS at 13:38

## 2021-09-23 NOTE — PROGRESS NOTES
Inpatients must meet criteria 1 through 7.   1. Minimum 30 minutes after last dose of sedative medication, minimum 120 minutes after last dose of reversal agent. Yes   2. Systolic BP stable within 20 mmHg for 30 minutes & systolic BP between 90 & 335 or within 10 mmHg of baseline. Yes   3. Pulse between 60 and 100 or within 10 bpm of baseline. Yes   4. Spontaneous respiratory rate >/= 10 per minute. Yes   5. SaO2 >/= 95 or >/= baseline. Yes   6. Able to cough and swallow or return to baseline function. Yes   7. Alert and oriented or return to baseline mental status. Yes   8. Demonstrates controlled, coordinated movements, ambulates with steady gait, or return to baseline activity function. Yes   9. Minimal or no pain or nausea, or at a level tolerable and acceptable to patient. Yes   10. Takes and retains oral fluids as allowed. Yes   11. Procedural / perioperative site stable. Minimal or no bleeding. Yes   12. If GI endoscopy procedure, minimal or no abdominal distention or passing flatus. N/A   13. Written discharge instructions and emergency telephone number provided. Yes   14. Accompanied by a responsible adult. Yes   Adult patient discharged from facility without responsible person meets above criteria plus the following:   a) remains awake without stimulus for 30 minutes   b) oriented appropriate for age   c) all vital signs stable   d) no significant risk of losing protective reflexes   e) able to maintain pre-procedure mobility without assistance   f) no nausea or dizziness   g) transportation arrangements that do not require patient to operate motor Vehicle.    N/A

## 2021-09-23 NOTE — OP NOTE
ELECTROPHYSIOLOGY/CARDIAC STUDY     Patient: Tristan Chiu   YOB: 1940   Atd Phys: Thiago Acuna. Yvrose Henao MD, [facility]  PCP Phys: Adebayo Espinoza, APRN - CNP     PROCEDURE: Medtronic Bi-Ventricular ICD generator replacement   INDICATION: Battery end of life     NARRATIVE SUMMARY:   The patient was brought to the operating room after explaining the risks, benefits and alternatives of the procedure and informed written consent was obtained. The patient was prepped and draped in the standard surgical fashion. After adequate sedation Marcaine was used to anesthetize the area over the generator. An incision was made with careful attention to hemostasis. The old generator was removed and the RA, RV, LV, and defibrillator coil leads were ported to the new generator and the unused port was capped. Next, the device was interrogated and when all the leads were found to be within acceptable parameters, the pacemaker pocket was irrigated. Again, careful attention to hemostasis was made with cauterization. Finally, the pocket was suture closed using 3 layers of 2-0 Vicryl and 2 layers of 4-0 Vicryl. A thin layer of Dermabond was over the incision followed by a Safe Guard pessure dressing. Overall the patient tolerated the procedure well and there were no complications. GENERATOR: Medtronic Bi-V ICD Serial [id number]. See Device Download sheet in Epic for further device lead details. FINAL SETTINGS:   DDD with a lower rate 50 and an upper rate of 120. Thiago Acuna. Joel COTTO, MS, F.A.C.C.   [facility]  [address] [place] [address]  Phone: [phone], Fax: [phone]   Electronically signed by Edward Christianson MD on 9/23/2021 at 3:13 PM

## 2021-09-27 ENCOUNTER — OFFICE VISIT (OUTPATIENT)
Dept: CARDIOLOGY | Age: 81
End: 2021-09-27
Payer: MEDICARE

## 2021-09-27 ENCOUNTER — TELEPHONE (OUTPATIENT)
Dept: PHARMACY | Facility: CLINIC | Age: 81
End: 2021-09-27

## 2021-09-27 VITALS
HEART RATE: 76 BPM | DIASTOLIC BLOOD PRESSURE: 58 MMHG | BODY MASS INDEX: 25 KG/M2 | OXYGEN SATURATION: 97 % | SYSTOLIC BLOOD PRESSURE: 102 MMHG | WEIGHT: 184.6 LBS | HEIGHT: 72 IN | RESPIRATION RATE: 16 BRPM

## 2021-09-27 DIAGNOSIS — I48.0 PAF (PAROXYSMAL ATRIAL FIBRILLATION) (HCC): ICD-10-CM

## 2021-09-27 DIAGNOSIS — S20.212A HEMATOMA, CHEST WALL, LEFT, INITIAL ENCOUNTER: ICD-10-CM

## 2021-09-27 DIAGNOSIS — Z95.810 BIVENTRICULAR ICD (IMPLANTABLE CARDIOVERTER-DEFIBRILLATOR) IN PLACE: ICD-10-CM

## 2021-09-27 DIAGNOSIS — I20.9 ANGINA, CLASS II (HCC): ICD-10-CM

## 2021-09-27 DIAGNOSIS — I50.42 CHRONIC COMBINED SYSTOLIC AND DIASTOLIC CHF, NYHA CLASS 3 (HCC): Primary | ICD-10-CM

## 2021-09-27 DIAGNOSIS — E78.2 MIXED HYPERLIPIDEMIA: Chronic | ICD-10-CM

## 2021-09-27 DIAGNOSIS — I10 ESSENTIAL HYPERTENSION: Chronic | ICD-10-CM

## 2021-09-27 PROCEDURE — 1123F ACP DISCUSS/DSCN MKR DOCD: CPT | Performed by: FAMILY MEDICINE

## 2021-09-27 PROCEDURE — 1036F TOBACCO NON-USER: CPT | Performed by: FAMILY MEDICINE

## 2021-09-27 PROCEDURE — 4040F PNEUMOC VAC/ADMIN/RCVD: CPT | Performed by: FAMILY MEDICINE

## 2021-09-27 PROCEDURE — G8417 CALC BMI ABV UP PARAM F/U: HCPCS | Performed by: FAMILY MEDICINE

## 2021-09-27 PROCEDURE — 99213 OFFICE O/P EST LOW 20 MIN: CPT | Performed by: FAMILY MEDICINE

## 2021-09-27 PROCEDURE — G8427 DOCREV CUR MEDS BY ELIG CLIN: HCPCS | Performed by: FAMILY MEDICINE

## 2021-09-27 NOTE — PATIENT INSTRUCTIONS
SURVEY:    You may be receiving a survey from [employer] regarding your visit today. Please complete the survey to enable us to provide the highest quality of care to you and your family. If you cannot score us a very good on any question, please call the office to discuss how we could have made your experience a very good one. Thank you.

## 2021-09-27 NOTE — PROGRESS NOTES
15-20%. LV cavity size is moderately enlarged. LV wall thickness is mildly increased. Severe global hypokinesis. Left atrium is mildly dilated. Mild mitral regurgitation. Moderate diastolic dysfunction is seen. ICD battery replaced on 9/23/2021. Since the last time I saw Mr. Al Don, he reports doing relatively well. He recently had his battery replaced in his ICD. He has had a little bit of chest wall tenderness from the replacement. Denies any chest pain. He denies having any shortness of breath, lightheaded/dizziness or palpitations. He denies any bleeding problems, bowel issues, problems with his medications or any other concerns at this time. No nausea or vomiting. No cough, fever or chills. Exercise Tolerance: Mr. Al Don reports that he has a fairly good exercise tolerance. He does take a Nitro in the morning before he goes and he doesn't have chest pain but says that since he had his ICD replaced he has not had to take any nitro. Past Medical History:   Diagnosis Date    Abnormal echocardiogram 5/2/12    EF 15%. mildly dilated LV cavity.  Abnormal resting ECG findings 4/25/12    atrial sensed, ventricular paced rhythm at 73 beats per minute. Wide QRS of 183 ms.  Biventricular ICD (implantable cardiac defibrillator) in place 8/26/11    [facility]. CRT-D    Biventricular ICD (implantable cardioverter-defibrillator) in place 10/29/2015    [employer]- Bi V ICD (Battery change)    Biventricular pacemaker check 5/12 1/13    LV lead turned of 1/13 due to diaphragmatic pacing.  CAD (coronary artery disease)     Diabetes mellitus ([address]     type 2    Esophageal reflux     History of echocardiogram 11/23/2018    EF 15%. Mildly increased LV wall thickness w/ severely increased LA cavity size. Severe global hypokinesis w/ no segmental variation. LA is moderately dilated w/ LA volume index of 35. Mild mitral and tricuspid regurg. Evidence of moderate diastolic dysfunction seen.     Hx of blood clots     Hx of left heart catheterization by ventricular puncture 2013    LAD-20-30%, LCX & RCA-10-20%, Normal LVEDP,     Hyperlipidemia     Hypertension     Mantle cell lymphoma, unspecified site, extranodal and solid organ sites     Nonischemic dilated cardiomyopathy ([facility] [address]     Pain in joint, lower leg     Pain in limb     S/P cardiac catheterization 12/13/10    No significant coronary disease. EF estimated at 35%.  Thoracic or lumbosacral neuritis or radiculitis, unspecified     Unspecified acute reaction to stress        CURRENT ALLERGIES: Seasonal REVIEW OF SYSTEMS: 14 systems were reviewed. Pertinent positives and negatives as above, all else negative.      Past Surgical History:   Procedure Laterality Date    BLADDER SURGERY      CARDIAC CATHETERIZATION      Patient states has had 3 in [place] and one in [facility] last one being in [place] 3 years ago Dr. Bonita Alonso COLONOSCOPY  10/15/2015    -polyps,diverticulosis,hemorrhoids    DIAGNOSTIC CARDIAC CATH LAB PROCEDURE  2013    ELBOW SURGERY      FOOT SURGERY      LOBECTOMY N/A 2017    LEFT MINI THORCOTOMY WITH LEFT VENTRICULAR LEAD PLACEMENT performed by Irina Pineda MD at [address] Left 2021    Dr Steph Anderson Martin/ICD/Pacemaker generator change    PACEMAKER PLACEMENT      SKIN CANCER EXCISION  2013    basal cell on chest.    THORACOTOMY  2017    Left Mini    UPPER GASTROINTESTINAL ENDOSCOPY  10/15/2015    -bx    Social History:  Social History     Tobacco Use    Smoking status: Former Smoker     Packs/day: 0.00     Years: 30.00     Pack years: 0.00     Types: Cigarettes     Quit date: 2008     Years since quittin.4    Smokeless tobacco: Never Used    Tobacco comment: Patient only smoked when he drank beer   Substance Use Topics    Alcohol use: Yes     Alcohol/week: 0.0 standard drinks     Types: 3 - 4 Cans of beer per week     Comment: 3 per week    Drug use: No        CURRENT MEDICATIONS:  Outpatient Medications Marked as Taking for the 9/27/21 encounter (Office Visit) with Luci Mcdaniel MD   Medication Sig Dispense Refill    ALPRAZolam (XANAX) 1 MG tablet Take 1 tablet by mouth nightly as needed for Sleep or Anxiety for up to 90 days. 90 tablet 0    simvastatin (ZOCOR) 40 MG tablet Take 1 tablet by mouth nightly 90 tablet 1    rivaroxaban (XARELTO) 15 MG TABS tablet Take 1 tablet by mouth Daily with supper 90 tablet 3    furosemide (LASIX) 20 MG tablet Take 20 mg by mouth daily      lisinopril (PRINIVIL;ZESTRIL) 2.5 MG tablet TAKE 1 TABLET BY MOUTH  DAILY 90 tablet 1    glimepiride (AMARYL) 4 MG tablet Take 1 tablet by mouth 2 times daily TAKE 1 TABLET BY MOUTH  TWICE DAILY 180 tablet 1    finasteride (PROSCAR) 5 MG tablet Take 1 tablet by mouth daily TAKE 1 TABLET BY MOUTH  DAILY 90 tablet 3    nitroGLYCERIN (NITROSTAT) 0.4 MG SL tablet Place 1 tablet under the tongue every 5 minutes as needed for Chest pain up to max of 3 total doses. If no relief after 1 dose, call 911. 100 tablet 3    prednisoLONE acetate (PRED FORTE) 1 % ophthalmic suspension Place 1 drop into both eyes daily      tamsulosin (FLOMAX) 0.4 MG capsule TAKE 1 CAPSULE EVERY DAY 90 capsule 3    Lancets MISC 1 each by Does not apply route daily Dx--E11.9 Non-insulin dependent 100 each 5    blood glucose test strips (ASCENSIA AUTODISC VI;ONE TOUCH ULTRA TEST VI) strip 1 each by In Vitro route daily As needed. ONE TOUCH VERIO.   DX--E11.9 NON-INSULIN DEPENDENT 100 each 3    Blood Glucose Monitoring Suppl (ONE TOUCH ULTRA 2) w/Device KIT 1 kit by Does not apply route daily 1 kit 0    blood glucose monitor strips One Touch Ultra 2   Test qd  Dx--E11.9 non-insulin dependent 100 strip 5    blood glucose test strips (ASCENSIA AUTODISC VI;ONE TOUCH ULTRA TEST VI) strip 1 each by In Vitro route 2 times daily ON CALL EXPRESS TEST STRIPS DX--E11.9 NON-INSULIN DEPENDENT 100 each 5    metoprolol succinate (TOPROL XL) 50 MG extended release tablet TAKE 1 TABLET BY MOUTH  DAILY 90 tablet 3    NONFORMULARY daily Ibgard for IBS       potassium chloride (KLOR-CON M) 20 MEQ TBCR extended release tablet Take 20 mEq by mouth daily          FAMILY HISTORY: family history includes Cancer in his father, sister, and sister; Diabetes in his brother; Heart Disease in his mother. Physical Examination:     BP (!) 102/58 (Site: Right Upper Arm, Position: Sitting, Cuff Size: Medium Adult)   Pulse 76   Resp 16   Ht 6' (1.829 m)   Wt 184 lb 9.6 oz (83.7 kg)   SpO2 97%   BMI 25.04 kg/m²  Body mass index is 25.04 kg/m². Constitutional: He appeared oriented to person and place. He appears well-developed and well-nourished. In no acute distress. HEENT: Normocephalic and atraumatic. No JVD present. Carotid bruit is not present. No mass and no thyromegaly present. No lymphadenopathy noted. Cardiovascular: Normal rate, regular rhythm, normal heart sounds. Exam reveals no gallop and no friction rubs. 2/6 systolic murmur, 2nd intercostal space on the RIGHT just lateral to the sternum  Pulmonary/Chest: Effort normal and breath sounds normal. No respiratory distress. He has no wheezes, rhonchi or rales. Abdominal: Soft, non-tender. He exhibits no organomegaly, mass or bruit. Extremities: Trace. No cyanosis or clubbing. 2+ radial and carotid pulses. Distal extremity pulses: 2+ bilaterally. Neurological: Alertness and orientation as per Constitutional exam. No evidence of gross cranial nerve deficit. Coordination appeared normal.   Skin: Skin is warm and dry. There is no rash or diaphoresis. Psychiatric: He has a normal mood and affect.  His speech is normal and behavior is normal.        MOST RECENT LABS ON RECORD:   Lab Results   Component Value Date    WBC 5.2 09/17/2021    HGB 11.8 (L) 09/17/2021 HCT 35.5 (L) 09/17/2021     09/17/2021    CHOL 145 (L) 12/30/2020    TRIG 89 12/30/2020    HDL 43 12/30/2020    ALT 12 03/08/2021    AST 17 03/08/2021     09/17/2021    K 5.2 (H) 09/17/2021     09/17/2021    CREATININE 1.52 (H) 09/17/2021    BUN 29 (H) 09/17/2021    CO2 26 09/17/2021    TSH 2.74 02/15/2019    PSA 2.23 10/16/2019    INR 1.2 03/13/2017    GLUF 149 (H) 11/21/2018    LABA1C 6.9 03/30/2021    LABMICR CANNOT BE CALCULATED 03/27/2019     (H) 02/03/2013       ASSESSMENT:  1. Chronic combined systolic and diastolic CHF, NYHA class 3 ([facility] [address]    2. Hematoma, chest wall, left, initial encounter    3. PAF (paroxysmal atrial fibrillation) ([facility] [address]    4. Essential hypertension    5. Mixed hyperlipidemia    6. Biventricular ICD (implantable cardioverter-defibrillator) in place    7. Angina, class II ([facility] [address]       PLAN:     Chronic Combined Systolic and Diastolic Heart Failure/ NICM: Echo on 12/2/2020 showed an EF of 15-20% Currently appears stable  Beta Blocker: Continue Metoprolol succinate (Toprol XL) 50 mg daily. ACE Inibitor/ARB: Continue lisinopril 5 mg, 1/2 tab daily. Diuretics: Continue furosemide (Lasix) 20 mg every morning. Angina Citizen of [place] Class II: Stable and actually less since getting his ICD replaced - Taking one nitro to help with chest pain prior to exercise which I think is fine but I strongly encouraged him to avoid pushing through chest pain as this can be life threatening  Beta Blocker: Continue Metoprolol succinate (Toprol XL) 50 mg daily. Cholesterol Reduction Therapy: Continue simvastatin (Zocor) 40 mg daily. Anti-anginal medications: Continue nitroglycerin 0.4 mg tablets as needed for chest pain. Counseling: I asked him to come to the emergency room if he develops persistent or worsening chest pain or worsening shortness of breath. Paroxysmal Atrial Fibrillation: Rate control  Beta Blocker: Continue Metoprolol succinate (Toprol XL) 50 mg daily. [id number] Score for Atrial Fibrillation Stroke Risk   Risk   Factors  Component Value   C CHF Yes 1   H HTN Yes 1   A2 Age >= 76 Yes,  [de-identified] y.o.) 2   D DM Yes 1   S2 Prior Stroke/TIA No 0   V Vascular Disease No 0   A Age 74-69 No,  [de-identified] y.o.) 0   Sc Sex male 0    [id number]  Score  5   Score last updated 38/0/68 8:68 PM EST  Click here for a link to the UpToDate guideline \"Atrial Fibrillation: Anticoagulation therapy to prevent embolization  Disclaimer: Risk Score calculation is dependent on accuracy of patient problem list and past encounter diagnosis. His CHADS2 score is 5/9(6.7% stroke risk)  Anticoagulation: Continue Riveroxaban (Xarelto): 15 mg once daily with largest meal (typically dinner). Essential Hypertension: Controlled  Beta Blocker: Continue Metoprolol succinate (Toprol XL) 50 mg daily. ACE Inibitor/ARB: Continue lisinopril 5 mg, 1/2 tab daily. Diuretics: Continue furosemide (Lasix) 20 mg every morning. Pure Hypercholesterolemia: Last LDL on 12/30/2020 was 84 mg/dL  Cholesterol Reduction Therapy: Continue simvastatin (Zocor) 40 mg daily. Bi-Ventricular Implantable Cardioverter Defibrillator: RRT Met as of 9/16/2021 - Battery replaced on 9/23/2021  Indication for Device Placement: Cardiomyopathy with ejection fraction <35%   Interrogation Findings: We will plan to recheck their device at their next scheduled appointment date. Left Chest wall hematoma: Mild. Incision site appears clean and dry and no signs of infection. Safe Guard pressure dressing was removed, area cleaned and sterile 4x4 placed. Told him to keep this clean and dry. In the meantime, I encouraged Mr. Al Don to continue to take his current medications and follow up with you as previously scheduled. FOLLOW UP:    I told Mr. Al Don to call my office if he had any problems, but otherwise told him to Return in about 3 weeks (around 10/18/2021). However, I would be happy to see him sooner should the need arise. Sincerely,  Urbano Maldonado MD, MS, F.A.C.C. [facility] Cardiology Specialist  [address] [place] [address] [facility], [address] [place] [address]  Phone: [phone], Fax: [phone]    I believe that the risk of significant morbidity and mortality related to the patient's current medical conditions are: low-intermediate. The documentation recorded by the scribe, accurately and completely reflects the services I personally performed and the decisions made by me. Nikole Beltran MD, MS, F.A.C.C.  September 27, 2021

## 2021-09-27 NOTE — TELEPHONE ENCOUNTER
[facility] CLINICAL PHARMACY REVIEW: ADHERENCE REVIEW  Identified care gap per Bebeto; fills at [facility]: Diabetes adherence    Last Visit: 7/9/21    Patient not found in Outcomes MTM    [address]    Per Insurance Records through 9/19/21(2020 [place] = n/a%; YTD [place] = 0.81%; Potential Fail Date: 12/11/21):   simvastatin last filled on 7/22 for 90 day supply. Next refill due: 10/20    Per Chart Review: Rx for simvastatin written 9/23/21 for 6 month supply. Lab Results   Component Value Date    CHOL 145 (L) 12/30/2020    TRIG 89 12/30/2020    HDL 43 12/30/2020    LDLCHOLESTEROL 74 04/21/2020    LDLCALC 84 12/30/2020     ALT   Date Value Ref Range Status   03/08/2021 12 0 - 40 U/L Final     AST   Date Value Ref Range Status   03/08/2021 17 0 - 41 U/L Final     The ASCVD Risk score (Luh Rausch, et al., 2013) failed to calculate for the following reasons: The 2013 ASCVD risk score is only valid for ages 36 to 78     DIABETES ADHERENCE    Per Insurance Records through 9/19/21 (2020 [place] = n/a%; YTD [place] = n/a%; Potential Fail Date: 10/2/21):   Glimepiride last filled on 5/20/21 for 90 day supply. Next refill due: 8/18/21    Per [facility]:   glimepiride last picked up on 9/20/21 for 90 day supply. 1 refills remaining. Billed through [employer] Value Date    LABA1C 6.9 03/30/2021    LABA1C 7.5 (H) 12/30/2020    LABA1C 7.0 06/10/2020     NOTE A1c <9%    ACE/ARB ADHERENCE    Per Insurance Records through 9/19/21 (2020 [place] = PASS>80%; YTD [place] = 100%; Potential Fail Date: 2/18/22):   lisinopril last filled on 9/4/21 for 90 day supply. Next refill due: 10/20/21    Per Chart Review: simvastatin Rx written 9/23/21 for a 6 month supply. BP Readings from Last 3 Encounters:   09/27/21 (!) 102/58   09/23/21 (!) 116/59   07/09/21 (!) 114/49     Estimated Creatinine Clearance: 43 mL/min (A) (based on SCr of 1.52 mg/dL (H)).       PLAN  The following are interventions that have been identified:    Patient was shipped medications on 9/20/21 per [facility]. No outreach planned at this time.     Future Appointments   Date Time Provider Jacqueline Sloan   10/14/2021  1:15 PM JAI Sorensen - ANABELA OLSON   10/19/2021 11:00 AM Iesha Reilly MD TIFF CARD [facility]   3/29/2022  8:30 AM SCHEDULE, [facility] [employer] TIFF CARD [facility]   10/3/2022  9:00 AM Iesha Reilly MD TIFF CARD [facility]       SUSIE Gonzalez PharmD Candidate [id number]

## 2021-09-28 ENCOUNTER — TELEPHONE (OUTPATIENT)
Dept: CARDIOLOGY | Age: 81
End: 2021-09-28

## 2021-09-28 NOTE — TELEPHONE ENCOUNTER
Mr. Juan Henry called in to report that JAI Martin CNP stopped his Lasix. He wanted to know if you were okay with this? If not, did you want him to restart it? & what dose?

## 2021-09-29 NOTE — TELEPHONE ENCOUNTER
Please let patient know that his kidneys are worse so I think he should hold for now but if his lower extremity edema gets worse we may just start 20 mg on Monday, Wednesday and Friday. Thanks.

## 2021-10-19 ENCOUNTER — OFFICE VISIT (OUTPATIENT)
Dept: CARDIOLOGY | Age: 81
End: 2021-10-19
Payer: MEDICARE

## 2021-10-19 VITALS
OXYGEN SATURATION: 98 % | BODY MASS INDEX: 25.06 KG/M2 | WEIGHT: 185 LBS | SYSTOLIC BLOOD PRESSURE: 134 MMHG | RESPIRATION RATE: 18 BRPM | HEIGHT: 72 IN | HEART RATE: 64 BPM | DIASTOLIC BLOOD PRESSURE: 72 MMHG

## 2021-10-19 DIAGNOSIS — I50.42 CHRONIC COMBINED SYSTOLIC AND DIASTOLIC CHF, NYHA CLASS 3 (HCC): Primary | ICD-10-CM

## 2021-10-19 DIAGNOSIS — Z95.810 BIVENTRICULAR ICD (IMPLANTABLE CARDIOVERTER-DEFIBRILLATOR) IN PLACE: ICD-10-CM

## 2021-10-19 DIAGNOSIS — I42.8 NON-ISCHEMIC CARDIOMYOPATHY (HCC): ICD-10-CM

## 2021-10-19 DIAGNOSIS — I10 ESSENTIAL HYPERTENSION: Chronic | ICD-10-CM

## 2021-10-19 DIAGNOSIS — I48.0 PAF (PAROXYSMAL ATRIAL FIBRILLATION) (HCC): ICD-10-CM

## 2021-10-19 DIAGNOSIS — Z79.01 ENCOUNTER FOR CURRENT LONG-TERM USE OF ANTICOAGULANTS: ICD-10-CM

## 2021-10-19 PROCEDURE — 99213 OFFICE O/P EST LOW 20 MIN: CPT | Performed by: FAMILY MEDICINE

## 2021-10-19 PROCEDURE — 4040F PNEUMOC VAC/ADMIN/RCVD: CPT | Performed by: FAMILY MEDICINE

## 2021-10-19 PROCEDURE — G8417 CALC BMI ABV UP PARAM F/U: HCPCS | Performed by: FAMILY MEDICINE

## 2021-10-19 PROCEDURE — G8427 DOCREV CUR MEDS BY ELIG CLIN: HCPCS | Performed by: FAMILY MEDICINE

## 2021-10-19 PROCEDURE — G8484 FLU IMMUNIZE NO ADMIN: HCPCS | Performed by: FAMILY MEDICINE

## 2021-10-19 PROCEDURE — 1123F ACP DISCUSS/DSCN MKR DOCD: CPT | Performed by: FAMILY MEDICINE

## 2021-10-19 PROCEDURE — 1036F TOBACCO NON-USER: CPT | Performed by: FAMILY MEDICINE

## 2021-10-19 NOTE — PATIENT INSTRUCTIONS
SURVEY:    You may be receiving a survey from [employer] regarding your visit today. Please complete the survey to enable us to provide the highest quality of care to you and your family. If you cannot score us a very good on any question, please call the office to discuss how we could have made your experience a very good one. Thank you.

## 2021-10-20 ENCOUNTER — TELEPHONE (OUTPATIENT)
Dept: CARDIOLOGY | Age: 81
End: 2021-10-20

## 2021-10-20 NOTE — TELEPHONE ENCOUNTER
Please let patient know that I mentioned zachary and Wanda Hendrix and to discuss these with JAI Castillo CNP.

## 2021-10-20 NOTE — TELEPHONE ENCOUNTER
Senthil Albarran reports at his appointment yesterday that you guys had discussed sleeping medications. He is wanting to know the names of the ones discussed that way he can research them.

## 2021-11-18 RX ORDER — METOPROLOL SUCCINATE 50 MG/1
50 TABLET, EXTENDED RELEASE ORAL DAILY
Qty: 90 TABLET | Refills: 3 | Status: SHIPPED | OUTPATIENT
Start: 2021-11-18 | End: 2022-09-08

## 2021-12-01 ENCOUNTER — TELEPHONE (OUTPATIENT)
Dept: PHARMACY | Facility: CLINIC | Age: 81
End: 2021-12-01

## 2021-12-01 NOTE — TELEPHONE ENCOUNTER
For Pharmacy [id number] in place:  No   Recommendation Provided To:    Intervention Detail: Adherence Monitoring: 3   Gap Closed?: No    Intervention Accepted By:   Lexi Ivan Time Spent (min): 15

## 2022-01-05 ENCOUNTER — HOSPITAL ENCOUNTER (OUTPATIENT)
Age: 82
Discharge: HOME | End: 2022-01-05
Payer: MEDICARE

## 2022-01-05 DIAGNOSIS — I10 ESSENTIAL HYPERTENSION: ICD-10-CM

## 2022-01-05 DIAGNOSIS — G47.00 INSOMNIA, UNSPECIFIED TYPE: ICD-10-CM

## 2022-01-05 DIAGNOSIS — E11.9 TYPE 2 DIABETES MELLITUS WITHOUT COMPLICATION, WITHOUT LONG-TERM CURRENT USE OF INSULIN (HCC): ICD-10-CM

## 2022-01-05 DIAGNOSIS — E78.00 PURE HYPERCHOLESTEROLEMIA: ICD-10-CM

## 2022-01-05 DIAGNOSIS — I50.42 CHRONIC COMBINED SYSTOLIC AND DIASTOLIC CHF, NYHA CLASS 3 (HCC): ICD-10-CM

## 2022-01-05 DIAGNOSIS — Z12.5 SCREENING FOR PROSTATE CANCER: ICD-10-CM

## 2022-01-05 LAB
ALBUMIN SERPL-MCNC: 4.2 G/DL (ref 3.5–5.2)
ALBUMIN/GLOBULIN RATIO: 1.4 (ref 1–2.5)
ALP BLD-CCNC: 51 U/L (ref 40–129)
ALT SERPL-CCNC: 14 U/L (ref 5–41)
ANION GAP SERPL CALCULATED.3IONS-SCNC: 11 MMOL/L (ref 9–17)
AST SERPL-CCNC: 16 U/L
BILIRUB SERPL-MCNC: 0.34 MG/DL (ref 0.3–1.2)
BUN BLDV-MCNC: 42 MG/DL (ref 8–23)
BUN/CREAT BLD: 27 (ref 9–20)
CALCIUM SERPL-MCNC: 9 MG/DL (ref 8.6–10.4)
CHLORIDE BLD-SCNC: 106 MMOL/L (ref 98–107)
CHOLESTEROL/HDL RATIO: 2.9
CHOLESTEROL: 153 MG/DL
CO2: 21 MMOL/L (ref 20–31)
CREAT SERPL-MCNC: 1.57 MG/DL (ref 0.7–1.2)
CREATININE URINE: 107.7 MG/DL (ref 39–259)
ESTIMATED AVERAGE GLUCOSE: 148 MG/DL
GFR AFRICAN AMERICAN: 52 ML/MIN
GFR NON-AFRICAN AMERICAN: 43 ML/MIN
GFR SERPL CREATININE-BSD FRML MDRD: ABNORMAL ML/MIN/{1.73_M2}
GFR SERPL CREATININE-BSD FRML MDRD: ABNORMAL ML/MIN/{1.73_M2}
GLUCOSE BLD-MCNC: 112 MG/DL (ref 70–99)
HBA1C MFR BLD: 6.8 % (ref 4–6)
HDLC SERPL-MCNC: 53 MG/DL
LDL CHOLESTEROL: 86 MG/DL (ref 0–130)
MICROALBUMIN/CREAT 24H UR: <12 MG/L
MICROALBUMIN/CREAT UR-RTO: NORMAL MCG/MG CREAT
POTASSIUM SERPL-SCNC: 4.6 MMOL/L (ref 3.7–5.3)
SODIUM BLD-SCNC: 138 MMOL/L (ref 135–144)
TOTAL PROTEIN: 7.2 G/DL (ref 6.4–8.3)
TRIGL SERPL-MCNC: 68 MG/DL
VLDLC SERPL CALC-MCNC: NORMAL MG/DL (ref 1–30)

## 2022-01-05 PROCEDURE — 83036 HEMOGLOBIN GLYCOSYLATED A1C: CPT

## 2022-01-05 PROCEDURE — 80307 DRUG TEST PRSMV CHEM ANLYZR: CPT

## 2022-01-05 PROCEDURE — 80053 COMPREHEN METABOLIC PANEL: CPT

## 2022-01-05 PROCEDURE — G0480 DRUG TEST DEF 1-7 CLASSES: HCPCS

## 2022-01-05 PROCEDURE — G0103 PSA SCREENING: HCPCS

## 2022-01-05 PROCEDURE — 82570 ASSAY OF URINE CREATININE: CPT

## 2022-01-05 PROCEDURE — 82043 UR ALBUMIN QUANTITATIVE: CPT

## 2022-01-05 PROCEDURE — 80061 LIPID PANEL: CPT

## 2022-01-05 PROCEDURE — 36415 COLL VENOUS BLD VENIPUNCTURE: CPT

## 2022-01-06 LAB — PROSTATE SPECIFIC ANTIGEN: 1.93 UG/L

## 2022-01-07 LAB
ALCOHOL URINE: NEGATIVE MG/DL
AMPHETAMINE SCREEN URINE: NEGATIVE NG/ML
BARBITURATE SCREEN URINE: NEGATIVE NG/ML
BENZODIAZEPINE SCREEN, URINE: POSITIVE NG/ML
CANNABINOID SCREEN URINE: NEGATIVE NG/ML
COCAINE(METAB.)SCREEN, URINE: NEGATIVE NG/ML
CREATININE URINE: 83 MG/DL (ref 20–400)
Lab: NORMAL
METHADONE SCREEN, URINE: NEGATIVE NG/ML
OPIATES, URINE: NEGATIVE NG/ML
PHENCYCLIDINE, URINE: NEGATIVE NG/ML
PROPOXYPHENE, URINE: NEGATIVE NG/ML

## 2022-01-09 LAB
ALPRAZOLAM: 329 NG/ML
CHLORDIAZEPOXIDE: <20 NG/ML
CLONAZEPAM, URINE SCREEN: <5 NG/ML
DIAZEPAM URINE QUANT: <20 NG/ML
LORAZEPAM: <20 NG/ML
MIDAZOLAM, URINE SCREEN: <20 NG/ML
MIDAZOLAM, URINE SCREEN: <20 NG/ML
NORDIAZEPAM URINE SCREEN: <20 NG/ML
OH-ALPRAZOLAM SCREEN: 402 NG/ML
OXAZEPAM URINE SCREEN: <20 NG/ML
TEMAZEPAM,URINE SCREEN: <20 NG/ML
UR 7-AMINOCLONAZEPAM: <5 NG/ML

## 2022-01-14 PROBLEM — N18.30 STAGE 3 CHRONIC KIDNEY DISEASE, UNSPECIFIED WHETHER STAGE 3A OR 3B CKD (HCC): Status: ACTIVE | Noted: 2022-01-14

## 2022-01-14 PROBLEM — I73.9 CLAUDICATION OF BOTH LOWER EXTREMITIES (HCC): Status: ACTIVE | Noted: 2022-01-14

## 2022-03-29 ENCOUNTER — NURSE ONLY (OUTPATIENT)
Dept: CARDIOLOGY | Age: 82
End: 2022-03-29
Payer: MEDICARE

## 2022-03-29 DIAGNOSIS — I42.8 NON-ISCHEMIC CARDIOMYOPATHY (HCC): Primary | ICD-10-CM

## 2022-03-29 DIAGNOSIS — Z95.810 ICD (IMPLANTABLE CARDIOVERTER-DEFIBRILLATOR) IN PLACE: ICD-10-CM

## 2022-03-29 PROCEDURE — 93295 DEV INTERROG REMOTE 1/2/MLT: CPT | Performed by: FAMILY MEDICINE

## 2022-06-06 ENCOUNTER — OFFICE VISIT (OUTPATIENT)
Dept: CARDIOLOGY | Age: 82
End: 2022-06-06
Payer: MEDICARE

## 2022-06-06 VITALS
BODY MASS INDEX: 25.11 KG/M2 | OXYGEN SATURATION: 98 % | HEIGHT: 72 IN | DIASTOLIC BLOOD PRESSURE: 57 MMHG | SYSTOLIC BLOOD PRESSURE: 108 MMHG | RESPIRATION RATE: 18 BRPM | WEIGHT: 185.4 LBS | HEART RATE: 57 BPM

## 2022-06-06 DIAGNOSIS — I10 ESSENTIAL HYPERTENSION: ICD-10-CM

## 2022-06-06 DIAGNOSIS — I50.42 CHRONIC COMBINED SYSTOLIC AND DIASTOLIC CONGESTIVE HEART FAILURE (HCC): ICD-10-CM

## 2022-06-06 DIAGNOSIS — Z79.01 ENCOUNTER FOR CURRENT LONG-TERM USE OF ANTICOAGULANTS: ICD-10-CM

## 2022-06-06 DIAGNOSIS — Z79.01 CHRONIC ANTICOAGULATION: ICD-10-CM

## 2022-06-06 DIAGNOSIS — I48.0 PAF (PAROXYSMAL ATRIAL FIBRILLATION) (HCC): ICD-10-CM

## 2022-06-06 DIAGNOSIS — Z95.810 ICD (IMPLANTABLE CARDIOVERTER-DEFIBRILLATOR) IN PLACE: ICD-10-CM

## 2022-06-06 DIAGNOSIS — N18.30 STAGE 3 CHRONIC KIDNEY DISEASE, UNSPECIFIED WHETHER STAGE 3A OR 3B CKD (HCC): ICD-10-CM

## 2022-06-06 DIAGNOSIS — I42.8 NICM (NONISCHEMIC CARDIOMYOPATHY) (HCC): ICD-10-CM

## 2022-06-06 DIAGNOSIS — I20.9 ANGINA, CLASS III (HCC): Primary | ICD-10-CM

## 2022-06-06 DIAGNOSIS — E78.00 PURE HYPERCHOLESTEROLEMIA: ICD-10-CM

## 2022-06-06 PROCEDURE — 99214 OFFICE O/P EST MOD 30 MIN: CPT | Performed by: FAMILY MEDICINE

## 2022-06-06 PROCEDURE — 93000 ELECTROCARDIOGRAM COMPLETE: CPT | Performed by: FAMILY MEDICINE

## 2022-06-06 PROCEDURE — G8427 DOCREV CUR MEDS BY ELIG CLIN: HCPCS | Performed by: FAMILY MEDICINE

## 2022-06-06 PROCEDURE — 1036F TOBACCO NON-USER: CPT | Performed by: FAMILY MEDICINE

## 2022-06-06 PROCEDURE — G8417 CALC BMI ABV UP PARAM F/U: HCPCS | Performed by: FAMILY MEDICINE

## 2022-06-06 PROCEDURE — 1123F ACP DISCUSS/DSCN MKR DOCD: CPT | Performed by: FAMILY MEDICINE

## 2022-06-06 NOTE — PROGRESS NOTES
Casey Ceja am scribing for and in the presence of Carmel Castro. Joel COTTO, MS, F.A.C.C. Patient: Kobe Stokes  : [phone]  Date of Visit: 2022    REASON FOR VISIT / CONSULTATION: 6 Month Follow-Up (HX: CHF, PAFF, NICM, bi-VICD. pt is here for 6 month f/u today pt states he is doing well. pt states he does have chest pains every night around 10 till 8 he states he takes a nitro and it goes away. states he has pain right in the middle a dull pain and sometimes will throb lasts 5-10 min. pt denies SOB, light headed/dizziness, palpitations. )      Dear JAI Rodriguez CNP,    I had the pleasure of seeing your patient Kobe Stokes in follow up today status post ICD RV lead extraction and new RV lead placement. As you know, Mr. Kim Clayton is a 81 y.o. male with a history of systolic heart failure with a reduced EF of around 35% that recently declined to around 15% leading to a biventricular ICD which required placement of a epicardial lead due to a history of diaphragmatic pacing with his previous LV lead. Also it was found that his right ventricular ICD cable appeared to have significantly fluctuating impedances with sitting and standing ranging from the seventies to the 140's as well as a possible malfunctioning RV ICD coil. 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 Therefore in  he underwent an RV lead extraction with RV lead replacement. In , we did a home ICD check which showed 28 runs of paroxysmal atrial fibrillation, the longest being 13 minutes in duration. His echocardiogram done on 3/1/2017 showed an ejection fraction of 5-10%. Echocardiogram done on 2018 showed EF of 15%. Mildly increased LV wall thickness with a severely increased LV cavity size. Severe global hypokineses. LA is moderately dilated. Mild mitral & tricuspid regurgitation. Evidence of moderate diastolic dysfunction is seen. Echocardiogram done on 12/2/2020 showed an EF of 15-20%. LV cavity size is moderately enlarged. LV wall thickness is mildly increased. Severe global hypokinesis. Left atrium is mildly dilated. Mild mitral regurgitation. Moderate diastolic dysfunction is seen. ICD battery replaced on 9/23/2021. Since the last time I saw Mr. Jm Mendez, he reports doing relatively well. He does still get chest discomfort about every night and takes a nitro and it goes away. It lasts about 5-10 minutes in duration and is usually in the middle of his chest. This is chronic and it does not worry him to much because the one nitro that he takes makes his pain go away. It occurs about every night about ten minutes until eight o'clock. He was shoveling dirt this morning and felt fine doing this. He denies having any shortness of breath, lightheaded/dizziness or palpitations. He denies any bleeding problems, bowel issues, problems with his medications or any other concerns at this time. No nausea or vomiting. No cough, fever or chills. Bleeding Risks: Mr. Jm Mendez denies any current or recent bleeding problems including a history of a GI bleed, ulcers, recent or upcoming surgeries, blood in his stool or black tarry stools or blood in his urine. Past Medical History:   Diagnosis Date    Abnormal echocardiogram 5/2/12    EF 15%. mildly dilated LV cavity.  Abnormal resting ECG findings 4/25/12    atrial sensed, ventricular paced rhythm at 73 beats per minute. Wide QRS of 183 ms.  Biventricular ICD (implantable cardiac defibrillator) in place 8/26/11    [facility] [employer]. CRT-D    Biventricular ICD (implantable cardioverter-defibrillator) in place 10/29/2015    [employer]- Bi V ICD (Battery change)    Biventricular pacemaker check 5/12 1/13    LV lead turned of 1/13 due to diaphragmatic pacing.     CAD (coronary artery disease)     Diabetes mellitus ([facility]     type 2    Esophageal reflux     History of echocardiogram 11/23/2018    EF 15%. Mildly increased LV wall thickness w/ severely increased LA cavity size. Severe global hypokinesis w/ no segmental variation. LA is moderately dilated w/ LA volume index of 35. Mild mitral and tricuspid regurg. Evidence of moderate diastolic dysfunction seen.  Hx of blood clots     Hx of left heart catheterization by ventricular puncture 04/19/2013    LAD-20-30%, LCX & RCA-10-20%, Normal LVEDP,     Hyperlipidemia     Hypertension     Mantle cell lymphoma, unspecified site, extranodal and solid organ sites     Nonischemic dilated cardiomyopathy ([facility]     Pain in joint, lower leg     Pain in limb     S/P cardiac catheterization 12/13/10    No significant coronary disease. EF estimated at 35%.  Thoracic or lumbosacral neuritis or radiculitis, unspecified     Unspecified acute reaction to stress        CURRENT ALLERGIES: Seasonal REVIEW OF SYSTEMS: 14 systems were reviewed. Pertinent positives and negatives as above, all else negative.      Past Surgical History:   Procedure Laterality Date    BLADDER SURGERY  2008    CARDIAC CATHETERIZATION      Patient states has had 3 in [place] and one in [facility] last one being in [place] 3 years ago Dr. Ellie Galaviz COLONOSCOPY  10/15/2015    -polyps,diverticulosis,hemorrhoids    DIAGNOSTIC CARDIAC CATH LAB PROCEDURE  04/19/2013    ELBOW SURGERY  2000    FOOT SURGERY  1993    LOBECTOMY N/A 02/28/2017    LEFT MINI THORCOTOMY WITH LEFT VENTRICULAR LEAD PLACEMENT performed by Francisca Pulido MD at [address] Left 09/23/2021    Dr Kemal Bacafin/ICD/Pacemaker generator change    PACEMAKER PLACEMENT      SKIN CANCER EXCISION  09/11/2013    basal cell on chest.    THORACOTOMY  02/28/2017    Left Mini    UPPER GASTROINTESTINAL ENDOSCOPY  10/15/2015 -bx    Social History:  Social History     Tobacco Use    Smoking status: Former Smoker     Packs/day: 0.00     Years: 30.00     Pack years: 0.00     Types: Cigarettes     Quit date: 2008     Years since quittin.1    Smokeless tobacco: Never Used    Tobacco comment: Patient only smoked when he drank beer   Substance Use Topics    Alcohol use: Yes     Alcohol/week: 0.0 standard drinks     Types: 3 - 4 Cans of beer per week     Comment: 3 per week    Drug use: No        CURRENT MEDICATIONS:  Outpatient Medications Marked as Taking for the 22 encounter (Office Visit) with Dawson Augustin MD   Medication Sig Dispense Refill    traZODone (DESYREL) 100 MG tablet TAKE 1 TABLET BY MOUTH AT  NIGHT AS NEEDED FOR SLEEP 90 tablet 1    omeprazole (PRILOSEC) 20 MG delayed release capsule Take 1 capsule by mouth in the morning and at bedtime 180 capsule 0    nitroGLYCERIN (NITROSTAT) 0.4 MG SL tablet DISSOLVE 1 TABLET UNDER THE TONGUE EVERY 5 MINUTES AS  NEEDED FOR CHEST PAIN. MAX  OF 3 TABLETS IN 15 MINUTES. CALL 911 IF PAIN PERSISTS. 100 tablet 3    finasteride (PROSCAR) 5 MG tablet TAKE 1 TABLET BY MOUTH  DAILY 90 tablet 3    simvastatin (ZOCOR) 40 MG tablet TAKE 1 TABLET BY MOUTH AT  NIGHT 90 tablet 1    ALPRAZolam (XANAX) 1 MG tablet Take 1.5 tablets by mouth nightly as needed for Sleep for up to 90 days.  135 tablet 0    blood glucose test strips (ASCENSIA AUTODISC VI;ONE TOUCH ULTRA TEST VI) strip USE AS DIRECTED TWICE A DAY TO CHECK BLOOD SUGAR: ONE TOUCH VERIO METER: DX:E11.9 100 each 3    tamsulosin (FLOMAX) 0.4 MG capsule TAKE 1 CAPSULE BY MOUTH  DAILY 90 capsule 1    lisinopril (PRINIVIL;ZESTRIL) 2.5 MG tablet TAKE 1 TABLET BY MOUTH  DAILY 90 tablet 1    rivaroxaban (XARELTO) 15 MG TABS tablet Take 1 tablet by mouth Daily with supper 90 tablet 3    glimepiride (AMARYL) 4 MG tablet TAKE 1 TABLET BY MOUTH  TWICE DAILY 180 tablet 3    metoprolol succinate (TOPROL XL) 50 MG extended release tablet TAKE 1 TABLET BY MOUTH  DAILY 90 tablet 3    Lancets MISC 1 each by Does not apply route daily Dx--E11.9 Non-insulin dependent 100 each 5     FAMILY HISTORY: family history includes Cancer in his father, sister, and sister; Diabetes in his brother; Heart Disease in his mother. Physical Examination:     BP (!) 108/57 (Site: Left Upper Arm, Position: Sitting, Cuff Size: Large Adult)   Pulse 57   Resp 18   Ht 6' (1.829 m)   Wt 185 lb 6.4 oz (84.1 kg)   SpO2 98%   BMI 25.14 kg/m²  Body mass index is 25.14 kg/m². Constitutional: He appeared oriented to person and place. He appears well-developed and well-nourished. In no acute distress. HEENT: Normocephalic and atraumatic. No JVD present. Carotid bruit is not present. No mass and no thyromegaly present. No lymphadenopathy noted. Cardiovascular: Normal rate, regular rhythm, normal heart sounds. Exam reveals no gallop and no friction rubs. 2/6 systolic murmur, 5th intercostal space on the LEFT in the mid-clavicular line (cardiac apex)  Pulmonary/Chest: Effort normal and breath sounds normal. No respiratory distress. He has no wheezes, rhonchi or rales. Abdominal: Soft, non-tender. He exhibits no organomegaly, mass or bruit. Extremities: None. No cyanosis or clubbing. 2+ radial and carotid pulses. Distal extremity pulses: 2+ bilaterally. Neurological: Alertness and orientation as per Constitutional exam. No evidence of gross cranial nerve deficit. Coordination appeared normal.   Skin: Skin is warm and dry. There is no rash or diaphoresis. Psychiatric: He has a normal mood and affect.  His speech is normal and behavior is normal.        MOST RECENT LABS ON RECORD:   Lab Results   Component Value Date    WBC 5.2 09/17/2021    HGB 11.8 (L) 09/17/2021    HCT 35.5 (L) 09/17/2021     09/17/2021    CHOL 153 01/05/2022    TRIG 68 01/05/2022    HDL 53 01/05/2022    ALT 14 01/05/2022    AST 16 01/05/2022     01/05/2022    K 4.6 01/05/2022     01/05/2022    CREATININE 1.57 (H) 01/05/2022    BUN 42 (H) 01/05/2022    CO2 21 01/05/2022    TSH 2.74 02/15/2019    PSA 1.93 01/05/2022    INR 1.2 03/13/2017    GLUF 149 (H) 11/21/2018    LABA1C 6.8 (H) 01/05/2022    LABMICR Can not be calculated 01/05/2022     (H) 02/03/2013     ASSESSMENT:  1. Angina, class III ([facility] [address]    2. Chronic combined systolic and diastolic congestive heart failure ([facility] [address] [facility]    3. NICM (nonischemic cardiomyopathy) ([facility] [address] [facility]    4. PA[facility]oxysmal [facility] fibrillation) [facility]    5. Chronic anticoagulation    6. Essential hypertension    7. Pure hypercholesterolemia    8. ICD (implantable cardioverter-defibrillator) in place    9. Stage 3 chronic kidney disease, unspecified whether stage 3a or 3b CKD ([facility]    10. Encounter for current long-term use of anticoagulants       PLAN:   · Angina Superior Class III: Stable. He is still taking one nitro to help with chest pain which I think is fine but I strongly encouraged him to avoid pushing through chest pain as this can be life threatening. · Beta Blocker: Continue Metoprolol succinate (Toprol XL) 50 mg daily. · Cholesterol Reduction Therapy: Continue simvastatin (Zocor) 40 mg daily. · Anti-anginal medications: Continue nitroglycerin 0.4 mg tablets as needed for chest pain. · Counseling: I asked him to come to the emergency room if he develops persistent or worsening chest pain or worsening shortness of breath. · We discussed the potential benefits of cardiac rehab to improve both his cardiac condition as well as improve his exercise tolerance and overall quality of life. He was very agreeable with this however he would like to hold off on this for now over the summer time due to him being very active in the summer. He would like to do cardiac rehab in the winter. We will discuss this further at next visit and we will place the order at next visit.      · Chronic Combined Systolic and Diastolic Heart Failure/ NICM: Echo on 12/2/2020 showed an EF of 15-20% Currently appears stable  · Beta Blocker: Continue Metoprolol succinate (Toprol XL) 50 mg daily. · ACE Inibitor/ARB: Continue lisinopril 5 mg, 1/2 tab daily. · Diuretics: Not indicated at this time. · Paroxysmal Atrial Fibrillation: Rate control  · Beta Blocker: Continue Metoprolol succinate (Toprol XL) 50 mg daily. · [id number] Score for Atrial Fibrillation Stroke Risk   Risk   Factors  Component Value   C CHF Yes 1   H HTN Yes 1   A2 Age >= 76 Yes,  (81 y.o.) 2   D DM Yes 1   S2 Prior Stroke/TIA No 0   V Vascular Disease No 0   A Age 74-69 No,  (81 y.o.) 0   Sc Sex male 0    [id number]  Score  5   Score last updated 32/6/36 1:86 PM EST  Click here for a link to the UpToDate guideline \"Atrial Fibrillation: Anticoagulation therapy to prevent embolization  Disclaimer: Risk Score calculation is dependent on accuracy of patient problem list and past encounter diagnosis. · His CHADS2 score is 5/9(6.7% stroke risk)  · Anticoagulation: Continue Riveroxaban (Xarelto): 15 mg once daily with largest meal (typically dinner). Because of his atrial fibrillation, I also would also recommend that he continue with anticoagulation to decrease his risk of stoke but also reminded him to watch for signs of blood in his stool or black tarry stools and stop the medication immediately if this develops as this could be life threatening.  Essential Hypertension: Controlled  · Beta Blocker: Continue Metoprolol succinate (Toprol XL) 50 mg daily. · ACE Inibitor/ARB: Continue lisinopril 5 mg, 1/2 tab daily. · Diuretics: Not indicated at this time. · Pure Hypercholesterolemia: Last LDL on 1/5/2022 was 86 mg/dL  · Cholesterol Reduction Therapy: Continue simvastatin (Zocor) 40 mg daily.       Bi-Ventricular Implantable Cardioverter Defibrillator: RRT Met as of 9/16/2021 - Battery replaced on 9/23/2021  Indication for Device Placement: Cardiomyopathy with ejection fraction <35% Interrogation Findings: We will plan to recheck their device at their next scheduled appointment date. In the meantime, I encouraged Mr. Nina Torres to continue to take his current medications and follow up with you as previously scheduled. FOLLOW UP:    I told Mr. Nina Torres to call my office if he had any problems, but otherwise told him to Return in about 6 months (around 12/6/2022). However, I would be happy to see him sooner should the need arise. Sincerely,  Ariel Boykin. Joel COTTO, MS, F.A.C.C. [facility] Cardiology Specialist  [address] [place] [address]  Phone: [phone], Fax: [phone]    I believe that the risk of significant morbidity and mortality related to the patient's current medical conditions are: Intermediate. The documentation recorded by the scribe, accurately and completely reflects the services I personally performed and the decisions made by me. Rochelle Osler MD, MS, F.A.C.C.  June 6, 2022

## 2022-06-06 NOTE — PATIENT INSTRUCTIONS
SURVEY:    You may be receiving a survey from [employer] regarding your visit today. Please complete the survey to enable us to provide the highest quality of care to you and your family. If you cannot score us a very good on any question, please call the office to discuss how we could have made your experience a very good one. Thank you.

## 2022-06-22 ENCOUNTER — TELEPHONE (OUTPATIENT)
Dept: PHARMACY | Facility: CLINIC | Age: 82
End: 2022-06-22

## 2022-06-22 NOTE — TELEPHONE ENCOUNTER
[facility] CLINICAL PHARMACY: ADHERENCE REVIEW  Identified care gap per [employer]: fills at [facility]: ACE/ARB, Diabetes and Statin adherence    Last Visit: 04.08.22        ASSESSMENT  ACE/ARB ADHERENCE    Insurance Records claims through 06.14.22 (Prior Year [place] = PASSED; YTD [place] = Filled only once; Potential Fail Date: 06.28.22):   Lisinopril last filled on 01.20.22 for 90 day supply. Next refill due: 04.20.22    Per [employer] Portal:  Lisinopril last filled on 06.14.22 for 90 day supply. BP Readings from Last 3 Encounters:   06/06/22 (!) 108/57   04/08/22 (!) 112/47   01/14/22 (!) 109/51     Estimated Creatinine Clearance: 41 mL/min (A) (based on SCr of 1.57 mg/dL (H)). DIABETES ADHERENCE    Insurance Records claims through 06.14.22 (Prior Year PDC = PASSED; YTD PDC = 100%; Potential Fail Date: 10.02.22):   Glimepiride last filled on 04.23.22 for 90 day supply. Next refill due: 07.22.22    Per [employer] Portal:  (same as above). Lab Results   Component Value Date    LABA1C 6.8 (H) 01/05/2022    LABA1C 7.2 (H) 09/27/2021    LABA1C 6.9 03/30/2021     NOTE A1c <9%    STATIN ADHERENCE    Insurance Records claims through 06.14.22 (Prior Year PDC = PASSED; YTD PDC = 100%; Potential Fail Date: 09.26.22):   Simvastatin last filled on 04.15.22 for 90 day supply. Next refill due: 07.14.22    Per [employer] Portal:  (same as above). Lab Results   Component Value Date    CHOL 153 01/05/2022    TRIG 68 01/05/2022    HDL 53 01/05/2022    LDLCHOLESTEROL 86 01/05/2022    LDLCALC 84 12/30/2020     ALT   Date Value Ref Range Status   01/05/2022 14 5 - 41 U/L Final     AST   Date Value Ref Range Status   01/05/2022 16 <40 U/L Final     The ASCVD Risk score (Swati Nice, et al., 2013) failed to calculate for the following reasons:     The 2013 ASCVD risk score is only valid for ages 36 to 78     PLAN  The following are interventions that have been identified:     - patient recently received 90 day supply of Lisinopril with refills. No patient out reach planned at this time.          Future Appointments   Date Time Provider Jacqueline Sloan   7/14/2022  1:30 PM JAI Salvador - ANABELA OLSON   10/3/2022  9:00 AM Nikole Beltran MD TIFF CARD [facility]   3/28/2023  8:30 AM SCHEDULE, KRISTIAN KEENEFIN [facility] [employer] TIFF CARD [facility]       Sheryle Dus, [address] [facility]  Phone: toll free [phone]

## 2022-08-16 ENCOUNTER — TELEPHONE (OUTPATIENT)
Dept: CARDIOLOGY | Age: 82
End: 2022-08-16

## 2022-08-16 NOTE — TELEPHONE ENCOUNTER
Mr. Burt Hadley called and states Xarelto is too expensive. I did advise him we can try samples and pt assistance. He states he will finish Eliquis and will stop medication.   He is willing to try

## 2022-08-17 ENCOUNTER — TELEPHONE (OUTPATIENT)
Dept: CARDIOLOGY | Age: 82
End: 2022-08-17

## 2022-08-17 NOTE — TELEPHONE ENCOUNTER
Please let patient know that I am happy to refer him to warfarin clinic if he would prefer that. Just let us know what he would prefer but I really would hate to see him get a stroke.

## 2022-08-17 NOTE — TELEPHONE ENCOUNTER
. Kenisha Cannon called in and stated that he just filled his prescription for Xarelto and the price has went up to another 100 dollars which puts this medicine at 400 dollars every three months. He says he cannot afford this and that this is more expensive than his eliquis was. He says he will be good for the next four months with Xarelto and then he has some eliquis left over that he could take after that, but then he will be out. He would like to know what to do next? Please advise. Thanks so much.

## 2022-08-18 NOTE — TELEPHONE ENCOUNTER
Please let patient know that I do not have any other options other than warfarin. If he would prefer we can make the referral. Thanks.

## 2022-08-18 NOTE — TELEPHONE ENCOUNTER
Pt aware and would like referral to Warfarin Clinic but will call back after 3 months when he is almost out of Xarelto

## 2022-09-08 RX ORDER — METOPROLOL SUCCINATE 50 MG/1
50 TABLET, EXTENDED RELEASE ORAL DAILY
Qty: 90 TABLET | Refills: 3 | Status: SHIPPED | OUTPATIENT
Start: 2022-09-08

## 2022-10-03 ENCOUNTER — OFFICE VISIT (OUTPATIENT)
Dept: CARDIOLOGY | Age: 82
End: 2022-10-03
Payer: MEDICARE

## 2022-10-03 VITALS
BODY MASS INDEX: 25.55 KG/M2 | HEART RATE: 66 BPM | OXYGEN SATURATION: 99 % | SYSTOLIC BLOOD PRESSURE: 133 MMHG | WEIGHT: 188.6 LBS | RESPIRATION RATE: 18 BRPM | DIASTOLIC BLOOD PRESSURE: 66 MMHG | HEIGHT: 72 IN

## 2022-10-03 DIAGNOSIS — I48.0 PAF (PAROXYSMAL ATRIAL FIBRILLATION) (HCC): ICD-10-CM

## 2022-10-03 DIAGNOSIS — E78.00 PURE HYPERCHOLESTEROLEMIA: ICD-10-CM

## 2022-10-03 DIAGNOSIS — I50.42 CHRONIC COMBINED SYSTOLIC AND DIASTOLIC CONGESTIVE HEART FAILURE (HCC): ICD-10-CM

## 2022-10-03 DIAGNOSIS — I42.8 NON-ISCHEMIC CARDIOMYOPATHY (HCC): ICD-10-CM

## 2022-10-03 DIAGNOSIS — Z95.810 ICD (IMPLANTABLE CARDIOVERTER-DEFIBRILLATOR) IN PLACE: ICD-10-CM

## 2022-10-03 DIAGNOSIS — I20.9 ANGINA, CLASS III (HCC): Primary | ICD-10-CM

## 2022-10-03 DIAGNOSIS — I10 ESSENTIAL HYPERTENSION: ICD-10-CM

## 2022-10-03 PROCEDURE — 1123F ACP DISCUSS/DSCN MKR DOCD: CPT | Performed by: PHYSICIAN ASSISTANT

## 2022-10-03 PROCEDURE — 99214 OFFICE O/P EST MOD 30 MIN: CPT | Performed by: PHYSICIAN ASSISTANT

## 2022-10-03 PROCEDURE — 99211 OFF/OP EST MAY X REQ PHY/QHP: CPT | Performed by: PHYSICIAN ASSISTANT

## 2022-10-03 PROCEDURE — 93289 INTERROG DEVICE EVAL HEART: CPT | Performed by: PHYSICIAN ASSISTANT

## 2022-10-03 NOTE — PROGRESS NOTES
Nicolás Mccarthy am scribing for and in the presence of Bijal Phel[place]    Patient: Tino Beard  :   Date of Visit: October 3, 2022    REASON FOR VISIT / CONSULTATION: Follow-up (HX: Angina, CHF, NICM, PAF, chronic anticoag, HTN, ICD, hypercholesterolemia. Pt is here today for a 1 year follow up. Pt says he is feeling good. Pt has some chest pains every night at 10 till 8 he takes a nitro and it goes away lasts no more than 5-10 mins. Pt denies palpitations, SOB, light headed/dizziness. )      Dear hZeng Matos, JAI - CNP,    I had the pleasure of seeing your patient Tino Beard in follow up today status post ICD RV lead extraction and new RV lead placement. As you know, Mr. Marquis Reynolds is a 81 y.o. male with a history of systolic heart failure with a reduced EF of around 35% that recently declined to around 15% leading to a biventricular ICD which required placement of a epicardial lead due to a history of diaphragmatic pacing with his previous LV lead. Also it was found that his right ventricular ICD cable appeared to have significantly fluctuating impedances with sitting and standing ranging from the seventies to the 140's as well as a possible malfunctioning RV ICD coil. 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 Therefore in  he underwent an RV lead extraction with RV lead replacement. In , we did a home ICD check which showed 28 runs of paroxysmal atrial fibrillation, the longest being 13 minutes in duration. His echocardiogram done on 3/1/2017 showed an ejection fraction of 5-10%. Echocardiogram done on 2018 showed EF of 15%. Mildly increased LV wall thickness with a severely increased LV cavity size. Severe global hypokineses. LA is moderately dilated. Mild mitral & tricuspid regurgitation.  Evidence of lead turned of 1/13 due to diaphragmatic pacing. CAD (coronary artery disease)     Diabetes mellitus ([facility] [address]     type 2    Esophageal reflux     History of echocardiogram 11/23/2018    EF 15%. Mildly increased LV wall thickness w/ severely increased LA cavity size. Severe global hypokinesis w/ no segmental variation. LA is moderately dilated w/ LA volume index of 35. Mild mitral and tricuspid regurg. Evidence of moderate diastolic dysfunction seen. Hx of blood clots     Hx of left heart catheterization by ventricular puncture 04/19/2013    LAD-20-30%, LCX & RCA-10-20%, Normal LVEDP,     Hyperlipidemia     Hypertension     Mantle cell lymphoma, unspecified site, extranodal and solid organ sites     Nonischemic dilated cardiomyopathy (HCC)     Pain in joint, lower leg     Pain in limb     S/P cardiac catheterization 12/13/10    No significant coronary disease. EF estimated at 35%. Thoracic or lumbosacral neuritis or radiculitis, unspecified     Unspecified acute reaction to stress        CURRENT ALLERGIES: Seasonal REVIEW OF SYSTEMS: 14 systems were reviewed. Pertinent positives and negatives as above, all else negative.      Past Surgical History:   Procedure Laterality Date    BLADDER SURGERY  2008    CARDIAC CATHETERIZATION      Patient states has had 3 in [place] and one in [facility] last one being in [place] 3 years ago Dr. Joana Barnes AICD    COLONOSCOPY  10/15/2015    -polyps,diverticulosis,hemorrhoids    DIAGNOSTIC CARDIAC CATH LAB PROCEDURE  04/19/2013    ELBOW SURGERY  2000    FOOT SURGERY  1993    LOBECTOMY N/A 02/28/2017    LEFT MINI THORCOTOMY WITH LEFT VENTRICULAR LEAD PLACEMENT performed by Winnie Jackson MD at [facility] 09/23/2021    Dr Meche Sam/ICD/Pacemaker generator change    PACEMAKER PLACEMENT      SKIN CANCER EXCISION  09/11/2013    basal cell on chest.    THORACOTOMY  02/28/2017 Left Mini    UPPER GASTROINTESTINAL ENDOSCOPY  10/15/2015    -bx    Social History:  Social History     Tobacco Use    Smoking status: Former     Packs/day: 0.00     Years: 30.00     Pack years: 0.00     Types: Cigarettes     Quit date: 2008     Years since quittin.4    Smokeless tobacco: Never    Tobacco comments:     Patient only smoked when he drank beer   Substance Use Topics    Alcohol use: Yes     Alcohol/week: 0.0 standard drinks     Types: 3 - 4 Cans of beer per week     Comment: 3 per week    Drug use: No        CURRENT MEDICATIONS:  Outpatient Medications Marked as Taking for the 10/3/22 encounter (Office Visit) with Pool Solorzano PA-C   Medication Sig Dispense Refill    metoprolol succinate (TOPROL XL) 50 MG extended release tablet TAKE 1 TABLET BY MOUTH  DAILY 90 tablet 3    simvastatin (ZOCOR) 40 MG tablet TAKE 1 TABLET BY MOUTH AT  NIGHT 90 tablet 1    omeprazole (PRILOSEC) 20 MG delayed release capsule TAKE 1 CAPSULE BY MOUTH IN  THE MORNING AND AT BEDTIME 180 capsule 1    tamsulosin (FLOMAX) 0.4 MG capsule TAKE 1 CAPSULE BY MOUTH  DAILY 90 capsule 1    lisinopril (PRINIVIL;ZESTRIL) 2.5 MG tablet TAKE 1 TABLET BY MOUTH  DAILY 90 tablet 1    ALPRAZolam (XANAX) 1 MG tablet Take 1.5 tablets by mouth nightly as needed for Sleep for up to 90 days. 135 tablet 0    traZODone (DESYREL) 100 MG tablet TAKE 1 TABLET BY MOUTH AT  NIGHT AS NEEDED FOR SLEEP 90 tablet 1    nitroGLYCERIN (NITROSTAT) 0.4 MG SL tablet DISSOLVE 1 TABLET UNDER THE TONGUE EVERY 5 MINUTES AS  NEEDED FOR CHEST PAIN. MAX  OF 3 TABLETS IN 15 MINUTES. CALL 911 IF PAIN PERSISTS.  100 tablet 3    finasteride (PROSCAR) 5 MG tablet TAKE 1 TABLET BY MOUTH  DAILY 90 tablet 3    blood glucose test strips (ASCENSIA AUTODISC VI;ONE TOUCH ULTRA TEST VI) strip USE AS DIRECTED TWICE A DAY TO CHECK BLOOD SUGAR: ONE TOUCH VERIO METER: DX:E11.9 100 each 3    rivaroxaban (XARELTO) 15 MG TABS tablet Take 1 tablet by mouth Daily with supper 90 tablet 3    glimepiride (AMARYL) 4 MG tablet TAKE 1 TABLET BY MOUTH  TWICE DAILY 180 tablet 3    Lancets MISC 1 each by Does not apply route daily Dx--E11.9 Non-insulin dependent 100 each 5     FAMILY HISTORY: family history includes Cancer in his father, sister, and sister; Diabetes in his brother; Heart Disease in his mother. Physical Examination:     /66 (Site: Left Upper Arm, Position: Sitting, Cuff Size: Medium Adult)   Pulse 66   Resp 18   Ht 6' (1.829 m)   Wt 188 lb 9.6 oz (85.5 kg)   SpO2 99%   BMI 25.58 kg/m²  Body mass index is 25.58 kg/m². Constitutional: He appeared oriented to person and place. He appears well-developed and well-nourished. In no acute distress. HEENT: Normocephalic and atraumatic. No JVD present. Carotid bruit is not present. No mass and no thyromegaly present. No lymphadenopathy noted. Cardiovascular: Normal rate, regular rhythm, normal heart sounds. Exam reveals no gallop and no friction rubs. 2/6 systolic murmur, 5th intercostal space on the LEFT in the mid-clavicular line (cardiac apex)  Pulmonary/Chest: Effort normal and breath sounds normal. No respiratory distress. He has no wheezes, rhonchi or rales. Abdominal: Soft, non-tender. He exhibits no organomegaly, mass or bruit. Extremities: None. No cyanosis or clubbing. 2+ radial and carotid pulses. Distal extremity pulses: 2+ bilaterally. Neurological: Alertness and orientation as per Constitutional exam. No evidence of gross cranial nerve deficit. Coordination appeared normal.   Skin: Skin is warm and dry. There is no rash or diaphoresis. Psychiatric: He has a normal mood and affect.  His speech is normal and behavior is normal.        MOST RECENT LABS ON RECORD:   Lab Results   Component Value Date    WBC 5.2 09/17/2021    HGB 11.8 (L) 09/17/2021    HCT 35.5 (L) 09/17/2021     09/17/2021    CHOL 153 01/05/2022    TRIG 68 01/05/2022    HDL 53 01/05/2022    ALT 13 07/05/2022    AST 16 07/05/2022     07/05/2022    K 4.8 07/05/2022     07/05/2022    CREATININE 1.53 (H) 07/05/2022    BUN 30 (H) 07/05/2022    CO2 23 07/05/2022    TSH 2.74 02/15/2019    PSA 1.93 01/05/2022    INR 1.2 03/13/2017    GLUF 149 (H) 11/21/2018    LABA1C 6.4 (H) 07/05/2022    LABMICR Can not be calculated 01/05/2022     (H) 02/03/2013     ASSESSMENT:  1. Angina, class III ([facility] [address]    2. Chronic combined systolic and diastolic congestive heart failure ([facility] [address]    3. PAF (paroxysmal atrial fibrillation) ([facility] [address]    4. Essential hypertension    5. Pure hypercholesterolemia    6. ICD (implantable cardioverter-defibrillator) in place         PLAN:   Angina [place] Class III: Stable. He is still taking one nitro to help with chest pain which I think is fine but I strongly encouraged him to avoid pushing through chest pain as this can be life threatening. He states he does not know how long his chest pain would last because as soon as he notices it he takes a nitroglycerin. Beta Blocker: Continue Metoprolol succinate (Toprol XL) 50 mg daily. Cholesterol Reduction Therapy: Continue simvastatin (Zocor) 40 mg daily. Anti-anginal medications: Continue nitroglycerin 0.4 mg tablets as needed for chest pain. Counseling: I asked him to come to the emergency room if he develops persistent or worsening chest pain or worsening shortness of breath. We discussed the potential benefits of cardiac rehab to improve both his cardiac condition as well as improve his exercise tolerance and overall quality of life. He was agreeable with this so I did place a referral to cardiac rehab. 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 ACE Inibitor/ARB: Continue lisinopril 5 mg, 1/2 tab daily. Diuretics: Not indicated at this time.      Paroxysmal Atrial Fibrillation: Rate control  Beta Blocker: Continue Metoprolol succinate (Toprol XL) 50 mg daily. [id number] Score for Atrial Fibrillation Stroke Risk   Risk   Factors  Component Value   C CHF Yes 1   H HTN Yes 1   A2 Age >= 76 Yes,  (81 y.o.) 2   D DM Yes 1   S2 Prior Stroke/TIA No 0   V Vascular Disease No 0   A Age 74-69 No,  (81 y.o.) 0   Sc Sex male 0    [id number]  Score  5   Score last updated 30/5/38 3:21 PM EST  Click here for a link to the UpToDate guideline \"Atrial Fibrillation: Anticoagulation therapy to prevent embolization  Disclaimer: Risk Score calculation is dependent on accuracy of patient problem list and past encounter diagnosis. His CHADS2 score is 5/9(6.7% stroke risk)  Anticoagulation: Continue Riveroxaban (Xarelto): 15 mg once daily with largest meal (typically dinner). Because of his atrial fibrillation, I also would also recommend that he continue with anticoagulation to decrease his risk of stoke but also reminded him to watch for signs of blood in his stool or black tarry stools and stop the medication immediately if this develops as this could be life threatening. Essential Hypertension: Controlled  Beta Blocker: Continue Metoprolol succinate (Toprol XL) 50 mg daily. ACE Inibitor/ARB: Continue lisinopril 5 mg, 1/2 tab daily. Diuretics: Not indicated at this time. Pure Hypercholesterolemia: Last LDL on 1/5/2022 was 86 mg/dL  Cholesterol Reduction Therapy: Continue simvastatin (Zocor) 40 mg daily. Bi-Ventricular Implantable Cardioverter Defibrillator: RRT Met as of 9/16/2021 - Battery replaced on 9/23/2021  Indication for Device Placement: Cardiomyopathy with ejection fraction <35%   Interrogation Findings: Medtronic device interrogated today in office on 10/3/22, he is pacing at 97 percent. 3.7 years battery life left. No changes made. Normal device function for parameters.      In the meantime, I encouraged Mr. Dung Hameed to continue to take his current medications and follow up with you as previously scheduled. FOLLOW UP:    I told Mr. Aris Guillaume to call my office if he had any problems, but otherwise told him to Return in about 6 months (around 4/3/2023). However, I would be happy to see him sooner should the need arise. Sincerely,  Abby Cleary PA-C  [facility]ist   [facility], [address] [place] [address]  Phone: [phone], Fax: [phone]    I believe that the risk of significant morbidity and mortality related to the patient's current medical conditions are: Intermediate. Approximately 40 minutes were spent during prep work, discussion and exam of the patient, and follow up documentation and all of their questions were answered. The documentation recorded by the scribe, accurately and completely reflects the services I personally performed and the decisions made by me.  Abby Cleary PA-C October 3, 2022

## 2022-10-03 NOTE — PATIENT INSTRUCTIONS
SURVEY:    You may be receiving a survey from [employer] regarding your visit today. Please complete the survey to enable us to provide the highest quality of care to you and your family. If you cannot score us a very good on any question, please call the office to discuss how we could have made your experience a very good one. Thank you.

## 2022-10-12 RX ORDER — RIVAROXABAN 15 MG/1
TABLET, FILM COATED ORAL
Qty: 90 TABLET | Refills: 3 | Status: SHIPPED | OUTPATIENT
Start: 2022-10-12

## 2022-10-28 ENCOUNTER — OFFICE VISIT (OUTPATIENT)
Dept: UROLOGY | Age: 82
End: 2022-10-28
Payer: MEDICARE

## 2022-10-28 ENCOUNTER — HOSPITAL ENCOUNTER (OUTPATIENT)
Age: 82
Setting detail: SPECIMEN
Discharge: HOME | End: 2022-10-28
Payer: MEDICARE

## 2022-10-28 VITALS
SYSTOLIC BLOOD PRESSURE: 147 MMHG | HEART RATE: 61 BPM | BODY MASS INDEX: 25.36 KG/M2 | WEIGHT: 187 LBS | DIASTOLIC BLOOD PRESSURE: 61 MMHG

## 2022-10-28 DIAGNOSIS — R35.0 URINARY FREQUENCY: ICD-10-CM

## 2022-10-28 DIAGNOSIS — N13.8 BPH WITH OBSTRUCTION/LOWER URINARY TRACT SYMPTOMS: ICD-10-CM

## 2022-10-28 DIAGNOSIS — N39.41 URGE INCONTINENCE: ICD-10-CM

## 2022-10-28 DIAGNOSIS — N40.1 BPH WITH OBSTRUCTION/LOWER URINARY TRACT SYMPTOMS: Primary | ICD-10-CM

## 2022-10-28 DIAGNOSIS — N13.8 BPH WITH OBSTRUCTION/LOWER URINARY TRACT SYMPTOMS: Primary | ICD-10-CM

## 2022-10-28 DIAGNOSIS — N40.1 BPH WITH OBSTRUCTION/LOWER URINARY TRACT SYMPTOMS: ICD-10-CM

## 2022-10-28 LAB
BACTERIA: ABNORMAL
BILIRUBIN URINE: NEGATIVE
COLOR: YELLOW
EPITHELIAL CELLS UA: ABNORMAL /HPF (ref 0–5)
GLUCOSE URINE: NEGATIVE
KETONES, URINE: NEGATIVE
LEUKOCYTE ESTERASE, URINE: NEGATIVE
NITRITE, URINE: NEGATIVE
PH UA: 5.5 (ref 5–9)
PROTEIN UA: NEGATIVE
RBC UA: ABNORMAL /HPF (ref 0–2)
SPECIFIC GRAVITY UA: 1.01 (ref 1.01–1.02)
TURBIDITY: CLEAR
URINE HGB: NEGATIVE
UROBILINOGEN, URINE: NORMAL
WBC UA: ABNORMAL /HPF (ref 0–5)

## 2022-10-28 PROCEDURE — 81001 URINALYSIS AUTO W/SCOPE: CPT

## 2022-10-28 PROCEDURE — 99204 OFFICE O/P NEW MOD 45 MIN: CPT | Performed by: UROLOGY

## 2022-10-28 PROCEDURE — 87086 URINE CULTURE/COLONY COUNT: CPT

## 2022-10-28 PROCEDURE — 1123F ACP DISCUSS/DSCN MKR DOCD: CPT | Performed by: UROLOGY

## 2022-10-28 PROCEDURE — 51798 US URINE CAPACITY MEASURE: CPT | Performed by: UROLOGY

## 2022-10-28 PROCEDURE — 3078F DIAST BP <80 MM HG: CPT | Performed by: UROLOGY

## 2022-10-28 PROCEDURE — 3074F SYST BP LT 130 MM HG: CPT | Performed by: UROLOGY

## 2022-10-28 ASSESSMENT — ENCOUNTER SYMPTOMS
WHEEZING: 0
VOMITING: 0
BACK PAIN: 0
NAUSEA: 0
EYE REDNESS: 0
SHORTNESS OF BREATH: 0
CONSTIPATION: 0
COLOR CHANGE: 0
ABDOMINAL PAIN: 0
COUGH: 0

## 2022-10-28 NOTE — PROGRESS NOTES
HPI:          Patient is a 81 y.o. male in no acute distress. He is alert and oriented to person, place, and time. Patient is here today as a new patient. Patient was referred to us by JAI Wellington. Patient does see nephrology. Patient does state that he has some urinary incontinence during the day but is continent at night. Patient has seen urologist approximately 15 years ago. Patient is currently being seen by nephrology. He has been having worsening urinary tract symptoms for the last year or so. Patient's does report today that he drinks only 1 to 2 cups of coffee per day. He does have an elevation in both his BUN and creatinine. He does say that he still having nocturia 2-3 times at night. No flank pain nausea vomiting. Patient has no pain today. Past Medical History:   Diagnosis Date    Abnormal echocardiogram 5/2/12    EF 15%. mildly dilated LV cavity. Abnormal resting ECG findings 4/25/12    atrial sensed, ventricular paced rhythm at 73 beats per minute. Wide QRS of 183 ms. Biventricular ICD (implantable cardiac defibrillator) in place 8/26/11    [address] [place] [address]. CRT-D    Biventricular ICD (implantable cardioverter-defibrillator) in place 10/29/2015    Medtronic- Bi V ICD (Battery change)    Biventricular pacemaker check 5/12 1/13    LV lead turned of 1/13 due to diaphragmatic pacing. CAD (coronary artery disease)     Diabetes mellitus ([facility] [address]     type 2    Esophageal reflux     History of echocardiogram 11/23/2018    EF 15%. Mildly increased LV wall thickness w/ severely increased LA cavity size. Severe global hypokinesis w/ no segmental variation. LA is moderately dilated w/ LA volume index of 35. Mild mitral and tricuspid regurg. Evidence of moderate diastolic dysfunction seen.     Hx of blood clots     Hx of left heart catheterization by ventricular puncture 04/19/2013    LAD-20-30%, LCX & RCA-10-20%, Normal LVEDP,     Hyperlipidemia     Hypertension Mantle cell lymphoma, unspecified site, extranodal and solid organ sites     Nonischemic dilated cardiomyopathy (HCC)     Pain in joint, lower leg     Pain in limb     S/P cardiac catheterization 12/13/10    No significant coronary disease. EF estimated at 35%. Thoracic or lumbosacral neuritis or radiculitis, unspecified     Unspecified acute reaction to stress      Past Surgical History:   Procedure Laterality Date    BLADDER SURGERY  2008    CARDIAC CATHETERIZATION      Patient states has had 3 in [facility] and one in [facility] last one being in [facility] 3 years ago Dr. Luis Park  10/15/2015    -polyps,diverticulosis,hemorrhoids    DIAGNOSTIC CARDIAC CATH LAB PROCEDURE  04/19/2013    ELBOW SURGERY  2000    FOOT SURGERY  1993    LOBECTOMY N/A 02/28/2017    LEFT MINI THORCOTOMY WITH LEFT VENTRICULAR LEAD PLACEMENT performed by Bud Skinner MD at [facility] 09/23/2021    Dr Jose A Bacafin/ICD/Pacemaker generator change    PACEMAKER PLACEMENT      SKIN CANCER EXCISION  09/11/2013    basal cell on chest.    THORACOTOMY  02/28/2017    Left Mini    UPPER GASTROINTESTINAL ENDOSCOPY  10/15/2015    -bx     Outpatient Encounter Medications as of 10/28/2022   Medication Sig Dispense Refill    ALPRAZolam (XANAX) 1 MG tablet Take 1.5 tablets by mouth nightly as needed for Sleep for up to 90 days.  135 tablet 0    XARELTO 15 MG TABS tablet TAKE 1 TABLET BY MOUTH  DAILY WITH SUPPER 90 tablet 3    traZODone (DESYREL) 100 MG tablet TAKE 1 TABLET BY MOUTH AT  NIGHT AS NEEDED FOR SLEEP 90 tablet 1    metoprolol succinate (TOPROL XL) 50 MG extended release tablet TAKE 1 TABLET BY MOUTH  DAILY 90 tablet 3    simvastatin (ZOCOR) 40 MG tablet TAKE 1 TABLET BY MOUTH AT  NIGHT 90 tablet 1    omeprazole (PRILOSEC) 20 MG delayed release capsule TAKE 1 CAPSULE BY MOUTH IN  THE MORNING AND AT BEDTIME 180 capsule 1    tamsulosin (FLOMAX) 0.4 MG capsule TAKE 1 CAPSULE BY MOUTH  DAILY 90 capsule 1    lisinopril (PRINIVIL;ZESTRIL) 2.5 MG tablet TAKE 1 TABLET BY MOUTH  DAILY 90 tablet 1    finasteride (PROSCAR) 5 MG tablet TAKE 1 TABLET BY MOUTH  DAILY 90 tablet 3    blood glucose test strips (ASCENSIA AUTODISC VI;ONE TOUCH ULTRA TEST VI) strip USE AS DIRECTED TWICE A DAY TO CHECK BLOOD SUGAR: ONE TOUCH VERIO METER: DX:E11.9 100 each 3    glimepiride (AMARYL) 4 MG tablet TAKE 1 TABLET BY MOUTH  TWICE DAILY 180 tablet 3    Lancets MISC 1 each by Does not apply route daily Dx--E11.9 Non-insulin dependent 100 each 5    nitroGLYCERIN (NITROSTAT) 0.4 MG SL tablet DISSOLVE 1 TABLET UNDER THE TONGUE EVERY 5 MINUTES AS  NEEDED FOR CHEST PAIN. MAX  OF 3 TABLETS IN 15 MINUTES. CALL 911 IF PAIN PERSISTS. (Patient not taking: No sig reported) 100 tablet 3     No facility-administered encounter medications on file as of 10/28/2022. Current Outpatient Medications on File Prior to Visit   Medication Sig Dispense Refill    ALPRAZolam (XANAX) 1 MG tablet Take 1.5 tablets by mouth nightly as needed for Sleep for up to 90 days.  135 tablet 0    XARELTO 15 MG TABS tablet TAKE 1 TABLET BY MOUTH  DAILY WITH SUPPER 90 tablet 3    traZODone (DESYREL) 100 MG tablet TAKE 1 TABLET BY MOUTH AT  NIGHT AS NEEDED FOR SLEEP 90 tablet 1    metoprolol succinate (TOPROL XL) 50 MG extended release tablet TAKE 1 TABLET BY MOUTH  DAILY 90 tablet 3    simvastatin (ZOCOR) 40 MG tablet TAKE 1 TABLET BY MOUTH AT  NIGHT 90 tablet 1    omeprazole (PRILOSEC) 20 MG delayed release capsule TAKE 1 CAPSULE BY MOUTH IN  THE MORNING AND AT BEDTIME 180 capsule 1    tamsulosin (FLOMAX) 0.4 MG capsule TAKE 1 CAPSULE BY MOUTH  DAILY 90 capsule 1    lisinopril (PRINIVIL;ZESTRIL) 2.5 MG tablet TAKE 1 TABLET BY MOUTH  DAILY 90 tablet 1    finasteride (PROSCAR) 5 MG tablet TAKE 1 TABLET BY MOUTH  DAILY 90 tablet 3    blood glucose test strips (ASCENSIA AUTODISC VI;ONE TOUCH ULTRA TEST VI) strip USE AS DIRECTED TWICE A DAY TO CHECK BLOOD SUGAR: ONE TOUCH VERIO METER: DX:E11.9 100 each 3    glimepiride (AMARYL) 4 MG tablet TAKE 1 TABLET BY MOUTH  TWICE DAILY 180 tablet 3    Lancets MISC 1 each by Does not apply route daily Dx--E11.9 Non-insulin dependent 100 each 5    nitroGLYCERIN (NITROSTAT) 0.4 MG SL tablet DISSOLVE 1 TABLET UNDER THE TONGUE EVERY 5 MINUTES AS  NEEDED FOR CHEST PAIN. MAX  OF 3 TABLETS IN 15 MINUTES. CALL 911 IF PAIN PERSISTS. (Patient not taking: No sig reported) 100 tablet 3     No current facility-administered medications on file prior to visit. Seasonal  Family History   Problem Relation Age of Onset    Heart Disease Mother     Cancer Father     Diabetes Brother     Cancer Sister         breast     Cancer Sister      Social History     Tobacco Use   Smoking Status Former    Packs/day: 0.00    Years: 30.00    Pack years: 0.00    Types: Cigarettes    Quit date: 2008    Years since quittin.5   Smokeless Tobacco Never   Tobacco Comments    Patient only smoked when he drank beer       Social History     Substance and Sexual Activity   Alcohol Use Yes    Alcohol/week: 0.0 standard drinks    Types: 3 - 4 Cans of beer per week    Comment: 3 per week       Review of Systems   Constitutional:  Negative for appetite change, chills and fever. Eyes:  Negative for redness and visual disturbance. Respiratory:  Negative for cough, shortness of breath and wheezing. Cardiovascular:  Negative for chest pain and leg swelling. Gastrointestinal:  Negative for abdominal pain, constipation, nausea and vomiting. Genitourinary:  Positive for difficulty urinating, frequency and urgency. Negative for decreased urine volume, dysuria, enuresis, flank pain, hematuria, penile discharge, penile pain, scrotal swelling and testicular pain. Musculoskeletal:  Negative for back pain, joint swelling and myalgias. Skin:  Negative for color change, rash and wound. Neurological:  Negative for dizziness, tremors and numbness. Hematological:  Negative for adenopathy. Does not bruise/bleed easily. BP (!) 147/61 (Site: Right Upper Arm, Position: Sitting, Cuff Size: Medium Adult)   Pulse 61   Wt 187 lb (84.8 kg)   BMI 25.36 kg/m²       PHYSICAL EXAM:  Constitutional: Patient in no acute distress; Neuro: alert and oriented to person place and time. Psych: Mood and affect normal.  Skin: Normal  Lungs: Respiratory effort normal  Cardiovascular:  Normal peripheral pulses  Abdomen: Soft, non-tender, non-distended with no CVA, flank pain  Bladder non-tender and not distended. Lymphatics: no palpable lymphadenopathy  Penis normal  Urethral meatus normal  Scrotal exam normal  Testicles normal bilaterally  Epididymis normal bilaterally  No evidence of inguinal hernia      Lab Results   Component Value Date    BUN 30 (H) 07/05/2022     Lab Results   Component Value Date    CREATININE 1.53 (H) 07/05/2022     Lab Results   Component Value Date    PSA 1.93 01/05/2022    PSA 2.23 10/16/2019    PSA 3.22 09/21/2018       ASSESSMENT:  This is a 81 y.o. 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 Urinary frequency  [place] MEASUREMENT,POST-VOID RESIDUAL VOLUME BY US,NON-IMAGING    Urinalysis with Microscopic    Culture, Urine      3. Urge incontinence  [place] MEASUREMENT,POST-VOID RESIDUAL VOLUME BY US,NON-IMAGING    Urinalysis with Microscopic    Culture, Urine           PLAN:  We will check patient's urine for culture and sensitivity today. We will plan on seeing him back for cystoscopy. He will continue Flomax and finasteride.

## 2022-10-29 LAB
CULTURE: NORMAL
SPECIMEN DESCRIPTION: NORMAL

## 2022-10-31 ENCOUNTER — TELEPHONE (OUTPATIENT)
Dept: UROLOGY | Age: 82
End: 2022-10-31

## 2022-10-31 NOTE — RESULT ENCOUNTER NOTE
Please call pt - urine culture reviewed and does not show UTI    Follow-up as scheduled for cystoscopy

## 2022-10-31 NOTE — TELEPHONE ENCOUNTER
----- Message from Migel Miramontes PA-C sent at 10/31/2022 12:13 PM EDT -----  Please call pt - urine culture reviewed and does not show UTI    Follow-up as scheduled for cystoscopy

## 2022-11-01 ENCOUNTER — TELEPHONE (OUTPATIENT)
Dept: CARDIAC REHAB | Age: 82
End: 2022-11-01

## 2022-11-01 NOTE — TELEPHONE ENCOUNTER
Phoned patient to schedule cardiac rehab assessment. States he has participated in rehab before, didn't like it, and \"that was enough. \"    I am filing his information - thank you for the referral of Esperanza Snider.

## 2022-11-15 ENCOUNTER — TELEPHONE (OUTPATIENT)
Dept: CARDIOLOGY | Age: 82
End: 2022-11-15

## 2022-11-15 DIAGNOSIS — I48.0 PAF (PAROXYSMAL ATRIAL FIBRILLATION) (HCC): Primary | ICD-10-CM

## 2022-11-16 ENCOUNTER — ANTI-COAG VISIT (OUTPATIENT)
Dept: PHARMACY | Age: 82
End: 2022-11-16

## 2022-11-16 DIAGNOSIS — I48.0 PAF (PAROXYSMAL ATRIAL FIBRILLATION) (HCC): Primary | ICD-10-CM

## 2022-11-16 NOTE — TELEPHONE ENCOUNTER
Please let patient know that is fine if he prefers it. If he is in the donut hole then we could probably provide samples till the end of the year but it is up to him. Put in referral to warfarin clinic if that is what they want. Thanks.

## 2022-11-16 NOTE — PROGRESS NOTES
Called new RX into [facility] voicemail for warfarin 5 mg tablets take 1 tablet daily or as directed #90 with 1 refills per Dr Amanuel Elias. Gave patient directions to take both warfarin and Xarelto for 3 days before appt on . Do Not start warfarin until . Patient verbalizes understanding of instructions.     I entered anticoag episode into EPIC    For Pharmacy Admin Tracking Only    Intervention Detail: Adherence Monitorin, Dose Adjustment: 1, reason: Therapy Optimization, and New Rx: 2, reason: Patient Preference  Total # of Interventions Recommended: 3  Total # of Interventions Accepted: 3  Time Spent (min): [id number] [facility], PharmMIRIAM 2022 4:26 PM

## 2022-11-16 NOTE — TELEPHONE ENCOUNTER
At this time Mr. Sivan De Jesus was ok with going back to the Warfarin so referral was placed. Thanks!

## 2022-12-01 ENCOUNTER — PROCEDURE VISIT (OUTPATIENT)
Dept: UROLOGY | Age: 82
End: 2022-12-01

## 2022-12-01 VITALS
HEART RATE: 69 BPM | TEMPERATURE: 97.9 F | DIASTOLIC BLOOD PRESSURE: 69 MMHG | SYSTOLIC BLOOD PRESSURE: 117 MMHG | WEIGHT: 191 LBS | BODY MASS INDEX: 25.9 KG/M2

## 2022-12-01 DIAGNOSIS — N39.41 URGE INCONTINENCE: ICD-10-CM

## 2022-12-01 DIAGNOSIS — N13.8 BPH WITH OBSTRUCTION/LOWER URINARY TRACT SYMPTOMS: Primary | ICD-10-CM

## 2022-12-01 DIAGNOSIS — R35.0 URINARY FREQUENCY: ICD-10-CM

## 2022-12-01 DIAGNOSIS — N40.1 BPH WITH OBSTRUCTION/LOWER URINARY TRACT SYMPTOMS: Primary | ICD-10-CM

## 2022-12-01 NOTE — PROGRESS NOTES
During cystoscopy the following was utilized on patient with no adverse affects:    45% SODIUM CHLORIDE 500 ML BAG  Lot number: [id number]  Expiration date: NOV23      LIDOCAINE HYDROCHLORIDE JELLY 2%   Lot number: [id number]  Expiration date: 2-24

## 2022-12-01 NOTE — PROGRESS NOTES
HPI:          Patient is a 81 y.o. male in no acute distress. He is alert and oriented to person, place, and time. History  Patient is here today as a new patient. Patient was referred to us by JAI Valdivia. Patient does see nephrology. Patient does state that he has some urinary incontinence during the day but is continent at night. Patient has seen urologist approximately 15 years ago. Patient is currently being seen by nephrology. He has been having worsening urinary tract symptoms for the last year or so. Patient's does report today that he drinks only 1 to 2 cups of coffee per day. He does have an elevation in both his BUN and creatinine. He does say that he still having nocturia 2-3 times at night. No flank pain nausea vomiting. Patient has no pain today. Currently  Patient is here today for lower tract visualization. This secondary to persistent lower urinary tract symptoms. Patient does currently take Flomax and finasteride. Patient does drink only 20 ounces of coffee a day. He drinks nothing else. At this point time patient does state that his nephrologist told him to drink only 20 ounces of fluid a day. He has decided to make these 20 ounces a day decaffeinated coffee. He said he would like to drink more water. No flank pain nausea vomiting today. No pain today. Cystoscopy Procedure Note    Pre-operative Diagnosis: bph with LUTs    Post-operative Diagnosis: Same     Surgeon: Audra Epley    Assistants: None    Anesthesia : Local    Procedure Details   The risks, benefits, complications, treatment options, and expected outcomes were discussed with the patient. The patient concurred with the proposed plan, giving informed consent. Cystoscopy was performed today under local anesthesia, using sterile technique. The patient was placed in the dorsal lithotomy position, prepped with CHG, and draped in the usual sterile fashion.  A 14 Tajik flexible cystoscope was used to systematically inspect both the urethra and bladder in their entirety. Findings:  Anterior urethra: normal without strictures  Hyperplasia: bilobar  Bladder: Normal mucosa, without lesions. 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 Disposition: home           Condition: stable     Past Medical History:   Diagnosis Date    Abnormal echocardiogram 5/2/12    EF 15%. mildly dilated LV cavity. Abnormal resting ECG findings 4/25/12    atrial sensed, ventricular paced rhythm at 73 beats per minute. Wide QRS of 183 ms. Biventricular ICD (implantable cardiac defibrillator) in place 8/26/11    [address] [place] [address]. CRT-D    Biventricular ICD (implantable cardioverter-defibrillator) in place 10/29/2015    Medtronic- Bi V ICD (Battery change)    Biventricular pacemaker check 5/12 1/13    LV lead turned of 1/13 due to diaphragmatic pacing. CAD (coronary artery disease)     Diabetes mellitus ([facility] [address]     type 2    Esophageal reflux     History of echocardiogram 11/23/2018    EF 15%. Mildly increased LV wall thickness w/ severely increased LA cavity size. Severe global hypokinesis w/ no segmental variation. LA is moderately dilated w/ LA volume index of 35. Mild mitral and tricuspid regurg. Evidence of moderate diastolic dysfunction seen.     Hx of blood clots     Hx of left heart catheterization by ventricular puncture 04/19/2013    LAD-20-30%, LCX & RCA-10-20%, Normal LVEDP,     Hyperlipidemia     Hypertension     Mantle cell lymphoma, unspecified site, extranodal and solid organ sites     Nonischemic dilated cardiomyopathy (HCC)     Pain in joint, lower leg     Pain in limb     S/P cardiac catheterization 12/13/10    No significant coronary disease. EF estimated at 35%. Thoracic or lumbosacral neuritis or radiculitis, unspecified     Unspecified acute reaction to stress      Past Surgical History:   Procedure Laterality Date    BLADDER SURGERY  2008    CARDIAC CATHETERIZATION      Patient states has had 3 in [facility] and one in [facility] last one being in [facility] 3 years ago Dr. [place]  10/15/2015    -polyps,diverticulosis,hemorrhoids    DIAGNOSTIC CARDIAC CATH LAB PROCEDURE  04/19/2013    ELBOW SURGERY  2000    FOOT SURGERY  1993    LOBECTOMY N/A 02/28/2017    LEFT MINI THORCOTOMY WITH LEFT VENTRICULAR LEAD PLACEMENT performed by Pema De La Vega MD at [facility] Left 09/23/2021    Dr Tere Bolden [place]/ICD/Pacemaker generator change    PACEMAKER PLACEMENT      SKIN CANCER EXCISION  09/11/2013    basal cell on chest.    THORACOTOMY  02/28/2017    Left Mini    UPPER GASTROINTESTINAL ENDOSCOPY  10/15/2015    -bx     Outpatient Encounter Medications as of 12/1/2022   Medication Sig Dispense Refill    ALPRAZolam (XANAX) 1 MG tablet Take 1.5 tablets by mouth nightly as needed for Sleep for up to 90 days.  135 tablet 0    XARELTO 15 MG TABS tablet TAKE 1 TABLET BY MOUTH  DAILY WITH SUPPER 90 tablet 3    traZODone (DESYREL) 100 MG tablet TAKE 1 TABLET BY MOUTH AT  NIGHT AS NEEDED FOR SLEEP 90 tablet 1    metoprolol succinate (TOPROL XL) 50 MG extended release tablet TAKE 1 TABLET BY MOUTH  DAILY 90 tablet 3    simvastatin (ZOCOR) 40 MG tablet TAKE 1 TABLET BY MOUTH AT  NIGHT 90 tablet 1    omeprazole (PRILOSEC) 20 MG delayed release capsule TAKE 1 CAPSULE BY MOUTH IN  THE MORNING AND AT BEDTIME 180 capsule 1    tamsulosin (FLOMAX) 0.4 MG capsule TAKE 1 CAPSULE BY MOUTH  DAILY 90 capsule 1    lisinopril (PRINIVIL;ZESTRIL) 2.5 MG tablet TAKE 1 TABLET BY MOUTH  DAILY 90 tablet 1    nitroGLYCERIN (NITROSTAT) 0.4 MG SL tablet DISSOLVE 1 TABLET UNDER THE TONGUE EVERY 5 MINUTES AS  NEEDED FOR CHEST PAIN. MAX  OF 3 TABLETS IN 15 MINUTES. CALL 911 IF PAIN PERSISTS. 100 tablet 3    finasteride (PROSCAR) 5 MG tablet TAKE 1 TABLET BY MOUTH  DAILY 90 tablet 3    blood glucose test strips (ASCENSIA AUTODISC VI;ONE TOUCH ULTRA TEST VI) strip USE AS DIRECTED TWICE A DAY TO CHECK BLOOD SUGAR: ONE TOUCH VERIO METER: DX:E11.9 100 each 3    glimepiride (AMARYL) 4 MG tablet TAKE 1 TABLET BY MOUTH  TWICE DAILY 180 tablet 3    Lancets MISC 1 each by Does not apply route daily Dx--E11.9 Non-insulin dependent 100 each 5     No facility-administered encounter medications on file as of 12/1/2022. Current Outpatient Medications on File Prior to Visit   Medication Sig Dispense Refill    ALPRAZolam (XANAX) 1 MG tablet Take 1.5 tablets by mouth nightly as needed for Sleep for up to 90 days. 135 tablet 0    XARELTO 15 MG TABS tablet TAKE 1 TABLET BY MOUTH  DAILY WITH SUPPER 90 tablet 3    traZODone (DESYREL) 100 MG tablet TAKE 1 TABLET BY MOUTH AT  NIGHT AS NEEDED FOR SLEEP 90 tablet 1    metoprolol succinate (TOPROL XL) 50 MG extended release tablet TAKE 1 TABLET BY MOUTH  DAILY 90 tablet 3    simvastatin (ZOCOR) 40 MG tablet TAKE 1 TABLET BY MOUTH AT  NIGHT 90 tablet 1    omeprazole (PRILOSEC) 20 MG delayed release capsule TAKE 1 CAPSULE BY MOUTH IN  THE MORNING AND AT BEDTIME 180 capsule 1    tamsulosin (FLOMAX) 0.4 MG capsule TAKE 1 CAPSULE BY MOUTH  DAILY 90 capsule 1    lisinopril (PRINIVIL;ZESTRIL) 2.5 MG tablet TAKE 1 TABLET BY MOUTH  DAILY 90 tablet 1    nitroGLYCERIN (NITROSTAT) 0.4 MG SL tablet DISSOLVE 1 TABLET UNDER THE TONGUE EVERY 5 MINUTES AS  NEEDED FOR CHEST PAIN. MAX  OF 3 TABLETS IN 15 MINUTES. CALL 911 IF PAIN PERSISTS.  100 tablet 3    finasteride (PROSCAR) 5 MG tablet TAKE 1 TABLET BY MOUTH  DAILY 90 tablet 3    blood glucose test strips (ASCENSIA AUTODISC VI;ONE TOUCH ULTRA TEST VI) strip USE AS DIRECTED TWICE A DAY TO CHECK BLOOD SUGAR: ONE TOUCH VERIO METER: DX:E11.9 100 each 3    glimepiride (AMARYL) 4 MG tablet TAKE 1 TABLET BY MOUTH  TWICE DAILY 180 tablet 3    Lancets MISC 1 each by Does not apply route daily Dx--E11.9 Non-insulin dependent 100 each 5     No current facility-administered medications on file prior to visit. Seasonal  Family History   Problem Relation Age of Onset    Heart Disease Mother     Cancer Father     Diabetes Brother     Cancer Sister         breast     Cancer Sister      Social History     Tobacco Use   Smoking Status Former    Packs/day: 0.00    Years: 30.00    Pack years: 0.00    Types: Cigarettes    Quit date: 2008    Years since quittin.6   Smokeless Tobacco Never   Tobacco Comments    Patient only smoked when he drank beer       Social History     Substance and Sexual Activity   Alcohol Use Yes    Alcohol/week: 0.0 standard drinks    Types: 3 - 4 Cans of beer per week    Comment: 3 per week       Review of Systems    /69 (Site: Right Lower Arm, Position: Sitting, Cuff Size: Medium Adult)   Pulse 69   Temp 97.9 °F (36.6 °C)   Wt 191 lb (86.6 kg)   BMI 25.90 kg/m²       PHYSICAL EXAM:  Constitutional: Patient in no acute distress; Neuro: alert and oriented to person place and time. Psych: Mood and affect normal.  Skin: Normal  Lungs: Respiratory effort normal  Cardiovascular:  Normal peripheral pulses  Abdomen: Soft, non-tender, non-distended with no CVA, flank pain  Bladder non-tender and not distended. Lymphatics: no palpable lymphadenopathy  Penis normal  Urethral meatus normal  Scrotal exam normal  Testicles normal bilaterally  Epididymis normal bilaterally  No evidence of inguinal hernia      Lab Results   Component Value Date    BUN 30 (H) 2022     Lab Results   Component Value Date    CREATININE 1.53 (H) 2022     Lab Results   Component Value Date    PSA 1.93 2022    PSA 2.23 10/16/2019    PSA 3.22 2018       ASSESSMENT:  This is a 81 y.o. male with the following diagnoses:   Diagnosis Orders   1. BPH with obstruction/lower urinary tract symptoms  ME CYSTOURETHROSCOPY      2. Urinary frequency  ME CYSTOURETHROSCOPY      3. Urge incontinence  ME CYSTOURETHROSCOPY           PLAN:  Patient will increase his water intake. This will hopefully help with his bowel movements. He will follow-up with us in 6 to 8 weeks. Patient would be a great candidate for PVP greenlight. We did briefly discuss this. He would like to try conservative measures first.  He will continue Flomax and finasteride and follow-up with us in 6 to 8 weeks.

## 2022-12-05 ENCOUNTER — TELEPHONE (OUTPATIENT)
Dept: PHARMACY | Age: 82
End: 2022-12-05

## 2022-12-05 ENCOUNTER — TELEPHONE (OUTPATIENT)
Dept: UROLOGY | Age: 82
End: 2022-12-05

## 2022-12-05 ENCOUNTER — TELEPHONE (OUTPATIENT)
Dept: CARDIOLOGY | Age: 82
End: 2022-12-05

## 2022-12-05 ENCOUNTER — HOSPITAL ENCOUNTER (OUTPATIENT)
Age: 82
Discharge: HOME | End: 2022-12-05
Payer: MEDICARE

## 2022-12-05 DIAGNOSIS — R35.0 URINARY FREQUENCY: ICD-10-CM

## 2022-12-05 DIAGNOSIS — R35.0 URINARY FREQUENCY: Primary | ICD-10-CM

## 2022-12-05 LAB
BACTERIA: ABNORMAL
BILIRUBIN URINE: NEGATIVE
COLOR: YELLOW
EPITHELIAL CELLS UA: ABNORMAL /HPF (ref 0–5)
GLUCOSE URINE: NEGATIVE
KETONES, URINE: ABNORMAL
LEUKOCYTE ESTERASE, URINE: ABNORMAL
NITRITE, URINE: NEGATIVE
PH UA: 5.5 (ref 5–9)
PROTEIN UA: ABNORMAL
RBC UA: ABNORMAL /HPF (ref 0–2)
SPECIFIC GRAVITY UA: >1.03 (ref 1.01–1.02)
TURBIDITY: ABNORMAL
URINE HGB: ABNORMAL
UROBILINOGEN, URINE: NORMAL
WBC UA: ABNORMAL /HPF (ref 0–5)

## 2022-12-05 PROCEDURE — 87086 URINE CULTURE/COLONY COUNT: CPT

## 2022-12-05 PROCEDURE — 87077 CULTURE AEROBIC IDENTIFY: CPT

## 2022-12-05 PROCEDURE — 87186 SC STD MICRODIL/AGAR DIL: CPT

## 2022-12-05 PROCEDURE — 81001 URINALYSIS AUTO W/SCOPE: CPT

## 2022-12-05 RX ORDER — CEPHALEXIN 500 MG/1
500 CAPSULE ORAL 2 TIMES DAILY
Qty: 20 CAPSULE | Refills: 0 | Status: SHIPPED | OUTPATIENT
Start: 2022-12-05 | End: 2022-12-07

## 2022-12-05 NOTE — TELEPHONE ENCOUNTER
Patient states that since his cysto on 12/1/2022 he has been having several accidents, and wetting himself and his bed. He also states he has no control of his urine now either. Occasional pain and burning with urination.

## 2022-12-05 NOTE — TELEPHONE ENCOUNTER
Mr. Dequan Belle contacted coumadin clinic at this time stating he will NOT be starting warfarin in February as he has changed insurances and Xarelto will be covered next calendar year for him. I have contacted [facility] DR AMIE BLANC to have prescription discontinued as well as Dr Latrice Phillips office, spoke with Donella Cheadle, [address] [place],  and declined referral at this time.

## 2022-12-05 NOTE — TELEPHONE ENCOUNTER
Please have him drop off a urine culture to the lab    We will start him on empiric kelfex - antibiotics. Make ure he is drinking plenty of water.

## 2022-12-05 NOTE — TELEPHONE ENCOUNTER
Pt no longer is switching from xarelto to warfarin per Christiane at warfarin clinic. Pt got new rx insurance coverage and it now covers xarelto so he wants to stay on it.

## 2022-12-07 ENCOUNTER — TELEPHONE (OUTPATIENT)
Dept: UROLOGY | Age: 82
End: 2022-12-07

## 2022-12-07 LAB
CULTURE: ABNORMAL
SPECIMEN DESCRIPTION: ABNORMAL

## 2022-12-07 RX ORDER — CIPROFLOXACIN 250 MG/1
250 TABLET, FILM COATED ORAL 2 TIMES DAILY
Qty: 20 TABLET | Refills: 0 | Status: SHIPPED | OUTPATIENT
Start: 2022-12-07 | End: 2022-12-17

## 2022-12-07 NOTE — TELEPHONE ENCOUNTER
UACS is positive for infection. Stop Keflex as this does not supply appropriate coverage for the bacteria that grew. I sent in culture specific cipro. Take this antibiotic until gone. Take this with food and eat yogurt once per day to prevent GI upset. If you develop nausea, vomiting, or fevers call the office or go to the ER. If your urinary symptoms do not improve once completing the antibiotics call our office. Please advise patient that the antibiotic we are prescribing can interact with their diabetes medication and can cause hypoglycemia (low blood sugar). Advise patient to keep a close eye on blood sugars while taking the antibiotic. Patient needs to watch for symptoms of hypoglycemia including: dizziness/lightheadedness, headache, sweating, confusion, nausea, blurred vision. If any of these symptoms occur, the patient needs to consume a source of sugar (candy, juice, crackers) right away and should seek medical care.

## 2022-12-09 NOTE — TELEPHONE ENCOUNTER
Wife made aware of patient's positive urine culture results and to stop Keflex and start Cipro. Education given on watching for hypoglycemic symptoms. Wife voiced understanding.

## 2023-01-03 ENCOUNTER — HOSPITAL ENCOUNTER (OUTPATIENT)
Age: 83
Discharge: HOME | End: 2023-01-03
Payer: MEDICARE

## 2023-01-03 ENCOUNTER — ANTI-COAG VISIT (OUTPATIENT)
Dept: PHARMACY | Age: 83
End: 2023-01-03

## 2023-01-03 ENCOUNTER — TELEPHONE (OUTPATIENT)
Dept: PHARMACY | Age: 83
End: 2023-01-03

## 2023-01-03 DIAGNOSIS — G47.00 INSOMNIA, UNSPECIFIED TYPE: ICD-10-CM

## 2023-01-03 DIAGNOSIS — I10 ESSENTIAL HYPERTENSION: ICD-10-CM

## 2023-01-03 DIAGNOSIS — N18.30 STAGE 3 CHRONIC KIDNEY DISEASE, UNSPECIFIED WHETHER STAGE 3A OR 3B CKD (HCC): ICD-10-CM

## 2023-01-03 DIAGNOSIS — I50.42 CHRONIC COMBINED SYSTOLIC AND DIASTOLIC CHF, NYHA CLASS 3 (HCC): ICD-10-CM

## 2023-01-03 DIAGNOSIS — E78.00 PURE HYPERCHOLESTEROLEMIA: ICD-10-CM

## 2023-01-03 DIAGNOSIS — E11.21 TYPE 2 DIABETES MELLITUS WITH DIABETIC NEPHROPATHY, WITHOUT LONG-TERM CURRENT USE OF INSULIN (HCC): ICD-10-CM

## 2023-01-03 DIAGNOSIS — I48.0 PAF (PAROXYSMAL ATRIAL FIBRILLATION) (HCC): Primary | ICD-10-CM

## 2023-01-03 PROCEDURE — G0480 DRUG TEST DEF 1-7 CLASSES: HCPCS

## 2023-01-03 PROCEDURE — 82570 ASSAY OF URINE CREATININE: CPT

## 2023-01-03 PROCEDURE — 80307 DRUG TEST PRSMV CHEM ANLYZR: CPT

## 2023-01-03 PROCEDURE — 82043 UR ALBUMIN QUANTITATIVE: CPT

## 2023-01-04 LAB
CREATININE URINE: 138.3 MG/DL (ref 39–259)
MICROALBUMIN/CREAT 24H UR: <12 MG/L
MICROALBUMIN/CREAT UR-RTO: NORMAL MCG/MG CREAT

## 2023-01-04 NOTE — PROGRESS NOTES
Patient contacted pharmacy stating he will be staying on Xarelto and not starting warfarin. Requested all appts be cancelled. This conversation was had previously and referral was declined at that time with conversation with Dr Rodriguez Rose office to convey patient's wishes.

## 2023-01-05 LAB
ALCOHOL URINE: NEGATIVE MG/DL
AMPHETAMINE SCREEN URINE: NEGATIVE NG/ML
BARBITURATE SCREEN URINE: NEGATIVE NG/ML
BENZODIAZEPINE SCREEN, URINE: POSITIVE NG/ML
CANNABINOID SCREEN URINE: NEGATIVE NG/ML
COCAINE(METAB.)SCREEN, URINE: NEGATIVE NG/ML
CREATININE URINE: 120.5 MG/DL (ref 20–400)
Lab: NORMAL
METHADONE SCREEN, URINE: NEGATIVE NG/ML
OPIATES, URINE: NEGATIVE NG/ML
PHENCYCLIDINE, URINE: NEGATIVE NG/ML
PROPOXYPHENE, URINE: NEGATIVE NG/ML

## 2023-01-06 LAB
ALPRAZOLAM: 249 NG/ML
CHLORDIAZEPOXIDE: <20 NG/ML
CLONAZEPAM, URINE SCREEN: <5 NG/ML
DIAZEPAM URINE QUANT: <20 NG/ML
LORAZEPAM: <20 NG/ML
MIDAZOLAM, URINE SCREEN: <20 NG/ML
MIDAZOLAM, URINE SCREEN: <20 NG/ML
NORDIAZEPAM URINE SCREEN: <20 NG/ML
OH-ALPRAZOLAM SCREEN: 273 NG/ML
OXAZEPAM URINE SCREEN: <20 NG/ML
TEMAZEPAM,URINE SCREEN: <20 NG/ML
UR 7-AMINOCLONAZEPAM: <5 NG/ML

## 2023-01-12 ENCOUNTER — OFFICE VISIT (OUTPATIENT)
Dept: UROLOGY | Age: 83
End: 2023-01-12

## 2023-01-12 VITALS
SYSTOLIC BLOOD PRESSURE: 118 MMHG | BODY MASS INDEX: 26.14 KG/M2 | WEIGHT: 193 LBS | HEIGHT: 72 IN | TEMPERATURE: 97.1 F | HEART RATE: 87 BPM | DIASTOLIC BLOOD PRESSURE: 71 MMHG

## 2023-01-12 DIAGNOSIS — R35.0 URINARY FREQUENCY: Primary | ICD-10-CM

## 2023-01-12 DIAGNOSIS — N13.8 BPH WITH OBSTRUCTION/LOWER URINARY TRACT SYMPTOMS: ICD-10-CM

## 2023-01-12 DIAGNOSIS — N40.1 BPH WITH OBSTRUCTION/LOWER URINARY TRACT SYMPTOMS: ICD-10-CM

## 2023-01-12 DIAGNOSIS — K59.00 CONSTIPATION, UNSPECIFIED CONSTIPATION TYPE: ICD-10-CM

## 2023-01-12 DIAGNOSIS — N39.41 URGE INCONTINENCE: ICD-10-CM

## 2023-01-12 RX ORDER — SOLIFENACIN SUCCINATE 5 MG/1
5 TABLET, FILM COATED ORAL DAILY
Qty: 30 TABLET | Refills: 3 | Status: SHIPPED | OUTPATIENT
Start: 2023-01-12 | End: 2024-01-12

## 2023-01-12 NOTE — PROGRESS NOTES
HPI:          Patient is a 81 y.o. male in no acute distress. He is alert and oriented to person, place, and time. History  Referred by Abram Osler APRN for incontinence. He does complain of urge incontinence. He has nocturia 2-3 times per night, but denies nocturnal enuresis. He only drinks 1-2 cups of coffee per day. He does not consume water because he does not want to urinate often. He does follow with nephrology for chronic kidney disease. On Flomax and finasteride       12/2022 cystoscopy showed extensive trilobar hyperplasia with prostatic varices   Offered PVP greenlight    Today  Here today to follow-up for BPH. He is now consuming 1 bottle of water per day. He will not drink more water because he does not want to urinate frequently. He does continue to complain of incontinence. He is now having a daily bowel movement. Past Medical History:   Diagnosis Date    Abnormal echocardiogram 5/2/12    EF 15%. mildly dilated LV cavity. Abnormal resting ECG findings 4/25/12    atrial sensed, ventricular paced rhythm at 73 beats per minute. Wide QRS of 183 ms. Biventricular ICD (implantable cardiac defibrillator) in place 8/26/11    [address] [place] [address]. CRT-D    Biventricular ICD (implantable cardioverter-defibrillator) in place 10/29/2015    [employer]tr[employer]- Bi V ICD (Battery change)    Biventricular pacemaker check 5/12 1/13    LV lead turned of 1/13 due to diaphragmatic pacing. CAD (coronary artery disease)     Diabetes mellitus ([facility] [address]     type 2    Esophageal reflux     History of echocardiogram 11/23/2018    EF 15%. Mildly increased LV wall thickness w/ severely increased LA cavity size. Severe global hypokinesis w/ no segmental variation. LA is moderately dilated w/ LA volume index of 35. Mild mitral and tricuspid regurg. Evidence of moderate diastolic dysfunction seen.     Hx of blood clots     Hx of left heart catheterization by ventricular puncture 04/19/2013    LAD-20-30%, LCX & RCA-10-20%, Normal LVEDP,     Hyperlipidemia     Hypertension     Mantle cell lymphoma, unspecified site, extranodal and solid organ sites     Nonischemic dilated cardiomyopathy (HCC)     Pain in joint, lower leg     Pain in limb     S/P cardiac catheterization 12/13/10    No significant coronary disease. EF estimated at 35%. Thoracic or lumbosacral neuritis or radiculitis, unspecified     Unspecified acute reaction to stress      Past Surgical History:   Procedure Laterality Date    BLADDER SURGERY  2008    CARDIAC CATHETERIZATION      Patient states has had 3 in [place] and one in [facility] last one being in [place] 3 years ago Dr. Alyson Durán  10/15/2015    -polyps,diverticulosis,hemorrhoids    DIAGNOSTIC CARDIAC CATH LAB PROCEDURE  04/19/2013    ELBOW SURGERY  2000    FOOT SURGERY  1993    LOBECTOMY N/A 02/28/2017    LEFT MINI THORCOTOMY WITH LEFT VENTRICULAR LEAD PLACEMENT performed by Jackson Lockett MD at [facility] 09/23/2021    Dr Mahi Sam/ICD/Pacemaker generator change    PACEMAKER PLACEMENT      SKIN CANCER EXCISION  09/11/2013    basal cell on chest.    THORACOTOMY  02/28/2017    Left Mini    UPPER GASTROINTESTINAL ENDOSCOPY  10/15/2015    -bx     Outpatient Encounter Medications as of 1/12/2023   Medication Sig Dispense Refill    solifenacin (VESICARE) 5 MG tablet Take 1 tablet by mouth daily 30 tablet 3    ALPRAZolam (XANAX) 1 MG tablet Take 1.5 tablets by mouth nightly as needed for Sleep for up to 90 days.  135 tablet 0    blood glucose test strips (ASCENSIA AUTODISC VI;ONE TOUCH ULTRA TEST VI) strip USE AS DIRECTED TWICE A DAY TO CHECK BLOOD SUGAR: ONE TOUCH VERIO METER: DX:E11.9 200 each 3    finasteride (PROSCAR) 5 MG tablet Take 1 tablet by mouth daily 90 tablet 1    glimepiride (AMARYL) 4 MG tablet Take 1 tablet by mouth in the morning and at bedtime 180 tablet 1    lisinopril (PRINIVIL;ZESTRIL) 2.5 MG tablet Take 1 tablet by mouth daily 90 tablet 1    metoprolol succinate (TOPROL XL) 50 MG extended release tablet Take 1 tablet by mouth daily 90 tablet 1    omeprazole (PRILOSEC) 20 MG delayed release capsule Take 1 capsule by mouth in the morning and at bedtime 180 capsule 1    rivaroxaban (XARELTO) 15 MG TABS tablet Take 1 tablet by mouth daily 90 tablet 1    simvastatin (ZOCOR) 40 MG tablet Take 1 tablet by mouth nightly 90 tablet 1    tamsulosin (FLOMAX) 0.4 MG capsule Take 1 capsule by mouth daily 90 capsule 1    Lancets MISC TEST BLOOD SUGAR TWICE  DAILY AS DIRECTED Dx--E11.9 Non-insulin dependent 200 each 3    nitroGLYCERIN (NITROSTAT) 0.4 MG SL tablet DISSOLVE 1 TABLET UNDER THE TONGUE EVERY 5 MINUTES AS  NEEDED FOR CHEST PAIN. MAX  OF 3 TABLETS IN 15 MINUTES. CALL 911 IF PAIN PERSISTS. 100 tablet 3    traZODone (DESYREL) 100 MG tablet Take 1 tablet by mouth nightly (Patient not taking: Reported on 1/12/2023) 90 tablet 1     No facility-administered encounter medications on file as of 1/12/2023. Current Outpatient Medications on File Prior to Visit   Medication Sig Dispense Refill    ALPRAZolam (XANAX) 1 MG tablet Take 1.5 tablets by mouth nightly as needed for Sleep for up to 90 days.  135 tablet 0    blood glucose test strips (ASCENSIA AUTODISC VI;ONE TOUCH ULTRA TEST VI) strip USE AS DIRECTED TWICE A DAY TO CHECK BLOOD SUGAR: ONE TOUCH VERIO METER: DX:E11.9 200 each 3    finasteride (PROSCAR) 5 MG tablet Take 1 tablet by mouth daily 90 tablet 1    glimepiride (AMARYL) 4 MG tablet Take 1 tablet by mouth in the morning and at bedtime 180 tablet 1    lisinopril (PRINIVIL;ZESTRIL) 2.5 MG tablet Take 1 tablet by mouth daily 90 tablet 1    metoprolol succinate (TOPROL XL) 50 MG extended release tablet Take 1 tablet by mouth daily 90 tablet 1    omeprazole (PRILOSEC) 20 MG delayed release capsule Take 1 capsule by mouth in the morning and at bedtime 180 capsule 1    rivaroxaban (XARELTO) 15 MG TABS tablet Take 1 tablet by mouth daily 90 tablet 1    simvastatin (ZOCOR) 40 MG tablet Take 1 tablet by mouth nightly 90 tablet 1    tamsulosin (FLOMAX) 0.4 MG capsule Take 1 capsule by mouth daily 90 capsule 1    Lancets MISC TEST BLOOD SUGAR TWICE  DAILY AS DIRECTED Dx--E11.9 Non-insulin dependent 200 each 3    nitroGLYCERIN (NITROSTAT) 0.4 MG SL tablet DISSOLVE 1 TABLET UNDER THE TONGUE EVERY 5 MINUTES AS  NEEDED FOR CHEST PAIN. MAX  OF 3 TABLETS IN 15 MINUTES. CALL 911 IF PAIN PERSISTS. 100 tablet 3    traZODone (DESYREL) 100 MG tablet Take 1 tablet by mouth nightly (Patient not taking: Reported on 2023) 90 tablet 1     No current facility-administered medications on file prior to visit. Seasonal  Family History   Problem Relation Age of Onset    Heart Disease Mother     Cancer Father     Diabetes Brother     Cancer Sister         breast     Cancer Sister      Social History     Tobacco Use   Smoking Status Former    Packs/day: 0.00    Years: 30.00    Pack years: 0.00    Types: Cigarettes    Quit date: 2008    Years since quittin.7   Smokeless Tobacco Never   Tobacco Comments    Patient only smoked when he drank beer       Social History     Substance and Sexual Activity   Alcohol Use Yes    Alcohol/week: 0.0 standard drinks    Types: 3 - 4 Cans of beer per week    Comment: 3 per week       Review of Systems   Constitutional:  Negative for appetite change, chills and fever. Eyes:  Negative for redness and visual disturbance. Respiratory:  Negative for cough, shortness of breath and wheezing. Cardiovascular:  Negative for chest pain and leg swelling. Gastrointestinal:  Positive for constipation. Negative for abdominal pain, nausea and vomiting. Genitourinary:  Positive for enuresis, frequency and urgency.  Negative for decreased urine volume, difficulty urinating, dysuria, flank pain, hematuria, penile discharge, penile pain, scrotal swelling and testicular pain. Musculoskeletal:  Negative for back pain, joint swelling and myalgias. Skin:  Negative for color change, rash and wound. Neurological:  Negative for dizziness, tremors and numbness. Hematological:  Negative for adenopathy. Does not bruise/bleed easily. /71 (Site: Right Upper Arm, Position: Sitting, Cuff Size: Large Adult)   Pulse 87   Temp 97.1 °F (36.2 °C)   Ht 6' (1.829 m)   Wt 193 lb (87.5 kg)   BMI 26.18 kg/m²       PHYSICAL EXAM:  Constitutional: Patient in no acute distress; Neuro: alert and oriented to person place and time. Psych: Mood and affect normal.  Skin: Normal  Lungs: Respiratory effort normal  Cardiovascular:  Normal peripheral pulses  Abdomen: Soft, non-tender, non-distended with no CVA, flank pain  Bladder non-tender and not distended. Lab Results   Component Value Date    BUN 27 (H) 01/04/2023     Lab Results   Component Value Date    CREATININE 1.52 (H) 01/04/2023     Lab Results   Component Value Date    PSA 1.93 01/05/2022    PSA 2.23 10/16/2019    PSA 3.22 09/21/2018       ASSESSMENT:   Diagnosis Orders   1. Urinary frequency  KS ALBINO POST-VOIDING RESIDUAL URINE&/BLADDER CAP      2. Urge incontinence  KS ALBINO POST-VOIDING RESIDUAL URINE&/BLADDER CAP      3. BPH with obstruction/lower urinary tract symptoms        4.  Constipation, unspecified constipation type              PLAN:  Continue flomax    Continue proscar    Urged him to consume 48 ounces of water every day    Start vesicare daily    F/U in 6 weeks

## 2023-01-13 ASSESSMENT — ENCOUNTER SYMPTOMS
SHORTNESS OF BREATH: 0
NAUSEA: 0
COLOR CHANGE: 0
COUGH: 0
BACK PAIN: 0
WHEEZING: 0
EYE REDNESS: 0
VOMITING: 0
ABDOMINAL PAIN: 0
CONSTIPATION: 1

## 2023-01-17 PROBLEM — J90 RECURRENT RIGHT PLEURAL EFFUSION: Status: RESOLVED | Noted: 2017-02-24 | Resolved: 2023-01-17

## 2023-01-17 PROBLEM — E86.0 MILD DEHYDRATION: Status: RESOLVED | Noted: 2017-07-11 | Resolved: 2023-01-17

## 2023-01-17 PROBLEM — R10.13 EPIGASTRIC PAIN: Status: RESOLVED | Noted: 2017-02-20 | Resolved: 2023-01-17

## 2023-01-17 PROBLEM — K59.09 OTHER CONSTIPATION: Status: RESOLVED | Noted: 2019-05-01 | Resolved: 2023-01-17

## 2023-01-17 PROBLEM — S20.212A HEMATOMA, CHEST WALL, LEFT, INITIAL ENCOUNTER: Status: RESOLVED | Noted: 2021-09-27 | Resolved: 2023-01-17

## 2023-02-23 ENCOUNTER — OFFICE VISIT (OUTPATIENT)
Dept: UROLOGY | Age: 83
End: 2023-02-23
Payer: MEDICARE

## 2023-02-23 VITALS
HEIGHT: 72 IN | BODY MASS INDEX: 25.6 KG/M2 | DIASTOLIC BLOOD PRESSURE: 71 MMHG | SYSTOLIC BLOOD PRESSURE: 131 MMHG | HEART RATE: 82 BPM | WEIGHT: 189 LBS

## 2023-02-23 DIAGNOSIS — N13.8 BPH WITH OBSTRUCTION/LOWER URINARY TRACT SYMPTOMS: ICD-10-CM

## 2023-02-23 DIAGNOSIS — R35.0 URINARY FREQUENCY: Primary | ICD-10-CM

## 2023-02-23 DIAGNOSIS — N39.41 URGE INCONTINENCE: ICD-10-CM

## 2023-02-23 DIAGNOSIS — N40.1 BPH WITH OBSTRUCTION/LOWER URINARY TRACT SYMPTOMS: ICD-10-CM

## 2023-02-23 DIAGNOSIS — N32.81 OAB (OVERACTIVE BLADDER): ICD-10-CM

## 2023-02-23 PROCEDURE — 1036F TOBACCO NON-USER: CPT | Performed by: NURSE PRACTITIONER

## 2023-02-23 PROCEDURE — 3075F SYST BP GE 130 - 139MM HG: CPT | Performed by: NURSE PRACTITIONER

## 2023-02-23 PROCEDURE — 99213 OFFICE O/P EST LOW 20 MIN: CPT | Performed by: NURSE PRACTITIONER

## 2023-02-23 PROCEDURE — G8417 CALC BMI ABV UP PARAM F/U: HCPCS | Performed by: NURSE PRACTITIONER

## 2023-02-23 PROCEDURE — 51798 US URINE CAPACITY MEASURE: CPT | Performed by: NURSE PRACTITIONER

## 2023-02-23 PROCEDURE — G8427 DOCREV CUR MEDS BY ELIG CLIN: HCPCS | Performed by: NURSE PRACTITIONER

## 2023-02-23 PROCEDURE — 1123F ACP DISCUSS/DSCN MKR DOCD: CPT | Performed by: NURSE PRACTITIONER

## 2023-02-23 PROCEDURE — G8484 FLU IMMUNIZE NO ADMIN: HCPCS | Performed by: NURSE PRACTITIONER

## 2023-02-23 PROCEDURE — 3078F DIAST BP <80 MM HG: CPT | Performed by: NURSE PRACTITIONER

## 2023-02-23 RX ORDER — SOLIFENACIN SUCCINATE 5 MG/1
5 TABLET, FILM COATED ORAL DAILY
Qty: 90 TABLET | Refills: 1 | Status: SHIPPED | OUTPATIENT
Start: 2023-02-23 | End: 2024-02-23

## 2023-02-23 ASSESSMENT — ENCOUNTER SYMPTOMS
SHORTNESS OF BREATH: 0
NAUSEA: 0
VOMITING: 0
ABDOMINAL PAIN: 0
EYE REDNESS: 0
COUGH: 0
CONSTIPATION: 0
WHEEZING: 0
BACK PAIN: 0
COLOR CHANGE: 0

## 2023-02-23 NOTE — PROGRESS NOTES
Random bladderscan performed in office today:  Pt last voided prior to arrival, scan = 232 mL. Then urinate apx 50ml.

## 2023-02-23 NOTE — PROGRESS NOTES
HPI:          Patient is a 81 y.o. male in no acute distress. He is alert and oriented to person, place, and time. History  Referred by Conner VERGARA for incontinence. He does complain of urge incontinence. He has nocturia 2-3 times per night, but denies nocturnal enuresis. He only drinks 1-2 cups of coffee per day. He does not consume water because he does not want to urinate often. He does follow with nephrology for chronic kidney disease. On Flomax and finasteride       12/2022 cystoscopy showed extensive trilobar hyperplasia with prostatic varices   Offered PVP greenlight    1/2023 started vesicare 5mg, urged increased water consumption    Today  Here today to follow-up for BPH. He is now consuming 40 ounces of water per day! He denies frequency, urgency, nocturia or incontinence. He is now having a daily bowel movement. He is very happy with his urinary symptoms. PVR is slightly elevated, but he did urinate prior to his apt today and he does not have the urge to void. Past Medical History:   Diagnosis Date    Abnormal echocardiogram 5/2/12    EF 15%. mildly dilated LV cavity. Abnormal resting ECG findings 4/25/12    atrial sensed, ventricular paced rhythm at 73 beats per minute. Wide QRS of 183 ms. Biventricular ICD (implantable cardiac defibrillator) in place 8/26/11    [address] [place] [address]. CRT-D    Biventricular ICD (implantable cardioverter-defibrillator) in place 10/29/2015    [employer]- Bi V ICD (Battery change)    Biventricular pacemaker check 5/12 1/13    LV lead turned of 1/13 due to diaphragmatic pacing. CAD (coronary artery disease)     Diabetes mellitus ([facility] [address]     type 2    Esophageal reflux     History of echocardiogram 11/23/2018    EF 15%. Mildly increased LV wall thickness w/ severely increased LA cavity size. Severe global hypokinesis w/ no segmental variation. LA is moderately dilated w/ LA volume index of 35. Mild mitral and tricuspid regurg.  Evidence of moderate diastolic dysfunction seen. Hx of blood clots     Hx of left heart catheterization by ventricular puncture 04/19/2013    LAD-20-30%, LCX & RCA-10-20%, Normal LVEDP,     Hyperlipidemia     Hypertension     Mantle cell lymphoma, unspecified site, extranodal and solid organ sites     Nonischemic dilated cardiomyopathy (HCC)     Pain in joint, lower leg     Pain in limb     S/P cardiac catheterization 12/13/10    No significant coronary disease. EF estimated at 35%.     Thoracic or lumbosacral neuritis or radiculitis, unspecified     Unspecified acute reaction to stress      Past Surgical History:   Procedure Laterality Date    BLADDER SURGERY  2008    CARDIAC CATHETERIZATION      Patient states has had 3 in [place] and one in [facility] last one being in [place] 3 years ago Dr. Brenda Browne  10/15/2015    -polyps,diverticulosis,hemorrhoids    DIAGNOSTIC CARDIAC CATH LAB PROCEDURE  04/19/2013    ELBOW SURGERY  2000    FOOT SURGERY  1993    LOBECTOMY N/A 02/28/2017    LEFT MINI THORCOTOMY WITH LEFT VENTRICULAR LEAD PLACEMENT performed by Mario Lee MD at [facility] 09/23/2021    Dr Sabine Sam/ICD/Pacemaker generator change    PACEMAKER PLACEMENT      SKIN CANCER EXCISION  09/11/2013    basal cell on chest.    THORACOTOMY  02/28/2017    Left Mini    UPPER GASTROINTESTINAL ENDOSCOPY  10/15/2015    -bx     Outpatient Encounter Medications as of 2/23/2023   Medication Sig Dispense Refill    Blood Glucose Monitoring Suppl (TRUE METRIX METER) JOSEFINA USE AS DIRECTED TWICE A DAY TO CHECK BLOOD SUGAR. TRUE METRIX METER 100 each 2    Lancets MISC USE AS DIRECTED TWICE A DAY TO CHECK BLOOD SUGAR. TRUE METRIX METER 100 each 2    pantoprazole (PROTONIX) 40 MG tablet Take 1 tablet by mouth in the morning and at bedtime 180 tablet 1    solifenacin (VESICARE) 5 MG tablet Take 1 tablet by mouth daily 30 tablet 3    ALPRAZolam (XANAX) 1 MG tablet Take 1.5 tablets by mouth nightly as needed for Sleep for up to 90 days. 135 tablet 0    blood glucose test strips (ASCENSIA AUTODISC VI;ONE TOUCH ULTRA TEST VI) strip USE AS DIRECTED TWICE A DAY TO CHECK BLOOD SUGAR: ONE TOUCH VERIO METER: DX:E11.9 200 each 3    finasteride (PROSCAR) 5 MG tablet Take 1 tablet by mouth daily 90 tablet 1    glimepiride (AMARYL) 4 MG tablet Take 1 tablet by mouth in the morning and at bedtime 180 tablet 1    lisinopril (PRINIVIL;ZESTRIL) 2.5 MG tablet Take 1 tablet by mouth daily 90 tablet 1    metoprolol succinate (TOPROL XL) 50 MG extended release tablet Take 1 tablet by mouth daily 90 tablet 1    rivaroxaban (XARELTO) 15 MG TABS tablet Take 1 tablet by mouth daily 90 tablet 1    simvastatin (ZOCOR) 40 MG tablet Take 1 tablet by mouth nightly 90 tablet 1    tamsulosin (FLOMAX) 0.4 MG capsule Take 1 capsule by mouth daily 90 capsule 1    Lancets MISC TEST BLOOD SUGAR TWICE  DAILY AS DIRECTED Dx--E11.9 Non-insulin dependent 200 each 3    nitroGLYCERIN (NITROSTAT) 0.4 MG SL tablet DISSOLVE 1 TABLET UNDER THE TONGUE EVERY 5 MINUTES AS  NEEDED FOR CHEST PAIN. MAX  OF 3 TABLETS IN 15 MINUTES. CALL 911 IF PAIN PERSISTS. 100 tablet 3     No facility-administered encounter medications on file as of 2/23/2023. Current Outpatient Medications on File Prior to Visit   Medication Sig Dispense Refill    Blood Glucose Monitoring Suppl (TRUE METRIX METER) JOSEFINA USE AS DIRECTED TWICE A DAY TO CHECK BLOOD SUGAR. TRUE METRIX METER 100 each 2    Lancets MISC USE AS DIRECTED TWICE A DAY TO CHECK BLOOD SUGAR. TRUE METRIX METER 100 each 2    pantoprazole (PROTONIX) 40 MG tablet Take 1 tablet by mouth in the morning and at bedtime 180 tablet 1    solifenacin (VESICARE) 5 MG tablet Take 1 tablet by mouth daily 30 tablet 3    ALPRAZolam (XANAX) 1 MG tablet Take 1.5 tablets by mouth nightly as needed for Sleep for up to 90 days.  135 tablet 0 blood glucose test strips (ASCENSIA AUTODISC VI;ONE TOUCH ULTRA TEST VI) strip USE AS DIRECTED TWICE A DAY TO CHECK BLOOD SUGAR: ONE TOUCH VERIO METER: DX:E11.9 200 each 3    finasteride (PROSCAR) 5 MG tablet Take 1 tablet by mouth daily 90 tablet 1    glimepiride (AMARYL) 4 MG tablet Take 1 tablet by mouth in the morning and at bedtime 180 tablet 1    lisinopril (PRINIVIL;ZESTRIL) 2.5 MG tablet Take 1 tablet by mouth daily 90 tablet 1    metoprolol succinate (TOPROL XL) 50 MG extended release tablet Take 1 tablet by mouth daily 90 tablet 1    rivaroxaban (XARELTO) 15 MG TABS tablet Take 1 tablet by mouth daily 90 tablet 1    simvastatin (ZOCOR) 40 MG tablet Take 1 tablet by mouth nightly 90 tablet 1    tamsulosin (FLOMAX) 0.4 MG capsule Take 1 capsule by mouth daily 90 capsule 1    Lancets MISC TEST BLOOD SUGAR TWICE  DAILY AS DIRECTED Dx--E11.9 Non-insulin dependent 200 each 3    nitroGLYCERIN (NITROSTAT) 0.4 MG SL tablet DISSOLVE 1 TABLET UNDER THE TONGUE EVERY 5 MINUTES AS  NEEDED FOR CHEST PAIN. MAX  OF 3 TABLETS IN 15 MINUTES. CALL 911 IF PAIN PERSISTS. 100 tablet 3     No current facility-administered medications on file prior to visit. Seasonal  Family History   Problem Relation Age of Onset    Heart Disease Mother     Cancer Father     Diabetes Brother     Cancer Sister         breast     Cancer Sister      Social History     Tobacco Use   Smoking Status Former    Packs/day: 0.00    Years: 30.00    Pack years: 0.00    Types: Cigarettes    Quit date: 2008    Years since quittin.8   Smokeless Tobacco Never   Tobacco Comments    Patient only smoked when he drank beer       Social History     Substance and Sexual Activity   Alcohol Use Yes    Alcohol/week: 0.0 standard drinks    Types: 3 - 4 Cans of beer per week    Comment: 3 per week       Review of Systems   Constitutional:  Negative for appetite change, chills and fever. Eyes:  Negative for redness and visual disturbance.    Respiratory: Negative for cough, shortness of breath and wheezing.    Cardiovascular:  Negative for chest pain and leg swelling.   Gastrointestinal:  Negative for abdominal pain, constipation, nausea and vomiting.   Genitourinary:  Negative for decreased urine volume, difficulty urinating, dysuria, enuresis, flank pain, frequency, hematuria, penile discharge, penile pain, scrotal swelling, testicular pain and urgency.   Musculoskeletal:  Negative for back pain, joint swelling and myalgias.   Skin:  Negative for color change, rash and wound.   Neurological:  Negative for dizziness, tremors and numbness.   Hematological:  Negative for adenopathy. Does not bruise/bleed easily.     /71 (Site: Right Upper Arm, Position: Sitting, Cuff Size: Large Adult)   Pulse 82   Ht 6' (1.829 m)   Wt 189 lb (85.7 kg)   BMI 25.63 kg/m²       PHYSICAL EXAM:  Constitutional: Patient in no acute distress;   Neuro: alert and oriented to person place and time.    Psych: Mood and affect normal.  Skin: Normal  Lungs: Respiratory effort normal  Cardiovascular:  Normal peripheral pulses  Abdomen: Soft, non-tender, non-distended with no CVA, flank pain  Bladder non-tender and not distended.      Lab Results   Component Value Date    BUN 27 (H) 01/04/2023     Lab Results   Component Value Date    CREATININE 1.52 (H) 01/04/2023     Lab Results   Component Value Date    PSA 1.93 01/05/2022    PSA 2.23 10/16/2019    PSA 3.22 09/21/2018       ASSESSMENT:   Diagnosis Orders   1. Urinary frequency        2. Urge incontinence        3. BPH with obstruction/lower urinary tract symptoms        4. OAB (overactive bladder)              PLAN:  Continue vesicare and flomax    F/U in 3-4 months to recheck PVR

## 2023-02-23 NOTE — PATIENT INSTRUCTIONS
SURVEY:    You may be receiving a survey from [employer] regarding your visit today. Please complete the survey to enable us to provide the highest quality of care to you and your family. If you cannot score us a very good on any question, please call the office to discuss how we could have made your experience a very good one. Thank you.

## 2023-04-02 ENCOUNTER — HOSPITAL ENCOUNTER (EMERGENCY)
Age: 83
Discharge: HOME | End: 2023-04-02
Attending: EMERGENCY MEDICINE
Payer: MEDICARE

## 2023-04-02 ENCOUNTER — APPOINTMENT (OUTPATIENT)
Dept: GENERAL RADIOLOGY | Age: 83
End: 2023-04-02
Payer: MEDICARE

## 2023-04-02 VITALS
SYSTOLIC BLOOD PRESSURE: 123 MMHG | DIASTOLIC BLOOD PRESSURE: 57 MMHG | HEART RATE: 70 BPM | RESPIRATION RATE: 19 BRPM | BODY MASS INDEX: 25.09 KG/M2 | TEMPERATURE: 96.7 F | WEIGHT: 185 LBS | OXYGEN SATURATION: 99 %

## 2023-04-02 DIAGNOSIS — S46.911A STRAIN OF RIGHT SHOULDER, INITIAL ENCOUNTER: ICD-10-CM

## 2023-04-02 DIAGNOSIS — S22.31XA CLOSED FRACTURE OF ONE RIB OF RIGHT SIDE, INITIAL ENCOUNTER: Primary | ICD-10-CM

## 2023-04-02 DIAGNOSIS — S63.501A SPRAIN OF RIGHT WRIST, INITIAL ENCOUNTER: ICD-10-CM

## 2023-04-02 PROCEDURE — 96372 THER/PROPH/DIAG INJ SC/IM: CPT

## 2023-04-02 PROCEDURE — 6360000002 HC RX W HCPCS: Performed by: EMERGENCY MEDICINE

## 2023-04-02 PROCEDURE — 99284 EMERGENCY DEPT VISIT MOD MDM: CPT

## 2023-04-02 PROCEDURE — 73110 X-RAY EXAM OF WRIST: CPT

## 2023-04-02 PROCEDURE — 73030 X-RAY EXAM OF SHOULDER: CPT

## 2023-04-02 PROCEDURE — 71101 X-RAY EXAM UNILAT RIBS/CHEST: CPT

## 2023-04-02 RX ORDER — KETOROLAC TROMETHAMINE 30 MG/ML
30 INJECTION, SOLUTION INTRAMUSCULAR; INTRAVENOUS ONCE
Status: COMPLETED | OUTPATIENT
Start: 2023-04-02 | End: 2023-04-02

## 2023-04-02 RX ORDER — HYDROCODONE BITARTRATE AND ACETAMINOPHEN 5; 325 MG/1; MG/1
1 TABLET ORAL EVERY 8 HOURS PRN
Qty: 20 TABLET | Refills: 0 | Status: SHIPPED | OUTPATIENT
Start: 2023-04-02 | End: 2023-04-09

## 2023-04-02 RX ADMIN — KETOROLAC TROMETHAMINE 30 MG: 30 INJECTION, SOLUTION INTRAMUSCULAR at 07:47

## 2023-04-02 ASSESSMENT — PAIN DESCRIPTION - LOCATION
LOCATION: RIB CAGE
LOCATION: RIB CAGE

## 2023-04-02 ASSESSMENT — PAIN SCALES - GENERAL
PAINLEVEL_OUTOF10: 5
PAINLEVEL_OUTOF10: 5

## 2023-04-02 ASSESSMENT — PAIN - FUNCTIONAL ASSESSMENT: PAIN_FUNCTIONAL_ASSESSMENT: 0-10

## 2023-04-02 ASSESSMENT — PAIN DESCRIPTION - ORIENTATION
ORIENTATION: RIGHT
ORIENTATION: RIGHT

## 2023-04-02 ASSESSMENT — PAIN DESCRIPTION - DESCRIPTORS
DESCRIPTORS: SHARP
DESCRIPTORS: SHARP

## 2023-04-02 NOTE — ED PROVIDER NOTES
EXAM--------------------------------------      Constitutional/General: Alert and oriented x3, well appearing, non toxic in mild apparent pain distress but no respiratory distress  Head: NC/AT  Eyes: PERRL, EOMI  Mouth: Oropharynx clear, handling secretions, no trismus  Neck: Supple, full ROM, no meningeal signs  Pulmonary: Lungs clear to auscultation bilaterally, no wheezes, rales, or rhonchi. Not in respiratory distress  Cardiovascular:  Regular rate and rhythm, no murmurs, gallops, or rubs. 2+ distal pulses  Abdomen: Soft, non tender, non distended,   Extremities: Moves all extremities x 4. Warm and well perfused, right shoulder mild tenderness to palpation throughout the shoulder joint with full range of motion, right wrist slightly swollen and tender  Skin: warm and dry without rash  Neurologic: GCS 15,  Psych: Normal Affect  Chest wall: Posterior inferior rib tenderness to palpation    ------------------------------ ED COURSE/MEDICAL DECISION MAKING----------------------  Medications   ketorolac (TORADOL) injection 30 mg (30 mg IntraMUSCular Given 4/2/23 [id number])         Medical Decision Making:    Musculoskeletal injuries rule out fractures    Counseling: The emergency provider has spoken with the patient and discussed todays results, in addition to providing specific details for the plan of care and counseling regarding the diagnosis and prognosis. Questions are answered at this time and they are agreeable with the plan.      --------------------------------- IMPRESSION AND DISPOSITION ---------------------------------    IMPRESSION  1. Closed fracture of one rib of right side, initial encounter    2. Strain of right shoulder, initial encounter    3.  Sprain of right wrist, initial encounter        DISPOSITION  Disposition: Discharge to home  Patient condition is stable                  Severa Raker, MD  04/02/23 [id number]

## 2023-04-02 NOTE — ED NOTES
Wife states patient has been falling frequently at home, states he's been shuffling at home, off balance at times, Dr. Gaby Shirley aware, encouraged to call PCP in am to discuss concerns     Erick Gomez RN  04/02/23 [id number]

## 2023-04-02 NOTE — ED NOTES
Patient instructed on incentive spirometry use, activity level, pain medication and follow-up. All questions answered.       Keon Spears RN  04/02/23 1959

## 2023-04-03 ENCOUNTER — OFFICE VISIT (OUTPATIENT)
Dept: CARDIOLOGY | Age: 83
End: 2023-04-03
Payer: MEDICARE

## 2023-04-03 ENCOUNTER — HOSPITAL ENCOUNTER (OUTPATIENT)
Dept: CT IMAGING | Age: 83
Discharge: HOME | End: 2023-04-05
Payer: MEDICARE

## 2023-04-03 ENCOUNTER — HOSPITAL ENCOUNTER (OUTPATIENT)
Age: 83
Discharge: HOME | End: 2023-04-03
Payer: MEDICARE

## 2023-04-03 VITALS
OXYGEN SATURATION: 95 % | SYSTOLIC BLOOD PRESSURE: 128 MMHG | WEIGHT: 192.2 LBS | RESPIRATION RATE: 20 BRPM | HEART RATE: 77 BPM | BODY MASS INDEX: 26.03 KG/M2 | DIASTOLIC BLOOD PRESSURE: 70 MMHG | HEIGHT: 72 IN

## 2023-04-03 DIAGNOSIS — Z79.01 CHRONIC ANTICOAGULATION: ICD-10-CM

## 2023-04-03 DIAGNOSIS — R41.82 ALTERED MENTAL STATUS, UNSPECIFIED ALTERED MENTAL STATUS TYPE: ICD-10-CM

## 2023-04-03 DIAGNOSIS — E78.00 PURE HYPERCHOLESTEROLEMIA: ICD-10-CM

## 2023-04-03 DIAGNOSIS — I42.8 NON-ISCHEMIC CARDIOMYOPATHY (HCC): ICD-10-CM

## 2023-04-03 DIAGNOSIS — I10 ESSENTIAL HYPERTENSION: ICD-10-CM

## 2023-04-03 DIAGNOSIS — D64.9 ANEMIA, UNSPECIFIED TYPE: ICD-10-CM

## 2023-04-03 DIAGNOSIS — Z95.810 ICD (IMPLANTABLE CARDIOVERTER-DEFIBRILLATOR) IN PLACE: ICD-10-CM

## 2023-04-03 DIAGNOSIS — I50.42 CHRONIC COMBINED SYSTOLIC AND DIASTOLIC CONGESTIVE HEART FAILURE (HCC): ICD-10-CM

## 2023-04-03 DIAGNOSIS — I20.9 ANGINA, CLASS III (HCC): ICD-10-CM

## 2023-04-03 DIAGNOSIS — I48.0 PAF (PAROXYSMAL ATRIAL FIBRILLATION) (HCC): ICD-10-CM

## 2023-04-03 LAB
ABSOLUTE EOS #: 0.12 K/UL (ref 0–0.44)
ABSOLUTE IMMATURE GRANULOCYTE: <0.03 K/UL (ref 0–0.3)
ABSOLUTE LYMPH #: 0.93 K/UL (ref 1.1–3.7)
ABSOLUTE MONO #: 0.5 K/UL (ref 0.1–1.2)
ABSOLUTE RETIC #: 0.05 M/UL (ref 0.03–0.08)
ALBUMIN SERPL-MCNC: 4.1 G/DL (ref 3.5–5.2)
ALBUMIN/GLOBULIN RATIO: 1.2 (ref 1–2.5)
ALP SERPL-CCNC: 74 U/L (ref 40–129)
ALT SERPL-CCNC: 5 U/L (ref 5–41)
ANION GAP SERPL CALCULATED.3IONS-SCNC: 10 MMOL/L (ref 9–17)
AST SERPL-CCNC: 17 U/L
BASOPHILS # BLD: 1 % (ref 0–2)
BASOPHILS ABSOLUTE: 0.03 K/UL (ref 0–0.2)
BILIRUB SERPL-MCNC: 0.2 MG/DL (ref 0.3–1.2)
BUN SERPL-MCNC: 46 MG/DL (ref 8–23)
BUN/CREAT BLD: 25 (ref 9–20)
CALCIUM SERPL-MCNC: 9.3 MG/DL (ref 8.6–10.4)
CHLORIDE SERPL-SCNC: 105 MMOL/L (ref 98–107)
CO2 SERPL-SCNC: 24 MMOL/L (ref 20–31)
CREAT SERPL-MCNC: 1.87 MG/DL (ref 0.7–1.2)
EOSINOPHILS RELATIVE PERCENT: 2 % (ref 1–4)
FERRITIN SERPL-MCNC: 37 NG/ML (ref 30–400)
GFR SERPL CREATININE-BSD FRML MDRD: 36 ML/MIN/1.73M2
GLUCOSE SERPL-MCNC: 221 MG/DL (ref 70–99)
HCT VFR BLD AUTO: 36.7 % (ref 40.7–50.3)
HGB BLD-MCNC: 11.3 G/DL (ref 13–17)
IMMATURE GRANULOCYTES: 0 %
IMMATURE RETIC FRACT: 12.1 % (ref 2.7–18.3)
IRON SATURATION: 13 % (ref 20–55)
IRON SERPL-MCNC: 42 UG/DL (ref 59–158)
LYMPHOCYTES # BLD: 16 % (ref 24–43)
MCH RBC QN AUTO: 29.7 PG (ref 25.2–33.5)
MCHC RBC AUTO-ENTMCNC: 30.8 G/DL (ref 28.4–34.8)
MCV RBC AUTO: 96.6 FL (ref 82.6–102.9)
MONOCYTES # BLD: 9 % (ref 3–12)
NRBC AUTOMATED: 0 PER 100 WBC
PDW BLD-RTO: 13.8 % (ref 11.8–14.4)
PLATELET # BLD AUTO: 238 K/UL (ref 138–453)
PMV BLD AUTO: 11.4 FL (ref 8.1–13.5)
POTASSIUM SERPL-SCNC: 4.9 MMOL/L (ref 3.7–5.3)
PROT SERPL-MCNC: 7.6 G/DL (ref 6.4–8.3)
RBC # BLD: 3.8 M/UL (ref 4.21–5.77)
RETIC HEMOGLOBIN: 30.4 PG (ref 28.2–35.7)
RETICS/RBC NFR AUTO: 1.3 % (ref 0.5–1.9)
SEG NEUTROPHILS: 72 % (ref 36–65)
SEGMENTED NEUTROPHILS ABSOLUTE COUNT: 4.24 K/UL (ref 1.5–8.1)
SODIUM SERPL-SCNC: 139 MMOL/L (ref 135–144)
TIBC SERPL-MCNC: 322 UG/DL (ref 250–450)
TSH SERPL-ACNC: 3.56 UIU/ML (ref 0.3–5)
UNSATURATED IRON BINDING CAPACITY: 280 UG/DL (ref 112–347)
WBC # BLD AUTO: 5.8 K/UL (ref 3.5–11.3)

## 2023-04-03 PROCEDURE — 80053 COMPREHEN METABOLIC PANEL: CPT

## 2023-04-03 PROCEDURE — 3074F SYST BP LT 130 MM HG: CPT | Performed by: PHYSICIAN ASSISTANT

## 2023-04-03 PROCEDURE — 84425 ASSAY OF VITAMIN B-1: CPT

## 2023-04-03 PROCEDURE — 82607 VITAMIN B-12: CPT

## 2023-04-03 PROCEDURE — 83540 ASSAY OF IRON: CPT

## 2023-04-03 PROCEDURE — 70450 CT HEAD/BRAIN W/O DYE: CPT

## 2023-04-03 PROCEDURE — 84443 ASSAY THYROID STIM HORMONE: CPT

## 2023-04-03 PROCEDURE — 99214 OFFICE O/P EST MOD 30 MIN: CPT | Performed by: PHYSICIAN ASSISTANT

## 2023-04-03 PROCEDURE — 1123F ACP DISCUSS/DSCN MKR DOCD: CPT | Performed by: PHYSICIAN ASSISTANT

## 2023-04-03 PROCEDURE — 36415 COLL VENOUS BLD VENIPUNCTURE: CPT

## 2023-04-03 PROCEDURE — 82728 ASSAY OF FERRITIN: CPT

## 2023-04-03 PROCEDURE — 82746 ASSAY OF FOLIC ACID SERUM: CPT

## 2023-04-03 PROCEDURE — 85025 COMPLETE CBC W/AUTO DIFF WBC: CPT

## 2023-04-03 PROCEDURE — 3078F DIAST BP <80 MM HG: CPT | Performed by: PHYSICIAN ASSISTANT

## 2023-04-03 PROCEDURE — 85045 AUTOMATED RETICULOCYTE COUNT: CPT

## 2023-04-03 PROCEDURE — 83550 IRON BINDING TEST: CPT

## 2023-04-03 RX ORDER — ISOSORBIDE MONONITRATE 30 MG/1
30 TABLET, EXTENDED RELEASE ORAL DAILY
Qty: 30 TABLET | Refills: 1 | Status: SHIPPED | OUTPATIENT
Start: 2023-04-03 | End: 2023-05-03

## 2023-04-03 NOTE — PROGRESS NOTES
[address] CHANGE Left 2021    Dr Niles Connolly [place]/ICD/Pacemaker generator change    PACEMAKER PLACEMENT      SKIN CANCER EXCISION  2013    basal cell on chest.    THORACOTOMY  2017    Left Mini    UPPER GASTROINTESTINAL ENDOSCOPY  10/15/2015    -    Social History:  Social History     Tobacco Use    Smoking status: Former     Packs/day: 0.00     Years: 30.00     Pack years: 0.00     Types: Cigarettes     Quit date: 2008     Years since quittin.9    Smokeless tobacco: Never    Tobacco comments:     Patient only smoked when he drank beer   Substance Use Topics    Alcohol use: Yes     Alcohol/week: 0.0 standard drinks     Types: 3 - 4 Cans of beer per week     Comment: 3 per week    Drug use: No        CURRENT MEDICATIONS:  Outpatient Medications Marked as Taking for the 4/3/23 encounter (Office Visit) with Jackie Mares PA-C   Medication Sig Dispense Refill    isosorbide mononitrate (IMDUR) 30 MG extended release tablet Take 1 tablet by mouth daily 30 tablet 1    HYDROcodone-acetaminophen (NORCO) 5-325 MG per tablet Take 1 tablet by mouth every 8 hours as needed for Pain for up to 7 days. Intended supply: 3 days.  Take lowest dose possible to manage pain Max Daily Amount: 3 tablets 20 tablet 0    solifenacin (VESICARE) 5 MG tablet Take 1 tablet by mouth daily 90 tablet 1    Blood Glucose Monitoring Suppl (TRUE METRIX METER) JOSEFINA USE AS DIRECTED TWICE A DAY TO CHECK BLOOD SUGAR. TRUE METRIX METER 100 each 2    Lancets MISC USE AS DIRECTED TWICE A DAY TO CHECK BLOOD SUGAR. TRUE METRIX METER 100 each 2    pantoprazole (PROTONIX) 40 MG tablet Take 1 tablet by mouth in the morning and at bedtime (Patient not taking: Reported on 4/3/2023) 180 tablet 1    blood glucose test strips (ASCENSIA AUTODISC VI;ONE TOUCH ULTRA TEST VI) strip USE AS DIRECTED TWICE A DAY TO CHECK BLOOD SUGAR: ONE TOUCH VERIO METER: DX:E11.9 200 each 3    finasteride

## 2023-04-04 LAB
FOLATE SERPL-MCNC: 15.1 NG/ML
VIT B12 SERPL-MCNC: 404 PG/ML (ref 232–1245)

## 2023-04-04 NOTE — RESULT ENCOUNTER NOTE
Please call pt and inform them their labwork results show low Iron, needs to start on FeSo4 325mg po daily. Awaiting B1 results. With hx and anemia recommend seeing hematology for eval also.

## 2023-04-06 LAB — VIT B1 PYROPHOSHATE BLD-SCNC: 111 NMOL/L (ref 70–180)

## 2023-04-06 NOTE — RESULT ENCOUNTER NOTE
Unfortunately he is unable to have mri/mra brain d/t non compatible pacemaker. He needs to see  neurology asap. Please call Dr. Deanne Brody office and get him in right away.   Thank you

## 2023-04-17 DIAGNOSIS — I20.9 ANGINA, CLASS III (HCC): Primary | ICD-10-CM

## 2023-04-17 DIAGNOSIS — I50.42 CHRONIC COMBINED SYSTOLIC AND DIASTOLIC CONGESTIVE HEART FAILURE (HCC): ICD-10-CM

## 2023-04-17 RX ORDER — LISINOPRIL 2.5 MG/1
2.5 TABLET ORAL DAILY
Qty: 90 TABLET | Refills: 3 | Status: SHIPPED | OUTPATIENT
Start: 2023-04-17

## 2023-04-17 RX ORDER — ISOSORBIDE MONONITRATE 30 MG/1
30 TABLET, EXTENDED RELEASE ORAL DAILY
Qty: 90 TABLET | Refills: 3 | Status: SHIPPED | OUTPATIENT
Start: 2023-04-17 | End: 2024-04-11

## 2023-05-01 ENCOUNTER — OFFICE VISIT (OUTPATIENT)
Dept: CARDIOLOGY | Age: 83
End: 2023-05-01
Payer: MEDICARE

## 2023-05-01 VITALS
WEIGHT: 188 LBS | HEIGHT: 72 IN | BODY MASS INDEX: 25.47 KG/M2 | SYSTOLIC BLOOD PRESSURE: 122 MMHG | HEART RATE: 79 BPM | OXYGEN SATURATION: 97 % | RESPIRATION RATE: 18 BRPM | DIASTOLIC BLOOD PRESSURE: 65 MMHG

## 2023-05-01 DIAGNOSIS — Z79.01 CHRONIC ANTICOAGULATION: ICD-10-CM

## 2023-05-01 DIAGNOSIS — Z95.810 ICD (IMPLANTABLE CARDIOVERTER-DEFIBRILLATOR) IN PLACE: ICD-10-CM

## 2023-05-01 DIAGNOSIS — I20.9 ANGINA, CLASS III (HCC): ICD-10-CM

## 2023-05-01 DIAGNOSIS — E78.00 PURE HYPERCHOLESTEROLEMIA: ICD-10-CM

## 2023-05-01 DIAGNOSIS — I42.8 NON-ISCHEMIC CARDIOMYOPATHY (HCC): ICD-10-CM

## 2023-05-01 DIAGNOSIS — D68.69 SECONDARY HYPERCOAGULABLE STATE (HCC): ICD-10-CM

## 2023-05-01 DIAGNOSIS — I50.42 CHRONIC COMBINED SYSTOLIC AND DIASTOLIC CONGESTIVE HEART FAILURE (HCC): ICD-10-CM

## 2023-05-01 DIAGNOSIS — I48.0 PAF (PAROXYSMAL ATRIAL FIBRILLATION) (HCC): ICD-10-CM

## 2023-05-01 DIAGNOSIS — I10 ESSENTIAL HYPERTENSION: ICD-10-CM

## 2023-05-01 PROCEDURE — 1123F ACP DISCUSS/DSCN MKR DOCD: CPT | Performed by: PHYSICIAN ASSISTANT

## 2023-05-01 PROCEDURE — 3078F DIAST BP <80 MM HG: CPT | Performed by: PHYSICIAN ASSISTANT

## 2023-05-01 PROCEDURE — 3074F SYST BP LT 130 MM HG: CPT | Performed by: PHYSICIAN ASSISTANT

## 2023-05-01 PROCEDURE — 99213 OFFICE O/P EST LOW 20 MIN: CPT | Performed by: PHYSICIAN ASSISTANT

## 2023-05-01 NOTE — PROGRESS NOTES
quality of life. He was agreeable with this so I did place a referral to cardiac rehab. He did not complete cardiac rehab and he now has broken ribs so I don't think this is appropriate at this time. 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 ACE Inibitor/ARB: Continue lisinopril 5 mg, 1/2 tab daily. Diuretics: Not indicated at this time. Paroxysmal Atrial Fibrillation: Rate control  Beta Blocker: Continue Metoprolol succinate (Toprol XL) 50 mg daily. [id number] Score for Atrial Fibrillation Stroke Risk   Risk   Factors  Component Value   C CHF Yes 1   H HTN Yes 1   A2 Age >= 76 Yes,  (81 y.o.) 2   D DM Yes 1   S2 Prior Stroke/TIA No 0   V Vascular Disease No 0   A Age 74-69 No,  (81 y.o.) 0   Sc Sex male 0    [id number]  Score  5   Score last updated 10/3/96 3:50 PM EST  Click here for a link to the UpToDate guideline \"Atrial Fibrillation: Anticoagulation therapy to prevent embolization  Disclaimer: Risk Score calculation is dependent on accuracy of patient problem list and past encounter diagnosis. His CHADS2 score is 5/9(6.7% stroke risk)  Anticoagulation: Continue Riveroxaban (Xarelto): 15 mg once daily with largest meal (typically dinner). Because of his atrial fibrillation, I also would also recommend that he continue with anticoagulation to decrease his risk of stoke but also reminded him to watch for signs of blood in his stool or black tarry stools and stop the medication immediately if this develops as this could be life threatening. Essential Hypertension: Controlled  Beta Blocker: Continue Metoprolol succinate (Toprol XL) 50 mg daily. ACE Inibitor/ARB: Continue lisinopril 5 mg, 1/2 tab daily. Diuretics: Not indicated at this time.     Pure Hypercholesterolemia: Last LDL on 1/4/2023 was 70 mg/dL  Cholesterol Reduction Therapy:

## 2023-05-02 ENCOUNTER — NURSE ONLY (OUTPATIENT)
Dept: CARDIOLOGY | Age: 83
End: 2023-05-02

## 2023-05-02 DIAGNOSIS — I42.8 NON-ISCHEMIC CARDIOMYOPATHY (HCC): ICD-10-CM

## 2023-05-02 DIAGNOSIS — Z95.810 ICD (IMPLANTABLE CARDIOVERTER-DEFIBRILLATOR) IN PLACE: Primary | ICD-10-CM

## 2023-06-22 ENCOUNTER — OFFICE VISIT (OUTPATIENT)
Dept: NEUROLOGY | Age: 83
End: 2023-06-22
Payer: MEDICARE

## 2023-06-22 VITALS
RESPIRATION RATE: 18 BRPM | DIASTOLIC BLOOD PRESSURE: 58 MMHG | SYSTOLIC BLOOD PRESSURE: 115 MMHG | HEART RATE: 71 BPM | BODY MASS INDEX: 24.43 KG/M2 | TEMPERATURE: 97 F | WEIGHT: 180.4 LBS | HEIGHT: 72 IN

## 2023-06-22 DIAGNOSIS — G91.2 NPH (NORMAL PRESSURE HYDROCEPHALUS) (HCC): ICD-10-CM

## 2023-06-22 DIAGNOSIS — R26.89 POOR BALANCE: Primary | ICD-10-CM

## 2023-06-22 PROCEDURE — 1123F ACP DISCUSS/DSCN MKR DOCD: CPT | Performed by: NEUROMUSCULOSKELETAL MEDICINE, SPORTS MEDICINE

## 2023-06-22 PROCEDURE — 3074F SYST BP LT 130 MM HG: CPT | Performed by: NEUROMUSCULOSKELETAL MEDICINE, SPORTS MEDICINE

## 2023-06-22 PROCEDURE — 99212 OFFICE O/P EST SF 10 MIN: CPT | Performed by: NEUROMUSCULOSKELETAL MEDICINE, SPORTS MEDICINE

## 2023-06-22 PROCEDURE — 3078F DIAST BP <80 MM HG: CPT | Performed by: NEUROMUSCULOSKELETAL MEDICINE, SPORTS MEDICINE

## 2023-06-22 NOTE — PROGRESS NOTES
NEUROLOGY Follow up    Patient Name:  Sai Marsh  :   1940  Clinic Visit Date: 2023    I saw Mr. Sai Marsh  in the neurology clinic today for follow-up of symptoms of poor balance and cognitive dysfunction. 23-year-old right-handed gentleman with a history of BPH, hyperlipidemia, hyperglycemia, hypertension, CAD, cardiomyopathy with a permanent pacemaker, CT scan of the brain done on 4/3/2023 demonstrated mild hydrocephalus with differential diagnosis of normal pressure hydrocephalus. \"Physical therapy did not help my balance \". According to his wife he continues to have intermittent memory lapses and impaired balance. .  However , he has been able to function with ADLs without much difficulty. No headache, abnormal visual symptoms,  slurred speech facial numbness or focal extremity weakness. REVIEW OF SYSTEMS    Constitutional Weight changes: absent, change in appetite: absent Fatigue: present; Fevers : absent, Any recent hospitalizations:  absent   HEENT Ears: normal,  Visual disturbance: absent   Respiratory Shortness of breath: present, choking:  absent, Cough: absent, Snoring : absent   Cardiovascular Chest pain: present, Leg swelling :absent, palpitations : absent, fainting : absent   GI Constipation: absent, Diarrhea: absent, Swallowing change: absent    Urinary frequency: absent, Urinary urgency: present, Urinary incontinence: absent   Musculoskeletal Neck pain: absent, Back pain: absent, Stiffness: absent, Muscle pain: absent, Joint pain: absent, restless leg : present   Dermatological Hair loss: present, Skin changes: absent   Neurological Confusion: present, Trouble concentrating: absent, Seizures: absent;  Memory loss: present, balance problem: present, Dizziness: present, vertigo: absent, Weakness: present, Numbness absent, Tremor: absent, Spasm: absent, involuntary movement: absent, Speech difficulty: absent, Headache: present, Light sensitivity: absent   Psychiatric

## 2023-06-22 NOTE — PATIENT INSTRUCTIONS
SURVEY:    You may be receiving a survey from [employer] regarding your visit today. Please complete the survey to enable us to provide the highest quality of care to you and your family. If you cannot score us a very good on any question, please call the office to discuss how we could have made your experience a very good one. Thank you.

## 2023-06-26 ENCOUNTER — OFFICE VISIT (OUTPATIENT)
Dept: GASTROENTEROLOGY | Age: 83
End: 2023-06-26
Payer: MEDICARE

## 2023-06-26 VITALS
HEART RATE: 67 BPM | SYSTOLIC BLOOD PRESSURE: 112 MMHG | DIASTOLIC BLOOD PRESSURE: 68 MMHG | HEIGHT: 72 IN | TEMPERATURE: 97.6 F | WEIGHT: 180.6 LBS | RESPIRATION RATE: 18 BRPM | BODY MASS INDEX: 24.46 KG/M2

## 2023-06-26 DIAGNOSIS — D64.9 ANEMIA, UNSPECIFIED TYPE: Primary | ICD-10-CM

## 2023-06-26 PROCEDURE — 3074F SYST BP LT 130 MM HG: CPT | Performed by: INTERNAL MEDICINE

## 2023-06-26 PROCEDURE — 99202 OFFICE O/P NEW SF 15 MIN: CPT | Performed by: INTERNAL MEDICINE

## 2023-06-26 PROCEDURE — 3078F DIAST BP <80 MM HG: CPT | Performed by: INTERNAL MEDICINE

## 2023-06-26 PROCEDURE — 1123F ACP DISCUSS/DSCN MKR DOCD: CPT | Performed by: INTERNAL MEDICINE

## 2023-06-29 ENCOUNTER — OFFICE VISIT (OUTPATIENT)
Dept: UROLOGY | Age: 83
End: 2023-06-29
Payer: MEDICARE

## 2023-06-29 VITALS
BODY MASS INDEX: 24.38 KG/M2 | WEIGHT: 180 LBS | TEMPERATURE: 97.5 F | HEIGHT: 72 IN | SYSTOLIC BLOOD PRESSURE: 114 MMHG | DIASTOLIC BLOOD PRESSURE: 62 MMHG

## 2023-06-29 DIAGNOSIS — N39.41 URGE INCONTINENCE: ICD-10-CM

## 2023-06-29 DIAGNOSIS — R35.0 URINARY FREQUENCY: ICD-10-CM

## 2023-06-29 DIAGNOSIS — N39.44 NOCTURNAL ENURESIS: Primary | ICD-10-CM

## 2023-06-29 DIAGNOSIS — N13.8 BPH WITH OBSTRUCTION/LOWER URINARY TRACT SYMPTOMS: ICD-10-CM

## 2023-06-29 DIAGNOSIS — N40.1 BPH WITH OBSTRUCTION/LOWER URINARY TRACT SYMPTOMS: ICD-10-CM

## 2023-06-29 PROCEDURE — 99213 OFFICE O/P EST LOW 20 MIN: CPT | Performed by: UROLOGY

## 2023-06-29 PROCEDURE — 3078F DIAST BP <80 MM HG: CPT | Performed by: UROLOGY

## 2023-06-29 PROCEDURE — 3074F SYST BP LT 130 MM HG: CPT | Performed by: UROLOGY

## 2023-06-29 PROCEDURE — 51798 US URINE CAPACITY MEASURE: CPT | Performed by: UROLOGY

## 2023-06-29 PROCEDURE — 1123F ACP DISCUSS/DSCN MKR DOCD: CPT | Performed by: UROLOGY

## 2023-06-29 RX ORDER — TAMSULOSIN HYDROCHLORIDE 0.4 MG/1
0.4 CAPSULE ORAL DAILY
Qty: 90 CAPSULE | Refills: 3 | Status: SHIPPED | OUTPATIENT
Start: 2023-06-29

## 2023-06-29 ASSESSMENT — ENCOUNTER SYMPTOMS
COUGH: 0
SHORTNESS OF BREATH: 0
WHEEZING: 0
VOMITING: 0
BACK PAIN: 0
EYE REDNESS: 0
COLOR CHANGE: 0
NAUSEA: 0
CONSTIPATION: 0
ABDOMINAL PAIN: 0

## 2023-07-02 ASSESSMENT — ENCOUNTER SYMPTOMS
ANAL BLEEDING: 0
EYES NEGATIVE: 1
RESPIRATORY NEGATIVE: 1

## 2023-07-07 ENCOUNTER — HOSPITAL ENCOUNTER (EMERGENCY)
Age: 83
Discharge: HOME | End: 2023-07-07
Attending: EMERGENCY MEDICINE
Payer: MEDICARE

## 2023-07-07 ENCOUNTER — APPOINTMENT (OUTPATIENT)
Dept: GENERAL RADIOLOGY | Age: 83
End: 2023-07-07
Payer: MEDICARE

## 2023-07-07 VITALS
TEMPERATURE: 98 F | HEART RATE: 64 BPM | SYSTOLIC BLOOD PRESSURE: 136 MMHG | BODY MASS INDEX: 24.41 KG/M2 | DIASTOLIC BLOOD PRESSURE: 70 MMHG | WEIGHT: 180 LBS | OXYGEN SATURATION: 98 % | RESPIRATION RATE: 13 BRPM

## 2023-07-07 DIAGNOSIS — R05.1 ACUTE COUGH: Primary | ICD-10-CM

## 2023-07-07 LAB
ANION GAP SERPL CALCULATED.3IONS-SCNC: 6 MMOL/L (ref 9–17)
BUN SERPL-MCNC: 34 MG/DL (ref 8–23)
BUN/CREAT SERPL: 24 (ref 9–20)
CALCIUM SERPL-MCNC: 8.8 MG/DL (ref 8.6–10.4)
CHLORIDE SERPL-SCNC: 111 MMOL/L (ref 98–107)
CO2 SERPL-SCNC: 24 MMOL/L (ref 20–31)
CREAT SERPL-MCNC: 1.43 MG/DL (ref 0.7–1.2)
EKG ATRIAL RATE: 61 BPM
EKG P AXIS: -29 DEGREES
EKG P-R INTERVAL: 152 MS
EKG Q-T INTERVAL: 526 MS
EKG QRS DURATION: 178 MS
EKG QTC CALCULATION (BAZETT): 529 MS
EKG R AXIS: -88 DEGREES
EKG T AXIS: 99 DEGREES
EKG VENTRICULAR RATE: 61 BPM
ERYTHROCYTE [DISTWIDTH] IN BLOOD BY AUTOMATED COUNT: 14.8 % (ref 11.8–14.4)
GFR SERPL CREATININE-BSD FRML MDRD: 49 ML/MIN/1.73M2
GLUCOSE SERPL-MCNC: 175 MG/DL (ref 70–99)
HCT VFR BLD AUTO: 33.9 % (ref 40.7–50.3)
HGB BLD-MCNC: 11 G/DL (ref 13–17)
MCH RBC QN AUTO: 30.6 PG (ref 25.2–33.5)
MCHC RBC AUTO-ENTMCNC: 32.4 G/DL (ref 28.4–34.8)
MCV RBC AUTO: 94.2 FL (ref 82.6–102.9)
NRBC BLD-RTO: 0 PER 100 WBC
PLATELET # BLD AUTO: 214 K/UL (ref 138–453)
PMV BLD AUTO: 11.2 FL (ref 8.1–13.5)
POTASSIUM SERPL-SCNC: 4.7 MMOL/L (ref 3.7–5.3)
RBC # BLD AUTO: 3.6 M/UL (ref 4.21–5.77)
SODIUM SERPL-SCNC: 141 MMOL/L (ref 135–144)
TROPONIN I SERPL HS-MCNC: 27 NG/L (ref 0–22)
TROPONIN I SERPL HS-MCNC: 31 NG/L (ref 0–22)
WBC OTHER # BLD: 6.9 K/UL (ref 3.5–11.3)

## 2023-07-07 PROCEDURE — 93005 ELECTROCARDIOGRAM TRACING: CPT | Performed by: EMERGENCY MEDICINE

## 2023-07-07 PROCEDURE — 85027 COMPLETE CBC AUTOMATED: CPT

## 2023-07-07 PROCEDURE — 80048 BASIC METABOLIC PNL TOTAL CA: CPT

## 2023-07-07 PROCEDURE — 84484 ASSAY OF TROPONIN QUANT: CPT

## 2023-07-07 PROCEDURE — 99285 EMERGENCY DEPT VISIT HI MDM: CPT

## 2023-07-07 PROCEDURE — 93010 ELECTROCARDIOGRAM REPORT: CPT | Performed by: INTERNAL MEDICINE

## 2023-07-07 PROCEDURE — 71046 X-RAY EXAM CHEST 2 VIEWS: CPT

## 2023-07-07 RX ORDER — BENZONATATE 100 MG/1
100 CAPSULE ORAL 3 TIMES DAILY PRN
Qty: 30 CAPSULE | Refills: 0 | Status: SHIPPED | OUTPATIENT
Start: 2023-07-07 | End: 2023-07-11 | Stop reason: SDUPTHER

## 2023-07-07 ASSESSMENT — PAIN - FUNCTIONAL ASSESSMENT: PAIN_FUNCTIONAL_ASSESSMENT: NONE - DENIES PAIN

## 2023-07-07 ASSESSMENT — LIFESTYLE VARIABLES
HOW MANY STANDARD DRINKS CONTAINING ALCOHOL DO YOU HAVE ON A TYPICAL DAY: 1 OR 2
HOW OFTEN DO YOU HAVE A DRINK CONTAINING ALCOHOL: MONTHLY OR LESS

## 2023-07-07 NOTE — DISCHARGE INSTRUCTIONS
Take tessalon pearls to help with cough, return to Er for worsening shortness of breath or persistent cough. Follow up with Danial Cervantes in 3 days.

## 2023-07-07 NOTE — ED PROVIDER NOTES
atraumatic. Eyes:      General:         Right eye: No discharge. Left eye: No discharge. Extraocular Movements: Extraocular movements intact. Pupils: Pupils are equal, round, and reactive to light. Cardiovascular:      Rate and Rhythm: Normal rate. Pulses: Normal pulses. Heart sounds: No murmur heard. No friction rub. No gallop. Pulmonary:      Effort: Pulmonary effort is normal. No respiratory distress. Breath sounds: No stridor. No wheezing, rhonchi or rales. Chest:      Chest wall: No tenderness. Musculoskeletal:         General: No swelling or tenderness. Right lower leg: No edema. Left lower leg: No edema. Neurological:      General: No focal deficit present. Mental Status: He is alert and oriented to person, place, and time. DDX/DIAGNOSTIC RESULTS / EMERGENCY DEPARTMENT COURSE / MDM     Medical Decision Making  Patient with cough for 4 days, with productive sputum wife's concern for pneumonia. Patient denies any chest pain or shortness of breath overall well-appearing he is anticoagulated on Xarelto. No lower extremity edema or calf tenderness to palpation. No other URI symptoms. Will check chest x-ray, and cardiac labs. To rule out any ACS as cough could possibly be related to an anginal equivalent. Low concern for PE as patient is already anticoagulated. Amount and/or Complexity of Data Reviewed  Independent Historian: spouse  Labs: ordered. Radiology: ordered. ECG/medicine tests: ordered. Risk  Prescription drug management.         EKG  EKG shows atrial sensed and ventricular paced rhythm,    All EKG's are interpreted by the Emergency Department Physician who either signs or Co-signs this chart in the absence of a cardiologist.    EMERGENCY DEPARTMENT COURSE:  Patient continues to be well-appearing updated them on results of laboratory testing, downtrending troponin, which is slightly elevated due to underlying kidney

## 2023-07-17 ENCOUNTER — TELEPHONE (OUTPATIENT)
Dept: UROLOGY | Age: 83
End: 2023-07-17

## 2023-07-24 ENCOUNTER — TELEPHONE (OUTPATIENT)
Dept: UROLOGY | Age: 83
End: 2023-07-24

## 2023-07-24 RX ORDER — SOLIFENACIN SUCCINATE 5 MG/1
5 TABLET, FILM COATED ORAL DAILY
Qty: 90 TABLET | Refills: 0 | Status: SHIPPED | OUTPATIENT
Start: 2023-07-24 | End: 2023-10-22

## 2023-07-26 ENCOUNTER — OFFICE VISIT (OUTPATIENT)
Dept: VASCULAR SURGERY | Age: 83
End: 2023-07-26
Payer: MEDICARE

## 2023-07-26 VITALS
SYSTOLIC BLOOD PRESSURE: 94 MMHG | HEART RATE: 92 BPM | TEMPERATURE: 97.5 F | HEIGHT: 72 IN | DIASTOLIC BLOOD PRESSURE: 56 MMHG | BODY MASS INDEX: 23.96 KG/M2 | WEIGHT: 176.9 LBS | RESPIRATION RATE: 16 BRPM

## 2023-07-26 DIAGNOSIS — M79.604 LEG PAIN, BILATERAL: Primary | ICD-10-CM

## 2023-07-26 DIAGNOSIS — M79.605 LEG PAIN, BILATERAL: Primary | ICD-10-CM

## 2023-07-26 PROCEDURE — 3074F SYST BP LT 130 MM HG: CPT | Performed by: INTERNAL MEDICINE

## 2023-07-26 PROCEDURE — 99205 OFFICE O/P NEW HI 60 MIN: CPT | Performed by: INTERNAL MEDICINE

## 2023-07-26 PROCEDURE — 1123F ACP DISCUSS/DSCN MKR DOCD: CPT | Performed by: INTERNAL MEDICINE

## 2023-07-26 PROCEDURE — 3078F DIAST BP <80 MM HG: CPT | Performed by: INTERNAL MEDICINE

## 2023-07-26 NOTE — PROGRESS NOTES
Charlotte Luna was seen on 7/26/2023 for   Chief Complaint   Patient presents with    New Patient     New patient-here for claudication. Patient states his legs are \"sore all the time. \" States he is having trouble walking. Alexandrea Beasley                             REVIEW OF SYSTEMS    Constitutional Weight: present, A, Fatigue: present Fever: absent   HEENT Ears: pain,Visual disturbance: absent Sore throat: absent,    Respiratory Shortness of breath: absent, Cough: absent;, Snoring: absent   Cardivascular Chest pain: present,  Leg pain: present,Leg swelling:absent, Non-healing wound:absent    GI Diarrhea: absent, Abdominal Pain: absent    Urinary frequency: present, Urinary incontinence: absent   Musculoskeletal Neck pain: absent, Back pain: absent, Restless Leg:present     Dermatological Hair loss: absent, Skin changes: absent   Neurological  Sudden Loss of Vision in one eye:absent, Slurred Speech:absent, Weakness on one side of body: absent,Headache: absent   Psychiatric Anxiety: absent, Depression: absent   Hematologic Abnormal bleeding: absent,
History of echocardiogram 11/23/2018    EF 15%. Mildly increased LV wall thickness w/ severely increased LA cavity size. Severe global hypokinesis w/ no segmental variation. LA is moderately dilated w/ LA volume index of 35. Mild mitral and tricuspid regurg. Evidence of moderate diastolic dysfunction seen. Hx of blood clots     Hx of left heart catheterization by ventricular puncture 04/19/2013    LAD-20-30%, LCX & RCA-10-20%, Normal LVEDP,     Hyperlipidemia     Hypertension     Mantle cell lymphoma, unspecified site, extranodal and solid organ sites     Non Hodgkin's lymphoma ([address])     Nonischemic dilated cardiomyopathy (HCC)     Pain in joint, lower leg     Pain in limb     S/P cardiac catheterization 12/13/2010    No significant coronary disease. EF estimated at 35%. Thoracic or lumbosacral neuritis or radiculitis, unspecified     Unspecified acute reaction to stress      Current Outpatient Medications   Medication Sig Dispense Refill    ALPRAZolam (XANAX PO) Take by mouth as needed      solifenacin (VESICARE) 5 MG tablet Take 1 tablet by mouth daily 90 tablet 0    fluticasone (FLONASE) 50 MCG/ACT nasal spray 1 spray by Each Nostril route in the morning and at bedtime 32 g 0    loratadine (CLARITIN) 10 MG tablet Take 1 tablet by mouth daily 30 tablet 1    tamsulosin (FLOMAX) 0.4 MG capsule Take 1 capsule by mouth daily 90 capsule 3    pantoprazole (PROTONIX) 40 MG tablet Take 1 tablet by mouth in the morning and at bedtime 180 tablet 1    nitroGLYCERIN (NITROSTAT) 0.4 MG SL tablet DISSOLVE 1 TABLET UNDER THE TONGUE EVERY 5 MINUTES AS  NEEDED FOR CHEST PAIN. MAX  OF 3 TABLETS IN 15 MINUTES. CALL 911 IF PAIN PERSISTS.  100 tablet 1    isosorbide mononitrate (IMDUR) 30 MG extended release tablet Take 1 tablet by mouth daily 90 tablet 3    lisinopril (PRINIVIL;ZESTRIL) 2.5 MG tablet Take 1 tablet by mouth daily 90 tablet 3    famotidine (PEPCID) 10 MG tablet Take 1 tablet by mouth 2 times daily      ferrous

## 2023-07-26 NOTE — PATIENT INSTRUCTIONS
SURVEY:    You may be receiving a survey from [employer] regarding your visit today. Please complete the survey to enable us to provide the highest quality of care to you and your family. If you cannot score us a very good on any question, please call the office to discuss how we could have made your experience a very good one. Thank you.

## 2023-08-07 RX ORDER — SOLIFENACIN SUCCINATE 5 MG/1
5 TABLET, FILM COATED ORAL DAILY
Qty: 30 TABLET | Refills: 11 | OUTPATIENT
Start: 2023-08-07 | End: 2023-11-05

## 2023-08-08 ENCOUNTER — NURSE ONLY (OUTPATIENT)
Dept: CARDIOLOGY | Age: 83
End: 2023-08-08
Payer: MEDICARE

## 2023-08-08 DIAGNOSIS — Z95.810 ICD (IMPLANTABLE CARDIOVERTER-DEFIBRILLATOR) IN PLACE: Primary | ICD-10-CM

## 2023-08-08 DIAGNOSIS — I42.8 NON-ISCHEMIC CARDIOMYOPATHY (HCC): ICD-10-CM

## 2023-08-08 PROCEDURE — 93295 DEV INTERROG REMOTE 1/2/MLT: CPT | Performed by: FAMILY MEDICINE

## 2023-09-14 RX ORDER — SOLIFENACIN SUCCINATE 5 MG/1
5 TABLET, FILM COATED ORAL DAILY
Qty: 60 TABLET | Refills: 5 | Status: SHIPPED | OUTPATIENT
Start: 2023-09-14 | End: 2023-12-13

## 2023-09-24 DIAGNOSIS — E11.21 TYPE 2 DIABETES MELLITUS WITH DIABETIC NEPHROPATHY, WITHOUT LONG-TERM CURRENT USE OF INSULIN (HCC): ICD-10-CM

## 2023-09-25 RX ORDER — METOPROLOL SUCCINATE 50 MG/1
50 TABLET, EXTENDED RELEASE ORAL DAILY
Qty: 100 TABLET | Refills: 2 | Status: SHIPPED | OUTPATIENT
Start: 2023-09-25

## 2023-09-28 NOTE — PROGRESS NOTES
Patient states they received from [address] [facility] office; NPO and pre-op medication instructions, as well as eye drops and instructions. Reminded to bring eye drops as well as the bag they received the day of procedure for further instructions. Patient verbalizes understanding. Patient will contact Dr. Tonia Salmon office for clarification on Xarelto dosing prior to surgery.

## 2023-10-06 ENCOUNTER — ANESTHESIA EVENT (OUTPATIENT)
Dept: OPERATING ROOM | Age: 83
End: 2023-10-06
Payer: MEDICARE

## 2023-10-08 PROBLEM — H25.811 COMBINED FORMS OF AGE-RELATED CATARACT OF RIGHT EYE: Chronic | Status: ACTIVE | Noted: 2023-10-08

## 2023-10-09 ENCOUNTER — ANESTHESIA (OUTPATIENT)
Dept: OPERATING ROOM | Age: 83
End: 2023-10-09
Payer: MEDICARE

## 2023-10-09 ENCOUNTER — HOSPITAL ENCOUNTER (OUTPATIENT)
Age: 83
Setting detail: OUTPATIENT SURGERY
Discharge: HOME | End: 2023-10-09
Attending: OPHTHALMOLOGY | Admitting: OPHTHALMOLOGY
Payer: MEDICARE

## 2023-10-09 VITALS
DIASTOLIC BLOOD PRESSURE: 40 MMHG | HEART RATE: 74 BPM | TEMPERATURE: 97.2 F | SYSTOLIC BLOOD PRESSURE: 103 MMHG | WEIGHT: 185 LBS | RESPIRATION RATE: 16 BRPM | BODY MASS INDEX: 25.06 KG/M2 | HEIGHT: 72 IN | OXYGEN SATURATION: 97 %

## 2023-10-09 PROBLEM — H25.811 COMBINED FORMS OF AGE-RELATED CATARACT OF RIGHT EYE: Chronic | Status: RESOLVED | Noted: 2023-10-08 | Resolved: 2023-10-09

## 2023-10-09 PROCEDURE — 2500000003 HC RX 250 WO HCPCS: Performed by: OPHTHALMOLOGY

## 2023-10-09 PROCEDURE — 2580000003 HC RX 258: Performed by: NURSE ANESTHETIST, CERTIFIED REGISTERED

## 2023-10-09 PROCEDURE — 7100000010 HC PHASE II RECOVERY - FIRST 15 MIN: Performed by: OPHTHALMOLOGY

## 2023-10-09 PROCEDURE — 6360000002 HC RX W HCPCS: Performed by: NURSE ANESTHETIST, CERTIFIED REGISTERED

## 2023-10-09 PROCEDURE — 3700000001 HC ADD 15 MINUTES (ANESTHESIA): Performed by: OPHTHALMOLOGY

## 2023-10-09 PROCEDURE — 2709999900 HC NON-CHARGEABLE SUPPLY: Performed by: OPHTHALMOLOGY

## 2023-10-09 PROCEDURE — 7100000011 HC PHASE II RECOVERY - ADDTL 15 MIN: Performed by: OPHTHALMOLOGY

## 2023-10-09 PROCEDURE — 2720000010 HC SURG SUPPLY STERILE: Performed by: OPHTHALMOLOGY

## 2023-10-09 PROCEDURE — 6370000000 HC RX 637 (ALT 250 FOR IP): Performed by: OPHTHALMOLOGY

## 2023-10-09 PROCEDURE — 3700000000 HC ANESTHESIA ATTENDED CARE: Performed by: OPHTHALMOLOGY

## 2023-10-09 PROCEDURE — 6360000002 HC RX W HCPCS: Performed by: OPHTHALMOLOGY

## 2023-10-09 PROCEDURE — 3600000003 HC SURGERY LEVEL 3 BASE: Performed by: OPHTHALMOLOGY

## 2023-10-09 PROCEDURE — V2632 POST CHMBR INTRAOCULAR LENS: HCPCS | Performed by: OPHTHALMOLOGY

## 2023-10-09 PROCEDURE — 3600000013 HC SURGERY LEVEL 3 ADDTL 15MIN: Performed by: OPHTHALMOLOGY

## 2023-10-09 DEVICE — LENS INTRAOCULAR BCNVX 20.5+ DIOPT 6X12.5 MM ACRYL ENVISTA: Type: IMPLANTABLE DEVICE | Site: EYE | Status: FUNCTIONAL

## 2023-10-09 RX ORDER — MIDAZOLAM HYDROCHLORIDE 1 MG/ML
INJECTION INTRAMUSCULAR; INTRAVENOUS PRN
Status: DISCONTINUED | OUTPATIENT
Start: 2023-10-09 | End: 2023-10-09 | Stop reason: SDUPTHER

## 2023-10-09 RX ORDER — SODIUM CHLORIDE 0.9 % (FLUSH) 0.9 %
5-40 SYRINGE (ML) INJECTION EVERY 12 HOURS SCHEDULED
Status: DISCONTINUED | OUTPATIENT
Start: 2023-10-09 | End: 2023-10-09 | Stop reason: HOSPADM

## 2023-10-09 RX ORDER — PHENYLEPHRINE HYDROCHLORIDE 25 MG/ML
1 SOLUTION/ DROPS OPHTHALMIC SEE ADMIN INSTRUCTIONS
Status: DISCONTINUED | OUTPATIENT
Start: 2023-10-09 | End: 2023-10-09 | Stop reason: HOSPADM

## 2023-10-09 RX ORDER — PROPARACAINE HYDROCHLORIDE 5 MG/ML
1 SOLUTION/ DROPS OPHTHALMIC SEE ADMIN INSTRUCTIONS
Status: DISCONTINUED | OUTPATIENT
Start: 2023-10-09 | End: 2023-10-09 | Stop reason: HOSPADM

## 2023-10-09 RX ORDER — SODIUM CHLORIDE 9 MG/ML
INJECTION, SOLUTION INTRAVENOUS PRN
Status: DISCONTINUED | OUTPATIENT
Start: 2023-10-09 | End: 2023-10-09 | Stop reason: HOSPADM

## 2023-10-09 RX ORDER — FENTANYL CITRATE 50 UG/ML
INJECTION, SOLUTION INTRAMUSCULAR; INTRAVENOUS PRN
Status: DISCONTINUED | OUTPATIENT
Start: 2023-10-09 | End: 2023-10-09 | Stop reason: SDUPTHER

## 2023-10-09 RX ORDER — SODIUM CHLORIDE 0.9 % (FLUSH) 0.9 %
5-40 SYRINGE (ML) INJECTION PRN
Status: DISCONTINUED | OUTPATIENT
Start: 2023-10-09 | End: 2023-10-09 | Stop reason: HOSPADM

## 2023-10-09 RX ORDER — TETRACAINE HYDROCHLORIDE 5 MG/ML
1 SOLUTION OPHTHALMIC SEE ADMIN INSTRUCTIONS
Status: DISCONTINUED | OUTPATIENT
Start: 2023-10-09 | End: 2023-10-09 | Stop reason: HOSPADM

## 2023-10-09 RX ORDER — SODIUM CHLORIDE 9 MG/ML
INJECTION, SOLUTION INTRAVENOUS CONTINUOUS
Status: DISCONTINUED | OUTPATIENT
Start: 2023-10-09 | End: 2023-10-09 | Stop reason: HOSPADM

## 2023-10-09 RX ORDER — SODIUM CHLORIDE, SODIUM LACTATE, POTASSIUM CHLORIDE, CALCIUM CHLORIDE 600; 310; 30; 20 MG/100ML; MG/100ML; MG/100ML; MG/100ML
INJECTION, SOLUTION INTRAVENOUS CONTINUOUS
Status: DISCONTINUED | OUTPATIENT
Start: 2023-10-09 | End: 2023-10-09 | Stop reason: HOSPADM

## 2023-10-09 RX ORDER — TROPICAMIDE 10 MG/ML
1 SOLUTION/ DROPS OPHTHALMIC SEE ADMIN INSTRUCTIONS
Status: DISCONTINUED | OUTPATIENT
Start: 2023-10-09 | End: 2023-10-09 | Stop reason: HOSPADM

## 2023-10-09 RX ORDER — TETRACAINE HYDROCHLORIDE 5 MG/ML
SOLUTION OPHTHALMIC PRN
Status: DISCONTINUED | OUTPATIENT
Start: 2023-10-09 | End: 2023-10-09 | Stop reason: ALTCHOICE

## 2023-10-09 RX ORDER — LIDOCAINE HYDROCHLORIDE 10 MG/ML
INJECTION, SOLUTION EPIDURAL; INFILTRATION; INTRACAUDAL; PERINEURAL PRN
Status: DISCONTINUED | OUTPATIENT
Start: 2023-10-09 | End: 2023-10-09 | Stop reason: ALTCHOICE

## 2023-10-09 RX ADMIN — SODIUM CHLORIDE, POTASSIUM CHLORIDE, SODIUM LACTATE AND CALCIUM CHLORIDE: 600; 310; 30; 20 INJECTION, SOLUTION INTRAVENOUS at 08:36

## 2023-10-09 RX ADMIN — MIDAZOLAM 1 MG: 1 INJECTION INTRAMUSCULAR; INTRAVENOUS at 09:15

## 2023-10-09 RX ADMIN — PROPARACAINE HYDROCHLORIDE 1 DROP: 5 SOLUTION/ DROPS OPHTHALMIC at 08:28

## 2023-10-09 RX ADMIN — PROPARACAINE HYDROCHLORIDE 1 DROP: 5 SOLUTION/ DROPS OPHTHALMIC at 08:37

## 2023-10-09 RX ADMIN — PHENYLEPHRINE HYDROCHLORIDE 1 DROP: 25 SOLUTION/ DROPS OPHTHALMIC at 08:22

## 2023-10-09 RX ADMIN — PHENYLEPHRINE HYDROCHLORIDE 1 DROP: 25 SOLUTION/ DROPS OPHTHALMIC at 08:37

## 2023-10-09 RX ADMIN — TROPICAMIDE 1 DROP: 10 SOLUTION/ DROPS OPHTHALMIC at 08:37

## 2023-10-09 RX ADMIN — PROPARACAINE HYDROCHLORIDE 1 DROP: 5 SOLUTION/ DROPS OPHTHALMIC at 08:22

## 2023-10-09 RX ADMIN — PHENYLEPHRINE HYDROCHLORIDE 1 DROP: 25 SOLUTION/ DROPS OPHTHALMIC at 08:28

## 2023-10-09 RX ADMIN — TROPICAMIDE 1 DROP: 10 SOLUTION/ DROPS OPHTHALMIC at 08:22

## 2023-10-09 RX ADMIN — FENTANYL CITRATE 25 MCG: 50 INJECTION INTRAMUSCULAR; INTRAVENOUS at 09:13

## 2023-10-09 RX ADMIN — TROPICAMIDE 1 DROP: 10 SOLUTION/ DROPS OPHTHALMIC at 08:28

## 2023-10-09 RX ADMIN — MIDAZOLAM 1 MG: 1 INJECTION INTRAMUSCULAR; INTRAVENOUS at 09:19

## 2023-10-09 ASSESSMENT — PAIN DESCRIPTION - LOCATION
LOCATION: BACK

## 2023-10-09 ASSESSMENT — PAIN SCALES - GENERAL
PAINLEVEL_OUTOF10: 6

## 2023-10-09 ASSESSMENT — PAIN - FUNCTIONAL ASSESSMENT: PAIN_FUNCTIONAL_ASSESSMENT: 0-10

## 2023-10-09 ASSESSMENT — PAIN DESCRIPTION - PAIN TYPE
TYPE: CHRONIC PAIN

## 2023-10-09 NOTE — PROGRESS NOTES
Patient verbalizes readiness for discharge. Discharge instructions given to patient and responsible adult, answered all questions, and verbalized understanding of discharge instructions. Discharge Criteria    Inpatients must meet Criteria 1 through 7. All other patients are either YES or N/A. If a NO is chosen then Anesthesia or Surgeon must be notified. 1.  Minimum 30 minutes after last dose of sedative medication. Yes      2. Systolic BP between 90 - 459. Diastolic BP between 60 - 90. Yes      3. Pulse between 60 - 120    Yes      4. Respirations between 8 - 25. Yes      5. SpO2 92% - 100%. Yes      6. Able to cough and swallow or return to baseline function. Yes      7. Alert and oriented or return to baseline mental status. Yes      8. Demonstrates controlled, coordinated movements, ambulates with steady gait, or return to baseline activity function. Yes      9. Minimal or no pain or nausea, or at a level tolerable and acceptable to patient. Yes      10. Takes and retains oral fluids as allowed. Yes      11. Procedural / perioperative site stable. Minimal or no bleeding. Yes          12. If GI endoscopy procedure, minimal or no abdominal distention or passing flatus. N/A      13. Written discharge instructions and emergency telephone number provided. Yes      14. Accompanied by a responsible adult.     Yes

## 2023-10-09 NOTE — DISCHARGE INSTRUCTIONS
lifting, is not harmful. Please phone if any problems arise during the healing period. The office number is [phone]. Take surgery bag and all eye drops to Dr. Goldy Loyd office tomorrow at 8:50am.    Nighat Rodgers may resume your normal diet.     Start your eye drops tomorrow after your post-op appointment:    Ofloxacin/Polytrim one drop to the operated eye 4 times daily    Prednisolone one drop to the operated eye 4 times daily

## 2023-10-09 NOTE — OP NOTE
after the lens was implanted. There was no stitch placed to close this temporal posterior corneal incision. Estimated blood loss was < 1.0 ml. Vivien Wisdom.  Morris Stephens, DO

## 2023-10-09 NOTE — ANESTHESIA PRE PROCEDURE
Department of Anesthesiology  Preprocedure Note       Name:  Jose Sales   Age:  81 y.o.  :  1940                                          MRN:  [id number]         Date:  10/9/2023      Surgeon: Jean Moyer):  Roseann Cook DO    Procedure: Procedure(s):  EYE CATARACT EMULSIFICATION IOL IMPLANT    Medications prior to admission:   Prior to Admission medications    Medication Sig Start Date End Date Taking? Authorizing Provider   metoprolol succinate (TOPROL XL) 50 MG extended release tablet TAKE 1 TABLET BY MOUTH  DAILY 23   Alexy De Jesus PA-C   omeprazole (PRILOSEC) 20 MG delayed release capsule TAKE 1 CAPSULE BY MOUTH IN  THE MORNING AND AT BEDTIME 23   JAI Lombardo CNP   solifenacin (VESICARE) 5 MG tablet TAKE 1 TABLET BY MOUTH DAILY 23  Hector Mosqueda PA-C   isosorbide mononitrate (IMDUR) 30 MG extended release tablet Take 1 tablet by mouth daily 23  JAI Lombardo CNP   ALPRAZolam Deneise Arley) 1 MG tablet Take 1.5 tablets by mouth nightly as needed for Sleep or Anxiety for up to 90 days. Max Daily Amount: 1.5 mg 23  Ale Vidal MD   tamsulosin [facility]) 0.4 MG capsule TAKE 1 CAPSULE BY MOUTH  DAILY 8/3/23   JAI Lombardo CNP   lisinopril (PRINIVIL;ZESTRIL) 2.5 MG tablet TAKE 1 TABLET BY MOUTH  DAILY 8/3/23   JAI Lombardo CNP   fluticasone (FLONASE) 50 MCG/ACT nasal spray SPRAY ONE SPRAY IN EACH NOSTRIL EVERY MORNING AND EVERY NIGHT AT BEDTIME  Patient not taking: Reported on 2023   JAI Lombardo CNP   pantoprazole (PROTONIX) 40 MG tablet Take 1 tablet by mouth in the morning and at bedtime 5/15/23   Ale Vidal MD   nitroGLYCERIN (NITROSTAT) 0.4 MG SL tablet DISSOLVE 1 TABLET UNDER THE TONGUE EVERY 5 MINUTES AS  NEEDED FOR CHEST PAIN. MAX  OF 3 TABLETS IN 15 MINUTES. CALL 911 IF PAIN PERSISTS.   Patient not taking: Reported on 2023 5/15/23

## 2023-10-09 NOTE — H&P
JAI Petty - CNP     Scheduled Meds:  Continuous Infusions:  PRN Meds:. Allergies   Allergen Reactions    Norco [Hydrocodone-Acetaminophen] Other (See Comments)     Confusion, mental status change    Seasonal        There were no vitals filed for this visit. 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 ASA Score-2      Plan:   1. Risks, benefits, alternatives to surgery discussed with the patient and family. 2. All questions were answered to their satisfaction. 3. Ok to proceed with surgery as planned.     Supriya Stoddard, , DO

## 2023-10-09 NOTE — PROGRESS NOTES
Zuleyka Dedham am scribing for and in the presence of Fide Bloom. Dolores Brown MD, MS, F.A.C.C..    Patient: Kyara Beck  :   Date of Visit: 2021    REASON FOR VISIT / CONSULTATION: Follow-up (hx: CHF. pt is here for 3 week f/u ICD battery change. a few episodes of CP, he said its the same as always. he gets dizzy when he bends over Denies: SOB, palps)      Dear JAI Ta CNP,    I had the pleasure of seeing your patient Kyara Beck in follow up today status post ICD RV lead extraction and new RV lead placement. As you know, Mr. Angel Luis Collins is a [de-identified] y.o. male with a history of systolic heart failure with a reduced EF of around 35% that recently declined to around 15% leading to a biventricular ICD which required placement of a epicardial lead due to a history of diaphragmatic pacing with his previous LV lead. Also it was found that his right ventricular ICD cable appeared to have significantly fluctuating impedances with sitting and standing ranging from the seventies to the 140's as well as a possible malfunctioning RV ICD coil. 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 Therefore in  he underwent an RV lead extraction with RV lead replacement. In , we did a home ICD check which showed 28 runs of paroxysmal atrial fibrillation, the longest being 13 minutes in duration. His echocardiogram done on 3/1/2017 showed an ejection fraction of 5-10%. Echocardiogram done on 2018 showed EF of 15%. Mildly increased LV wall thickness with a severely increased LV cavity size. Severe global hypokineses. LA is moderately dilated. Mild mitral & tricuspid regurgitation. Evidence of moderate diastolic dysfunction is seen. Echocardiogram done on 2020 showed an EF of 15-20%. LV cavity size is moderately enlarged.  LV wall thickness is mildly increased. Severe global hypokinesis. Left atrium is mildly dilated. Mild mitral regurgitation. Moderate diastolic dysfunction is seen. ICD battery replaced on 9/23/2021. Since the last time I saw Mr. Adelaida Mims, he reports doing relatively well. He recently had his battery replaced in his ICD. He has had a little bit of chest wall tenderness from the replacement. Denies any chest pain. He denies having any shortness of breath, lightheaded/dizziness or palpitations. He denies any bleeding problems, bowel issues, problems with his medications or any other concerns at this time. No nausea or vomiting. No cough, fever or chills. Exercise Tolerance: Mr. Adelaida Mims reports that he has a fairly good exercise tolerance. He does take a Nitro in the morning before he goes and he doesn't have chest pain but says that since he had his ICD replaced he has not had to take any nitro. Past Medical History:   Diagnosis Date    Abnormal echocardiogram 5/2/12    EF 15%. mildly dilated LV cavity.  Abnormal resting ECG findings 4/25/12    atrial sensed, ventricular paced rhythm at 73 beats per minute. Wide QRS of 183 ms.  Biventricular ICD (implantable cardiac defibrillator) in place 8/26/11    [facility]    Biventricular ICD (implantable cardioverter-defibrillator) in place 10/29/2015    [employer]tronic- Bi V ICD (Battery change)    Biventricular pacemaker check 5/12 1/13    LV lead turned of 1/13 due to diaphragmatic pacing.  CAD (coronary artery disease)     Diabetes mellitus ([address]     type 2    Esophageal reflux     History of echocardiogram 11/23/2018    EF 15%. Mildly increased LV wall thickness w/ severely increased LA cavity size. Severe global hypokinesis w/ no segmental variation. LA is moderately dilated w/ LA volume index of 35. Mild mitral and tricuspid regurg. Evidence of moderate diastolic dysfunction seen.     Hx of blood clots     Hx of left heart catheterization by ventricular puncture 2013    LAD-20-30%, LCX & RCA-10-20%, Normal LVEDP,     Hyperlipidemia     Hypertension     Mantle cell lymphoma, unspecified site, extranodal and solid organ sites     Nonischemic dilated cardiomyopathy (HCC)     Pain in joint, lower leg     Pain in limb     S/P cardiac catheterization 12/13/10    No significant coronary disease. EF estimated at 35%.  Thoracic or lumbosacral neuritis or radiculitis, unspecified     Unspecified acute reaction to stress        CURRENT ALLERGIES: Seasonal REVIEW OF SYSTEMS: 14 systems were reviewed. Pertinent positives and negatives as above, all else negative. Past Surgical History:   Procedure Laterality Date    BLADDER SURGERY      CARDIAC CATHETERIZATION      Patient states has had 3 in [place] and one in [facility] last one being in [place] 3 years ago Dr. Karolina Fierro COLONOSCOPY  10/15/2015    -polyps,diverticulosis,hemorrhoids    DIAGNOSTIC CARDIAC CATH LAB PROCEDURE  2013    ELBOW SURGERY      FOOT SURGERY      LOBECTOMY N/A 2017    LEFT MINI THORCOTOMY WITH LEFT VENTRICULAR LEAD PLACEMENT performed by Valdez Gudino MD at [address] Left 2021    Dr Graeme Sam/ICD/Pacemaker generator change    PACEMAKER PLACEMENT      SKIN CANCER EXCISION  2013    basal cell on chest.    THORACOTOMY  2017    Left Mini    UPPER GASTROINTESTINAL ENDOSCOPY  10/15/2015    -bx    Social History:  Social History     Tobacco Use    Smoking status: Former Smoker     Packs/day: 0.00     Years: 30.00     Pack years: 0.00     Types: Cigarettes     Quit date: 2008     Years since quittin.4    Smokeless tobacco: Never Used    Tobacco comment: Patient only smoked when he drank beer   Substance Use Topics    Alcohol use:  Yes     Alcohol/week: 0.0 standard drinks Types: 3 - 4 Cans of beer per week     Comment: 3 per week    Drug use: No        CURRENT MEDICATIONS:  Outpatient Medications Marked as Taking for the 10/19/21 encounter (Office Visit) with Jena Haider MD   Medication Sig Dispense Refill    neomycin-polymyxin-dexameth (MAXITROL) 3.5-44891-2.1 ophthalmic suspension Place 1 drop into both eyes 4 times daily      ALPRAZolam (XANAX) 1 MG tablet Take 1 tablet by mouth nightly as needed for Sleep or Anxiety for up to 90 days. 90 tablet 0    simvastatin (ZOCOR) 40 MG tablet Take 1 tablet by mouth nightly 90 tablet 1    rivaroxaban (XARELTO) 15 MG TABS tablet Take 1 tablet by mouth Daily with supper 90 tablet 3    lisinopril (PRINIVIL;ZESTRIL) 2.5 MG tablet TAKE 1 TABLET BY MOUTH  DAILY 90 tablet 1    glimepiride (AMARYL) 4 MG tablet Take 1 tablet by mouth 2 times daily TAKE 1 TABLET BY MOUTH  TWICE DAILY 180 tablet 1    finasteride (PROSCAR) 5 MG tablet Take 1 tablet by mouth daily TAKE 1 TABLET BY MOUTH  DAILY 90 tablet 3    nitroGLYCERIN (NITROSTAT) 0.4 MG SL tablet Place 1 tablet under the tongue every 5 minutes as needed for Chest pain up to max of 3 total doses. If no relief after 1 dose, call 911. 100 tablet 3    prednisoLONE acetate (PRED FORTE) 1 % ophthalmic suspension Place 1 drop into both eyes daily      tamsulosin (FLOMAX) 0.4 MG capsule TAKE 1 CAPSULE EVERY DAY 90 capsule 3    Lancets MISC 1 each by Does not apply route daily Dx--E11.9 Non-insulin dependent 100 each 5    blood glucose test strips (ASCENSIA AUTODISC VI;ONE TOUCH ULTRA TEST VI) strip 1 each by In Vitro route daily As needed. ONE TOUCH VERIO.   DX--E11.9 NON-INSULIN DEPENDENT 100 each 3    Blood Glucose Monitoring Suppl (ONE TOUCH ULTRA 2) w/Device KIT 1 kit by Does not apply route daily 1 kit 0    blood glucose monitor strips One Touch Ultra 2   Test qd  Dx--E11.9 non-insulin dependent 100 strip 5    blood glucose test strips (ASCENSIA AUTODISC VI;ONE TOUCH ULTRA TEST VI) strip 1 each by In Vitro route 2 times daily ON CALL EXPRESS TEST STRIPS DX--E11.9 NON-INSULIN DEPENDENT 100 each 5    metoprolol succinate (TOPROL XL) 50 MG extended release tablet TAKE 1 TABLET BY MOUTH  DAILY 90 tablet 3    NONFORMULARY daily Ibgard for IBS        FAMILY HISTORY: family history includes Cancer in his father, sister, and sister; Diabetes in his brother; Heart Disease in his mother. Physical Examination:     /72 (Site: Right Upper Arm, Position: Sitting, Cuff Size: Medium Adult)   Pulse 64   Resp 18   Ht 6' 0.01\" (1.829 m)   Wt 185 lb (83.9 kg)   SpO2 98%   BMI 25.08 kg/m²  Body mass index is 25.08 kg/m². Constitutional: He appeared oriented to person and place. He appears well-developed and well-nourished. In no acute distress. HEENT: Normocephalic and atraumatic. No JVD present. Carotid bruit is not present. No mass and no thyromegaly present. No lymphadenopathy noted. Cardiovascular: Normal rate, regular rhythm, normal heart sounds. Exam reveals no gallop and no friction rubs. 2/6 systolic murmur, 2nd intercostal space on the RIGHT just lateral to the sternum  Pulmonary/Chest: Effort normal and breath sounds normal. No respiratory distress. He has no wheezes, rhonchi or rales. Abdominal: Soft, non-tender. He exhibits no organomegaly, mass or bruit. Extremities: Trace. No cyanosis or clubbing. 2+ radial and carotid pulses. Distal extremity pulses: 2+ bilaterally. Neurological: Alertness and orientation as per Constitutional exam. No evidence of gross cranial nerve deficit. Coordination appeared normal.   Skin: Skin is warm and dry. There is no rash or diaphoresis. Psychiatric: He has a normal mood and affect.  His speech is normal and behavior is normal.        MOST RECENT LABS ON RECORD:   Lab Results   Component Value Date    WBC 5.2 09/17/2021    HGB 11.8 (L) 09/17/2021    HCT 35.5 (L) 09/17/2021     09/17/2021    CHOL 145 (L) 12/30/2020    TRIG 89 12/30/2020 HDL 43 12/30/2020    ALT 20 09/27/2021    AST 21 09/27/2021     09/27/2021    K 5.1 (H) 09/27/2021     09/27/2021    CREATININE 1.71 (H) 09/27/2021    BUN 39 (H) 09/27/2021    CO2 28 09/27/2021    TSH 2.74 02/15/2019    PSA 2.23 10/16/2019    INR 1.2 03/13/2017    GLUF 149 (H) 11/21/2018    LABA1C 7.2 (H) 09/27/2021    LABMICR CANNOT BE CALCULATED 03/27/2019     (H) 02/03/2013     ASSESSMENT:  1. Chronic combined systolic and diastolic CHF, NYHA class 3 ([facility] [address]    2. Biventricular ICD (implantable cardioverter-defibrillator) in place    3. Essential hypertension    4. PAF (paroxysmal atrial fibrillation) ([facility] [address]    5. Non-ischemic cardiomyopathy ([facility] [address]    6. Encounter for current long-term use of anticoagulants       PLAN:     Chronic Combined Systolic and Diastolic Heart Failure/ NICM: Echo on 12/2/2020 showed an EF of 15-20% Currently appears stable  Beta Blocker: Continue Metoprolol succinate (Toprol XL) 50 mg daily. ACE Inibitor/ARB: Continue lisinopril 5 mg, 1/2 tab daily. Diuretics: Continue furosemide (Lasix) 20 mg every morning. Angina Indian Class II: Stable and actually less since getting his ICD replaced - Taking one nitro to help with chest pain prior to exercise which I think is fine but I strongly encouraged him to avoid pushing through chest pain as this can be life threatening  Beta Blocker: Continue Metoprolol succinate (Toprol XL) 50 mg daily. Cholesterol Reduction Therapy: Continue simvastatin (Zocor) 40 mg daily. Anti-anginal medications: Continue nitroglycerin 0.4 mg tablets as needed for chest pain. Counseling: I asked him to come to the emergency room if he develops persistent or worsening chest pain or worsening shortness of breath. Paroxysmal Atrial Fibrillation: Rate control  Beta Blocker: Continue Metoprolol succinate (Toprol XL) 50 mg daily.    [id number] Score for Atrial Fibrillation Stroke Risk   Risk   Factors  Component Value   C CHF Yes 1   H HTN Yes 1   A2 Age >= 76 Yes,  [de-identified] y.o.) 2   D DM Yes 1   S2 Prior Stroke/TIA No 0   V Vascular Disease No 0   A Age 74-69 No,  [de-identified] y.o.) 0   Sc Sex male 0    [id number]  Score  5   Score last updated 73/2/09 8:53 PM EST  Click here for a link to the UpToDate guideline \"Atrial Fibrillation: Anticoagulation therapy to prevent embolization  Disclaimer: Risk Score calculation is dependent on accuracy of patient problem list and past encounter diagnosis. His CHADS2 score is 5/9(6.7% stroke risk)  Anticoagulation: Continue Riveroxaban (Xarelto): 15 mg once daily with largest meal (typically dinner). Essential Hypertension: Controlled  Beta Blocker: Continue Metoprolol succinate (Toprol XL) 50 mg daily. ACE Inibitor/ARB: Continue lisinopril 5 mg, 1/2 tab daily. Diuretics: Continue furosemide (Lasix) 20 mg every morning. Pure Hypercholesterolemia: Last LDL on 12/30/2020 was 84 mg/dL  Cholesterol Reduction Therapy: Continue simvastatin (Zocor) 40 mg daily. Bi-Ventricular Implantable Cardioverter Defibrillator: RRT Met as of 9/16/2021 - Battery replaced on 9/23/2021  Indication for Device Placement: Cardiomyopathy with ejection fraction <35%   Interrogation Findings: We will plan to recheck their device at their next scheduled appointment date. Left Chest wall hematoma: Mild and slowly improving. Incision site appears clean and dry and no signs of infection. Safe Guard pressure dressing was removed, area cleaned and sterile 4x4 placed. Told him to keep this clean and dry. In the meantime, I encouraged Mr. Alec Malik to continue to take his current medications and follow up with you as previously scheduled. FOLLOW UP:    I told Mr. Alec Malik to call my office if he had any problems, but otherwise told him to Return in about 6 months (around 4/19/2022). However, I would be happy to see him sooner should the need arise. Sincerely,  Aleisha Justice. Joel COTTO, MS, F.A.C.C.   [facility]) Initial encounter of patient with complex medical history. L. intertrochanteric femur fracture requiring operative management. ESRD requiring HD. Labs, imaging, vitals reviewed, medications reconciled / ordered. Plan of care discussed with Ortho, ED and patient at bedside.

## 2023-10-09 NOTE — ANESTHESIA POSTPROCEDURE EVALUATION
Department of Anesthesiology  Postprocedure Note    Patient: Vandana Anaya  MRN: [id number]  YOB: 1940  Date of evaluation: 10/9/2023      Procedure Summary     Date: 10/09/23 Room / Location: [address] [place] [address]    Anesthesia Start: Francoise Salaam Anesthesia Stop: 3067    Procedure: EYE CATARACT EMULSIFICATION IOL IMPLANT (Right: Eye) Diagnosis:       Combined forms of age-related cataract of both eyes      (Combined forms of age-related cataract of both eyes [H25.813])    Surgeons: Vishal Batista DO Responsible Provider: JAI Vigil CRNA    Anesthesia Type: MAC ASA Status: 4          Anesthesia Type: No value filed.     Brooke Phase I: Brooke Score: 10    Brooke Phase II: Brooke Score: 10      Anesthesia Post Evaluation    Patient location during evaluation: PACU  Patient participation: complete - patient participated  Level of consciousness: awake and alert  Pain score: 0  Airway patency: patent  Nausea & Vomiting: no nausea and no vomiting  Complications: no  Cardiovascular status: blood pressure returned to baseline  Respiratory status: acceptable and room air  Hydration status: stable  Pain management: adequate and satisfactory to patient

## 2023-10-18 ENCOUNTER — ANESTHESIA EVENT (OUTPATIENT)
Dept: OPERATING ROOM | Age: 83
End: 2023-10-18
Payer: MEDICARE

## 2023-10-18 ENCOUNTER — OFFICE VISIT (OUTPATIENT)
Dept: CARDIOLOGY | Age: 83
End: 2023-10-18
Payer: MEDICARE

## 2023-10-18 ENCOUNTER — HOSPITAL ENCOUNTER (OUTPATIENT)
Age: 83
Discharge: HOME | End: 2023-10-18
Payer: MEDICARE

## 2023-10-18 VITALS
HEIGHT: 72 IN | OXYGEN SATURATION: 97 % | WEIGHT: 183 LBS | SYSTOLIC BLOOD PRESSURE: 96 MMHG | BODY MASS INDEX: 24.79 KG/M2 | RESPIRATION RATE: 18 BRPM | HEART RATE: 64 BPM | DIASTOLIC BLOOD PRESSURE: 55 MMHG

## 2023-10-18 DIAGNOSIS — I48.0 PAF (PAROXYSMAL ATRIAL FIBRILLATION) (HCC): ICD-10-CM

## 2023-10-18 DIAGNOSIS — Z95.810 BIVENTRICULAR ICD (IMPLANTABLE CARDIOVERTER-DEFIBRILLATOR) IN PLACE: ICD-10-CM

## 2023-10-18 DIAGNOSIS — I50.42 CHRONIC COMBINED SYSTOLIC AND DIASTOLIC CONGESTIVE HEART FAILURE (HCC): ICD-10-CM

## 2023-10-18 DIAGNOSIS — I42.8 NON-ISCHEMIC CARDIOMYOPATHY (HCC): ICD-10-CM

## 2023-10-18 DIAGNOSIS — I10 ESSENTIAL HYPERTENSION: ICD-10-CM

## 2023-10-18 DIAGNOSIS — E78.00 PURE HYPERCHOLESTEROLEMIA: ICD-10-CM

## 2023-10-18 DIAGNOSIS — I20.9 ANGINA, CLASS III (HCC): Primary | ICD-10-CM

## 2023-10-18 DIAGNOSIS — I20.9 ANGINA, CLASS III (HCC): ICD-10-CM

## 2023-10-18 LAB
ANION GAP SERPL CALCULATED.3IONS-SCNC: 7 MMOL/L (ref 9–17)
BUN SERPL-MCNC: 34 MG/DL (ref 8–23)
BUN/CREAT SERPL: 21 (ref 9–20)
CALCIUM SERPL-MCNC: 9.1 MG/DL (ref 8.6–10.4)
CHLORIDE SERPL-SCNC: 105 MMOL/L (ref 98–107)
CO2 SERPL-SCNC: 27 MMOL/L (ref 20–31)
CREAT SERPL-MCNC: 1.6 MG/DL (ref 0.7–1.2)
ERYTHROCYTE [DISTWIDTH] IN BLOOD BY AUTOMATED COUNT: 14.1 % (ref 11.8–14.4)
GFR SERPL CREATININE-BSD FRML MDRD: 43 ML/MIN/1.73M2
GLUCOSE SERPL-MCNC: 175 MG/DL (ref 70–99)
HCT VFR BLD AUTO: 35.2 % (ref 40.7–50.3)
HGB BLD-MCNC: 11.3 G/DL (ref 13–17)
MCH RBC QN AUTO: 30.6 PG (ref 25.2–33.5)
MCHC RBC AUTO-ENTMCNC: 32.1 G/DL (ref 28.4–34.8)
MCV RBC AUTO: 95.4 FL (ref 82.6–102.9)
NRBC BLD-RTO: 0 PER 100 WBC
PLATELET # BLD AUTO: 220 K/UL (ref 138–453)
PMV BLD AUTO: 10.6 FL (ref 8.1–13.5)
POTASSIUM SERPL-SCNC: 5.5 MMOL/L (ref 3.7–5.3)
RBC # BLD AUTO: 3.69 M/UL (ref 4.21–5.77)
SODIUM SERPL-SCNC: 139 MMOL/L (ref 135–144)
WBC OTHER # BLD: 4.3 K/UL (ref 3.5–11.3)

## 2023-10-18 PROCEDURE — 3074F SYST BP LT 130 MM HG: CPT | Performed by: FAMILY MEDICINE

## 2023-10-18 PROCEDURE — 99214 OFFICE O/P EST MOD 30 MIN: CPT | Performed by: FAMILY MEDICINE

## 2023-10-18 PROCEDURE — 3078F DIAST BP <80 MM HG: CPT | Performed by: FAMILY MEDICINE

## 2023-10-18 PROCEDURE — 1123F ACP DISCUSS/DSCN MKR DOCD: CPT | Performed by: FAMILY MEDICINE

## 2023-10-18 PROCEDURE — 85027 COMPLETE CBC AUTOMATED: CPT

## 2023-10-18 PROCEDURE — 36415 COLL VENOUS BLD VENIPUNCTURE: CPT

## 2023-10-18 PROCEDURE — 80048 BASIC METABOLIC PNL TOTAL CA: CPT

## 2023-10-18 PROCEDURE — 93289 INTERROG DEVICE EVAL HEART: CPT | Performed by: FAMILY MEDICINE

## 2023-10-18 NOTE — PROGRESS NOTES
to help guide future management    Paroxysmal Atrial Fibrillation: Rate control  Beta Blocker: Continue Metoprolol succinate (Toprol XL) 50 mg daily. [id number] Score for Atrial Fibrillation Stroke Risk   Risk   Factors  Component Value   C CHF Yes 1   H HTN Yes 1   A2 Age >= 76 Yes,  (81 y.o.) 2   D DM Yes 1   S2 Prior Stroke/TIA No 0   V Vascular Disease No 0   A Age 77-78 No,  (81 y.o.) 0   Sc Sex male 0    [id number]  Score  5   Score last updated 94/8/21 0:39 PM EST  Click here for a link to the UpToDate guideline \"Atrial Fibrillation: Anticoagulation therapy to prevent embolization  Disclaimer: Risk Score calculation is dependent on accuracy of patient problem list and past encounter diagnosis. His CHADS2 score is 5/9(6.7% stroke risk)  Anticoagulation: Continue Riveroxaban (Xarelto): 15 mg once daily with largest meal (typically dinner). Because of his atrial fibrillation, I also would also recommend that he continue with anticoagulation to decrease his risk of stoke but also reminded him to watch for signs of blood in his stool or black tarry stools and stop the medication immediately if this develops as this could be life threatening. Essential Hypertension: Controlled  Beta Blocker: Continue Metoprolol succinate (Toprol XL) 50 mg daily. ACE Inibitor/ARB: Continue lisinopril 5 mg, 1/2 tab daily. Diuretics: Not indicated at this time. Pure Hypercholesterolemia: Last LDL on 1/4/2023 was 70 mg/dL  Cholesterol Reduction Therapy: Continue simvastatin (Zocor) 40 mg daily. Bi-Ventricular Implantable Cardioverter Defibrillator: RRT Met as of 9/16/2021 - Battery replaced on 9/23/2021  Indication for Device Placement: Cardiomyopathy with ejection fraction <35%   Interrogation Findings: Medtronic device interrogated today in office on 10/18/23. No changes made. Normal device function for parameters.      In the meantime, I encouraged Mr. Ananda Aceves to continue to take his current medications and

## 2023-10-18 NOTE — PROGRESS NOTES
Patient received NPO instructions and pre-op medication instructions to be taken on the day of the procedure with a small sip of water. Pt was also given pre-op eye drop instructions from Dr. Davon Murcia office. Instructed pt to take imdur, metoprolol, pepcid, prilosec, and protonix with a small sip of water prior to arriving to the hospital the day of surgery.

## 2023-10-19 ENCOUNTER — TELEPHONE (OUTPATIENT)
Dept: CARDIOLOGY | Age: 83
End: 2023-10-19

## 2023-10-19 ENCOUNTER — TELEPHONE (OUTPATIENT)
Dept: UROLOGY | Age: 83
End: 2023-10-19

## 2023-10-19 ENCOUNTER — HOSPITAL ENCOUNTER (OUTPATIENT)
Age: 83
Setting detail: SPECIMEN
Discharge: HOME | End: 2023-10-19
Payer: MEDICARE

## 2023-10-19 ENCOUNTER — OFFICE VISIT (OUTPATIENT)
Dept: UROLOGY | Age: 83
End: 2023-10-19

## 2023-10-19 VITALS
BODY MASS INDEX: 25.23 KG/M2 | DIASTOLIC BLOOD PRESSURE: 79 MMHG | TEMPERATURE: 97.1 F | WEIGHT: 186 LBS | HEART RATE: 108 BPM | SYSTOLIC BLOOD PRESSURE: 117 MMHG

## 2023-10-19 DIAGNOSIS — N32.81 OAB (OVERACTIVE BLADDER): ICD-10-CM

## 2023-10-19 DIAGNOSIS — N39.44 NOCTURNAL ENURESIS: Primary | ICD-10-CM

## 2023-10-19 DIAGNOSIS — N13.8 BPH WITH OBSTRUCTION/LOWER URINARY TRACT SYMPTOMS: ICD-10-CM

## 2023-10-19 DIAGNOSIS — N40.1 BPH WITH OBSTRUCTION/LOWER URINARY TRACT SYMPTOMS: ICD-10-CM

## 2023-10-19 DIAGNOSIS — N39.44 NOCTURNAL ENURESIS: ICD-10-CM

## 2023-10-19 LAB
BACTERIA URNS QL MICRO: ABNORMAL
BILIRUB UR QL STRIP: NEGATIVE
CLARITY UR: CLEAR
COLOR UR: YELLOW
EPI CELLS #/AREA URNS HPF: ABNORMAL /HPF (ref 0–5)
GLUCOSE UR STRIP-MCNC: NEGATIVE MG/DL
HGB UR QL STRIP.AUTO: NEGATIVE
KETONES UR STRIP-MCNC: NEGATIVE MG/DL
LEUKOCYTE ESTERASE UR QL STRIP: NEGATIVE
MUCOUS THREADS URNS QL MICRO: ABNORMAL
NITRITE UR QL STRIP: NEGATIVE
PH UR STRIP: 6 [PH] (ref 5–9)
PROT UR STRIP-MCNC: NEGATIVE MG/DL
RBC #/AREA URNS HPF: ABNORMAL /HPF (ref 0–2)
SP GR UR STRIP: 1.02 (ref 1.01–1.02)
UROBILINOGEN UR STRIP-ACNC: NORMAL EU/DL (ref 0–1)
WBC #/AREA URNS HPF: ABNORMAL /HPF (ref 0–5)

## 2023-10-19 PROCEDURE — 87086 URINE CULTURE/COLONY COUNT: CPT

## 2023-10-19 PROCEDURE — 81001 URINALYSIS AUTO W/SCOPE: CPT

## 2023-10-19 RX ORDER — SOLIFENACIN SUCCINATE 5 MG/1
5 TABLET, FILM COATED ORAL DAILY
Qty: 90 TABLET | Refills: 3 | Status: SHIPPED | OUTPATIENT
Start: 2023-10-19

## 2023-10-19 RX ORDER — TAMSULOSIN HYDROCHLORIDE 0.4 MG/1
0.4 CAPSULE ORAL 2 TIMES DAILY
Qty: 180 CAPSULE | Refills: 3 | Status: SHIPPED | OUTPATIENT
Start: 2023-10-19

## 2023-10-19 ASSESSMENT — ENCOUNTER SYMPTOMS
WHEEZING: 0
VOMITING: 0
BACK PAIN: 0
NAUSEA: 0
EYE REDNESS: 0
SHORTNESS OF BREATH: 0
COLOR CHANGE: 0
COUGH: 0
ABDOMINAL PAIN: 0
CONSTIPATION: 0

## 2023-10-19 NOTE — TELEPHONE ENCOUNTER
----- Message from Colonel Chitra MD sent at 10/18/2023  9:29 PM EDT -----  Let Mr. Jesus Calhoun know their test result was ok. Will discuss at next visit. Thanks.

## 2023-10-19 NOTE — TELEPHONE ENCOUNTER
Patient and his wife called and they want him to be seen. He is having lots of trouble  He is wetting himself at night and he doesn't wake up. It is soaking a depends and pads and is becoming more frequent. He sad the Solifenacin is not working. He said he stops drinking at noon and Dr. Napoleon Abebe told him to call us because he is going to get into trouble not drinking. She said he drinks his coffe in the morning. He doesn't urinate during the day. Can he come in today?

## 2023-10-19 NOTE — PATIENT INSTRUCTIONS
SURVEY:    You may be receiving a survey from [employer] regarding your visit today. Please complete the survey to enable us to provide the highest quality of care to you and your family. If you cannot score us a very good on any question, please call the office to discuss how we could have made your experience a very good one. Thank you.

## 2023-10-20 ENCOUNTER — TELEPHONE (OUTPATIENT)
Dept: UROLOGY | Age: 83
End: 2023-10-20

## 2023-10-20 LAB
MICROORGANISM SPEC CULT: NO GROWTH
SPECIMEN DESCRIPTION: NORMAL

## 2023-10-20 RX ORDER — TAMSULOSIN HYDROCHLORIDE 0.4 MG/1
0.4 CAPSULE ORAL DAILY
Qty: 7 CAPSULE | Refills: 0 | Status: SHIPPED | OUTPATIENT
Start: 2023-10-20

## 2023-10-20 NOTE — TELEPHONE ENCOUNTER
Wife called and the flomax won't be here until Monday. Can you send 3 days to the [facility] in [facility]?

## 2023-10-22 PROBLEM — S05.21XS: Chronic | Status: ACTIVE | Noted: 2023-10-22

## 2023-10-23 ENCOUNTER — HOSPITAL ENCOUNTER (OUTPATIENT)
Age: 83
Setting detail: OUTPATIENT SURGERY
Discharge: HOME | DRG: 876 | End: 2023-10-23
Attending: OPHTHALMOLOGY | Admitting: OPHTHALMOLOGY
Payer: MEDICARE

## 2023-10-23 ENCOUNTER — TELEPHONE (OUTPATIENT)
Dept: UROLOGY | Age: 83
End: 2023-10-23

## 2023-10-23 ENCOUNTER — ANESTHESIA (OUTPATIENT)
Dept: OPERATING ROOM | Age: 83
End: 2023-10-23
Payer: MEDICARE

## 2023-10-23 VITALS
TEMPERATURE: 97.2 F | RESPIRATION RATE: 14 BRPM | SYSTOLIC BLOOD PRESSURE: 130 MMHG | BODY MASS INDEX: 24.65 KG/M2 | OXYGEN SATURATION: 96 % | DIASTOLIC BLOOD PRESSURE: 52 MMHG | HEART RATE: 66 BPM | HEIGHT: 72 IN | WEIGHT: 182 LBS

## 2023-10-23 PROBLEM — S05.21XS: Chronic | Status: RESOLVED | Noted: 2023-10-22 | Resolved: 2023-10-23

## 2023-10-23 PROCEDURE — 6360000002 HC RX W HCPCS: Performed by: NURSE ANESTHETIST, CERTIFIED REGISTERED

## 2023-10-23 PROCEDURE — 3700000000 HC ANESTHESIA ATTENDED CARE: Performed by: OPHTHALMOLOGY

## 2023-10-23 PROCEDURE — 08BC3ZZ EXCISION OF RIGHT IRIS, PERCUTANEOUS APPROACH: ICD-10-PCS | Performed by: OPHTHALMOLOGY

## 2023-10-23 PROCEDURE — 2500000003 HC RX 250 WO HCPCS: Performed by: OPHTHALMOLOGY

## 2023-10-23 PROCEDURE — 3700000001 HC ADD 15 MINUTES (ANESTHESIA): Performed by: OPHTHALMOLOGY

## 2023-10-23 PROCEDURE — 7100000011 HC PHASE II RECOVERY - ADDTL 15 MIN: Performed by: OPHTHALMOLOGY

## 2023-10-23 PROCEDURE — 6370000000 HC RX 637 (ALT 250 FOR IP): Performed by: OPHTHALMOLOGY

## 2023-10-23 PROCEDURE — 3600000012 HC SURGERY LEVEL 2 ADDTL 15MIN: Performed by: OPHTHALMOLOGY

## 2023-10-23 PROCEDURE — 08SC3ZZ REPOSITION RIGHT IRIS, PERCUTANEOUS APPROACH: ICD-10-PCS | Performed by: OPHTHALMOLOGY

## 2023-10-23 PROCEDURE — 3600000002 HC SURGERY LEVEL 2 BASE: Performed by: OPHTHALMOLOGY

## 2023-10-23 PROCEDURE — 2580000003 HC RX 258: Performed by: NURSE ANESTHETIST, CERTIFIED REGISTERED

## 2023-10-23 PROCEDURE — 7100000010 HC PHASE II RECOVERY - FIRST 15 MIN: Performed by: OPHTHALMOLOGY

## 2023-10-23 PROCEDURE — 2709999900 HC NON-CHARGEABLE SUPPLY: Performed by: OPHTHALMOLOGY

## 2023-10-23 RX ORDER — ACETAZOLAMIDE 250 MG/1
500 TABLET ORAL ONCE
Status: COMPLETED | OUTPATIENT
Start: 2023-10-23 | End: 2023-10-23

## 2023-10-23 RX ORDER — FENTANYL CITRATE 50 UG/ML
INJECTION, SOLUTION INTRAMUSCULAR; INTRAVENOUS PRN
Status: DISCONTINUED | OUTPATIENT
Start: 2023-10-23 | End: 2023-10-23 | Stop reason: SDUPTHER

## 2023-10-23 RX ORDER — LIDOCAINE HYDROCHLORIDE 10 MG/ML
INJECTION, SOLUTION EPIDURAL; INFILTRATION; INTRACAUDAL; PERINEURAL PRN
Status: DISCONTINUED | OUTPATIENT
Start: 2023-10-23 | End: 2023-10-23 | Stop reason: ALTCHOICE

## 2023-10-23 RX ORDER — TETRACAINE HYDROCHLORIDE 5 MG/ML
SOLUTION OPHTHALMIC PRN
Status: DISCONTINUED | OUTPATIENT
Start: 2023-10-23 | End: 2023-10-23 | Stop reason: ALTCHOICE

## 2023-10-23 RX ORDER — SODIUM CHLORIDE 0.9 % (FLUSH) 0.9 %
5-40 SYRINGE (ML) INJECTION EVERY 12 HOURS SCHEDULED
Status: CANCELLED | OUTPATIENT
Start: 2023-10-23

## 2023-10-23 RX ORDER — MIDAZOLAM HYDROCHLORIDE 1 MG/ML
INJECTION INTRAMUSCULAR; INTRAVENOUS PRN
Status: DISCONTINUED | OUTPATIENT
Start: 2023-10-23 | End: 2023-10-23 | Stop reason: SDUPTHER

## 2023-10-23 RX ORDER — SODIUM CHLORIDE 9 MG/ML
INJECTION, SOLUTION INTRAVENOUS CONTINUOUS
Status: DISCONTINUED | OUTPATIENT
Start: 2023-10-23 | End: 2023-10-23 | Stop reason: HOSPADM

## 2023-10-23 RX ORDER — SODIUM CHLORIDE 0.9 % (FLUSH) 0.9 %
5-40 SYRINGE (ML) INJECTION PRN
Status: DISCONTINUED | OUTPATIENT
Start: 2023-10-23 | End: 2023-10-23 | Stop reason: HOSPADM

## 2023-10-23 RX ORDER — TETRACAINE HYDROCHLORIDE 5 MG/ML
1 SOLUTION OPHTHALMIC SEE ADMIN INSTRUCTIONS
Status: DISCONTINUED | OUTPATIENT
Start: 2023-10-23 | End: 2023-10-23 | Stop reason: HOSPADM

## 2023-10-23 RX ORDER — SODIUM CHLORIDE, SODIUM LACTATE, POTASSIUM CHLORIDE, CALCIUM CHLORIDE 600; 310; 30; 20 MG/100ML; MG/100ML; MG/100ML; MG/100ML
INJECTION, SOLUTION INTRAVENOUS CONTINUOUS
Status: DISCONTINUED | OUTPATIENT
Start: 2023-10-23 | End: 2023-10-23 | Stop reason: HOSPADM

## 2023-10-23 RX ORDER — SODIUM CHLORIDE 0.9 % (FLUSH) 0.9 %
5-40 SYRINGE (ML) INJECTION EVERY 12 HOURS SCHEDULED
Status: DISCONTINUED | OUTPATIENT
Start: 2023-10-23 | End: 2023-10-23 | Stop reason: HOSPADM

## 2023-10-23 RX ORDER — SODIUM CHLORIDE 0.9 % (FLUSH) 0.9 %
5-40 SYRINGE (ML) INJECTION PRN
Status: CANCELLED | OUTPATIENT
Start: 2023-10-23

## 2023-10-23 RX ORDER — PILOCARPINE HYDROCHLORIDE 10 MG/ML
SOLUTION/ DROPS OPHTHALMIC PRN
Status: DISCONTINUED | OUTPATIENT
Start: 2023-10-23 | End: 2023-10-23 | Stop reason: ALTCHOICE

## 2023-10-23 RX ORDER — SODIUM CHLORIDE 9 MG/ML
INJECTION, SOLUTION INTRAVENOUS PRN
Status: CANCELLED | OUTPATIENT
Start: 2023-10-23

## 2023-10-23 RX ORDER — SODIUM CHLORIDE 9 MG/ML
INJECTION, SOLUTION INTRAVENOUS PRN
Status: DISCONTINUED | OUTPATIENT
Start: 2023-10-23 | End: 2023-10-23 | Stop reason: HOSPADM

## 2023-10-23 RX ORDER — PROPARACAINE HYDROCHLORIDE 5 MG/ML
1 SOLUTION/ DROPS OPHTHALMIC SEE ADMIN INSTRUCTIONS
Status: DISCONTINUED | OUTPATIENT
Start: 2023-10-23 | End: 2023-10-23 | Stop reason: HOSPADM

## 2023-10-23 RX ADMIN — ACETAZOLAMIDE 500 MG: 250 TABLET ORAL at 13:26

## 2023-10-23 RX ADMIN — PROPARACAINE HYDROCHLORIDE 1 DROP: 5 SOLUTION/ DROPS OPHTHALMIC at 13:26

## 2023-10-23 RX ADMIN — MIDAZOLAM 1 MG: 1 INJECTION INTRAMUSCULAR; INTRAVENOUS at 15:02

## 2023-10-23 RX ADMIN — SODIUM CHLORIDE, POTASSIUM CHLORIDE, SODIUM LACTATE AND CALCIUM CHLORIDE: 600; 310; 30; 20 INJECTION, SOLUTION INTRAVENOUS at 13:29

## 2023-10-23 RX ADMIN — FENTANYL CITRATE 50 MCG: 50 INJECTION INTRAMUSCULAR; INTRAVENOUS at 15:02

## 2023-10-23 RX ADMIN — TETRACAINE HYDROCHLORIDE 1 DROP: 5 SOLUTION OPHTHALMIC at 14:55

## 2023-10-23 ASSESSMENT — PAIN - FUNCTIONAL ASSESSMENT: PAIN_FUNCTIONAL_ASSESSMENT: 0-10

## 2023-10-23 ASSESSMENT — PAIN SCALES - GENERAL
PAINLEVEL_OUTOF10: 0
PAINLEVEL_OUTOF10: 0

## 2023-10-23 NOTE — H&P
Rosalva Palmer is a 81y.o. year old who presents for elective outpatient ophthalmic surgery with Viet Huber DO. The patient complains of his iris having an irregular shape after cataract surgery. Review of systems  Positive for irregular iris shape      All other review of systems were negative. Past Medical History:   Diagnosis Date    Abnormal echocardiogram 05/02/2012    EF 15%. mildly dilated LV cavity. Abnormal resting ECG findings 04/25/2012    atrial sensed, ventricular paced rhythm at 73 beats per minute. Wide QRS of 183 ms. 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 CRT-D    Biventricular ICD (implantable cardioverter-defibrillator) in place 10/29/2015    Medtronic- Bi V ICD (Battery change)    Biventricular pacemaker check 5/12 1/13    LV lead turned of 1/13 due to diaphragmatic pacing. CAD (coronary artery disease)     CKD (chronic kidney disease)     Dr. Jose Oliveira    Diabetes mellitus [facility])     type 2    Esophageal reflux     History of echocardiogram 11/23/2018    EF 15%. Mildly increased LV wall thickness w/ severely increased LA cavity size. Severe global hypokinesis w/ no segmental variation. LA is moderately dilated w/ LA volume index of 35. Mild mitral and tricuspid regurg. Evidence of moderate diastolic dysfunction seen. Hx of blood clots     Hx of left heart catheterization by ventricular puncture 04/19/2013    LAD-20-30%, LCX & RCA-10-20%, Normal LVEDP,     Hyperlipidemia     Hypertension     Mantle cell lymphoma, unspecified site, extranodal and solid organ sites     Non Hodgkin's lymphoma ([address])     Nonischemic dilated cardiomyopathy (HCC)     Pain in joint, lower leg     Pain in limb     S/P cardiac catheterization 12/13/2010    No significant coronary disease. EF estimated at 35%.     Thoracic or lumbosacral neuritis or radiculitis, unspecified     Unspecified acute tablet Take 1.5 tablets by mouth nightly as needed for Sleep or Anxiety for up to 90 days. Max Daily Amount: 1.5 mg 8/4/23 11/2/23  Maria Isabel Ortiz MD   lisinopril (PRINIVIL;ZESTRIL) 2.5 MG tablet TAKE 1 TABLET BY MOUTH  DAILY 8/3/23   JAI Canchola CNP   fluticasone Teri Orchard) 50 MCG/ACT nasal spray SPRAY ONE SPRAY IN EACH NOSTRIL EVERY MORNING AND EVERY NIGHT AT BEDTIME  Patient not taking: Reported on 9/22/2023 8/1/23   JAI Canchola CNP   pantoprazole (PROTONIX) 40 MG tablet Take 1 tablet by mouth in the morning and at bedtime 5/15/23   Maria Isabel Ortiz MD   nitroGLYCERIN (NITROSTAT) 0.4 MG SL tablet DISSOLVE 1 TABLET UNDER THE TONGUE EVERY 5 MINUTES AS  NEEDED FOR CHEST PAIN. MAX  OF 3 TABLETS IN 15 MINUTES.  CALL 911 IF PAIN PERSISTS. 5/15/23   Maria Isabel Ortiz MD   famotidine (PEPCID) 10 MG tablet Take 1 tablet by mouth 2 times daily    Provider, MD Scott   ferrous sulfate (IRON 325) 325 (65 Fe) MG tablet Take 1 tablet by mouth daily (with breakfast) 4/4/23   JAI Canchola CNP   Blood Glucose Monitoring Suppl (TRUE METRIX METER) JOSEFINA USE AS DIRECTED TWICE A DAY TO CHECK BLOOD SUGAR. TRUE METRIX METER 1/31/23   JAI Canchola CNP   blood glucose test strips (ASCENSIA AUTODISC VI;ONE TOUCH ULTRA TEST VI) strip USE AS DIRECTED TWICE A DAY TO CHECK BLOOD SUGAR: ONE TOUCH VERIO METER: DX:E11.9 12/22/22   JAI Canchola CNP   finasteride (PROSCAR) 5 MG tablet Take 1 tablet by mouth daily 12/22/22   JIA Canchola CNP   glimepiride (AMARYL) 4 MG tablet Take 1 tablet by mouth in the morning and at bedtime 12/22/22   JAI Canchola CNP   rivaroxaban (Chuck Gayer) 15 MG TABS tablet Take 1 tablet by mouth daily 12/22/22   JAI Canchola CNP   simvastatin (ZOCOR) 40 MG tablet Take 1 tablet by mouth nightly 12/22/22   Mor Pineda, APRN - CNP   Lancets MISC TEST BLOOD SUGAR TWICE

## 2023-10-23 NOTE — H&P
I have examined the patient and reviewed the history and physical completed on 10/22/23 and find no relevant changes. I have reviewed with the patient and/or family the risks, benefits, and alternatives to the procedure and they have agreed to proceed.     Ambrocio Jameson DO  10/23/2023  2:47 PM

## 2023-10-23 NOTE — TELEPHONE ENCOUNTER
----- Message from JAI Greenberg - CNP sent at 10/23/2023  9:03 AM EDT -----  Call pt - urine cx reviewed and negative for UTI & for significant microhematuria

## 2023-10-23 NOTE — ANESTHESIA POSTPROCEDURE EVALUATION
Department of Anesthesiology  Postprocedure Note    Patient: Jose Sales  MRN: [id number]  YOB: 1940  Date of evaluation: 10/23/2023      Procedure Summary     Date: 10/23/23 Room / Location: [address] [place] [address]    Anesthesia Start: 5402 Anesthesia Stop: 9130    Procedure: EYE IRIS REPAIR (Right: Eye) Diagnosis:       Combined forms of age-related cataract of both eyes      (Combined forms of age-related cataract of both eyes [H25.813])    Surgeons: Roseann Cook DO Responsible Provider: JAI Sahu CRNA    Anesthesia Type: MAC ASA Status: 4          Anesthesia Type: No value filed.     Brooke Phase I: Brooke Score: 10    Brooke Phase II: Brooke Score: 10      Anesthesia Post Evaluation    Patient location during evaluation: bedside  Patient participation: complete - patient participated  Level of consciousness: awake and alert  Pain score: 0  Airway patency: patent  Nausea & Vomiting: no nausea and no vomiting  Complications: no  Cardiovascular status: hemodynamically stable  Respiratory status: acceptable  Hydration status: stable  Multimodal analgesia pain management approach  Pain management: satisfactory to patient

## 2023-10-23 NOTE — ANESTHESIA PRE PROCEDURE
10/18/2023 09:59 AM    HCT 35.2 10/18/2023 09:59 AM    MCV 95.4 10/18/2023 09:59 AM    RDW 14.1 10/18/2023 09:59 AM     10/18/2023 09:59 AM     01/23/2012 12:02 PM       CMP:   Lab Results   Component Value Date/Time     10/18/2023 09:59 AM    K 5.5 10/18/2023 09:59 AM     10/18/2023 09:59 AM    CO2 27 10/18/2023 09:59 AM    BUN 34 10/18/2023 09:59 AM    CREATININE 1.6 10/18/2023 09:59 AM    GFRAA 52 01/05/2022 08:00 AM    LABGLOM 43 10/18/2023 09:59 AM    GLUCOSE 175 10/18/2023 09:59 AM    GLUCOSE 174 05/02/2023 11:05 AM    PROT 7.4 05/02/2023 11:05 AM    CALCIUM 9.1 10/18/2023 09:59 AM    BILITOT 0.4 05/02/2023 11:05 AM    ALKPHOS 68 05/02/2023 11:05 AM    ALKPHOS 74 04/03/2023 04:20 PM    AST 13 05/02/2023 11:05 AM    ALT 8 05/02/2023 11:05 AM       POC Tests: No results for input(s): \"POCGLU\", \"POCNA\", \"POCK\", \"POCCL\", \"POCBUN\", \"POCHEMO\", \"POCHCT\" in the last 72 hours.     Coags:   Lab Results   Component Value Date/Time    PROTIME 12.2 03/13/2017 10:10 AM    PROTIME 23.8 10/22/2011 09:18 AM    INR 1.2 03/13/2017 10:10 AM    APTT 22.8 02/27/2017 06:28 PM       HCG (If Applicable): No results found for: \"PREGTESTUR\", \"PREGSERUM\", \"HCG\", \"HCGQUANT\"     ABGs: No results found for: \"PHART\", \"PO2ART\", \"VBU2MSP\", \"ENV5GPW\", \"BEART\", \"P3JCSOEH\"     Type & Screen (If Applicable):  No results found for: \"LABABO\", \"LABRH\"    Drug/Infectious Status (If Applicable):  Lab Results   Component Value Date/Time    HEPCAB NONREACTIVE 02/03/2013 05:00 PM       COVID-19 Screening (If Applicable):   Lab Results   Component Value Date/Time    COVID19 Detected 11/10/2020 10:18 AM           Anesthesia Evaluation  Patient summary reviewed and Nursing notes reviewed no history of anesthetic complications:   Airway: Mallampati: II  TM distance: >3 FB   Neck ROM: limited  Mouth opening: > = 3 FB   Dental:    (+) partials      Pulmonary:Negative Pulmonary ROS and normal exam

## 2023-10-23 NOTE — OP NOTE
OPERATIVE NOTE      Patient:  Mary Ellen Thomson    Birthdate:  [birth year]    Account #:  [de-identified]    Date:  10/23/2023    Surgeon:  Jeana Robertson. Jose Angel Rodriguez DO    Primary Care Physician:Vanessa Rodriguez, APRN - CNP      Preoperative Diagnosis: Iris Prolapse, right eye    Postoperative Diagnosis: Same    Procedure: repair of iris prolapse, right eye    Anesthesia: Topical and intracameral anesthesia with intravenous sedation    Complications: none    Specimens: none    Indications for procedure: The patient is a 81y.o. year old with an irregular pupil shape. Examination revealed prolapse of iris through the temporal corneal wound after cataract surgery. Other eye diseases have been ruled out as the primary cause of decreased visual function. Significant improvement is expected in the patient's visual acuity and/or visual function from repair of the iris. Risks, benefits, and alternatives to surgery were discussed with the patient and the patient elected to proceed with repair of the iris. Details of the procedure: Following informed consent, the patient was identified by name and identification tag in the holding area,taken to the operating room and placed in the supine position. A time out was performed by all present in the room to identify the patient, the eye to be operated on, the correct operative procedure, and the correct intraocular lens. Monitoring leads were placed. The patient was positioned. An eyelid prep of half-strength Betadine was performed. The patient was administered intravenous sedation if desired and topical anesthetic was placed in the operative eye. The eye prepped a second time and draped in the usual sterile fashion using aseptic technique for cataract surgery. A lid speculum was then inserted. Ocucoat was placed onto the corneal surface. A stab incision was made using a disposable blade into the anterior chamber.   Intracameral preservative free xylocaine was placed into the

## 2023-10-25 ENCOUNTER — APPOINTMENT (OUTPATIENT)
Dept: CT IMAGING | Age: 83
DRG: 876 | End: 2023-10-25
Payer: MEDICARE

## 2023-10-25 ENCOUNTER — HOSPITAL ENCOUNTER (INPATIENT)
Age: 83
LOS: 2 days | Discharge: HOME | DRG: 876 | End: 2023-10-27
Attending: EMERGENCY MEDICINE | Admitting: INTERNAL MEDICINE
Payer: MEDICARE

## 2023-10-25 ENCOUNTER — APPOINTMENT (OUTPATIENT)
Dept: GENERAL RADIOLOGY | Age: 83
DRG: 876 | End: 2023-10-25
Payer: MEDICARE

## 2023-10-25 DIAGNOSIS — R29.6 FREQUENT FALLS: ICD-10-CM

## 2023-10-25 DIAGNOSIS — R53.1 GENERALIZED WEAKNESS: Primary | ICD-10-CM

## 2023-10-25 DIAGNOSIS — N17.9 ACUTE KIDNEY INJURY (HCC): ICD-10-CM

## 2023-10-25 LAB
ANION GAP SERPL CALCULATED.3IONS-SCNC: 8 MMOL/L (ref 9–17)
BACTERIA URNS QL MICRO: ABNORMAL
BASOPHILS # BLD: <0.03 K/UL (ref 0–0.2)
BASOPHILS NFR BLD: 0 % (ref 0–2)
BILIRUB UR QL STRIP: NEGATIVE
BUN SERPL-MCNC: 35 MG/DL (ref 8–23)
BUN/CREAT SERPL: 18 (ref 9–20)
CALCIUM SERPL-MCNC: 8.9 MG/DL (ref 8.6–10.4)
CHLORIDE SERPL-SCNC: 108 MMOL/L (ref 98–107)
CK SERPL-CCNC: 40 U/L (ref 39–308)
CLARITY UR: CLEAR
CO2 SERPL-SCNC: 21 MMOL/L (ref 20–31)
COLOR UR: YELLOW
CREAT SERPL-MCNC: 2 MG/DL (ref 0.7–1.2)
EOSINOPHIL # BLD: 0.08 K/UL (ref 0–0.44)
EOSINOPHILS RELATIVE PERCENT: 2 % (ref 1–4)
EPI CELLS #/AREA URNS HPF: ABNORMAL /HPF (ref 0–5)
ERYTHROCYTE [DISTWIDTH] IN BLOOD BY AUTOMATED COUNT: 14.1 % (ref 11.8–14.4)
GFR SERPL CREATININE-BSD FRML MDRD: 33 ML/MIN/1.73M2
GLUCOSE SERPL-MCNC: 171 MG/DL (ref 70–99)
GLUCOSE UR STRIP-MCNC: NEGATIVE MG/DL
HCT VFR BLD AUTO: 34.8 % (ref 40.7–50.3)
HGB BLD-MCNC: 11.1 G/DL (ref 13–17)
HGB UR QL STRIP.AUTO: NEGATIVE
IMM GRANULOCYTES # BLD AUTO: <0.03 K/UL (ref 0–0.3)
IMM GRANULOCYTES NFR BLD: 0 %
INR PPP: 2.9
KETONES UR STRIP-MCNC: NEGATIVE MG/DL
LEUKOCYTE ESTERASE UR QL STRIP: NEGATIVE
LYMPHOCYTES NFR BLD: 0.98 K/UL (ref 1.1–3.7)
LYMPHOCYTES RELATIVE PERCENT: 21 % (ref 24–43)
MCH RBC QN AUTO: 30.5 PG (ref 25.2–33.5)
MCHC RBC AUTO-ENTMCNC: 31.9 G/DL (ref 28.4–34.8)
MCV RBC AUTO: 95.6 FL (ref 82.6–102.9)
MONOCYTES NFR BLD: 0.39 K/UL (ref 0.1–1.2)
MONOCYTES NFR BLD: 9 % (ref 3–12)
NEUTROPHILS NFR BLD: 68 % (ref 36–65)
NEUTS SEG NFR BLD: 3.09 K/UL (ref 1.5–8.1)
NITRITE UR QL STRIP: NEGATIVE
NRBC BLD-RTO: 0 PER 100 WBC
PH UR STRIP: 7 [PH] (ref 5–9)
PLATELET # BLD AUTO: 249 K/UL (ref 138–453)
PMV BLD AUTO: 11.1 FL (ref 8.1–13.5)
POTASSIUM SERPL-SCNC: 4.9 MMOL/L (ref 3.7–5.3)
PROT UR STRIP-MCNC: NEGATIVE MG/DL
PROTHROMBIN TIME: 30.2 SEC (ref 11.9–14.8)
RBC # BLD AUTO: 3.64 M/UL (ref 4.21–5.77)
RBC #/AREA URNS HPF: ABNORMAL /HPF (ref 0–2)
SODIUM SERPL-SCNC: 137 MMOL/L (ref 135–144)
SP GR UR STRIP: 1.01 (ref 1.01–1.02)
TROPONIN I SERPL HS-MCNC: 31 NG/L (ref 0–22)
TROPONIN I SERPL HS-MCNC: 35 NG/L (ref 0–22)
TSH SERPL DL<=0.05 MIU/L-ACNC: 1.5 UIU/ML (ref 0.3–5)
UROBILINOGEN UR STRIP-ACNC: NORMAL EU/DL (ref 0–1)
WBC #/AREA URNS HPF: ABNORMAL /HPF (ref 0–5)
WBC OTHER # BLD: 4.6 K/UL (ref 3.5–11.3)

## 2023-10-25 PROCEDURE — 93005 ELECTROCARDIOGRAM TRACING: CPT | Performed by: EMERGENCY MEDICINE

## 2023-10-25 PROCEDURE — 82550 ASSAY OF CK (CPK): CPT

## 2023-10-25 PROCEDURE — 96361 HYDRATE IV INFUSION ADD-ON: CPT

## 2023-10-25 PROCEDURE — 85610 PROTHROMBIN TIME: CPT

## 2023-10-25 PROCEDURE — 99285 EMERGENCY DEPT VISIT HI MDM: CPT

## 2023-10-25 PROCEDURE — 84443 ASSAY THYROID STIM HORMONE: CPT

## 2023-10-25 PROCEDURE — 94761 N-INVAS EAR/PLS OXIMETRY MLT: CPT

## 2023-10-25 PROCEDURE — 70450 CT HEAD/BRAIN W/O DYE: CPT

## 2023-10-25 PROCEDURE — 96360 HYDRATION IV INFUSION INIT: CPT

## 2023-10-25 PROCEDURE — 84484 ASSAY OF TROPONIN QUANT: CPT

## 2023-10-25 PROCEDURE — 6370000000 HC RX 637 (ALT 250 FOR IP): Performed by: INTERNAL MEDICINE

## 2023-10-25 PROCEDURE — 2580000003 HC RX 258: Performed by: EMERGENCY MEDICINE

## 2023-10-25 PROCEDURE — 85025 COMPLETE CBC W/AUTO DIFF WBC: CPT

## 2023-10-25 PROCEDURE — 73502 X-RAY EXAM HIP UNI 2-3 VIEWS: CPT

## 2023-10-25 PROCEDURE — 1200000000 HC SEMI PRIVATE

## 2023-10-25 PROCEDURE — 81001 URINALYSIS AUTO W/SCOPE: CPT

## 2023-10-25 PROCEDURE — 80048 BASIC METABOLIC PNL TOTAL CA: CPT

## 2023-10-25 PROCEDURE — G0378 HOSPITAL OBSERVATION PER HR: HCPCS

## 2023-10-25 PROCEDURE — 36415 COLL VENOUS BLD VENIPUNCTURE: CPT

## 2023-10-25 PROCEDURE — 2580000003 HC RX 258: Performed by: INTERNAL MEDICINE

## 2023-10-25 RX ORDER — POTASSIUM CHLORIDE 7.45 MG/ML
10 INJECTION INTRAVENOUS PRN
Status: DISCONTINUED | OUTPATIENT
Start: 2023-10-25 | End: 2023-10-27 | Stop reason: HOSPADM

## 2023-10-25 RX ORDER — SODIUM CHLORIDE 9 MG/ML
INJECTION, SOLUTION INTRAVENOUS PRN
Status: DISCONTINUED | OUTPATIENT
Start: 2023-10-25 | End: 2023-10-27 | Stop reason: HOSPADM

## 2023-10-25 RX ORDER — TAMSULOSIN HYDROCHLORIDE 0.4 MG/1
0.4 CAPSULE ORAL DAILY
Status: DISCONTINUED | OUTPATIENT
Start: 2023-10-25 | End: 2023-10-27 | Stop reason: HOSPADM

## 2023-10-25 RX ORDER — FAMOTIDINE 20 MG/1
20 TABLET, FILM COATED ORAL 2 TIMES DAILY
Status: DISCONTINUED | OUTPATIENT
Start: 2023-10-25 | End: 2023-10-25 | Stop reason: SDUPTHER

## 2023-10-25 RX ORDER — ONDANSETRON 4 MG/1
4 TABLET, ORALLY DISINTEGRATING ORAL EVERY 8 HOURS PRN
Status: DISCONTINUED | OUTPATIENT
Start: 2023-10-25 | End: 2023-10-27 | Stop reason: HOSPADM

## 2023-10-25 RX ORDER — 0.9 % SODIUM CHLORIDE 0.9 %
1000 INTRAVENOUS SOLUTION INTRAVENOUS ONCE
Status: COMPLETED | OUTPATIENT
Start: 2023-10-25 | End: 2023-10-25

## 2023-10-25 RX ORDER — ONDANSETRON 2 MG/ML
4 INJECTION INTRAMUSCULAR; INTRAVENOUS EVERY 6 HOURS PRN
Status: DISCONTINUED | OUTPATIENT
Start: 2023-10-25 | End: 2023-10-27 | Stop reason: HOSPADM

## 2023-10-25 RX ORDER — GLIPIZIDE 5 MG/1
5 TABLET ORAL
Status: DISCONTINUED | OUTPATIENT
Start: 2023-10-26 | End: 2023-10-27 | Stop reason: HOSPADM

## 2023-10-25 RX ORDER — ATORVASTATIN CALCIUM 20 MG/1
20 TABLET, FILM COATED ORAL NIGHTLY
Status: DISCONTINUED | OUTPATIENT
Start: 2023-10-25 | End: 2023-10-27 | Stop reason: HOSPADM

## 2023-10-25 RX ORDER — POLYETHYLENE GLYCOL 3350 17 G/17G
17 POWDER, FOR SOLUTION ORAL DAILY PRN
Status: DISCONTINUED | OUTPATIENT
Start: 2023-10-25 | End: 2023-10-27 | Stop reason: HOSPADM

## 2023-10-25 RX ORDER — FINASTERIDE 5 MG/1
5 TABLET, FILM COATED ORAL DAILY
Status: DISCONTINUED | OUTPATIENT
Start: 2023-10-26 | End: 2023-10-27 | Stop reason: HOSPADM

## 2023-10-25 RX ORDER — SODIUM CHLORIDE 0.9 % (FLUSH) 0.9 %
10 SYRINGE (ML) INJECTION EVERY 12 HOURS SCHEDULED
Status: DISCONTINUED | OUTPATIENT
Start: 2023-10-25 | End: 2023-10-27 | Stop reason: HOSPADM

## 2023-10-25 RX ORDER — POTASSIUM CHLORIDE 20 MEQ/1
40 TABLET, EXTENDED RELEASE ORAL PRN
Status: DISCONTINUED | OUTPATIENT
Start: 2023-10-25 | End: 2023-10-27 | Stop reason: HOSPADM

## 2023-10-25 RX ORDER — ALPRAZOLAM 0.5 MG/1
1.5 TABLET ORAL NIGHTLY PRN
Status: DISCONTINUED | OUTPATIENT
Start: 2023-10-25 | End: 2023-10-27 | Stop reason: HOSPADM

## 2023-10-25 RX ORDER — METOPROLOL SUCCINATE 50 MG/1
50 TABLET, EXTENDED RELEASE ORAL DAILY
Status: DISCONTINUED | OUTPATIENT
Start: 2023-10-26 | End: 2023-10-27 | Stop reason: HOSPADM

## 2023-10-25 RX ORDER — ENOXAPARIN SODIUM 100 MG/ML
40 INJECTION SUBCUTANEOUS DAILY
Status: DISCONTINUED | OUTPATIENT
Start: 2023-10-25 | End: 2023-10-25 | Stop reason: SDUPTHER

## 2023-10-25 RX ORDER — SODIUM CHLORIDE 9 MG/ML
INJECTION, SOLUTION INTRAVENOUS CONTINUOUS
Status: DISCONTINUED | OUTPATIENT
Start: 2023-10-25 | End: 2023-10-27

## 2023-10-25 RX ORDER — LISINOPRIL 2.5 MG/1
2.5 TABLET ORAL DAILY
Status: DISCONTINUED | OUTPATIENT
Start: 2023-10-26 | End: 2023-10-27 | Stop reason: HOSPADM

## 2023-10-25 RX ORDER — TAMSULOSIN HYDROCHLORIDE 0.4 MG/1
0.4 CAPSULE ORAL 2 TIMES DAILY
Status: DISCONTINUED | OUTPATIENT
Start: 2023-10-25 | End: 2023-10-25 | Stop reason: SDUPTHER

## 2023-10-25 RX ORDER — ISOSORBIDE MONONITRATE 30 MG/1
30 TABLET, EXTENDED RELEASE ORAL DAILY
Status: DISCONTINUED | OUTPATIENT
Start: 2023-10-25 | End: 2023-10-27 | Stop reason: HOSPADM

## 2023-10-25 RX ORDER — FERROUS SULFATE 325(65) MG
325 TABLET ORAL
Status: DISCONTINUED | OUTPATIENT
Start: 2023-10-26 | End: 2023-10-27 | Stop reason: HOSPADM

## 2023-10-25 RX ORDER — SODIUM CHLORIDE 0.9 % (FLUSH) 0.9 %
10 SYRINGE (ML) INJECTION PRN
Status: DISCONTINUED | OUTPATIENT
Start: 2023-10-25 | End: 2023-10-27 | Stop reason: HOSPADM

## 2023-10-25 RX ORDER — PANTOPRAZOLE SODIUM 40 MG/1
40 TABLET, DELAYED RELEASE ORAL 2 TIMES DAILY
Status: DISCONTINUED | OUTPATIENT
Start: 2023-10-25 | End: 2023-10-27 | Stop reason: HOSPADM

## 2023-10-25 RX ORDER — TROSPIUM CHLORIDE 20 MG/1
20 TABLET, FILM COATED ORAL
Status: DISCONTINUED | OUTPATIENT
Start: 2023-10-26 | End: 2023-10-27 | Stop reason: HOSPADM

## 2023-10-25 RX ADMIN — PANTOPRAZOLE SODIUM 40 MG: 40 TABLET, DELAYED RELEASE ORAL at 20:30

## 2023-10-25 RX ADMIN — SODIUM CHLORIDE 1000 ML: 9 INJECTION, SOLUTION INTRAVENOUS at 16:33

## 2023-10-25 RX ADMIN — ATORVASTATIN CALCIUM 20 MG: 20 TABLET, FILM COATED ORAL at 20:30

## 2023-10-25 RX ADMIN — SODIUM CHLORIDE: 9 INJECTION, SOLUTION INTRAVENOUS at 20:30

## 2023-10-25 ASSESSMENT — ENCOUNTER SYMPTOMS
ABDOMINAL DISTENTION: 0
SORE THROAT: 0
BACK PAIN: 0
SHORTNESS OF BREATH: 0

## 2023-10-25 ASSESSMENT — LIFESTYLE VARIABLES
HOW OFTEN DO YOU HAVE A DRINK CONTAINING ALCOHOL: MONTHLY OR LESS
HOW MANY STANDARD DRINKS CONTAINING ALCOHOL DO YOU HAVE ON A TYPICAL DAY: 1 OR 2

## 2023-10-25 NOTE — ED PROVIDER NOTES
[address] [facility] ED  EMERGENCY DEPARTMENT ENCOUNTER      Pt Name: Jose Garcia  MRN: [id number]  [address] [facility] 1940  Date of evaluation: 10/25/2023  Provider: Jil Middleton       Chief Complaint   Patient presents with    Extremity Weakness     Patient brought in by EMS after wife called squad. Per ems wife reported patient has had frequent falls over the last few day with increase in weakness. Early onset of dementia. Pt A&O at this time, and answers appropriately. Currently has no complaints. HISTORY OF PRESENT ILLNESS   (Location/Symptom, Timing/Onset, Context/Setting, Quality, Duration, Modifying Factors, Severity)  Note limiting factors. Jose Garcia is a 81 y.o. male who presents to the emergency department via EMS after his wife called the squad for recurrent falls. Per EMS wife reported patient has fallen several times over the last few days. He has had 3 falls on Monday and has progressively gotten weaker. Patient does have history of early onset dementia and according to his son he has had a shuffling gait ongoing for the last several months but up until today he was able to get up and walk but today he has been very weak and cannot walk. Patient is alert and oriented x4 at this time and answering questions appropriately. Son states that he has a history of fluid in his brain in the past but that has dissipated over time. Son also mentions that he has had slurred speech on and off for the last several months. He has not been officially diagnosed with Parkinson's. Patient denies any fever or chills or cough or congestion. He denies any urinary symptoms. Son states that he can be incontinent of urine which is also not new. Patient states that when he fell on Monday he did hit his head against a wooden door but denies any loss of consciousness. He denies any vision changes or headaches.   Patient states that he thinks he falls these many times as he tries

## 2023-10-26 PROBLEM — E44.1 MILD MALNUTRITION (HCC): Status: ACTIVE | Noted: 2023-10-26

## 2023-10-26 LAB
ANION GAP SERPL CALCULATED.3IONS-SCNC: 8 MMOL/L (ref 9–17)
BASOPHILS # BLD: <0.03 K/UL (ref 0–0.2)
BASOPHILS NFR BLD: 1 % (ref 0–2)
BUN SERPL-MCNC: 29 MG/DL (ref 8–23)
BUN/CREAT SERPL: 16 (ref 9–20)
CALCIUM SERPL-MCNC: 8.4 MG/DL (ref 8.6–10.4)
CHLORIDE SERPL-SCNC: 113 MMOL/L (ref 98–107)
CO2 SERPL-SCNC: 20 MMOL/L (ref 20–31)
CREAT SERPL-MCNC: 1.8 MG/DL (ref 0.7–1.2)
EKG ATRIAL RATE: 60 BPM
EKG P-R INTERVAL: 136 MS
EKG Q-T INTERVAL: 514 MS
EKG QRS DURATION: 172 MS
EKG QTC CALCULATION (BAZETT): 514 MS
EKG R AXIS: -89 DEGREES
EKG T AXIS: 106 DEGREES
EKG VENTRICULAR RATE: 60 BPM
EOSINOPHIL # BLD: 0.09 K/UL (ref 0–0.44)
EOSINOPHILS RELATIVE PERCENT: 2 % (ref 1–4)
ERYTHROCYTE [DISTWIDTH] IN BLOOD BY AUTOMATED COUNT: 14 % (ref 11.8–14.4)
GFR SERPL CREATININE-BSD FRML MDRD: 37 ML/MIN/1.73M2
GLUCOSE BLD-MCNC: 148 MG/DL (ref 74–100)
GLUCOSE BLD-MCNC: 181 MG/DL (ref 74–100)
GLUCOSE BLD-MCNC: 200 MG/DL (ref 74–100)
GLUCOSE SERPL-MCNC: 208 MG/DL (ref 70–99)
HCT VFR BLD AUTO: 34.4 % (ref 40.7–50.3)
HGB BLD-MCNC: 11.1 G/DL (ref 13–17)
IMM GRANULOCYTES # BLD AUTO: <0.03 K/UL (ref 0–0.3)
IMM GRANULOCYTES NFR BLD: 1 %
LYMPHOCYTES NFR BLD: 0.98 K/UL (ref 1.1–3.7)
LYMPHOCYTES RELATIVE PERCENT: 25 % (ref 24–43)
MCH RBC QN AUTO: 30.8 PG (ref 25.2–33.5)
MCHC RBC AUTO-ENTMCNC: 32.3 G/DL (ref 28.4–34.8)
MCV RBC AUTO: 95.6 FL (ref 82.6–102.9)
MONOCYTES NFR BLD: 0.44 K/UL (ref 0.1–1.2)
MONOCYTES NFR BLD: 11 % (ref 3–12)
NEUTROPHILS NFR BLD: 60 % (ref 36–65)
NEUTS SEG NFR BLD: 2.45 K/UL (ref 1.5–8.1)
NRBC BLD-RTO: 0 PER 100 WBC
PLATELET # BLD AUTO: 233 K/UL (ref 138–453)
PMV BLD AUTO: 10.7 FL (ref 8.1–13.5)
POTASSIUM SERPL-SCNC: 4.6 MMOL/L (ref 3.7–5.3)
RBC # BLD AUTO: 3.6 M/UL (ref 4.21–5.77)
SODIUM SERPL-SCNC: 141 MMOL/L (ref 135–144)
WBC OTHER # BLD: 4 K/UL (ref 3.5–11.3)

## 2023-10-26 PROCEDURE — 6360000002 HC RX W HCPCS

## 2023-10-26 PROCEDURE — G0378 HOSPITAL OBSERVATION PER HR: HCPCS

## 2023-10-26 PROCEDURE — 97166 OT EVAL MOD COMPLEX 45 MIN: CPT

## 2023-10-26 PROCEDURE — 96361 HYDRATE IV INFUSION ADD-ON: CPT

## 2023-10-26 PROCEDURE — 2580000003 HC RX 258: Performed by: INTERNAL MEDICINE

## 2023-10-26 PROCEDURE — 1200000000 HC SEMI PRIVATE

## 2023-10-26 PROCEDURE — 6370000000 HC RX 637 (ALT 250 FOR IP): Performed by: NURSE PRACTITIONER

## 2023-10-26 PROCEDURE — 93010 ELECTROCARDIOGRAM REPORT: CPT | Performed by: INTERNAL MEDICINE

## 2023-10-26 PROCEDURE — 97530 THERAPEUTIC ACTIVITIES: CPT

## 2023-10-26 PROCEDURE — 85025 COMPLETE CBC W/AUTO DIFF WBC: CPT

## 2023-10-26 PROCEDURE — 96372 THER/PROPH/DIAG INJ SC/IM: CPT

## 2023-10-26 PROCEDURE — 6370000000 HC RX 637 (ALT 250 FOR IP): Performed by: INTERNAL MEDICINE

## 2023-10-26 PROCEDURE — 97162 PT EVAL MOD COMPLEX 30 MIN: CPT

## 2023-10-26 PROCEDURE — 80048 BASIC METABOLIC PNL TOTAL CA: CPT

## 2023-10-26 PROCEDURE — 96374 THER/PROPH/DIAG INJ IV PUSH: CPT

## 2023-10-26 PROCEDURE — 82947 ASSAY GLUCOSE BLOOD QUANT: CPT

## 2023-10-26 PROCEDURE — 94761 N-INVAS EAR/PLS OXIMETRY MLT: CPT

## 2023-10-26 PROCEDURE — 36415 COLL VENOUS BLD VENIPUNCTURE: CPT

## 2023-10-26 RX ORDER — OFLOXACIN 3 MG/ML
3 SOLUTION/ DROPS OPHTHALMIC 4 TIMES DAILY
COMMUNITY

## 2023-10-26 RX ORDER — GLUCAGON 1 MG/ML
1 KIT INJECTION PRN
Status: DISCONTINUED | OUTPATIENT
Start: 2023-10-26 | End: 2023-10-27 | Stop reason: HOSPADM

## 2023-10-26 RX ORDER — PREDNISOLONE ACETATE 10 MG/ML
1 SUSPENSION/ DROPS OPHTHALMIC 4 TIMES DAILY
Status: DISCONTINUED | OUTPATIENT
Start: 2023-10-26 | End: 2023-10-27 | Stop reason: HOSPADM

## 2023-10-26 RX ORDER — INSULIN LISPRO 100 [IU]/ML
0-4 INJECTION, SOLUTION INTRAVENOUS; SUBCUTANEOUS NIGHTLY
Status: DISCONTINUED | OUTPATIENT
Start: 2023-10-26 | End: 2023-10-27 | Stop reason: HOSPADM

## 2023-10-26 RX ORDER — INSULIN LISPRO 100 [IU]/ML
0-4 INJECTION, SOLUTION INTRAVENOUS; SUBCUTANEOUS
Status: DISCONTINUED | OUTPATIENT
Start: 2023-10-26 | End: 2023-10-27 | Stop reason: HOSPADM

## 2023-10-26 RX ORDER — OFLOXACIN 3 MG/ML
5 SOLUTION AURICULAR (OTIC) DAILY
Status: DISCONTINUED | OUTPATIENT
Start: 2023-10-26 | End: 2023-10-26

## 2023-10-26 RX ORDER — HALOPERIDOL 5 MG/ML
1 INJECTION INTRAMUSCULAR EVERY 6 HOURS PRN
Status: DISCONTINUED | OUTPATIENT
Start: 2023-10-26 | End: 2023-10-27 | Stop reason: HOSPADM

## 2023-10-26 RX ORDER — PREDNISOLONE ACETATE 10 MG/ML
1 SUSPENSION/ DROPS OPHTHALMIC 4 TIMES DAILY
COMMUNITY

## 2023-10-26 RX ORDER — LORAZEPAM 2 MG/ML
1 INJECTION INTRAMUSCULAR ONCE
Status: COMPLETED | OUTPATIENT
Start: 2023-10-26 | End: 2023-10-26

## 2023-10-26 RX ORDER — OFLOXACIN 3 MG/ML
1 SOLUTION/ DROPS OPHTHALMIC 4 TIMES DAILY
Status: DISCONTINUED | OUTPATIENT
Start: 2023-10-26 | End: 2023-10-27 | Stop reason: HOSPADM

## 2023-10-26 RX ORDER — PREDNISOLONE SODIUM PHOSPHATE 10 MG/ML
1 SOLUTION/ DROPS OPHTHALMIC
Status: DISCONTINUED | OUTPATIENT
Start: 2023-10-26 | End: 2023-10-26

## 2023-10-26 RX ORDER — DEXTROSE MONOHYDRATE 100 MG/ML
INJECTION, SOLUTION INTRAVENOUS CONTINUOUS PRN
Status: DISCONTINUED | OUTPATIENT
Start: 2023-10-26 | End: 2023-10-27 | Stop reason: HOSPADM

## 2023-10-26 RX ADMIN — PANTOPRAZOLE SODIUM 40 MG: 40 TABLET, DELAYED RELEASE ORAL at 08:58

## 2023-10-26 RX ADMIN — INSULIN LISPRO 1 UNITS: 100 INJECTION, SOLUTION INTRAVENOUS; SUBCUTANEOUS at 12:44

## 2023-10-26 RX ADMIN — OFLOXACIN 1 DROP: 3 SOLUTION/ DROPS OPHTHALMIC at 20:00

## 2023-10-26 RX ADMIN — TAMSULOSIN HYDROCHLORIDE 0.4 MG: 0.4 CAPSULE ORAL at 08:58

## 2023-10-26 RX ADMIN — PREDNISOLONE ACETATE 1 DROP: 10 SUSPENSION/ DROPS OPHTHALMIC at 20:00

## 2023-10-26 RX ADMIN — FINASTERIDE 5 MG: 5 TABLET, FILM COATED ORAL at 08:59

## 2023-10-26 RX ADMIN — LISINOPRIL 2.5 MG: 2.5 TABLET ORAL at 08:58

## 2023-10-26 RX ADMIN — GLIPIZIDE 5 MG: 5 TABLET ORAL at 08:58

## 2023-10-26 RX ADMIN — ALPRAZOLAM 1.5 MG: 0.5 TABLET ORAL at 20:00

## 2023-10-26 RX ADMIN — OFLOXACIN 1 DROP: 3 SOLUTION/ DROPS OPHTHALMIC at 16:04

## 2023-10-26 RX ADMIN — HALOPERIDOL LACTATE 1 MG: 5 INJECTION, SOLUTION INTRAMUSCULAR at 23:37

## 2023-10-26 RX ADMIN — TROSPIUM CHLORIDE 20 MG: 20 TABLET, FILM COATED ORAL at 16:04

## 2023-10-26 RX ADMIN — RIVAROXABAN 15 MG: 15 TABLET, FILM COATED ORAL at 08:59

## 2023-10-26 RX ADMIN — ISOSORBIDE MONONITRATE 30 MG: 30 TABLET, EXTENDED RELEASE ORAL at 08:58

## 2023-10-26 RX ADMIN — PANTOPRAZOLE SODIUM 40 MG: 40 TABLET, DELAYED RELEASE ORAL at 20:00

## 2023-10-26 RX ADMIN — SODIUM CHLORIDE: 9 INJECTION, SOLUTION INTRAVENOUS at 03:17

## 2023-10-26 RX ADMIN — SODIUM CHLORIDE: 9 INJECTION, SOLUTION INTRAVENOUS at 22:02

## 2023-10-26 RX ADMIN — LORAZEPAM 1 MG: 2 INJECTION INTRAMUSCULAR; INTRAVENOUS at 03:16

## 2023-10-26 RX ADMIN — ATORVASTATIN CALCIUM 20 MG: 20 TABLET, FILM COATED ORAL at 20:00

## 2023-10-26 RX ADMIN — METOPROLOL SUCCINATE 50 MG: 50 TABLET, EXTENDED RELEASE ORAL at 08:58

## 2023-10-26 RX ADMIN — FERROUS SULFATE TAB 325 MG (65 MG ELEMENTAL FE) 325 MG: 325 (65 FE) TAB at 08:58

## 2023-10-26 RX ADMIN — HALOPERIDOL LACTATE 1 MG: 5 INJECTION, SOLUTION INTRAMUSCULAR at 05:19

## 2023-10-26 RX ADMIN — PREDNISOLONE ACETATE 1 DROP: 10 SUSPENSION/ DROPS OPHTHALMIC at 16:04

## 2023-10-26 RX ADMIN — HALOPERIDOL LACTATE 1 MG: 5 INJECTION, SOLUTION INTRAMUSCULAR at 16:39

## 2023-10-26 RX ADMIN — TROSPIUM CHLORIDE 20 MG: 20 TABLET, FILM COATED ORAL at 08:58

## 2023-10-26 NOTE — PROGRESS NOTES
Patient arrived at [facility] floor at this time via stretcher from ED. Patient ambulated to the bed with assistance. Patient alert and oriented x4. Vitals and assessment completed at this time, as charted. Admission navigator completed with patient and wife. Patient oriented to room and call light at this time. Patient denies further needs. Call light within reach, bed alarm on.

## 2023-10-26 NOTE — PROGRESS NOTES
Patient repeatedly attempting to climb out of bed. Reorientation attempts unsuccessful by Ji Colin RN, [address], PCA, Nadnini Mcbride NP. Halodol given per new order by Nandini Mcbride NP. Azeb Stinson currently sitting 1:1 with patient due to patient behaviors.

## 2023-10-26 NOTE — PROGRESS NOTES
Occupational Therapy  Facility/Department: [facility] AT THE [facility] MED SURG  Occupational Therapy Initial Assessment    Name: Darius Burns  : 1940  MRN: [id number]  Date of Service: 10/26/2023    Discharge Recommendations:  Continue to assess pending progress, Subacute/Skilled Nursing Facility, [address] [place] [address] with OT, 24 hour supervision or assist          Patient Diagnosis(es): The primary encounter diagnosis was Generalized weakness. Diagnoses of Frequent falls and Acute kidney injury [facility]) were also pertinent to this visit. 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, Biventricular ICD (implantable cardioverter-defibrillator) in place, Biventricular pacemaker check, CAD (coronary artery disease), CKD (chronic kidney disease), Diabetes mellitus ([address]), Esophageal reflux, History of echocardiogram, Hx of blood clots, Hx of left heart catheterization by ventricular puncture, Hyperlipidemia, Hypertension, Mantle cell lymphoma, unspecified site, extranodal and solid organ sites, Non Hodgkin's lymphoma ([address]), Nonischemic dilated cardiomyopathy ([address]), Pain in joint, lower leg, Pain in limb, S/P cardiac catheterization, Thoracic or lumbosacral neuritis or radiculitis, unspecified, and Unspecified acute reaction to stress. Past Surgical History:  has a past surgical history that includes Elbow surgery (); Neck surgery (); Foot surgery (); Bladder surgery (); Cardiac catheterization; Cardiac pacemaker placement; pacemaker placement; Skin cancer excision (2013); Diagnostic Cardiac Cath Lab Procedure (2013); Colonoscopy (10/15/2015); Upper gastrointestinal endoscopy (10/15/2015); Lung lobectomy (N/A, 2017); thoracotomy (2017); Pacemaker Change (Left, 2021); Colonoscopy ();  Eye surgery (Right, 10/9/2023); and Eye surgery (Right, 10/23/2023). Treatment Diagnosis: Weakness      Assessment   Performance deficits / Impairments: Decreased functional mobility ; Decreased endurance;Decreased coordination;Decreased ADL status; Decreased balance;Decreased strength;Decreased safe awareness;Decreased cognition  Assessment: 82 y/o M admitted to T due to frequent falls, confusion and weakness. Patient oriented x 1, very confused. Patient requires increased need for assist during ADL d/t weakness and confusion. Patient would benefit from OT services to address to improve ADL independence and safety  Treatment Diagnosis: Weakness  Prognosis: Fair  Decision Making: Medium Complexity  REQUIRES OT FOLLOW-UP: Yes        Plan   Occupational Therapy Plan  Times Per Day: Once a day  Days Per Week: 7 Days  Current Treatment Recommendations: Strengthening, Balance training, Functional mobility training, Endurance training, Safety education & training, Patient/Caregiver education & training, Equipment evaluation, education, & procurement, Self-Care / ADL, Cognitive reorientation     Restrictions  Restrictions/Precautions  Restrictions/Precautions: Fall Risk, Up as Tolerated, Other (comment) (one on one with nurse)    Subjective   Subjective  Subjective: Patient reports B calf pain. Patient sitting in chair upon arrival, eyes closed but responding well. Patient agreeable to OT evaluation.      Social/Functional History  Social/Functional History  Lives With: Spouse  Type of Home: House  Home Layout: Two level  Home Access: Stairs to enter with rails  Entrance Stairs - Number of Steps: 3  Bathroom Shower/Tub: Walk-in shower  Bathroom Toilet: Handicap height  Bathroom Equipment: Grab bars in shower  ADL Assistance: Independent  Homemaking Assistance: Independent  Ambulation Assistance: Independent  Transfer Assistance: Independent  Additional Comments: per wife Pt was independent without an AD       Objective        Safety Devices  Type of Devices: Nurse

## 2023-10-26 NOTE — ACP (ADVANCE CARE PLANNING)
Advance Care Planning     Advance Care Planning Activator (Inpatient)  Conversation Note      Date of ACP Conversation: 10/26/2023     Conversation Conducted with:  Healthcare Decision Maker: Named in Advance Directive or Healthcare Power of  (name) Michelle Mccormack    ACP Activator: Katty Fall RN        Health Care Decision Maker:     Current Designated Health Care Decision Maker:     Primary Decision Maker: Maggi Mccormack - Spouse - [phone]    Secondary Decision Maker: Jesus Santiago Child - [phone]      Care Preferences    Ventilation: \"If you were in your present state of health and suddenly became very ill and were unable to breathe on your own, what would your preference be about the use of a ventilator (breathing machine) if it were available to you? \"      Would the patient desire the use of ventilator (breathing machine)?: yes    \"If your health worsens and it becomes clear that your chance of recovery is unlikely, what would your preference be about the use of a ventilator (breathing machine) if it were available to you? \"     Would the patient desire the use of ventilator (breathing machine)?: No      Resuscitation  \"CPR works best to restart the heart when there is a sudden event, like a heart attack, in someone who is otherwise healthy. Unfortunately, CPR does not typically restart the heart for people who have serious health conditions or who are very sick. \"    \"In the event your heart stopped as a result of an underlying serious health condition, would you want attempts to be made to restart your heart (answer \"yes\" for attempt to resuscitate) or would you prefer a natural death (answer \"no\" for do not attempt to resuscitate)? \" yes       [x] Yes   [] No   Educated Patient / Padmini Elam regarding differences between Advance Directives and portable DNR orders.     Length of ACP Conversation in minutes:  30    Conversation Outcomes:  ACP discussion completed and Existing advance

## 2023-10-26 NOTE — PROGRESS NOTES
Pt vitals and assessment completed, see flowsheet. Pt alert and oriented to self only, breathing normal. Pt is repeatedly attempting to climb out of bed, reorientation attempts unsuccessful . Pt denies pain or any further needs, writer at bedside with 1:1 supervision.

## 2023-10-26 NOTE — PROGRESS NOTES
[facility]  Inpatient/Observation/Outpatient Rehabilitation    Date: 10/26/2023  Patient Name: Lizbeth Combs       [x] Inpatient Acute/Observation       []  Outpatient  : 1940         [] Pt no showed for scheduled appointment    [] Pt refused/declined therapy at this time due to:           [x] Pt cancelled due to:  [] No Reason Given   [] Sick/ill   [] Other:eval attempted, per nurse Dom pt finally sleeping after trying to constantly climb out of bed, so to let pt rest    Therapist/Assistant will attempt to see this patient, at our earliest opportunity.        Nova Pascual, PT Date: 10/26/2023

## 2023-10-26 NOTE — PLAN OF CARE
Problem: Discharge Planning  Goal: Discharge to home or other facility with appropriate resources  10/26/2023 0927 by Pari Ley RN  Outcome: Progressing  10/26/2023 0157 by Inna Harrison RN  Outcome: Progressing  Flowsheets (Taken 10/26/2023 4143)  Discharge to home or other facility with appropriate resources:   Identify barriers to discharge with patient and caregiver   Arrange for needed discharge resources and transportation as appropriate   Arrange for interpreters to assist at discharge as needed   Identify discharge learning needs (meds, wound care, etc)   Refer to discharge planning if patient needs post-hospital services based on physician order or complex needs related to functional status, cognitive ability or social support system     Problem: Safety - Adult  Goal: Free from fall injury  10/26/2023 0927 by Pari Ley RN  Outcome: Progressing  10/26/2023 0157 by Inna Harrison, RN  Outcome: Progressing  Flowsheets (Taken 10/26/2023 0156)  Free From Fall Injury: Instruct family/caregiver on patient safety

## 2023-10-26 NOTE — PROGRESS NOTES
Physical Therapy  Facility/Department: [facility] AT THE [facility] MED SURG  Physical Therapy Initial Assessment    Name: Epifanio Osborn  : 1940  MRN: [id number]  Date of Service: 10/26/2023    Discharge Recommendations:  Continue to assess pending progress          Patient Diagnosis(es): The primary encounter diagnosis was Generalized weakness. Diagnoses of Frequent falls and Acute kidney injury [facility]) were also pertinent to this visit. 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, Biventricular ICD (implantable cardioverter-defibrillator) in place, Biventricular pacemaker check, CAD (coronary artery disease), CKD (chronic kidney disease), Diabetes mellitus ([address]), Esophageal reflux, History of echocardiogram, Hx of blood clots, Hx of left heart catheterization by ventricular puncture, Hyperlipidemia, Hypertension, Mantle cell lymphoma, unspecified site, extranodal and solid organ sites, Non Hodgkin's lymphoma ([address]), Nonischemic dilated cardiomyopathy ([address]), Pain in joint, lower leg, Pain in limb, S/P cardiac catheterization, Thoracic or lumbosacral neuritis or radiculitis, unspecified, and Unspecified acute reaction to stress. Past Surgical History:  has a past surgical history that includes Elbow surgery (); Neck surgery (); Foot surgery (); Bladder surgery (); Cardiac catheterization; Cardiac pacemaker placement; pacemaker placement; Skin cancer excision (2013); Diagnostic Cardiac Cath Lab Procedure (2013); Colonoscopy (10/15/2015); Upper gastrointestinal endoscopy (10/15/2015); Lung lobectomy (N/A, 2017); thoracotomy (2017); Pacemaker Change (Left, 2021); Colonoscopy (); Eye surgery (Right, 10/9/2023); and Eye surgery (Right, 10/23/2023).     Assessment   Body Structures, Functions, Activity Limitations Requiring Skilled order to increase independence  Short Term Goal 3: Pt will ambulate >/=100 feet with LRAD with CGA in order to return to PLOF  Short Term Goal 4: Pt will increase dynamic standing balance to fair in order to reduce fall risk       Education  Patient Education  Education Given To: Patient  Education Provided: Role of Therapy;Plan of Care      Therapy Time   Individual Concurrent Group Co-treatment   Time In 1133         Time Out 1150         Minutes 1700 Jb Lynch, PT

## 2023-10-26 NOTE — PROGRESS NOTES
Pt assisted to chair, +1 with a walker, tolerated well. Alarm on, family at bedside, Valente Form remains at bedside.

## 2023-10-26 NOTE — PROGRESS NOTES
Patient will get in bed and stay in bed until writer and [address] walks out of the room then patient will attempt to climb out of bed again. This happens repeatedly. Jessica MORRIS sitting 1:1 with patient.

## 2023-10-26 NOTE — PROGRESS NOTES
Patient set bed alarm off, when writer went into room patient asked why he was at a \"motel\". Writer reoriented patient and told him that he was at [facility]. Patient stated Andrea Avery yeah you did tell me that, I forgot\". Patient tucked back in bed and bed alarm.

## 2023-10-26 NOTE — CARE COORDINATION
Case Management Assessment  Initial Evaluation    Date/Time of Evaluation: 10/26/2023 1:28 PM  Assessment Completed by: Saskia Shoemaker MSW LSW     If patient is discharged prior to next notation, then this note serves as note for discharge by case management. Patient Name: Earlene Yanes                   YOB: 1940  Diagnosis: Generalized weakness [R53.1]  Acute kidney injury [facility]) [N17.9]  Frequent falls [R29.6]                   Date / Time: 10/25/2023  3:25 PM    Patient Admission Status: Inpatient   Readmission Risk (Low < 19, Mod (19-27), High > 27): Readmission Risk Score: 15.9    Current PCP: JAI Fajardo CNP  PCP verified by CM? Yes    Chart Reviewed: Yes      History Provided by: Spouse  Patient Orientation: Other (see comment) (Pt sleeping while SW completed assessment with wife.)    Patient Cognition:  Pt currently sleeping. Wife reports that he is having trouble with memory lately and it waxes and wanes depending on if he is having a good day or not. Hospitalization in the last 30 days (Readmission):  No    If yes, Readmission Assessment in CM Navigator will be completed.     Advance Directives:      Code Status: Full Code   Patient's Primary Decision Maker is: Named in Scanned ACP Document    Primary Decision Maker: Maggi Mccormack - Spouse - [phone]    Secondary Decision Maker: Hamzah Moreno - Child - [phone]    Discharge Planning:    Patient lives with: Spouse/Significant Other Type of Home: House  Primary Care Giver: Spouse  Patient Support Systems include: Spouse/Significant Other, Children   Current Financial resources: Medicare  Current community resources: None  Current services prior to admission: Durable Medical Equipment            Current DME: Walker            Type of Home Care services:  None    ADLS  Prior functional level: Assistance with the following:, Bathing, Toileting, Cooking, Housework, Shopping, Mobility  Current functional level:

## 2023-10-26 NOTE — PROGRESS NOTES
Patient set bed alarm off a few more times, when writer went into room patient stated he had to go to the bathroom. Writer reoriented patient and told him that he was at [facility] and assisted patient to the bathroom with Elio Barnes. Patient refused to use walker and stretched IV tubing. When writer instructed patient to slow down patient he is \"getting out of this place\". Chon Hanson, [address] and Paula Boland NP attempted to reoriented patient again. Patient assisted back to bed and new orders for ativan obtained from Paula Boland NP and medication given. Call light within reach, bed alarm on.

## 2023-10-26 NOTE — PLAN OF CARE
Problem: Discharge Planning  Goal: Discharge to home or other facility with appropriate resources  Outcome: Progressing  Flowsheets (Taken 10/26/2023 0156)  Discharge to home or other facility with appropriate resources:   Identify barriers to discharge with patient and caregiver   Arrange for needed discharge resources and transportation as appropriate   Arrange for interpreters to assist at discharge as needed   Identify discharge learning needs (meds, wound care, etc)   Refer to discharge planning if patient needs post-hospital services based on physician order or complex needs related to functional status, cognitive ability or social support system     Problem: Safety - Adult  Goal: Free from fall injury  Outcome: Progressing  Flowsheets (Taken 10/26/2023 0156)  Free From Fall Injury: Instruct family/caregiver on patient safety

## 2023-10-26 NOTE — PROGRESS NOTES
Patient has started to become agitated and would not sit down. With assistance from other Rns we sat him back down in chair and gave prn haldol. He was resistant and pushing our hands away during the process and at one point balled his fist at me. I attempted to obtain his blood sugar but the patient refused. Pt is in the chair with the alarm on and family at bedside. Pt remains in 1:1 supervision.

## 2023-10-26 NOTE — PROGRESS NOTES
Writer at bedside for shift assessment. Patient sitting up in chair visiting with family. Vitals obtained and assessment completed, see flowsheet for details. Pt became agitated and stated he wanted to go home. Patient stood up and refused to sit back down in the chair. Patient attempted to walk around room without walker. Granddaughter, son and Cleavon Cunningham got patient to use walker and took patient for a walk around the unit. Patient then came back in the room to go to the bathroom and is currently back in the chair visiting with family.

## 2023-10-26 NOTE — PLAN OF CARE
Problem: Discharge Planning  Goal: Discharge to home or other facility with appropriate resources  10/26/2023 1851 by Paulino Sharma RN  Outcome: Progressing  Flowsheets (Taken 10/26/2023 8516)  Discharge to home or other facility with appropriate resources:   Identify barriers to discharge with patient and caregiver   Arrange for needed discharge resources and transportation as appropriate   Arrange for interpreters to assist at discharge as needed   Identify discharge learning needs (meds, wound care, etc)   Refer to discharge planning if patient needs post-hospital services based on physician order or complex needs related to functional status, cognitive ability or social support system  10/26/2023 0927 by Elizabeth Seals RN  Outcome: Progressing     Problem: Safety - Adult  Goal: Free from fall injury  10/26/2023 1851 by Paulino Sharma RN  Outcome: Progressing  Flowsheets (Taken 10/26/2023 1851)  Free From Fall Injury: Instruct family/caregiver on patient safety  10/26/2023 0927 by Elizabeth Seals RN  Outcome: Progressing     Problem: Nutrition Deficit:  Goal: Optimize nutritional status  10/26/2023 1851 by Paulino Sharma RN  Outcome: Progressing  Flowsheets (Taken 10/26/2023 1851)  Nutrient intake appropriate for improving, restoring, or maintaining nutritional needs: Monitor oral intake, labs, and treatment plans  10/26/2023 1246 by Kelsey Celis RD, LD  Flowsheets (Taken 10/26/2023 1246)  Nutrient intake appropriate for improving, restoring, or maintaining nutritional needs:   Monitor oral intake, labs, and treatment plans   Recommend appropriate diets, oral nutritional supplements, and vitamin/mineral supplements  Note: Nutrition Problem #1: Other (Comment) (mild malnutrition)  Intervention: Food and/or Nutrient Delivery: Continue Current Diet, Start Oral Nutrition Supplement

## 2023-10-26 NOTE — PROGRESS NOTES
Comprehensive Nutrition Assessment    Type and Reason for Visit:  Initial    Nutrition Recommendations/Plan:   Continue current diet. Start 4 oz Glucerna TID with meals. Malnutrition Assessment:  Malnutrition Status:  Mild malnutrition (10/26/23 0759)    Context:  Acute Illness     Findings of the 6 clinical characteristics of malnutrition:  Energy Intake:  No significant decrease in energy intake  Weight Loss:  Greater than 2% over 1 week (3.1%)     Body Fat Loss:  No significant body fat loss     Muscle Mass Loss:  No significant muscle mass loss    Fluid Accumulation:  No significant fluid accumulation     Strength:  Not Performed    Nutrition Assessment:    Mild malnutrition r/t inadequate oral intakes aeb weight loss 3.1% x 1 week, multiple falls with reported weakness. Altered nutrition related labs r/t endocrine dysfunction aeb A1c 7.4/, glucose 208. GFR 37, calcium 8.4. Pt with Hx non hodgkins lymphoma, CHF, CKD, and DM. Pt does not follow a special diet at home. Denied any PO changes, but has early onset dementia. Add 4 oz Glucerna TID with meals. Nutrition Related Findings:    no visual indices of malnutrition Wound Type: None       Current Nutrition Intake & Therapies:    Average Meal Intake: %  Average Supplements Intake: None Ordered  ADULT DIET; Regular; 4 carb choices (60 gm/meal)    Anthropometric Measures:  Height: 182.9 cm (6')  Ideal Body Weight (IBW): 178 lbs (81 kg)    Admission Body Weight: 82.5 kg (181 lb 14 oz)  Current Body Weight: 81.7 kg (180 lb 3.2 oz), 101.2 % IBW.  Weight Source: Bed Scale  Current BMI (kg/m2): 24.4  Usual Body Weight: 84.4 kg (186 lb)  % Weight Change (Calculated): -3.1  Weight Adjustment For: No Adjustment                 BMI Categories: Normal Weight (BMI 22.0 to 24.9) age over 72    Estimated Daily Nutrient Needs:  Energy Requirements Based On: Kcal/kg     Energy (kcal/day): 9289-4279 (28-31/kg)  Weight Used for Protein Requirements:

## 2023-10-26 NOTE — PROGRESS NOTES
Writer got patient crackers and jello, due to patient refusing to eat his dinner. Patient ate all the crackers and jello.

## 2023-10-27 VITALS
BODY MASS INDEX: 25.2 KG/M2 | OXYGEN SATURATION: 97 % | DIASTOLIC BLOOD PRESSURE: 62 MMHG | HEART RATE: 98 BPM | SYSTOLIC BLOOD PRESSURE: 107 MMHG | WEIGHT: 186.07 LBS | TEMPERATURE: 97.1 F | HEIGHT: 72 IN | RESPIRATION RATE: 22 BRPM

## 2023-10-27 LAB
ANION GAP SERPL CALCULATED.3IONS-SCNC: 9 MMOL/L (ref 9–17)
BASOPHILS # BLD: <0.03 K/UL (ref 0–0.2)
BASOPHILS NFR BLD: 0 % (ref 0–2)
BUN SERPL-MCNC: 24 MG/DL (ref 8–23)
BUN/CREAT SERPL: 16 (ref 9–20)
CALCIUM SERPL-MCNC: 8.7 MG/DL (ref 8.6–10.4)
CHLORIDE SERPL-SCNC: 111 MMOL/L (ref 98–107)
CO2 SERPL-SCNC: 19 MMOL/L (ref 20–31)
CREAT SERPL-MCNC: 1.5 MG/DL (ref 0.7–1.2)
EOSINOPHIL # BLD: 0.08 K/UL (ref 0–0.44)
EOSINOPHILS RELATIVE PERCENT: 1 % (ref 1–4)
ERYTHROCYTE [DISTWIDTH] IN BLOOD BY AUTOMATED COUNT: 13.6 % (ref 11.8–14.4)
GFR SERPL CREATININE-BSD FRML MDRD: 46 ML/MIN/1.73M2
GLUCOSE BLD-MCNC: 133 MG/DL (ref 74–100)
GLUCOSE BLD-MCNC: 188 MG/DL (ref 74–100)
GLUCOSE SERPL-MCNC: 148 MG/DL (ref 70–99)
HCT VFR BLD AUTO: 32.9 % (ref 40.7–50.3)
HGB BLD-MCNC: 10.7 G/DL (ref 13–17)
IMM GRANULOCYTES # BLD AUTO: <0.03 K/UL (ref 0–0.3)
IMM GRANULOCYTES NFR BLD: 0 %
LYMPHOCYTES NFR BLD: 0.97 K/UL (ref 1.1–3.7)
LYMPHOCYTES RELATIVE PERCENT: 17 % (ref 24–43)
MCH RBC QN AUTO: 30.7 PG (ref 25.2–33.5)
MCHC RBC AUTO-ENTMCNC: 32.5 G/DL (ref 28.4–34.8)
MCV RBC AUTO: 94.5 FL (ref 82.6–102.9)
MONOCYTES NFR BLD: 0.55 K/UL (ref 0.1–1.2)
MONOCYTES NFR BLD: 10 % (ref 3–12)
NEUTROPHILS NFR BLD: 72 % (ref 36–65)
NEUTS SEG NFR BLD: 4.07 K/UL (ref 1.5–8.1)
NRBC BLD-RTO: 0 PER 100 WBC
PLATELET # BLD AUTO: 216 K/UL (ref 138–453)
PMV BLD AUTO: 11.1 FL (ref 8.1–13.5)
POTASSIUM SERPL-SCNC: 4.5 MMOL/L (ref 3.7–5.3)
RBC # BLD AUTO: 3.48 M/UL (ref 4.21–5.77)
SODIUM SERPL-SCNC: 139 MMOL/L (ref 135–144)
WBC OTHER # BLD: 5.7 K/UL (ref 3.5–11.3)

## 2023-10-27 PROCEDURE — 96361 HYDRATE IV INFUSION ADD-ON: CPT

## 2023-10-27 PROCEDURE — 85025 COMPLETE CBC W/AUTO DIFF WBC: CPT

## 2023-10-27 PROCEDURE — G0378 HOSPITAL OBSERVATION PER HR: HCPCS

## 2023-10-27 PROCEDURE — 36415 COLL VENOUS BLD VENIPUNCTURE: CPT

## 2023-10-27 PROCEDURE — 80048 BASIC METABOLIC PNL TOTAL CA: CPT

## 2023-10-27 PROCEDURE — 96372 THER/PROPH/DIAG INJ SC/IM: CPT

## 2023-10-27 PROCEDURE — 82947 ASSAY GLUCOSE BLOOD QUANT: CPT

## 2023-10-27 PROCEDURE — 6360000002 HC RX W HCPCS

## 2023-10-27 PROCEDURE — 94761 N-INVAS EAR/PLS OXIMETRY MLT: CPT

## 2023-10-27 PROCEDURE — 2580000003 HC RX 258: Performed by: INTERNAL MEDICINE

## 2023-10-27 PROCEDURE — 6370000000 HC RX 637 (ALT 250 FOR IP): Performed by: INTERNAL MEDICINE

## 2023-10-27 RX ORDER — HALOPERIDOL 5 MG/ML
2 INJECTION INTRAMUSCULAR ONCE
Status: COMPLETED | OUTPATIENT
Start: 2023-10-27 | End: 2023-10-27

## 2023-10-27 RX ADMIN — FINASTERIDE 5 MG: 5 TABLET, FILM COATED ORAL at 09:22

## 2023-10-27 RX ADMIN — RIVAROXABAN 15 MG: 15 TABLET, FILM COATED ORAL at 09:22

## 2023-10-27 RX ADMIN — PANTOPRAZOLE SODIUM 40 MG: 40 TABLET, DELAYED RELEASE ORAL at 09:22

## 2023-10-27 RX ADMIN — TROSPIUM CHLORIDE 20 MG: 20 TABLET, FILM COATED ORAL at 09:22

## 2023-10-27 RX ADMIN — METOPROLOL SUCCINATE 50 MG: 50 TABLET, EXTENDED RELEASE ORAL at 09:22

## 2023-10-27 RX ADMIN — ISOSORBIDE MONONITRATE 30 MG: 30 TABLET, EXTENDED RELEASE ORAL at 09:22

## 2023-10-27 RX ADMIN — LISINOPRIL 2.5 MG: 2.5 TABLET ORAL at 09:22

## 2023-10-27 RX ADMIN — FERROUS SULFATE TAB 325 MG (65 MG ELEMENTAL FE) 325 MG: 325 (65 FE) TAB at 09:22

## 2023-10-27 RX ADMIN — SODIUM CHLORIDE, PRESERVATIVE FREE 10 ML: 5 INJECTION INTRAVENOUS at 09:22

## 2023-10-27 RX ADMIN — TAMSULOSIN HYDROCHLORIDE 0.4 MG: 0.4 CAPSULE ORAL at 09:22

## 2023-10-27 RX ADMIN — OFLOXACIN 1 DROP: 3 SOLUTION/ DROPS OPHTHALMIC at 09:15

## 2023-10-27 RX ADMIN — GLIPIZIDE 5 MG: 5 TABLET ORAL at 09:22

## 2023-10-27 RX ADMIN — PREDNISOLONE ACETATE 1 DROP: 10 SUSPENSION/ DROPS OPHTHALMIC at 09:27

## 2023-10-27 RX ADMIN — HALOPERIDOL LACTATE 2 MG: 5 INJECTION, SOLUTION INTRAMUSCULAR at 00:24

## 2023-10-27 ASSESSMENT — PAIN SCALES - GENERAL: PAINLEVEL_OUTOF10: 0

## 2023-10-27 NOTE — PROGRESS NOTES
Spiritual Services Interventions  [id number]   10/27/2023  Susan Hunt LUIS Mccormack  81y.o. year old male    Encounter Summary  Encounter Overview/Reason : (P) Initial Encounter  Service Provided For[de-identified] (P) Patient  Referral/Consult From[de-identified] (P) Rounding  Support System: (P) Spouse, Children  Complexity of Encounter: (P) Moderate  Begin Time: (P) 1000  End Time : (P) 1015  Total Time Calculated: (P) 15 min                          Assessment/Intervention/Outcome  Assessment: (P) Calm  Intervention: (P) Active listening, Explored/Affirmed feelings, thoughts, concerns, Prayer (assurance of)/[place], Nurtured Hope  Outcome: (P) Engaged in conversation, Expressed feelings, needs, and concerns, Receptive

## 2023-10-27 NOTE — PROGRESS NOTES
Patient erratic, yelling and walking the halls. Reorientation attempts unsuccessful by Bong Johnson RN, Narinder Kingston NP, Claudine Moreira RN. Patient finally got back into bed after roaming the halls for 15 minutes.  Haladol ordered again by Narinder Kingston NP.

## 2023-10-27 NOTE — DISCHARGE SUMMARY
Discharge Summary    Esther Burch  :  1940  MRN:  [id number]    Admit date:  10/25/2023      Discharge date: 10/27/2023     Admitting Physician:  Yudy Salgado MD    Discharge Diagnoses:    Principal Problem:    Frequent falls  Active Problems:    Chronic renal disease, stage III ([address]) [775683]    Mild malnutrition ([address])    Essential hypertension    Type 2 diabetes mellitus with diabetic nephropathy, without long-term current use of insulin ([address])  Resolved Problems:    * No resolved hospital problems. *      Hospital Course:   Esther Burch is a 81 y.o. male admitted with frequent falls. He presented from home due to recurrent falls. Patient is fallen multiple times prior to arrival.  He did report that he did fall into a door and got dizzy after falling. No loss of consciousness was reported. He reported of increased weakness over several weeks. He does have shuffling gait. Patient has history of AICD placement, CAD, CHF, CKD, diabetes, non-Hodgkin's lymphoma and hydrocephalus as well as dementia. He lives at home with his wife. He uses a cane but not often. He complained of bilateral lower leg pain. He denied fever or chills. Radiology studies were negative for any acute process including reoccurrence of hydrocephalus. PT and OT were consulted and tolerated well. No infectious source was found. He was hydrated with fluids and electrolytes replaced. 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 Patient ambulated last evening down the berumen with his walker pounding on doors stating \"wake up\": Traci Álvarez Wife is at bedside today who wishes to take him home and states his behaviors are not as such at home. Hemodynamically patient is stable. Plan will be to discharge patient home today with his wife. I will [facility] for PT and OT as well.     Consultants:  none    Procedures: none    Complications: minutes    Including the following activities:  Evaluation and Management of patient  Discussion with patient and/or surrogate about current care plan  Coordination with Case Management and/or   Coordination of care with Consultants (if applicable)   Coordination of care with Receiving Facility Physician (if applicable)  Completion of DME forms (if applicable)  Preparation of Discharge Summary  Preparation of Medication Reconciliation  Preparation of Discharge Prescriptions    Signed:  JAI Wiseman CNP, JAI, NP-C  10/27/2023, 10:53 AM

## 2023-10-27 NOTE — PROGRESS NOTES
Wife at bedside. Incont care provided, moderate assist with 2 staff members. Pt closes eyes once care is completed. Wife states she thinks she will just take him home with her today but would like to talk to Dr. Yesi Oglesby first. DAVION Balderrama, called and informed of such. Will continue to monitor.

## 2023-10-27 NOTE — DISCHARGE INSTR - DIET

## 2023-10-27 NOTE — PROGRESS NOTES
Discharge instructions given to pt and wife. No questions, wife verbalized understanding all instructions. Wife aware of follow up appointment as listed on AVS. Pt d/c'd off unit at this time, via wheelchair, with spouse, to home. Belongings in hand. No issues noted.

## 2023-10-27 NOTE — PROGRESS NOTES
Physician Progress Note      Kitty Mclain  YASMINE #:                  [id number]  :                       1940  ADMIT DATE:       10/25/2023 3:25 PM  [facility] DATE:  RESPONDING  PROVIDER #:        MATT CHARLES          QUERY TEXT:    Patient admitted with recurrent fall. Noted documentation of Acute Kidney   Injury in ED 10/25. In order to support the diagnosis of JACQUE, please include   additional clinical indicators in your documentation. ? Or please document if   the diagnosis of JACQUE has been ruled out after further study. The medical record reflects the following:  Risk Factors: the 83 yrs. old male, htn, ckd IIIb  Clinical Indicators: ED 10/25-Acute kidney injury  cr 10/25- 2.0, 10/26-1.7  bun10/25-35, 10/26-  GFR 10/25-33, 10/26-37. Treatment: IV fluids, serial lab. Defined by Kidney Disease Improving Global Outcomes (KDIGO) clinical practice   guideline for acute kidney injury:  -Increase in SCr by greater than or equal to 0.3 mg/dl within 48 hours; or  -Increase or decrease in SCr to greater than or equal to 1.5 times baseline,   which is known or presumed to have occurred within the prior 7 days; or  -Urine volume < 0.5ml/kg/h for 6 hours. Thank you. Gume Grier RN, Clinical Documentation Integrity, Revenue   Cycle, Sherif Lopez, CRCR  Options provided:  -- Acute kidney injury evidenced by, Please document evidence as well as a   numerical baseline creatinine, if known.   -- Acute kidney injury ruled out after study  -- Other - I will add my own diagnosis  -- Disagree - Not applicable / Not valid  -- Disagree - Clinically unable to determine / Unknown  -- Refer to Clinical Documentation Reviewer    PROVIDER RESPONSE TEXT:    This patient has acute kidney injury as evidenced by Baseline creatinine   1.5-1.6, creatinine on 10/18/23 1.6. 10/25/23 2.0    Query created by: Karen Huber on 10/26/2023 8:56 AM      Electronically signed by:  Kenroy Brantley 10/26/2023

## 2023-10-27 NOTE — PROGRESS NOTES
[facility]  Inpatient/Observation/Outpatient Rehabilitation    Date: 10/27/2023  Patient Name: Jaz Gibbs       [x] Inpatient Acute/Observation       []  Outpatient  : 1940        [x] Pt cancelled due to:  [] No Reason Given   [] Sick/ill   [x] Other:  Consulted with RN, patient had a rough night with minimal rest. Patient currently asleep, will allow patient to rest and check back later in the day. Therapist/Assistant will attempt to see this patient, at our earliest opportunity.        Vivien Shi [id number] Date: 10/27/2023

## 2023-10-27 NOTE — PROGRESS NOTES
Patient has been continuously attempting to climb out of bed and fight staff. Writer, [facility]  and Broderick Tate RN at bedside to continuously redirect patient and keep patient in bed.

## 2023-10-27 NOTE — PROGRESS NOTES
Patient has not slept at all since his outburst in the hallway. Patient continues to fight and throw feet over the side of the bed in attempt to get out of bed.  RN continues to be at bedside to watch patient.

## 2023-10-27 NOTE — TELEPHONE ENCOUNTER
Informed wife of the results and she voiced understanding. recently treated with doxycyline for bladder infection x 7 days, course completed

## 2023-10-27 NOTE — PROGRESS NOTES
Pt awakes and attempting to get out of bed. Writer assisted pt to bathroom, incont care provided, pt urinated small amt in toilet as well, brief saturated. Pt is calm and cooperative, oriented to self only. Assmt and VS completed as charted, see flow sheet. Pt in chair, [facility] warmed and pt took in approx 75%, see flow sheet for details. Call light within reach, family at bedside, pt remains 1:1 d/t safety concerns, no ssx of pain noted, denies further needs. Chair alarm active. Will continue to monitor.

## 2023-10-27 NOTE — PROGRESS NOTES
Spoke with the wife regarding home health . She chose [facility] home care from the list.  Referral made and paper work fax to home health.     DAVION Chang

## 2023-10-27 NOTE — PROGRESS NOTES
Physician Progress Note      PATIENT:               Winnie Acevedo  CSN #:                  [id number]  :                       1940  ADMIT DATE:       10/25/2023 3:25 PM  [facility] DATE:  RESPONDING  PROVIDER #:        Matilde Colvin MD          QUERY TEXT:    Pt admitted with Freq falls. Patient states he fell multiple times today. Does not recall the situation. He states he did fall into a door and got   dizzy after falling. Denies loss of consciousness he has had increased   weakness over the last several weeks. Patient has had a shuffling gait. 80 yo Lives at home with his wife. States he has a cane but does not use it   often. He is currently complaining of bilateral lower leg pain. He states   this has been going on for several weeks. Pt noted to have Increased AMS. Oriented to self only. Patient repeatedly attempting to cl    The medical record reflects the following:  Risk Factors: fell multiple times today. Clinical Indicators:  Does not recall the situation. He states he did fall   into a door and got dizzy after falling. Denies loss of consciousness he has   had increased weakness over the last several weeks. Patient has had a   shuffling gait. 80 yo Lives at home with his wife. States he has a cane but   does not use it often. He is currently complaining of bilateral lower leg   pain. He states this has been going on for several weeks. Pt noted to have   Increased AMS. Oriented to self only. Patient repeatedly attempting to climb   out of bed. Reorientation attempts unsuccessful. Treatment:  consult, and Ativan Sitter now sitting 1:1 with   patient. Thank you. Surinder Keys RN, Clinical Documentation Integrity, Revenue   Cycle, [facility]  Options provided:  -- Age Related Physical Debility  -- Frailty  -- Falls due to other, Please document other cause.   -- Other - I will add my own diagnosis  -- Disagree - Not applicable / Not valid  --

## 2023-10-27 NOTE — PROGRESS NOTES
The [facility] at [address] [place] [address] is not in-network with the patient insurance so they are unable to accept the patient.   Candie Kawasaki, LSW

## 2023-10-30 ENCOUNTER — CARE COORDINATION (OUTPATIENT)
Dept: CASE MANAGEMENT | Age: 83
End: 2023-10-30

## 2023-10-30 DIAGNOSIS — R29.6 FREQUENT FALLS: Primary | ICD-10-CM

## 2023-10-30 PROCEDURE — 1111F DSCHRG MED/CURRENT MED MERGE: CPT

## 2023-10-30 NOTE — CARE COORDINATION
of your prescriptions and are they filled?: Yes  Have you been contacted by a [email or web address]?: Yes  Have you scheduled your follow up appointment?: Yes  How are you going to get to your appointment?: Car - family or friend to transport  Do you feel like you have everything you need to keep you well at home?: Yes  Care Transitions Interventions     Other Services: Completed                                    Discussed follow-up appointments. If no appointment was previously scheduled, appointment scheduling offered: Yes. Is follow up appointment scheduled within 7 days of discharge? Yes. Follow Up  Future Appointments   Date Time Provider [address]   11/6/2023 10:45 AM JAI Almazan CNP   12/7/2023  2:30 PM JAI Carpenter CNP UROLOGY [facility]   12/28/2023 10:30 AM JAI Carpenter CNP UROLOGY [facility]   1/16/2024  8:30 AM SCHEDULE, P [facility] [employer] [facility]   1/23/2024  1:00 PM JAI Almazan CNP   4/29/2024  1:20 PM Jacoby Solares MD TIFF C[facility]   10/16/2024  9:00 AM Jacoby Solares MD TIFF CARD [facility]       Care Transition Nurse provided contact information. Plan for follow-up call in 3-5 days based on severity of symptoms and risk factors.   Plan for next call: symptom management-     Romulo Novoa RN Hydroquinone Counseling:  Patient advised that medication may result in skin irritation, lightening (hypopigmentation), dryness, and burning.  In the event of skin irritation, the patient was advised to reduce the amount of the drug applied or use it less frequently.  Rarely, spots that are treated with hydroquinone can become darker (pseudoochronosis).  Should this occur, patient instructed to stop medication and call the office. The patient verbalized understanding of the proper use and possible adverse effects of hydroquinone.  All of the patient's questions and concerns were addressed.

## 2023-11-03 ENCOUNTER — CARE COORDINATION (OUTPATIENT)
Dept: CASE MANAGEMENT | Age: 83
End: 2023-11-03

## 2023-11-03 NOTE — CARE COORDINATION
Care Transitions Outreach Attempt    Call within 2 business days of discharge: Yes   Attempted to reach patient for transitions of care follow up. Unable to reach patient. Patient: Charlotte Luna Patient : 1940 MRN: <[id number]>    Last Discharge Facility       Date Complaint Diagnosis Description Type Department Provider    10/25/23 Extremity Weakness Generalized weakness . .. [facility]; Ar[facility]. .. # 1 attempt-Attempted to reach patient for subsequent call. Left [employer] appropriate message with contact information requesting return call to [phone]     Was this an external facility discharge?  No Discharge Facility Name: [facility]    Noted following upcoming appointments from discharge chart review:   [id number] [facility],[address] follow up appointment(s):   Future Appointments   Date Time Provider [address]   2023 10:45 AM Nunu Natalya, APRN - CNP [facility] M   2023  2:30 PM JAI Dai - CNP [facility]   2023 10:30 AM JAI Dai - CNP [facility]   2024  8:30 AM SCHEDULE, P [facility] [employer] [facility]   2024  1:00 PM Nunu Natalya, APRN - CNP [facility] Corolla M   2024  1:20 PM Alan Demarco MD TIFF CARD [facility]   10/16/2024  9:00 AM Alan Demarco MD TIFF CARD [facility]     Non-[facility]  follow up appointment(s):

## 2023-11-07 ENCOUNTER — CARE COORDINATION (OUTPATIENT)
Dept: CASE MANAGEMENT | Age: 83
End: 2023-11-07

## 2023-11-07 NOTE — CARE COORDINATION
Care Transitions Outreach Attempt #2      Attempt #2 to reach patient for transitions of care follow up. Unable to reach patient. Left message requesting call back. Noted pt had PCP appt yesterday, completed labs. Pt has [facility]. CTN sign off if no return call received. Patient: Vanda Nicholas Patient : 1940 MRN: [id number]    Last Discharge Facility       Date Complaint Diagnosis Description Type Department Provider    10/25/23 Extremity Weakness Generalized weakness . .. ED to Hosp-Admission (Discharged) (ADMITTED) [facility] Sarah Zuñiga MD; Dalila Tao. .. Was this an external facility discharge? No Discharge Facility Name: [facility]    Noted following upcoming appointments from discharge chart review:   [facility] follow up appointment(s):   Future Appointments   Date Time Provider 4600  46[facility]   2023  2:30 PM JAI Benito CNPF UROLOGY [facility]   2023 10:30 AM JAI Benito CNP UROLOGY [facility]   2024  8:30 AM SCHEDULE, [facility] [employer] TIFF CARD [facility]   2024  1:00 PM Delos Genin, APRN - CNP [facility] M   2024  1:20 PM Lexis Swenson MD TIFF CARD [facility]   10/16/2024  9:00 AM [facility]     Sheree Barrett RN BSN [facility]  Care Transitions Nurse  [phone]   [email or web address]

## 2023-11-17 ENCOUNTER — HOSPITAL ENCOUNTER (OUTPATIENT)
Dept: CT IMAGING | Age: 83
End: 2023-11-17
Payer: MEDICARE

## 2023-11-17 DIAGNOSIS — M79.89 LEG SWELLING: ICD-10-CM

## 2023-11-17 DIAGNOSIS — M79.661 PAIN OF RIGHT LOWER LEG: ICD-10-CM
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
## 2023-11-17 PROCEDURE — 73700 CT LOWER EXTREMITY W/O DYE: CPT

## 2023-11-20 NOTE — RESULT ENCOUNTER NOTE
I called and updated pt on findings of large hematoma. To ice, heat, elevate, compression stockings prn, ROM and up and about in small periods. To f/u if any worsening issues, swelling, cyanosis or issues with distal leg. Should slowly Improve. Tylenol for pain. Wife acknowledged. Please call them and ask for update.   Thank you

## 2023-12-04 NOTE — PROGRESS NOTES
Patient received NPO instructions and pre-op medication instructions to be taken on the day of the procedure with a small sip of water. Pt was also given pre-op eye drop instructions from Dr. Tricia Phan office. Instructed pt to take imdur, metoprolol, pepcid, prilosec, and protonix with a small sip of water prior to arriving to the hospital the day of surgery.

## 2023-12-07 ENCOUNTER — OFFICE VISIT (OUTPATIENT)
Dept: UROLOGY | Age: 83
End: 2023-12-07

## 2023-12-07 VITALS
TEMPERATURE: 97.3 F | SYSTOLIC BLOOD PRESSURE: 116 MMHG | DIASTOLIC BLOOD PRESSURE: 64 MMHG | BODY MASS INDEX: 24.41 KG/M2 | WEIGHT: 180 LBS

## 2023-12-07 DIAGNOSIS — N40.1 BPH WITH OBSTRUCTION/LOWER URINARY TRACT SYMPTOMS: Primary | ICD-10-CM

## 2023-12-07 DIAGNOSIS — N13.8 BPH WITH OBSTRUCTION/LOWER URINARY TRACT SYMPTOMS: Primary | ICD-10-CM

## 2023-12-07 DIAGNOSIS — N39.43 POST-VOID DRIBBLING: ICD-10-CM

## 2023-12-07 DIAGNOSIS — N32.81 OAB (OVERACTIVE BLADDER): ICD-10-CM

## 2023-12-07 DIAGNOSIS — R33.9 INCOMPLETE BLADDER EMPTYING: ICD-10-CM

## 2023-12-07 DIAGNOSIS — N39.44 NOCTURNAL ENURESIS: ICD-10-CM

## 2023-12-07 RX ORDER — TAMSULOSIN HYDROCHLORIDE 0.4 MG/1
0.4 CAPSULE ORAL 2 TIMES DAILY
Qty: 180 CAPSULE | Refills: 3 | Status: SHIPPED | OUTPATIENT
Start: 2023-12-07

## 2023-12-07 RX ORDER — SOLIFENACIN SUCCINATE 5 MG/1
5 TABLET, FILM COATED ORAL DAILY
Qty: 90 TABLET | Refills: 3 | Status: SHIPPED | OUTPATIENT
Start: 2023-12-07

## 2023-12-07 RX ORDER — FINASTERIDE 5 MG/1
5 TABLET, FILM COATED ORAL DAILY
Qty: 90 TABLET | Refills: 1 | Status: SHIPPED | OUTPATIENT
Start: 2023-12-07

## 2023-12-07 ASSESSMENT — ENCOUNTER SYMPTOMS
EYE REDNESS: 0
VOMITING: 0
COUGH: 0
SHORTNESS OF BREATH: 0
CONSTIPATION: 0
ABDOMINAL PAIN: 0
WHEEZING: 0
BACK PAIN: 0
COLOR CHANGE: 0
NAUSEA: 0

## 2023-12-07 NOTE — PROGRESS NOTES
HPI:          Patient is a 81 y.o. male in no acute distress. He is alert and oriented to person, place, and time. History  Referred by Juan Ramon VERGARA for incontinence. He does complain of urge incontinence. He has nocturia 2-3 times per night, but denies nocturnal enuresis. He only drinks 1-2 cups of coffee per day. He does not consume water because he does not want to urinate often. He does follow with nephrology for chronic kidney disease. On Flomax and finasteride                   12/2022 cystoscopy showed extensive trilobar hyperplasia with prostatic varices              Offered PVP greenlight     1/2023 started vesicare 5mg, urged increased water consumption    10/2023 nocturnal enuresis  flomax increased to BID     Today  Here today due to nocturnal enuresis. At his visit we did increase Flomax to twice per day. He is no longer waking up soaked. He denies daytime frequency, urgency, dysuria incontinence or hematuria. He does complain of postvoid dribbling. He is having a bowel movement every other day. He is taking Flomax, Proscar, and Vesicare as prescribed. PVR is 160 mL. He is complaining about all the medicines that he has to take and he is not happy with postvoid dribbling. Past Medical History:   Diagnosis Date    Abnormal echocardiogram 05/02/2012    EF 15%. mildly dilated LV cavity. Abnormal resting ECG findings 04/25/2012    atrial sensed, ventricular paced rhythm at 73 beats per minute. Wide QRS of 183 ms. Alcohol use disorder     Biventricular ICD (implantable cardiac defibrillator) in place 08/26/2011    [facility] [employer]. CRT-D    Biventricular ICD (implantable cardioverter-defibrillator) in place 10/29/2015    [employer]- Bi V ICD (Battery change)    Biventricular pacemaker check 5/12 1/13    LV lead turned of 1/13 due to diaphragmatic pacing.     CAD (coronary artery disease)     CKD (chronic kidney disease)     Dr. Yvrose Kunz    Dementia [facility])

## 2023-12-07 NOTE — PATIENT INSTRUCTIONS
Survey: You may be receiving a survey from [employer] regarding your visit today. Please complete the survey to enable us to provide the highest quality of care to you and your family.     If you cannot score us as very good on any question, please call the office to discuss how we could have major experience exceptional.    Thank you    Your Urology Care Team.

## 2023-12-08 ENCOUNTER — ANESTHESIA EVENT (OUTPATIENT)
Dept: OPERATING ROOM | Age: 83
End: 2023-12-08
Payer: MEDICARE

## 2023-12-10 PROBLEM — H25.812 COMBINED FORMS OF AGE-RELATED CATARACT OF LEFT EYE: Chronic | Status: ACTIVE | Noted: 2023-12-10

## 2023-12-10 NOTE — SUBJECTIVE & OBJECTIVE
MG tablet Take 1 tablet by mouth every morning (before breakfast) 11/3/23   JAI Kumar CNP   ferrous sulfate (IRON 325) 325 (65 Fe) MG tablet Take 1 tablet by mouth daily (with breakfast) 11/3/23   JAI Kumar CNP   prednisoLONE acetate (PRED FORTE) 1 % ophthalmic suspension Place 1 drop into the right eye 4 times daily 5 mins after ofloxacin    ProviderScott MD   ofloxacin (OCUFLOX) 0.3 % solution Place 3 drops into the right eye 4 times daily    ProviderScott MD   metoprolol succinate (TOPROL XL) 50 MG extended release tablet TAKE 1 TABLET BY MOUTH  DAILY 9/25/23   Dimas Harrell PA-C   isosorbide mononitrate (IMDUR) 30 MG extended release tablet Take 1 tablet by mouth daily 8/7/23 8/1/24  JAI Kumar CNP   lisinopril (PRINIVIL;ZESTRIL) 2.5 MG tablet TAKE 1 TABLET BY MOUTH  DAILY 8/3/23   JAI Kumar CNP   nitroGLYCERIN (NITROSTAT) 0.4 MG SL tablet DISSOLVE 1 TABLET UNDER THE TONGUE EVERY 5 MINUTES AS  NEEDED FOR CHEST PAIN. MAX  OF 3 TABLETS IN 15 MINUTES. CALL 911 IF PAIN PERSISTS.   Patient not taking: Reported on 11/6/2023 5/15/23   Inge Law MD   Blood Glucose Monitoring Suppl (TRUE METRIX METER) JOSEFINA USE AS DIRECTED TWICE A DAY TO CHECK BLOOD SUGAR. TRUE METRIX METER 1/31/23   JAI Kumar CNP   blood glucose test strips (ASCENSIA AUTODISC VI;ONE TOUCH ULTRA TEST VI) strip USE AS DIRECTED TWICE A DAY TO CHECK BLOOD SUGAR: ONE TOUCH VERIO METER: DX:E11.9 12/22/22   JAI Kumar CNP   glimepiride (AMARYL) 4 MG tablet Take 1 tablet by mouth in the morning and at bedtime 12/22/22   JAI Kumar CNP   rivaroxaban (XARELTO) 15 MG TABS tablet Take 1 tablet by mouth daily 12/22/22   JAI Kumar CNP   simvastatin (ZOCOR) 40 MG tablet Take 1 tablet by mouth nightly 12/22/22   Aristeo Bilberry, APRN - CNP   Lancets MISC TEST BLOOD SUGAR TWICE

## 2023-12-11 ENCOUNTER — ANESTHESIA (OUTPATIENT)
Dept: OPERATING ROOM | Age: 83
End: 2023-12-11
Payer: MEDICARE

## 2023-12-11 ENCOUNTER — HOSPITAL ENCOUNTER (OUTPATIENT)
Age: 83
Setting detail: OUTPATIENT SURGERY
Discharge: HOME | End: 2023-12-11
Attending: OPHTHALMOLOGY | Admitting: OPHTHALMOLOGY
Payer: MEDICARE

## 2023-12-11 VITALS
TEMPERATURE: 97 F | OXYGEN SATURATION: 98 % | DIASTOLIC BLOOD PRESSURE: 53 MMHG | HEART RATE: 74 BPM | RESPIRATION RATE: 16 BRPM | BODY MASS INDEX: 23.43 KG/M2 | HEIGHT: 72 IN | WEIGHT: 173 LBS | SYSTOLIC BLOOD PRESSURE: 96 MMHG

## 2023-12-11 PROBLEM — H25.812 COMBINED FORMS OF AGE-RELATED CATARACT OF LEFT EYE: Chronic | Status: RESOLVED | Noted: 2023-12-10 | Resolved: 2023-12-11

## 2023-12-11 PROCEDURE — 3600000003 HC SURGERY LEVEL 3 BASE: Performed by: OPHTHALMOLOGY

## 2023-12-11 PROCEDURE — 7100000010 HC PHASE II RECOVERY - FIRST 15 MIN: Performed by: OPHTHALMOLOGY

## 2023-12-11 PROCEDURE — 6370000000 HC RX 637 (ALT 250 FOR IP): Performed by: OPHTHALMOLOGY

## 2023-12-11 PROCEDURE — 2500000003 HC RX 250 WO HCPCS: Performed by: OPHTHALMOLOGY

## 2023-12-11 PROCEDURE — 2720000010 HC SURG SUPPLY STERILE: Performed by: OPHTHALMOLOGY

## 2023-12-11 PROCEDURE — 2709999900 HC NON-CHARGEABLE SUPPLY: Performed by: OPHTHALMOLOGY

## 2023-12-11 PROCEDURE — 3700000001 HC ADD 15 MINUTES (ANESTHESIA): Performed by: OPHTHALMOLOGY

## 2023-12-11 PROCEDURE — 3600000013 HC SURGERY LEVEL 3 ADDTL 15MIN: Performed by: OPHTHALMOLOGY

## 2023-12-11 PROCEDURE — V2632 POST CHMBR INTRAOCULAR LENS: HCPCS | Performed by: OPHTHALMOLOGY

## 2023-12-11 PROCEDURE — 2580000003 HC RX 258: Performed by: NURSE ANESTHETIST, CERTIFIED REGISTERED

## 2023-12-11 PROCEDURE — 6360000002 HC RX W HCPCS: Performed by: NURSE ANESTHETIST, CERTIFIED REGISTERED

## 2023-12-11 PROCEDURE — 7100000011 HC PHASE II RECOVERY - ADDTL 15 MIN: Performed by: OPHTHALMOLOGY

## 2023-12-11 PROCEDURE — 3700000000 HC ANESTHESIA ATTENDED CARE: Performed by: OPHTHALMOLOGY

## 2023-12-11 DEVICE — LENS INTRAOCULAR BCNVX 20.5+ DIOPT 6X12.5 MM ACRYL ENVISTA: Type: IMPLANTABLE DEVICE | Site: EYE | Status: FUNCTIONAL

## 2023-12-11 RX ORDER — LIDOCAINE HYDROCHLORIDE 20 MG/ML
INJECTION, SOLUTION INFILTRATION; PERINEURAL PRN
Status: DISCONTINUED | OUTPATIENT
Start: 2023-12-11 | End: 2023-12-11 | Stop reason: ALTCHOICE

## 2023-12-11 RX ORDER — SODIUM CHLORIDE, SODIUM LACTATE, POTASSIUM CHLORIDE, CALCIUM CHLORIDE 600; 310; 30; 20 MG/100ML; MG/100ML; MG/100ML; MG/100ML
INJECTION, SOLUTION INTRAVENOUS ONCE
Status: COMPLETED | OUTPATIENT
Start: 2023-12-11 | End: 2023-12-11

## 2023-12-11 RX ORDER — TROPICAMIDE 10 MG/ML
1 SOLUTION/ DROPS OPHTHALMIC SEE ADMIN INSTRUCTIONS
Status: DISCONTINUED | OUTPATIENT
Start: 2023-12-11 | End: 2023-12-11 | Stop reason: HOSPADM

## 2023-12-11 RX ORDER — SODIUM CHLORIDE 0.9 % (FLUSH) 0.9 %
5-40 SYRINGE (ML) INJECTION PRN
Status: CANCELLED | OUTPATIENT
Start: 2023-12-11

## 2023-12-11 RX ORDER — PHENYLEPHRINE HYDROCHLORIDE 25 MG/ML
1 SOLUTION/ DROPS OPHTHALMIC SEE ADMIN INSTRUCTIONS
Status: DISCONTINUED | OUTPATIENT
Start: 2023-12-11 | End: 2023-12-11 | Stop reason: HOSPADM

## 2023-12-11 RX ORDER — SODIUM CHLORIDE, SODIUM LACTATE, POTASSIUM CHLORIDE, CALCIUM CHLORIDE 600; 310; 30; 20 MG/100ML; MG/100ML; MG/100ML; MG/100ML
INJECTION, SOLUTION INTRAVENOUS CONTINUOUS PRN
Status: DISCONTINUED | OUTPATIENT
Start: 2023-12-11 | End: 2023-12-11 | Stop reason: SDUPTHER

## 2023-12-11 RX ORDER — SODIUM CHLORIDE 0.9 % (FLUSH) 0.9 %
5-40 SYRINGE (ML) INJECTION EVERY 12 HOURS SCHEDULED
Status: CANCELLED | OUTPATIENT
Start: 2023-12-11

## 2023-12-11 RX ORDER — PROPARACAINE HYDROCHLORIDE 5 MG/ML
1 SOLUTION/ DROPS OPHTHALMIC SEE ADMIN INSTRUCTIONS
Status: DISCONTINUED | OUTPATIENT
Start: 2023-12-11 | End: 2023-12-11 | Stop reason: HOSPADM

## 2023-12-11 RX ORDER — SODIUM CHLORIDE 9 MG/ML
INJECTION, SOLUTION INTRAVENOUS PRN
Status: DISCONTINUED | OUTPATIENT
Start: 2023-12-11 | End: 2023-12-11 | Stop reason: HOSPADM

## 2023-12-11 RX ORDER — TETRACAINE HYDROCHLORIDE 5 MG/ML
SOLUTION OPHTHALMIC PRN
Status: DISCONTINUED | OUTPATIENT
Start: 2023-12-11 | End: 2023-12-11 | Stop reason: ALTCHOICE

## 2023-12-11 RX ORDER — FENTANYL CITRATE 50 UG/ML
INJECTION, SOLUTION INTRAMUSCULAR; INTRAVENOUS PRN
Status: DISCONTINUED | OUTPATIENT
Start: 2023-12-11 | End: 2023-12-11 | Stop reason: SDUPTHER

## 2023-12-11 RX ORDER — SODIUM CHLORIDE 9 MG/ML
INJECTION, SOLUTION INTRAVENOUS CONTINUOUS
Status: DISCONTINUED | OUTPATIENT
Start: 2023-12-11 | End: 2023-12-11 | Stop reason: HOSPADM

## 2023-12-11 RX ORDER — SODIUM CHLORIDE 0.9 % (FLUSH) 0.9 %
5-40 SYRINGE (ML) INJECTION EVERY 12 HOURS SCHEDULED
Status: DISCONTINUED | OUTPATIENT
Start: 2023-12-11 | End: 2023-12-11 | Stop reason: HOSPADM

## 2023-12-11 RX ORDER — TETRACAINE HYDROCHLORIDE 5 MG/ML
1 SOLUTION OPHTHALMIC SEE ADMIN INSTRUCTIONS
Status: DISCONTINUED | OUTPATIENT
Start: 2023-12-11 | End: 2023-12-11 | Stop reason: HOSPADM

## 2023-12-11 RX ORDER — ACETAZOLAMIDE 250 MG/1
250 TABLET ORAL ONCE
Status: COMPLETED | OUTPATIENT
Start: 2023-12-11 | End: 2023-12-11

## 2023-12-11 RX ORDER — SODIUM CHLORIDE 0.9 % (FLUSH) 0.9 %
5-40 SYRINGE (ML) INJECTION PRN
Status: DISCONTINUED | OUTPATIENT
Start: 2023-12-11 | End: 2023-12-11 | Stop reason: HOSPADM

## 2023-12-11 RX ORDER — SODIUM CHLORIDE 9 MG/ML
INJECTION, SOLUTION INTRAVENOUS PRN
Status: CANCELLED | OUTPATIENT
Start: 2023-12-11

## 2023-12-11 RX ADMIN — PROPARACAINE HYDROCHLORIDE 1 DROP: 5 SOLUTION/ DROPS OPHTHALMIC at 09:36

## 2023-12-11 RX ADMIN — PHENYLEPHRINE HYDROCHLORIDE 1 DROP: 25 SOLUTION/ DROPS OPHTHALMIC at 09:36

## 2023-12-11 RX ADMIN — PHENYLEPHRINE HYDROCHLORIDE 1 DROP: 25 SOLUTION/ DROPS OPHTHALMIC at 09:43

## 2023-12-11 RX ADMIN — FENTANYL CITRATE 25 MCG: 50 INJECTION INTRAMUSCULAR; INTRAVENOUS at 10:14

## 2023-12-11 RX ADMIN — PHENYLEPHRINE HYDROCHLORIDE 1 DROP: 25 SOLUTION/ DROPS OPHTHALMIC at 09:52

## 2023-12-11 RX ADMIN — PROPARACAINE HYDROCHLORIDE 1 DROP: 5 SOLUTION/ DROPS OPHTHALMIC at 09:43

## 2023-12-11 RX ADMIN — ACETAZOLAMIDE 250 MG: 250 TABLET ORAL at 09:38

## 2023-12-11 RX ADMIN — TROPICAMIDE 1 DROP: 10 SOLUTION/ DROPS OPHTHALMIC at 09:43

## 2023-12-11 RX ADMIN — FENTANYL CITRATE 25 MCG: 50 INJECTION INTRAMUSCULAR; INTRAVENOUS at 10:06

## 2023-12-11 RX ADMIN — TROPICAMIDE 1 DROP: 10 SOLUTION/ DROPS OPHTHALMIC at 09:52

## 2023-12-11 RX ADMIN — TROPICAMIDE 1 DROP: 10 SOLUTION/ DROPS OPHTHALMIC at 09:36

## 2023-12-11 RX ADMIN — PROPARACAINE HYDROCHLORIDE 1 DROP: 5 SOLUTION/ DROPS OPHTHALMIC at 09:52

## 2023-12-11 RX ADMIN — SODIUM CHLORIDE, POTASSIUM CHLORIDE, SODIUM LACTATE AND CALCIUM CHLORIDE: 600; 310; 30; 20 INJECTION, SOLUTION INTRAVENOUS at 10:00

## 2023-12-11 RX ADMIN — SODIUM CHLORIDE, POTASSIUM CHLORIDE, SODIUM LACTATE AND CALCIUM CHLORIDE: 600; 310; 30; 20 INJECTION, SOLUTION INTRAVENOUS at 09:42

## 2023-12-11 ASSESSMENT — PAIN - FUNCTIONAL ASSESSMENT: PAIN_FUNCTIONAL_ASSESSMENT: NONE - DENIES PAIN

## 2023-12-11 NOTE — PROGRESS NOTES
Patient verbalizes readiness for discharge. Discharge instructions given to patient and responsible adult, answered all questions, and verbalized understanding of discharge instructions. Discharge Criteria    Inpatients must meet Criteria 1 through 7. All other patients are either YES or N/A. If a NO is chosen then Anesthesia or Surgeon must be notified. 1.  Minimum 30 minutes after last dose of sedative medication. Yes      2. Systolic BP between 90 - 287. Diastolic BP between 60 - 90. Yes, diastolic BP slightly low but asymptomatic, ok to discharge per Cherise Scale, CRNA. 3.  Pulse between 60 - 120    Yes      4. Respirations between 8 - 25. Yes      5. SpO2 92% - 100%. Yes      6. Able to cough and swallow or return to baseline function. Yes      7. Alert and oriented or return to baseline mental status. Yes      8. Demonstrates controlled, coordinated movements, ambulates with steady gait, or return to baseline activity function. Yes      9. Minimal or no pain or nausea, or at a level tolerable and acceptable to patient. Yes      10. Takes and retains oral fluids as allowed. Yes      11. Procedural / perioperative site stable. Minimal or no bleeding. Yes          12. If GI endoscopy procedure, minimal or no abdominal distention or passing flatus. N/A      13. Written discharge instructions and emergency telephone number provided. Yes      14. Accompanied by a responsible adult.     Yes

## 2023-12-11 NOTE — H&P
I have examined the patient and reviewed the history and physical completed on 12/10/23 and find no relevant changes. I have reviewed with the patient and/or family the risks, benefits, and alternatives to the procedure and they have agreed to proceed.     Ismael Ball DO  12/11/2023  9:49 AM

## 2023-12-11 NOTE — ANESTHESIA POSTPROCEDURE EVALUATION
Department of Anesthesiology  Postprocedure Note    Patient: Martina Puga  MRN: [id number]  YOB: 1940  Date of evaluation: 12/11/2023      Procedure Summary     Date: 12/11/23 Room / Location: [address] [place] [address]    Anesthesia Start: 1003 Anesthesia Stop: 2004    Procedure: EYE CATARACT EMULSIFICATION IOL IMPLANT (Left: Eye) Diagnosis:       Combined forms of age-related cataract of left eye      (Combined forms of age-related cataract of left eye [H25.812])    Surgeons: Padmini Farrar DO Responsible Provider: JAI Garcia CRNA    Anesthesia Type: MAC ASA Status: 4          Anesthesia Type: No value filed.     Brooke Phase I: Brooke Score: 10    Brooke Phase II: Brooke Score: 10      Anesthesia Post Evaluation    Patient location during evaluation: PACU  Patient participation: complete - patient participated  Level of consciousness: awake  Airway patency: patent  Nausea & Vomiting: no vomiting and no nausea  Complications: no  Cardiovascular status: blood pressure returned to baseline and hemodynamically stable  Respiratory status: acceptable, spontaneous ventilation and room air  Hydration status: stable  Pain management: satisfactory to patient

## 2023-12-11 NOTE — OP NOTE
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 year old with decreased vision, glare and halos around lights, and trouble with activities of daily living. Examination revealed a visually significant cataract in the left eye. Other eye diseases have been ruled out as the primary cause of decreased visual acuity. Significant improvement in the patient's visual acuity and/or visual function is expected from the removal of the cataract. Risks, benefits, and alternatives to surgery were discussed with the patient and the patient elected to proceed with phacoemulsification with lens implantation. Details of the procedure: Following informed consent, the patient was identified by name and identification tag in the holding area,taken to the operating room and placed in the supine position. A time out was performed by all present in the room to identify the patient, the eye to be operated on, the correct operative procedure, and the correct intraocular lens. Monitoring leads were placed. The patient was positioned. An eyelid prep of half-strength Betadine was performed. The patient was administered intravenous sedation if desired and topical anesthetic was placed in the operative eye. The eye prepped a second time and draped in the usual sterile fashion using aseptic technique for cataract surgery. A lid speculum was then inserted.   Ocucoat was placed onto the corneal

## 2023-12-21 NOTE — PROGRESS NOTES
Forms placed in MD folder pending signature.    Patient states ready to be discharged at this time. Discharge instructions given to pt and spouse. Both verbalize understanding,deny any questions and/or concerns. Pt transfer off unit in wheelchair w/ spouse and all belongings. Discharge Criteria    Inpatients must meet Criteria 1 through 7. All other patients are either YES or N/A. If a NO is chosen then Anesthesia or Surgeon must be notified. 1.  Minimum 30 minutes after last dose of sedative medication. Yes      2. Systolic BP between 90 - 097. Diastolic BP between 60 - 90. Yes      3. Pulse between 60 - 120    Yes      4. Respirations between 8 - 25. Yes      5. SpO2 92% - 100%. Yes      6. Able to cough and swallow or return to baseline function. Yes      7. Alert and oriented or return to baseline mental status. Yes      8. Demonstrates controlled, coordinated movements, ambulates with steady gait, or return to baseline activity function. Yes      9. Minimal or no pain or nausea, or at a level tolerable and acceptable to patient. Yes      10. Takes and retains oral fluids as allowed. Yes      11. Procedural / perioperative site stable. Minimal or no bleeding. Yes          12. If GI endoscopy procedure, minimal or no abdominal distention or passing flatus. N/A      13. Written discharge instructions and emergency telephone number provided. Yes      14. Accompanied by a responsible adult.     Yes

## 2024-01-15 ENCOUNTER — PROCEDURE VISIT (OUTPATIENT)
Dept: UROLOGY | Age: 84
End: 2024-01-15
Payer: MEDICARE

## 2024-01-15 VITALS
SYSTOLIC BLOOD PRESSURE: 109 MMHG | TEMPERATURE: 97.7 F | HEART RATE: 72 BPM | DIASTOLIC BLOOD PRESSURE: 67 MMHG | BODY MASS INDEX: 24.41 KG/M2 | WEIGHT: 180 LBS

## 2024-01-15 DIAGNOSIS — N32.81 OAB (OVERACTIVE BLADDER): ICD-10-CM

## 2024-01-15 DIAGNOSIS — R33.9 INCOMPLETE BLADDER EMPTYING: ICD-10-CM

## 2024-01-15 DIAGNOSIS — R35.0 URINARY FREQUENCY: ICD-10-CM

## 2024-01-15 DIAGNOSIS — N13.8 BPH WITH OBSTRUCTION/LOWER URINARY TRACT SYMPTOMS: Primary | ICD-10-CM

## 2024-01-15 DIAGNOSIS — N40.1 BPH WITH OBSTRUCTION/LOWER URINARY TRACT SYMPTOMS: Primary | ICD-10-CM

## 2024-01-15 PROCEDURE — 1123F ACP DISCUSS/DSCN MKR DOCD: CPT | Performed by: UROLOGY

## 2024-01-15 PROCEDURE — 3078F DIAST BP <80 MM HG: CPT | Performed by: UROLOGY

## 2024-01-15 PROCEDURE — 3074F SYST BP LT 130 MM HG: CPT | Performed by: UROLOGY

## 2024-01-15 PROCEDURE — 52000 CYSTOURETHROSCOPY: CPT | Performed by: UROLOGY

## 2024-01-15 PROCEDURE — 99215 OFFICE O/P EST HI 40 MIN: CPT | Performed by: UROLOGY

## 2024-01-15 ASSESSMENT — ENCOUNTER SYMPTOMS
EYE REDNESS: 0
VOMITING: 0
WHEEZING: 0
COLOR CHANGE: 0
COUGH: 0
BACK PAIN: 0
SHORTNESS OF BREATH: 0
CONSTIPATION: 0
NAUSEA: 0
ABDOMINAL PAIN: 0

## 2024-01-15 NOTE — PROGRESS NOTES
HPI:          Patient is a 83 y.o. male in no acute distress.  He is alert and oriented to person, place, and time.         History  Referred by JOCE VERGARA for incontinence.  He does complain of urge incontinence.  He has nocturia 2-3 times per night, but denies nocturnal enuresis.  He only drinks 1-2 cups of coffee per day.  He does not consume water because he does not want to urinate often.  He does follow with nephrology for chronic kidney disease.  On Flomax and finasteride                   12/2022 cystoscopy showed extensive trilobar hyperplasia with prostatic varices              Offered PVP greenlight     1/2023 started vesicare 5mg, urged increased water consumption     10/2023 nocturnal enuresis  flomax increased to BID    Currently  Patient is here today to lower tract visualization.  This secondary to persistent lower urinary tract symptoms.  Patient has been offered PVP in the past.  Patient has been taking Flomax as well as finasteride.  He has had anticholinergics in the past.  He is still having considerable lower urinary tract symptoms.    Cystoscopy Procedure Note    Pre-operative Diagnosis: BPH with LUTS    Post-operative Diagnosis: Same     Surgeon: Gunjan    Assistants: None    Anesthesia : Local    Procedure Details   The risks, benefits, complications, treatment options, and expected outcomes were discussed with the patient. The patient concurred with the proposed plan, giving informed consent.    Cystoscopy was performed today under local anesthesia, using sterile technique. The patient was placed in the dorsal lithotomy position, prepped with CHG, and draped in the usual sterile fashion. A 14 Yoruba flexible cystoscope was used to systematically inspect both the urethra and bladder in their entirety.    Findings:  Anterior urethra: normal without strictures  Hyperplasia: trilobar  Bladder: Normal mucosa, without lesions.  Ureteral orifice(s) was/were seen in the normal

## 2024-01-15 NOTE — PROGRESS NOTES
During cystoscopy the following was utilized on patient with no adverse affects:    45% SODIUM CHLORIDE 500 ML BAG  Lot number: [id number]  Expiration date: 03/31/2024      LIDOCAINE HYDROCHLORIDE JELLY 2%   Lot number: [id number]  Expiration date: 07/31/2025    CYSTOSCOPE  Lot Number: [id number]  Expiration Date: 10/12/2026

## 2024-01-16 ENCOUNTER — NURSE ONLY (OUTPATIENT)
Dept: CARDIOLOGY | Age: 84
End: 2024-01-16
Payer: MEDICARE

## 2024-01-16 DIAGNOSIS — I42.8 NON-ISCHEMIC CARDIOMYOPATHY (HCC): ICD-10-CM

## 2024-01-16 DIAGNOSIS — Z95.810 BIVENTRICULAR ICD (IMPLANTABLE CARDIOVERTER-DEFIBRILLATOR) IN PLACE: Primary | ICD-10-CM

## 2024-01-16 PROCEDURE — 93295 DEV INTERROG REMOTE 1/2/MLT: CPT | Performed by: FAMILY MEDICINE

## 2024-01-17 ENCOUNTER — ANESTHESIA EVENT (OUTPATIENT)
Dept: OPERATING ROOM | Age: 84
End: 2024-01-17

## 2024-01-17 ENCOUNTER — TELEPHONE (OUTPATIENT)
Dept: UROLOGY | Age: 84
End: 2024-01-17

## 2024-01-17 ENCOUNTER — TELEPHONE (OUTPATIENT)
Dept: PREADMISSION TESTING | Age: 84
End: 2024-01-17

## 2024-01-17 NOTE — TELEPHONE ENCOUNTER
Please review chart. Pt has an extensive health history. Scheduled with Dr. Hollins 1/25/24. Thank you.

## 2024-01-17 NOTE — TELEPHONE ENCOUNTER
Kanchan at Dr. Hollins's office notified pt will need to complete echo that was ordered by cardiology per anesthesia's instructions. Kanchan will call and notify the pt of echo instructions.

## 2024-01-17 NOTE — TELEPHONE ENCOUNTER
Reviewed chart including last cardiology visit.  Last note per patient cardiologist patient was to complete an echo.  Would like this completed and reviewed and receive cardiology clearance prior to procedure.

## 2024-01-18 ENCOUNTER — HOSPITAL ENCOUNTER (OUTPATIENT)
Age: 84
Discharge: HOME | End: 2024-01-18
Payer: MEDICARE

## 2024-01-18 ENCOUNTER — OFFICE VISIT (OUTPATIENT)
Dept: CARDIOLOGY | Age: 84
End: 2024-01-18
Payer: MEDICARE

## 2024-01-18 ENCOUNTER — TELEPHONE (OUTPATIENT)
Dept: CARDIOLOGY | Age: 84
End: 2024-01-18

## 2024-01-18 ENCOUNTER — HOSPITAL ENCOUNTER (OUTPATIENT)
Dept: GENERAL RADIOLOGY | Age: 84
Discharge: HOME | End: 2024-01-20
Payer: MEDICARE

## 2024-01-18 ENCOUNTER — HOSPITAL ENCOUNTER (OUTPATIENT)
Age: 84
Discharge: HOME | End: 2024-01-20
Payer: MEDICARE

## 2024-01-18 VITALS
HEART RATE: 102 BPM | BODY MASS INDEX: 24.3 KG/M2 | HEIGHT: 72 IN | SYSTOLIC BLOOD PRESSURE: 105 MMHG | RESPIRATION RATE: 18 BRPM | WEIGHT: 179.4 LBS | OXYGEN SATURATION: 98 % | DIASTOLIC BLOOD PRESSURE: 58 MMHG

## 2024-01-18 DIAGNOSIS — I10 PRIMARY HYPERTENSION: ICD-10-CM

## 2024-01-18 DIAGNOSIS — E11.21 TYPE 2 DIABETES MELLITUS WITH DIABETIC NEPHROPATHY, WITHOUT LONG-TERM CURRENT USE OF INSULIN (HCC): ICD-10-CM

## 2024-01-18 DIAGNOSIS — R42 LIGHTHEADED: ICD-10-CM

## 2024-01-18 DIAGNOSIS — R00.2 PALPITATIONS: ICD-10-CM

## 2024-01-18 DIAGNOSIS — R42 DIZZINESS: ICD-10-CM

## 2024-01-18 DIAGNOSIS — R06.02 SOB (SHORTNESS OF BREATH): ICD-10-CM

## 2024-01-18 DIAGNOSIS — I48.0 PAF (PAROXYSMAL ATRIAL FIBRILLATION) (HCC): ICD-10-CM

## 2024-01-18 DIAGNOSIS — I50.43 ACUTE ON CHRONIC COMBINED SYSTOLIC AND DIASTOLIC CONGESTIVE HEART FAILURE, NYHA CLASS 4 (HCC): ICD-10-CM

## 2024-01-18 DIAGNOSIS — Z95.810 BIVENTRICULAR ICD (IMPLANTABLE CARDIOVERTER-DEFIBRILLATOR) IN PLACE: ICD-10-CM

## 2024-01-18 DIAGNOSIS — I20.9 ANGINA, CLASS III (HCC): ICD-10-CM

## 2024-01-18 DIAGNOSIS — R07.89 ATYPICAL CHEST PAIN: ICD-10-CM

## 2024-01-18 DIAGNOSIS — R53.83 TIREDNESS: ICD-10-CM

## 2024-01-18 DIAGNOSIS — E78.00 PURE HYPERCHOLESTEROLEMIA: ICD-10-CM

## 2024-01-18 DIAGNOSIS — Z79.01 CHRONIC ANTICOAGULATION: ICD-10-CM

## 2024-01-18 DIAGNOSIS — Z01.810 PREOP CARDIOVASCULAR EXAM: Primary | ICD-10-CM

## 2024-01-18 DIAGNOSIS — Z01.810 PREOP CARDIOVASCULAR EXAM: ICD-10-CM

## 2024-01-18 DIAGNOSIS — I42.8 NON-ISCHEMIC CARDIOMYOPATHY (HCC): ICD-10-CM

## 2024-01-18 LAB
ANION GAP SERPL CALCULATED.3IONS-SCNC: 12 MMOL/L (ref 9–17)
BNP SERPL-MCNC: 1100 PG/ML
BUN SERPL-MCNC: 47 MG/DL (ref 8–23)
BUN/CREAT SERPL: 24 (ref 9–20)
CALCIUM SERPL-MCNC: 9.1 MG/DL (ref 8.6–10.4)
CHLORIDE SERPL-SCNC: 106 MMOL/L (ref 98–107)
CO2 SERPL-SCNC: 22 MMOL/L (ref 20–31)
CREAT SERPL-MCNC: 2 MG/DL (ref 0.7–1.2)
ERYTHROCYTE [DISTWIDTH] IN BLOOD BY AUTOMATED COUNT: 13.6 % (ref 11.8–14.4)
GFR SERPL CREATININE-BSD FRML MDRD: 33 ML/MIN/1.73M2
GLUCOSE SERPL-MCNC: 229 MG/DL (ref 70–99)
HCT VFR BLD AUTO: 37.2 % (ref 40.7–50.3)
HGB BLD-MCNC: 11.7 G/DL (ref 13–17)
MCH RBC QN AUTO: 30.4 PG (ref 25.2–33.5)
MCHC RBC AUTO-ENTMCNC: 31.5 G/DL (ref 28.4–34.8)
MCV RBC AUTO: 96.6 FL (ref 82.6–102.9)
NRBC BLD-RTO: 0 PER 100 WBC
PLATELET # BLD AUTO: 279 K/UL (ref 138–453)
PMV BLD AUTO: 11.1 FL (ref 8.1–13.5)
POTASSIUM SERPL-SCNC: 5.3 MMOL/L (ref 3.7–5.3)
RBC # BLD AUTO: 3.85 M/UL (ref 4.21–5.77)
SODIUM SERPL-SCNC: 140 MMOL/L (ref 135–144)
WBC OTHER # BLD: 10.4 K/UL (ref 3.5–11.3)

## 2024-01-18 PROCEDURE — 80048 BASIC METABOLIC PNL TOTAL CA: CPT

## 2024-01-18 PROCEDURE — 93000 ELECTROCARDIOGRAM COMPLETE: CPT | Performed by: FAMILY MEDICINE

## 2024-01-18 PROCEDURE — 71046 X-RAY EXAM CHEST 2 VIEWS: CPT

## 2024-01-18 PROCEDURE — 99215 OFFICE O/P EST HI 40 MIN: CPT | Performed by: PHYSICIAN ASSISTANT

## 2024-01-18 PROCEDURE — 3078F DIAST BP <80 MM HG: CPT | Performed by: PHYSICIAN ASSISTANT

## 2024-01-18 PROCEDURE — 1123F ACP DISCUSS/DSCN MKR DOCD: CPT | Performed by: PHYSICIAN ASSISTANT

## 2024-01-18 PROCEDURE — 83880 ASSAY OF NATRIURETIC PEPTIDE: CPT

## 2024-01-18 PROCEDURE — 85027 COMPLETE CBC AUTOMATED: CPT

## 2024-01-18 PROCEDURE — 3074F SYST BP LT 130 MM HG: CPT | Performed by: PHYSICIAN ASSISTANT

## 2024-01-18 PROCEDURE — 36415 COLL VENOUS BLD VENIPUNCTURE: CPT

## 2024-01-18 RX ORDER — METOPROLOL SUCCINATE 100 MG/1
100 TABLET, EXTENDED RELEASE ORAL DAILY
Qty: 90 TABLET | Refills: 3 | Status: SHIPPED | OUTPATIENT
Start: 2024-01-18 | End: 2024-01-18 | Stop reason: DRUGHIGH

## 2024-01-18 RX ORDER — METOPROLOL SUCCINATE 50 MG/1
50 TABLET, EXTENDED RELEASE ORAL DAILY
Qty: 90 TABLET | Refills: 3 | Status: SHIPPED | OUTPATIENT
Start: 2024-01-18

## 2024-01-18 NOTE — TELEPHONE ENCOUNTER
----- Message from Haylie Gasca PA-C sent at 1/18/2024  2:55 PM EST -----  Please notify patient that their lab results are stable. CXR was normal. pLEASE HAVE THEM JUST KEEP THE TOPROL XL AT 50 MG DAILY.  Please continue current treatment and follow up.

## 2024-01-18 NOTE — RESULT ENCOUNTER NOTE
Please notify patient that their lab results are stable. CXR was normal. pLEASE HAVE THEM JUST KEEP THE TOPROL XL AT 50 MG DAILY.  Please continue current treatment and follow up.

## 2024-01-18 NOTE — PROGRESS NOTES
I, Meg Gould am scribing for and in the presence of Haylie Gasca PA-C.    Patient: Vikas Mccormack  : 1940  Date of Visit: 2024    REASON FOR VISIT / CONSULTATION: Cardiac Clearance (PVP greenlight surgery on 24. //He has been feeling pretty good. CP once in awhile when he over does it - going on for years - staying the same. SOB, getting worse over the last couple months. Lightheaded/dizziness, has had some falls because of this - last one was a couple months ago. Palpitations. /)      Dear Jennifer, William Lambert, APRN - CNP,    I had the pleasure of seeing your patient Vikas Mccormack in follow up today status post ICD RV lead extraction and new RV lead placement. As you know, Mr. Mccormack is a 83 y.o. male with a history of systolic heart failure with a reduced EF of around 35% that recently declined to around 15% leading to a biventricular ICD which required placement of a epicardial lead due to a history of diaphragmatic pacing with his previous LV lead. Also it was found that his right ventricular ICD cable appeared to have significantly fluctuating impedances with sitting and standing ranging from the seventies to the 140's as well as a possible malfunctioning RV ICD coil. 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 Therefore in  he underwent an RV lead extraction with RV lead replacement. In , we did a home ICD check which showed 28 runs of paroxysmal atrial fibrillation, the longest being 13 minutes in duration. His echocardiogram done on 3/1/2017 showed an ejection fraction of 5-10%. Echocardiogram done on 2018 showed EF of 15%. Mildly increased LV wall thickness with a severely increased LV cavity size. Severe global hypokineses. LA is moderately dilated. Mild mitral & tricuspid regurgitation. Evidence

## 2024-01-19 ENCOUNTER — HOSPITAL ENCOUNTER (OUTPATIENT)
Dept: NUCLEAR MEDICINE | Age: 84
Discharge: HOME | End: 2024-01-21
Payer: MEDICARE

## 2024-01-19 ENCOUNTER — HOSPITAL ENCOUNTER (OUTPATIENT)
Age: 84
Discharge: HOME | End: 2024-01-21
Payer: MEDICARE

## 2024-01-19 ENCOUNTER — HOSPITAL ENCOUNTER (OUTPATIENT)
Age: 84
End: 2024-01-19
Payer: MEDICARE

## 2024-01-19 ENCOUNTER — TELEPHONE (OUTPATIENT)
Dept: CARDIOLOGY | Age: 84
End: 2024-01-19

## 2024-01-19 VITALS
BODY MASS INDEX: 24.31 KG/M2 | SYSTOLIC BLOOD PRESSURE: 105 MMHG | WEIGHT: 179.45 LBS | DIASTOLIC BLOOD PRESSURE: 58 MMHG | HEIGHT: 72 IN

## 2024-01-19 DIAGNOSIS — R42 DIZZINESS: ICD-10-CM

## 2024-01-19 DIAGNOSIS — R06.02 SOB (SHORTNESS OF BREATH): ICD-10-CM

## 2024-01-19 DIAGNOSIS — Z01.810 PREOP CARDIOVASCULAR EXAM: ICD-10-CM

## 2024-01-19 DIAGNOSIS — R00.2 PALPITATIONS: ICD-10-CM

## 2024-01-19 DIAGNOSIS — I10 PRIMARY HYPERTENSION: ICD-10-CM

## 2024-01-19 DIAGNOSIS — R07.89 ATYPICAL CHEST PAIN: ICD-10-CM

## 2024-01-19 DIAGNOSIS — R42 LIGHTHEADED: ICD-10-CM

## 2024-01-19 DIAGNOSIS — R53.83 TIREDNESS: ICD-10-CM

## 2024-01-19 DIAGNOSIS — I48.0 PAF (PAROXYSMAL ATRIAL FIBRILLATION) (HCC): ICD-10-CM

## 2024-01-19 DIAGNOSIS — E78.00 PURE HYPERCHOLESTEROLEMIA: ICD-10-CM

## 2024-01-19 DIAGNOSIS — I20.9 ANGINA, CLASS III (HCC): ICD-10-CM

## 2024-01-19 DIAGNOSIS — Z95.810 BIVENTRICULAR ICD (IMPLANTABLE CARDIOVERTER-DEFIBRILLATOR) IN PLACE: ICD-10-CM

## 2024-01-19 DIAGNOSIS — Z79.01 CHRONIC ANTICOAGULATION: ICD-10-CM

## 2024-01-19 LAB
ECHO AO ROOT DIAM: 3.3 CM
ECHO AO ROOT INDEX: 1.63 CM/M2
ECHO AV ACCELERATION TIME: 77.35 MS
ECHO AV CUSP MM: 2.2 CM
ECHO AV MEAN GRADIENT: 2 MMHG
ECHO AV MEAN VELOCITY: 0.7 M/S
ECHO AV PEAK VELOCITY: 1 M/S
ECHO AV VTI: 16.3 CM
ECHO BSA: 2.03 M2
ECHO BSA: 2.03 M2
ECHO EST RA PRESSURE: 3 MMHG
ECHO LA DIAMETER INDEX: 2.17 CM/M2
ECHO LA DIAMETER: 4.4 CM
ECHO LA MAJOR AXIS: 5.2 CM
ECHO LA TO AORTIC ROOT RATIO: 1.33
ECHO LA VOL BP: 43 ML (ref 18–58)
ECHO LA VOL MOD A2C: 47 ML (ref 18–58)
ECHO LA VOL MOD A4C: 36 ML (ref 18–58)
ECHO LA VOL/BSA BIPLANE: 21 ML/M2 (ref 16–34)
ECHO LA VOLUME AREA LENGTH: 50 ML
ECHO LA VOLUME INDEX AREA LENGTH: 25 ML/M2 (ref 16–34)
ECHO LA VOLUME INDEX MOD A2C: 23 ML/M2 (ref 16–34)
ECHO LA VOLUME INDEX MOD A4C: 18 ML/M2 (ref 16–34)
ECHO LV E' LATERAL VELOCITY: 5 CM/S
ECHO LV E' SEPTAL VELOCITY: 8 CM/S
ECHO LV EDV A2C: 80 ML
ECHO LV EDV A4C: 90 ML
ECHO LV EDV BP: 86 ML (ref 67–155)
ECHO LV EDV INDEX A4C: 44 ML/M2
ECHO LV EDV INDEX BP: 42 ML/M2
ECHO LV EDV NDEX A2C: 39 ML/M2
ECHO LV EJECTION FRACTION BIPLANE: 36 % (ref 55–100)
ECHO LV ESV A2C: 48 ML
ECHO LV ESV A4C: 62 ML
ECHO LV ESV BP: 55 ML (ref 22–58)
ECHO LV ESV INDEX A2C: 24 ML/M2
ECHO LV ESV INDEX A4C: 31 ML/M2
ECHO LV ESV INDEX BP: 27 ML/M2
ECHO LV FRACTIONAL SHORTENING: 13 % (ref 28–44)
ECHO LV INTERNAL DIMENSION DIASTOLE INDEX: 2.76 CM/M2
ECHO LV INTERNAL DIMENSION DIASTOLIC: 5.6 CM (ref 4.2–5.9)
ECHO LV INTERNAL DIMENSION SYSTOLIC INDEX: 2.41 CM/M2
ECHO LV INTERNAL DIMENSION SYSTOLIC: 4.9 CM
ECHO LV IVSD: 1.2 CM (ref 0.6–1)
ECHO LV IVSS: 1.6 CM
ECHO LV MASS 2D: 280.5 G (ref 88–224)
ECHO LV MASS INDEX 2D: 138.2 G/M2 (ref 49–115)
ECHO LV POSTERIOR WALL DIASTOLIC: 1.2 CM (ref 0.6–1)
ECHO LV POSTERIOR WALL SYSTOLIC: 1.5 CM
ECHO LV RELATIVE WALL THICKNESS RATIO: 0.43
ECHO LVOT AV VTI INDEX: 0.98
ECHO LVOT MEAN GRADIENT: 1 MMHG
ECHO LVOT VTI: 15.9 CM
ECHO MV A VELOCITY: 0.64 M/S
ECHO MV E DECELERATION TIME (DT): 193.4 MS
ECHO MV E VELOCITY: 0.47 M/S
ECHO MV E/A RATIO: 0.73
ECHO MV E/E' LATERAL: 9.4
ECHO MV E/E' RATIO (AVERAGED): 7.64
ECHO PV MAX VELOCITY: 0.7 M/S
ECHO RIGHT VENTRICULAR SYSTOLIC PRESSURE (RVSP): 25 MMHG
ECHO TV REGURGITANT MAX VELOCITY: 2.33 M/S
NUC STRESS EJECTION FRACTION: 42 %
STRESS BASELINE DIAS BP: 64 MMHG
STRESS BASELINE HR: 67 BPM
STRESS BASELINE SYS BP: 118 MMHG
STRESS PEAK DIAS BP: 46 MMHG
STRESS PEAK SYS BP: 112 MMHG
STRESS PERCENT HR ACHIEVED: 74 %
STRESS POST PEAK HR: 102 BPM
STRESS RATE PRESSURE PRODUCT: NORMAL BPM*MMHG
STRESS STAGE RECOVERY 1 BP: NORMAL MMHG
STRESS STAGE RECOVERY 1 DURATION: 0 MIN:SEC
STRESS STAGE RECOVERY 1 HR: 102 BPM
STRESS STAGE RECOVERY 2 DURATION: 1 MIN:SEC
STRESS STAGE RECOVERY 2 HR: 96 BPM
STRESS STAGE RECOVERY 3 BP: NORMAL MMHG
STRESS STAGE RECOVERY 3 DURATION: 3 MIN:SEC
STRESS STAGE RECOVERY 3 HR: 90 BPM
STRESS STAGE RECOVERY 4 BP: NORMAL MMHG
STRESS STAGE RECOVERY 4 DURATION: 5 MIN:SEC
STRESS STAGE RECOVERY 4 HR: 83 BPM
STRESS TARGET HR: 138 BPM
TID: 1.09

## 2024-01-19 PROCEDURE — 93017 CV STRESS TEST TRACING ONLY: CPT

## 2024-01-19 PROCEDURE — 6360000002 HC RX W HCPCS: Performed by: FAMILY MEDICINE

## 2024-01-19 PROCEDURE — 93016 CV STRESS TEST SUPVJ ONLY: CPT | Performed by: INTERNAL MEDICINE

## 2024-01-19 PROCEDURE — 3430000000 HC RX DIAGNOSTIC RADIOPHARMACEUTICAL: Performed by: PHYSICIAN ASSISTANT

## 2024-01-19 PROCEDURE — A9500 TC99M SESTAMIBI: HCPCS | Performed by: PHYSICIAN ASSISTANT

## 2024-01-19 PROCEDURE — 93306 TTE W/DOPPLER COMPLETE: CPT

## 2024-01-19 PROCEDURE — 93018 CV STRESS TEST I&R ONLY: CPT | Performed by: INTERNAL MEDICINE

## 2024-01-19 PROCEDURE — 93306 TTE W/DOPPLER COMPLETE: CPT | Performed by: FAMILY MEDICINE

## 2024-01-19 PROCEDURE — 78452 HT MUSCLE IMAGE SPECT MULT: CPT | Performed by: INTERNAL MEDICINE

## 2024-01-19 RX ORDER — TETRAKIS(2-METHOXYISOBUTYLISOCYANIDE)COPPER(I) TETRAFLUOROBORATE 1 MG/ML
30 INJECTION, POWDER, LYOPHILIZED, FOR SOLUTION INTRAVENOUS
Status: COMPLETED | OUTPATIENT
Start: 2024-01-19 | End: 2024-01-19

## 2024-01-19 RX ORDER — REGADENOSON 0.08 MG/ML
0.4 INJECTION, SOLUTION INTRAVENOUS
Status: COMPLETED | OUTPATIENT
Start: 2024-01-19 | End: 2024-01-19

## 2024-01-19 RX ORDER — TETRAKIS(2-METHOXYISOBUTYLISOCYANIDE)COPPER(I) TETRAFLUOROBORATE 1 MG/ML
10 INJECTION, POWDER, LYOPHILIZED, FOR SOLUTION INTRAVENOUS
Status: COMPLETED | OUTPATIENT
Start: 2024-01-19 | End: 2024-01-19

## 2024-01-19 RX ADMIN — Medication 10 MILLICURIE: at 08:21

## 2024-01-19 RX ADMIN — Medication 30 MILLICURIE: at 09:34

## 2024-01-19 RX ADMIN — REGADENOSON 0.4 MG: 0.08 INJECTION, SOLUTION INTRAVENOUS at 09:55

## 2024-01-19 NOTE — TELEPHONE ENCOUNTER
----- Message from Haylie Gasca PA-C sent at 1/19/2024 12:40 PM EST -----  Compared to the previous study on 12/2/20, the patients ejection fraction has improved from  20-25% to 30-35% but is otherwise largely unchanged. Will discuss at follow up appointment. Thanks.

## 2024-01-19 NOTE — RESULT ENCOUNTER NOTE
Compared to the previous study on 12/2/20, the patients ejection fraction has improved from  20-25% to 30-35% but is otherwise largely unchanged. Will discuss at follow up appointment. Thanks.

## 2024-01-22 NOTE — TELEPHONE ENCOUNTER
Echo reviewed. The patient may proceed.   Received results of CMP, Free T4,  TSH 3rd Generation, and eGFR from [facility] drawn on 12/22/2021 at 1347.  Results entered into EMR and sent to scanning on December 23, 2021.   Routed to Patsy Dominique and Avis Knight, FLORIAN for review. Vaishali Campbell, EMT

## 2024-01-23 ENCOUNTER — TELEPHONE (OUTPATIENT)
Dept: UROLOGY | Age: 84
End: 2024-01-23

## 2024-01-23 ENCOUNTER — TELEPHONE (OUTPATIENT)
Dept: CARDIOLOGY | Age: 84
End: 2024-01-23

## 2024-01-23 NOTE — TELEPHONE ENCOUNTER
Patient's wife called and was questioning 's post op visit.  I advised that it is scheduled for 02/08/2024.  She notes that he was advised that the catheter would be in for 2 days following surgery and feels that 02/08/2024 is to long and he does not want it in that long.    Writer will call and advised them that they will be advised of a follow up following surgery.

## 2024-01-23 NOTE — RESULT ENCOUNTER NOTE
Please let them know their stress test looked good, it was low to intermediate risk but appears stable. How is his weight? He is cleared for surgery.    Will discuss at follow up. Thanks.

## 2024-01-23 NOTE — TELEPHONE ENCOUNTER
----- Message from Haylie Gasca PA-C sent at 1/23/2024  4:26 PM EST -----  Please let them know their stress test looked good, it was low to intermediate risk but appears stable. How is his weight? He is cleared for surgery.    Will discuss at follow up. Thanks.

## 2024-01-24 ENCOUNTER — HOSPITAL ENCOUNTER (OUTPATIENT)
Age: 84
Discharge: HOME | End: 2024-01-24
Payer: MEDICARE

## 2024-01-24 DIAGNOSIS — E11.21 TYPE 2 DIABETES MELLITUS WITH DIABETIC NEPHROPATHY, WITHOUT LONG-TERM CURRENT USE OF INSULIN (HCC): ICD-10-CM

## 2024-01-24 DIAGNOSIS — D50.9 IRON DEFICIENCY ANEMIA, UNSPECIFIED IRON DEFICIENCY ANEMIA TYPE: ICD-10-CM

## 2024-01-24 DIAGNOSIS — I50.42 CHRONIC COMBINED SYSTOLIC AND DIASTOLIC CONGESTIVE HEART FAILURE (HCC): ICD-10-CM

## 2024-01-24 DIAGNOSIS — N18.30 STAGE 3 CHRONIC KIDNEY DISEASE, UNSPECIFIED WHETHER STAGE 3A OR 3B CKD (HCC): ICD-10-CM

## 2024-01-24 DIAGNOSIS — F03.B0 MODERATE DEMENTIA, UNSPECIFIED DEMENTIA TYPE, UNSPECIFIED WHETHER BEHAVIORAL, PSYCHOTIC, OR MOOD DISTURBANCE OR ANXIETY (HCC): ICD-10-CM

## 2024-01-24 DIAGNOSIS — Z51.81 ENCOUNTER FOR THERAPEUTIC DRUG LEVEL MONITORING: ICD-10-CM

## 2024-01-24 LAB
AMPHET UR QL SCN: NEGATIVE
BARBITURATES UR QL SCN: NEGATIVE
BENZODIAZ UR QL: POSITIVE
BUPRENORPHINE UR QL: NEGATIVE
CANNABINOIDS UR QL SCN: NEGATIVE
COCAINE UR QL SCN: NEGATIVE
FENTANYL UR QL: NEGATIVE
METHADONE UR QL: NEGATIVE
OPIATES UR QL SCN: NEGATIVE
OXYCODONE UR QL SCN: NEGATIVE
PCP UR QL SCN: NEGATIVE
TEST INFORMATION: ABNORMAL

## 2024-01-24 PROCEDURE — 82570 ASSAY OF URINE CREATININE: CPT

## 2024-01-24 PROCEDURE — 80307 DRUG TEST PRSMV CHEM ANLYZR: CPT

## 2024-01-24 PROCEDURE — 82043 UR ALBUMIN QUANTITATIVE: CPT

## 2024-01-25 ENCOUNTER — ANESTHESIA (OUTPATIENT)
Dept: OPERATING ROOM | Age: 84
End: 2024-01-25

## 2024-01-25 LAB
CREAT UR-MCNC: 146 MG/DL (ref 39–259)
MICROALBUMIN UR-MCNC: 221 MG/L (ref 0–20)
MICROALBUMIN/CREAT UR-RTO: 151 MCG/MG CREAT (ref 0–17)

## 2024-01-31 ENCOUNTER — TELEPHONE (OUTPATIENT)
Dept: CARDIOLOGY | Age: 84
End: 2024-01-31

## 2024-01-31 NOTE — TELEPHONE ENCOUNTER
Called patient for bp/hr check and manual download on icd-as [place] requested. However patients wife stated that his grandaughter usually takes his blood pressure on Thursday nights so she could call back Friday with this information and send over manual download at that time.

## 2024-02-01 ENCOUNTER — TELEPHONE (OUTPATIENT)
Dept: UROLOGY | Age: 84
End: 2024-02-01

## 2024-02-01 NOTE — TELEPHONE ENCOUNTER
Mrs Mccormack called, she would like the patient to kept over night 02/06 into 02/07 for observation because he doesn't do very well with anesthesia. Mrs. Mccormack is very concerned about his well being, she said his dimentia gets worse and for his benefit it would be best to keep him over night.     When returning call she asked that we call her cell phone [phone].

## 2024-02-01 NOTE — TELEPHONE ENCOUNTER
Please let her know this should not be a problem    Kanchan, please confirm with Dr. MEYERS so he is aware that patient needs to be admitted overnight for observation with consult hospitalist service

## 2024-02-01 NOTE — TELEPHONE ENCOUNTER
Mrs Easton has been notified, she expresses her gratitude. Dr MEYERS has also been notified and said that will be fine.

## 2024-02-02 NOTE — TELEPHONE ENCOUNTER
Patient wife called back today and stated that BP: 130/58 and 130/70 last night (Thursday), was unable to let me know heart rate. We tried to do home interrogation as requested by Jennifer but was found that his new monitor that [employer] had sent was incompatible with his icd so they had to send him out a new one and will not get this for another 7-10 business days.  Angie's wife wants to make sure that he is still cleared from a cardiac standpoint for his green light surgery with Dr MEYERS on 2/6/2024 without having this home interrogation. Please advise. Thanks so much

## 2024-02-05 ENCOUNTER — ANESTHESIA EVENT (OUTPATIENT)
Dept: OPERATING ROOM | Age: 84
End: 2024-02-05
Payer: MEDICARE

## 2024-02-05 NOTE — H&P
History and Physical    Patient:  Vikas Mccormack  MRN: [id number]    CHIEF COMPLAINT: BPH with lower urinary tract symptoms    HISTORY OF PRESENT ILLNESS:   The patient is a 83 y.o. male who presents with BPH and lower urinary tract symptoms.  History  Referred by JOCE VERGARA for incontinence.  He does complain of urge incontinence.  He has nocturia 2-3 times per night, but denies nocturnal enuresis.  He only drinks 1-2 cups of coffee per day.  He does not consume water because he does not want to urinate often.  He does follow with nephrology for chronic kidney disease.  On Flomax and finasteride                   12/2022 cystoscopy showed extensive trilobar hyperplasia with prostatic varices              Offered PVP greenlight     1/2023 started vesicare 5mg, urged increased water consumption     10/2023 nocturnal enuresis  flomax increased to BID  Past Medical History:    Past Medical History:   Diagnosis Date    Abnormal echocardiogram 05/02/2012    EF 15%. mildly dilated LV cavity.    Abnormal resting ECG findings 04/25/2012    atrial sensed, ventricular paced rhythm at 73 beats per minute. Wide QRS of 183 ms.    Alcohol use disorder     Biventricular ICD (implantable cardiac defibrillator) in place 08/26/2011    [facility] [employer]. CRT-D    Biventricular ICD (implantable cardioverter-defibrillator) in place 10/29/2015    [employer]- Bi V ICD (Battery change)    Biventricular pacemaker check 5/12 1/13    LV lead turned of 1/13 due to diaphragmatic pacing.    CAD (coronary artery disease)     CKD (chronic kidney disease)     Dr. Huff    Dementia (HCC)     Diabetes mellitus (HCC)     type 2    Esophageal reflux     History of echocardiogram 11/23/2018    EF 15%. Mildly increased LV wall thickness w/ severely increased LA cavity size. Severe global hypokinesis w/ no segmental variation. LA is moderately dilated w/ LA volume index of 35. Mild mitral and tricuspid regurg. Evidence of moderate diastolic

## 2024-02-06 ENCOUNTER — ANESTHESIA (OUTPATIENT)
Dept: OPERATING ROOM | Age: 84
End: 2024-02-06
Payer: MEDICARE

## 2024-02-06 ENCOUNTER — HOSPITAL ENCOUNTER (OUTPATIENT)
Age: 84
Setting detail: OBSERVATION
Discharge: HOME | End: 2024-02-07
Attending: UROLOGY | Admitting: UROLOGY
Payer: MEDICARE

## 2024-02-06 PROBLEM — N40.1 LOWER URINARY TRACT SYMPTOMS DUE TO BENIGN PROSTATIC HYPERPLASIA: Status: ACTIVE | Noted: 2024-02-06

## 2024-02-06 LAB
EST. AVERAGE GLUCOSE BLD GHB EST-MCNC: 240 MG/DL
GLUCOSE BLD-MCNC: 206 MG/DL (ref 74–100)
GLUCOSE BLD-MCNC: 215 MG/DL (ref 74–100)
GLUCOSE BLD-MCNC: 413 MG/DL (ref 74–100)
GLUCOSE BLD-MCNC: 447 MG/DL (ref 74–100)
HBA1C MFR BLD: 10 % (ref 4–6)

## 2024-02-06 PROCEDURE — 6370000000 HC RX 637 (ALT 250 FOR IP): Performed by: UROLOGY

## 2024-02-06 PROCEDURE — 82947 ASSAY GLUCOSE BLOOD QUANT: CPT

## 2024-02-06 PROCEDURE — G0378 HOSPITAL OBSERVATION PER HR: HCPCS

## 2024-02-06 PROCEDURE — 3600000004 HC SURGERY LEVEL 4 BASE: Performed by: UROLOGY

## 2024-02-06 PROCEDURE — 2720000010 HC SURG SUPPLY STERILE: Performed by: UROLOGY

## 2024-02-06 PROCEDURE — 7100000000 HC PACU RECOVERY - FIRST 15 MIN: Performed by: UROLOGY

## 2024-02-06 PROCEDURE — 6370000000 HC RX 637 (ALT 250 FOR IP): Performed by: INTERNAL MEDICINE

## 2024-02-06 PROCEDURE — 94761 N-INVAS EAR/PLS OXIMETRY MLT: CPT

## 2024-02-06 PROCEDURE — 6360000002 HC RX W HCPCS: Performed by: UROLOGY

## 2024-02-06 PROCEDURE — 36415 COLL VENOUS BLD VENIPUNCTURE: CPT

## 2024-02-06 PROCEDURE — 6370000000 HC RX 637 (ALT 250 FOR IP): Performed by: NURSE ANESTHETIST, CERTIFIED REGISTERED

## 2024-02-06 PROCEDURE — 83036 HEMOGLOBIN GLYCOSYLATED A1C: CPT

## 2024-02-06 PROCEDURE — 2500000003 HC RX 250 WO HCPCS: Performed by: NURSE ANESTHETIST, CERTIFIED REGISTERED

## 2024-02-06 PROCEDURE — 6360000002 HC RX W HCPCS: Performed by: NURSE ANESTHETIST, CERTIFIED REGISTERED

## 2024-02-06 PROCEDURE — 7100000001 HC PACU RECOVERY - ADDTL 15 MIN: Performed by: UROLOGY

## 2024-02-06 PROCEDURE — 2580000003 HC RX 258: Performed by: UROLOGY

## 2024-02-06 PROCEDURE — 3600000014 HC SURGERY LEVEL 4 ADDTL 15MIN: Performed by: UROLOGY

## 2024-02-06 PROCEDURE — 3700000001 HC ADD 15 MINUTES (ANESTHESIA): Performed by: UROLOGY

## 2024-02-06 PROCEDURE — 2709999900 HC NON-CHARGEABLE SUPPLY: Performed by: UROLOGY

## 2024-02-06 PROCEDURE — 6370000000 HC RX 637 (ALT 250 FOR IP): Performed by: NURSE PRACTITIONER

## 2024-02-06 PROCEDURE — 2580000003 HC RX 258: Performed by: NURSE ANESTHETIST, CERTIFIED REGISTERED

## 2024-02-06 PROCEDURE — 3700000000 HC ANESTHESIA ATTENDED CARE: Performed by: UROLOGY

## 2024-02-06 RX ORDER — GLIMEPIRIDE 4 MG/1
4 TABLET ORAL DAILY
Qty: 30 TABLET | Refills: 11 | Status: SHIPPED | OUTPATIENT
Start: 2024-02-06

## 2024-02-06 RX ORDER — GLIPIZIDE 5 MG/1
2.5 TABLET ORAL
Status: DISCONTINUED | OUTPATIENT
Start: 2024-02-07 | End: 2024-02-06

## 2024-02-06 RX ORDER — ATORVASTATIN CALCIUM 10 MG/1
10 TABLET, FILM COATED ORAL DAILY
Status: DISCONTINUED | OUTPATIENT
Start: 2024-02-06 | End: 2024-02-07 | Stop reason: HOSPADM

## 2024-02-06 RX ORDER — CEFAZOLIN SODIUM IN 0.9 % NACL 2 G/100 ML
2000 PLASTIC BAG, INJECTION (ML) INTRAVENOUS
Status: COMPLETED | OUTPATIENT
Start: 2024-02-06 | End: 2024-02-06

## 2024-02-06 RX ORDER — PROPOFOL 10 MG/ML
INJECTION, EMULSION INTRAVENOUS PRN
Status: DISCONTINUED | OUTPATIENT
Start: 2024-02-06 | End: 2024-02-06 | Stop reason: SDUPTHER

## 2024-02-06 RX ORDER — ACETAMINOPHEN 325 MG/1
650 TABLET ORAL ONCE
Status: COMPLETED | OUTPATIENT
Start: 2024-02-06 | End: 2024-02-06

## 2024-02-06 RX ORDER — TAMSULOSIN HYDROCHLORIDE 0.4 MG/1
0.4 CAPSULE ORAL 2 TIMES DAILY
Status: DISCONTINUED | OUTPATIENT
Start: 2024-02-06 | End: 2024-02-07 | Stop reason: HOSPADM

## 2024-02-06 RX ORDER — DEXAMETHASONE SODIUM PHOSPHATE 4 MG/ML
INJECTION, SOLUTION INTRA-ARTICULAR; INTRALESIONAL; INTRAMUSCULAR; INTRAVENOUS; SOFT TISSUE PRN
Status: DISCONTINUED | OUTPATIENT
Start: 2024-02-06 | End: 2024-02-06 | Stop reason: SDUPTHER

## 2024-02-06 RX ORDER — FENTANYL CITRATE 50 UG/ML
25 INJECTION, SOLUTION INTRAMUSCULAR; INTRAVENOUS EVERY 5 MIN PRN
Status: DISCONTINUED | OUTPATIENT
Start: 2024-02-06 | End: 2024-02-06 | Stop reason: HOSPADM

## 2024-02-06 RX ORDER — ALPRAZOLAM 0.5 MG/1
1 TABLET ORAL NIGHTLY PRN
Status: DISCONTINUED | OUTPATIENT
Start: 2024-02-06 | End: 2024-02-07 | Stop reason: HOSPADM

## 2024-02-06 RX ORDER — OFLOXACIN 3 MG/ML
3 SOLUTION/ DROPS OPHTHALMIC 4 TIMES DAILY
Status: DISCONTINUED | OUTPATIENT
Start: 2024-02-06 | End: 2024-02-07 | Stop reason: HOSPADM

## 2024-02-06 RX ORDER — SODIUM CHLORIDE 9 MG/ML
INJECTION, SOLUTION INTRAVENOUS CONTINUOUS
Status: DISCONTINUED | OUTPATIENT
Start: 2024-02-06 | End: 2024-02-07 | Stop reason: HOSPADM

## 2024-02-06 RX ORDER — TROSPIUM CHLORIDE 20 MG/1
20 TABLET, FILM COATED ORAL
Status: DISCONTINUED | OUTPATIENT
Start: 2024-02-06 | End: 2024-02-07 | Stop reason: HOSPADM

## 2024-02-06 RX ORDER — SODIUM CHLORIDE 0.9 % (FLUSH) 0.9 %
5-40 SYRINGE (ML) INJECTION EVERY 12 HOURS SCHEDULED
Status: DISCONTINUED | OUTPATIENT
Start: 2024-02-06 | End: 2024-02-07 | Stop reason: HOSPADM

## 2024-02-06 RX ORDER — LISINOPRIL 2.5 MG/1
2.5 TABLET ORAL DAILY
Status: DISCONTINUED | OUTPATIENT
Start: 2024-02-06 | End: 2024-02-07 | Stop reason: HOSPADM

## 2024-02-06 RX ORDER — METOPROLOL SUCCINATE 50 MG/1
50 TABLET, EXTENDED RELEASE ORAL DAILY
Status: DISCONTINUED | OUTPATIENT
Start: 2024-02-07 | End: 2024-02-07 | Stop reason: HOSPADM

## 2024-02-06 RX ORDER — SODIUM CHLORIDE 0.9 % (FLUSH) 0.9 %
5-40 SYRINGE (ML) INJECTION PRN
Status: DISCONTINUED | OUTPATIENT
Start: 2024-02-06 | End: 2024-02-06 | Stop reason: HOSPADM

## 2024-02-06 RX ORDER — PANTOPRAZOLE SODIUM 40 MG/1
40 TABLET, DELAYED RELEASE ORAL
Status: DISCONTINUED | OUTPATIENT
Start: 2024-02-07 | End: 2024-02-07 | Stop reason: HOSPADM

## 2024-02-06 RX ORDER — PREDNISOLONE ACETATE 10 MG/ML
1 SUSPENSION/ DROPS OPHTHALMIC 4 TIMES DAILY
Status: DISCONTINUED | OUTPATIENT
Start: 2024-02-06 | End: 2024-02-07 | Stop reason: HOSPADM

## 2024-02-06 RX ORDER — SODIUM CHLORIDE 9 MG/ML
INJECTION, SOLUTION INTRAVENOUS PRN
Status: DISCONTINUED | OUTPATIENT
Start: 2024-02-06 | End: 2024-02-06 | Stop reason: HOSPADM

## 2024-02-06 RX ORDER — ONDANSETRON 2 MG/ML
4 INJECTION INTRAMUSCULAR; INTRAVENOUS
Status: DISCONTINUED | OUTPATIENT
Start: 2024-02-06 | End: 2024-02-06 | Stop reason: HOSPADM

## 2024-02-06 RX ORDER — NITROGLYCERIN 0.4 MG/1
0.4 TABLET SUBLINGUAL EVERY 5 MIN PRN
Status: DISCONTINUED | OUTPATIENT
Start: 2024-02-06 | End: 2024-02-07 | Stop reason: HOSPADM

## 2024-02-06 RX ORDER — SODIUM CHLORIDE 0.9 % (FLUSH) 0.9 %
5-40 SYRINGE (ML) INJECTION EVERY 12 HOURS SCHEDULED
Status: DISCONTINUED | OUTPATIENT
Start: 2024-02-06 | End: 2024-02-06 | Stop reason: HOSPADM

## 2024-02-06 RX ORDER — DEXTROSE MONOHYDRATE 100 MG/ML
INJECTION, SOLUTION INTRAVENOUS CONTINUOUS PRN
Status: DISCONTINUED | OUTPATIENT
Start: 2024-02-06 | End: 2024-02-07 | Stop reason: HOSPADM

## 2024-02-06 RX ORDER — FENTANYL CITRATE 50 UG/ML
INJECTION, SOLUTION INTRAMUSCULAR; INTRAVENOUS PRN
Status: DISCONTINUED | OUTPATIENT
Start: 2024-02-06 | End: 2024-02-06 | Stop reason: SDUPTHER

## 2024-02-06 RX ORDER — INSULIN LISPRO 100 [IU]/ML
0-8 INJECTION, SOLUTION INTRAVENOUS; SUBCUTANEOUS
Status: DISCONTINUED | OUTPATIENT
Start: 2024-02-06 | End: 2024-02-07 | Stop reason: HOSPADM

## 2024-02-06 RX ORDER — GLUCAGON 1 MG/ML
1 KIT INJECTION PRN
Status: DISCONTINUED | OUTPATIENT
Start: 2024-02-06 | End: 2024-02-07 | Stop reason: HOSPADM

## 2024-02-06 RX ORDER — OXYCODONE HYDROCHLORIDE 5 MG/1
10 TABLET ORAL EVERY 4 HOURS PRN
Status: DISCONTINUED | OUTPATIENT
Start: 2024-02-06 | End: 2024-02-07 | Stop reason: HOSPADM

## 2024-02-06 RX ORDER — SODIUM CHLORIDE 0.9 % (FLUSH) 0.9 %
5-40 SYRINGE (ML) INJECTION PRN
Status: DISCONTINUED | OUTPATIENT
Start: 2024-02-06 | End: 2024-02-07 | Stop reason: HOSPADM

## 2024-02-06 RX ORDER — INSULIN LISPRO 100 [IU]/ML
2 INJECTION, SOLUTION INTRAVENOUS; SUBCUTANEOUS ONCE
Status: COMPLETED | OUTPATIENT
Start: 2024-02-06 | End: 2024-02-06

## 2024-02-06 RX ORDER — SODIUM CHLORIDE 9 MG/ML
INJECTION, SOLUTION INTRAVENOUS PRN
Status: DISCONTINUED | OUTPATIENT
Start: 2024-02-06 | End: 2024-02-07 | Stop reason: HOSPADM

## 2024-02-06 RX ORDER — OXYCODONE HYDROCHLORIDE 5 MG/1
5 TABLET ORAL
Status: DISCONTINUED | OUTPATIENT
Start: 2024-02-06 | End: 2024-02-06 | Stop reason: HOSPADM

## 2024-02-06 RX ORDER — SULFAMETHOXAZOLE AND TRIMETHOPRIM 800; 160 MG/1; MG/1
1 TABLET ORAL 2 TIMES DAILY
Qty: 14 TABLET | Refills: 0 | Status: SHIPPED | OUTPATIENT
Start: 2024-02-06 | End: 2024-02-07 | Stop reason: HOSPADM

## 2024-02-06 RX ORDER — POLYETHYLENE GLYCOL 3350 17 G/17G
17 POWDER, FOR SOLUTION ORAL DAILY
Status: DISCONTINUED | OUTPATIENT
Start: 2024-02-06 | End: 2024-02-07 | Stop reason: HOSPADM

## 2024-02-06 RX ORDER — ISOSORBIDE MONONITRATE 30 MG/1
30 TABLET, EXTENDED RELEASE ORAL DAILY
Status: DISCONTINUED | OUTPATIENT
Start: 2024-02-07 | End: 2024-02-07 | Stop reason: HOSPADM

## 2024-02-06 RX ORDER — GLUCOSAMINE HCL/CHONDROITIN SU 500-400 MG
1 CAPSULE ORAL DAILY
Status: DISCONTINUED | OUTPATIENT
Start: 2024-02-06 | End: 2024-02-06

## 2024-02-06 RX ORDER — FINASTERIDE 5 MG/1
5 TABLET, FILM COATED ORAL DAILY
Status: DISCONTINUED | OUTPATIENT
Start: 2024-02-06 | End: 2024-02-07 | Stop reason: HOSPADM

## 2024-02-06 RX ORDER — FENTANYL CITRATE 50 UG/ML
50 INJECTION, SOLUTION INTRAMUSCULAR; INTRAVENOUS EVERY 5 MIN PRN
Status: DISCONTINUED | OUTPATIENT
Start: 2024-02-06 | End: 2024-02-06 | Stop reason: HOSPADM

## 2024-02-06 RX ORDER — METOCLOPRAMIDE HYDROCHLORIDE 5 MG/ML
10 INJECTION INTRAMUSCULAR; INTRAVENOUS
Status: DISCONTINUED | OUTPATIENT
Start: 2024-02-06 | End: 2024-02-06 | Stop reason: HOSPADM

## 2024-02-06 RX ORDER — INSULIN LISPRO 100 [IU]/ML
0-4 INJECTION, SOLUTION INTRAVENOUS; SUBCUTANEOUS NIGHTLY
Status: DISCONTINUED | OUTPATIENT
Start: 2024-02-06 | End: 2024-02-07 | Stop reason: HOSPADM

## 2024-02-06 RX ORDER — OXYCODONE HYDROCHLORIDE 5 MG/1
5 TABLET ORAL EVERY 4 HOURS PRN
Status: DISCONTINUED | OUTPATIENT
Start: 2024-02-06 | End: 2024-02-07 | Stop reason: HOSPADM

## 2024-02-06 RX ORDER — LIDOCAINE HYDROCHLORIDE 20 MG/ML
JELLY TOPICAL PRN
Status: DISCONTINUED | OUTPATIENT
Start: 2024-02-06 | End: 2024-02-06 | Stop reason: ALTCHOICE

## 2024-02-06 RX ORDER — SODIUM CHLORIDE, SODIUM LACTATE, POTASSIUM CHLORIDE, CALCIUM CHLORIDE 600; 310; 30; 20 MG/100ML; MG/100ML; MG/100ML; MG/100ML
INJECTION, SOLUTION INTRAVENOUS CONTINUOUS
Status: DISCONTINUED | OUTPATIENT
Start: 2024-02-06 | End: 2024-02-06

## 2024-02-06 RX ORDER — FERROUS SULFATE 325(65) MG
325 TABLET ORAL DAILY
Status: DISCONTINUED | OUTPATIENT
Start: 2024-02-06 | End: 2024-02-07 | Stop reason: HOSPADM

## 2024-02-06 RX ADMIN — TROSPIUM CHLORIDE 20 MG: 20 TABLET, FILM COATED ORAL at 16:32

## 2024-02-06 RX ADMIN — FENTANYL CITRATE 25 MCG: 50 INJECTION, SOLUTION INTRAMUSCULAR; INTRAVENOUS at 09:15

## 2024-02-06 RX ADMIN — ATORVASTATIN CALCIUM 10 MG: 10 TABLET, FILM COATED ORAL at 13:03

## 2024-02-06 RX ADMIN — ACETAMINOPHEN 650 MG: 325 TABLET ORAL at 07:29

## 2024-02-06 RX ADMIN — LIDOCAINE HYDROCHLORIDE 50 MG: 20 INJECTION, SOLUTION EPIDURAL; INFILTRATION; INTRACAUDAL at 08:51

## 2024-02-06 RX ADMIN — Medication 2000 MG: at 08:37

## 2024-02-06 RX ADMIN — TAMSULOSIN HYDROCHLORIDE 0.4 MG: 0.4 CAPSULE ORAL at 13:03

## 2024-02-06 RX ADMIN — INSULIN LISPRO 8 UNITS: 100 INJECTION, SOLUTION INTRAVENOUS; SUBCUTANEOUS at 16:32

## 2024-02-06 RX ADMIN — FERROUS SULFATE TAB 325 MG (65 MG ELEMENTAL FE) 325 MG: 325 (65 FE) TAB at 13:03

## 2024-02-06 RX ADMIN — PREDNISOLONE ACETATE 1 DROP: 10 SUSPENSION/ DROPS OPHTHALMIC at 13:03

## 2024-02-06 RX ADMIN — SODIUM CHLORIDE: 9 INJECTION, SOLUTION INTRAVENOUS at 10:44

## 2024-02-06 RX ADMIN — INSULIN LISPRO 2 UNITS: 100 INJECTION, SOLUTION INTRAVENOUS; SUBCUTANEOUS at 21:00

## 2024-02-06 RX ADMIN — SODIUM CHLORIDE, POTASSIUM CHLORIDE, SODIUM LACTATE AND CALCIUM CHLORIDE: 600; 310; 30; 20 INJECTION, SOLUTION INTRAVENOUS at 07:29

## 2024-02-06 RX ADMIN — PREDNISOLONE ACETATE 1 DROP: 10 SUSPENSION/ DROPS OPHTHALMIC at 16:32

## 2024-02-06 RX ADMIN — FINASTERIDE 5 MG: 5 TABLET, FILM COATED ORAL at 13:03

## 2024-02-06 RX ADMIN — PREDNISOLONE ACETATE 1 DROP: 10 SUSPENSION/ DROPS OPHTHALMIC at 22:09

## 2024-02-06 RX ADMIN — OXYCODONE 10 MG: 5 TABLET ORAL at 10:50

## 2024-02-06 RX ADMIN — FENTANYL CITRATE 25 MCG: 50 INJECTION, SOLUTION INTRAMUSCULAR; INTRAVENOUS at 09:17

## 2024-02-06 RX ADMIN — TAMSULOSIN HYDROCHLORIDE 0.4 MG: 0.4 CAPSULE ORAL at 21:00

## 2024-02-06 RX ADMIN — INSULIN LISPRO 4 UNITS: 100 INJECTION, SOLUTION INTRAVENOUS; SUBCUTANEOUS at 21:00

## 2024-02-06 RX ADMIN — PROPOFOL 120 MG: 10 INJECTION, EMULSION INTRAVENOUS at 08:51

## 2024-02-06 RX ADMIN — DEXAMETHASONE SODIUM PHOSPHATE 8 MG: 4 INJECTION INTRA-ARTICULAR; INTRALESIONAL; INTRAMUSCULAR; INTRAVENOUS; SOFT TISSUE at 09:13

## 2024-02-06 ASSESSMENT — PAIN DESCRIPTION - LOCATION: LOCATION: PENIS

## 2024-02-06 ASSESSMENT — PAIN SCALES - GENERAL
PAINLEVEL_OUTOF10: 9
PAINLEVEL_OUTOF10: 0

## 2024-02-06 ASSESSMENT — PAIN DESCRIPTION - DESCRIPTORS: DESCRIPTORS: BURNING

## 2024-02-06 ASSESSMENT — PAIN DESCRIPTION - FREQUENCY: FREQUENCY: CONTINUOUS

## 2024-02-06 ASSESSMENT — PAIN DESCRIPTION - ONSET: ONSET: PROGRESSIVE

## 2024-02-06 ASSESSMENT — PAIN DESCRIPTION - ORIENTATION: ORIENTATION: MID

## 2024-02-06 ASSESSMENT — PAIN - FUNCTIONAL ASSESSMENT: PAIN_FUNCTIONAL_ASSESSMENT: ACTIVITIES ARE NOT PREVENTED

## 2024-02-06 ASSESSMENT — PAIN DESCRIPTION - PAIN TYPE: TYPE: SURGICAL PAIN

## 2024-02-06 NOTE — OP NOTE
[address] [address]                                OPERATIVE REPORT    PATIENT NAME: ALICE CONTE                     :        1940  MED REC NO:   [id number]                              ROOM:       0302  ACCOUNT NO:   [id number]                           ADMIT DATE: 2024  PROVIDER:     Lucian Hollins    DATE OF PROCEDURE:  2024    SURGEON:  Dr. Lucian Hollins.    ASSISTANT:  None.    PREOPERATIVE DIAGNOSES:  1.  BPH with lower urinary tract symptoms.  2.  Urinary frequency.  3.  Urinary weak stream.  4.  Urinary urgency.    POSTOPERATIVE DIAGNOSES:  1.  BPH with lower urinary tract symptoms.  2.  Urinary frequency.  3.  Urinary weak stream.  4.  Urinary urgency.    PROCEDURE PERFORMED:  Photoselective vaporization of the prostate.    ANESTHESIA:  General.    COMPLICATIONS:  None.    ESTIMATED BLOOD LOSS:  Minimal.    SPECIMENS:  None.    PROSTHESIS:  A 22-Colombian Spears catheter.    DISPOSITION:  Stable.    FINDINGS:  Trilobar hyperplasia of the prostate.    INDICATIONS:  This patient is an 83-year-old male with extensive lower  urinary tract symptoms, here now for definitive therapy in the form of  PVP GreenLight.    DESCRIPTION OF PROCEDURE:  The patient was taken back to the operating  room after informed consent including all risks, benefits, and  alternatives were obtained.  The patient was transferred from the [facility]  onto the operating room table, where he was induced under general  anesthesia, and given IV Ancef for preoperative antibiotic prophylaxis.   To begin the case, the patient was prepped and draped in the normal  sterile fashion and placed in dorsal lithotomy.  He had a 24-Colombian  sheath with 30-degree lens passed through the urethra into the bladder.   Once into the bladder, we identified the ureteral orifice far away from  the bladder neck.  We were able to then insert the XPS laser

## 2024-02-06 NOTE — ACP (ADVANCE CARE PLANNING)
Advance Care Planning     Advance Care Planning Activator (Inpatient)  Conversation Note      Date of ACP Conversation: 2/6/2024     Conversation Conducted with: Patient with Decision Making Capacity    ACP Activator: Karis Chawla RN        Health Care Decision Maker:     Current Designated Health Care Decision Maker:     Primary Decision Maker: Maggi Mccormack - Spouse - [phone]    Secondary Decision Maker: Adolfo Mccormack - Skyla - [phone]      Care Preferences    Ventilation:  \"If you were in your present state of health and suddenly became very ill and were unable to breathe on your own, what would your preference be about the use of a ventilator (breathing machine) if it were available to you?\"      Would the patient desire the use of ventilator (breathing machine)?: no    \"If your health worsens and it becomes clear that your chance of recovery is unlikely, what would your preference be about the use of a ventilator (breathing machine) if it were available to you?\"     Would the patient desire the use of ventilator (breathing machine)?: No      Resuscitation  \"CPR works best to restart the heart when there is a sudden event, like a heart attack, in someone who is otherwise healthy. Unfortunately, CPR does not typically restart the heart for people who have serious health conditions or who are very sick.\"    \"In the event your heart stopped as a result of an underlying serious health condition, would you want attempts to be made to restart your heart (answer \"yes\" for attempt to resuscitate) or would you prefer a natural death (answer \"no\" for do not attempt to resuscitate)?\" no       [x] Yes   [] No   Educated Patient / Decision Maker regarding differences between Advance Directives and portable DNR orders.    Length of ACP Conversation in minutes:  20    Conversation Outcomes:  ACP discussion completed, Existing advance directive reviewed with patient; no changes to patient's previously recorded

## 2024-02-06 NOTE — INTERVAL H&P NOTE
History and Physical reviewed  I have examined the patient and no changes    Lucian Hollins MD

## 2024-02-06 NOTE — ANESTHESIA POSTPROCEDURE EVALUATION
Department of Anesthesiology  Postprocedure Note    Patient: Vikas Mccormack  MRN: [id number]  YOB: 1940  Date of evaluation: 2/6/2024    Procedure Summary       Date: 02/06/24 Room / Location: [facility]    Anesthesia Start: 0846 Anesthesia Stop: 0945    Procedure: CYSTOSCOPY TRANSURETHRAL RESECTION PROSTATE LASER-PVP GREENLIGHT (Penis) Diagnosis:       Stage 3 chronic kidney disease, unspecified whether stage 3a or 3b CKD (HCC)      (Stage 3 chronic kidney disease, unspecified whether stage 3a or 3b CKD (HCC) [N18.30])    Surgeons: Lucian Hollins MD Responsible Provider: Abril Diallo APRN - CRNA    Anesthesia Type: general ASA Status: 4            Anesthesia Type: No value filed.    Brooke Phase I: Brooke Score: 9    Brooke Phase II:      Anesthesia Post Evaluation    Patient location during evaluation: PACU  Patient participation: complete - patient participated  Level of consciousness: awake and alert  Airway patency: patent  Nausea & Vomiting: no nausea and no vomiting  Cardiovascular status: blood pressure returned to baseline  Respiratory status: acceptable  Hydration status: euvolemic  Pain management: adequate and satisfactory to patient    No notable events documented.

## 2024-02-06 NOTE — ANESTHESIA PRE PROCEDURE
BUN 47 01/24/2024 09:34 AM    CREATININE 1.92 01/24/2024 09:34 AM    GFRAA 52 01/05/2022 08:00 AM    AGRATIO 1.2 04/03/2023 04:20 PM    LABGLOM 33 01/18/2024 10:53 AM    GLUCOSE 217 01/24/2024 09:34 AM    PROT 7.4 01/24/2024 09:34 AM    CALCIUM 9.5 01/24/2024 09:34 AM    BILITOT 0.4 01/24/2024 09:34 AM    ALKPHOS 67 01/24/2024 09:34 AM    ALKPHOS 74 04/03/2023 04:20 PM    AST 10 01/24/2024 09:34 AM    ALT 7 01/24/2024 09:34 AM       POC Tests: No results for input(s): \"POCGLU\", \"POCNA\", \"POCK\", \"POCCL\", \"POCBUN\", \"POCHEMO\", \"POCHCT\" in the last 72 hours.    Coags:   Lab Results   Component Value Date/Time    PROTIME 30.2 10/25/2023 03:15 PM    PROTIME 23.8 10/22/2011 09:18 AM    INR 2.9 10/25/2023 03:15 PM    APTT 22.8 02/27/2017 06:28 PM       HCG (If Applicable): No results found for: \"PREGTESTUR\", \"PREGSERUM\", \"HCG\", \"HCGQUANT\"     ABGs: No results found for: \"PHART\", \"PO2ART\", \"VMX9TOW\", \"VXX7BBS\", \"BEART\", \"Z2IGHFTB\"     Type & Screen (If Applicable):  No results found for: \"LABABO\", \"LABRH\"    Drug/Infectious Status (If Applicable):  Lab Results   Component Value Date/Time    HEPCAB NONREACTIVE 02/03/2013 05:00 PM       COVID-19 Screening (If Applicable):   Lab Results   Component Value Date/Time    COVID19 Detected 11/10/2020 10:18 AM           Anesthesia Evaluation     no history of anesthetic complications:   Airway: Mallampati: IV  TM distance: >3 FB   Neck ROM: full  Mouth opening: > = 3 FB   Dental:          Pulmonary:normal exam  breath sounds clear to auscultation                             Cardiovascular:  Exercise tolerance: good (>4 METS)  (+) hypertension:, angina:, pacemaker:, CAD:, dysrhythmias: atrial fibrillation, CHF:, hyperlipidemia      ECG reviewed      Echocardiogram reviewed    Cleared by cardiology              Neuro/Psych:   (+) psychiatric history:            GI/Hepatic/Renal:   (+) GERD: well controlled, renal disease: CRI          Endo/Other:    (+) Diabetes (A1C >9)Type II DM,

## 2024-02-06 NOTE — PROGRESS NOTES
Patient up to the chair. Denies any pain. Stated he is feeling pretty good this afternoon. Denies any other needs at this time

## 2024-02-06 NOTE — PROGRESS NOTES
Patient arrived from recovery and report received from Evelina ESTEVES. VS and assessment completed. Patients goode and leg strap intact. Patient very uncomfortable. Unable to fully complete admission navigator d/t patients dementia. Will complete with wife once she arrives. Plan of care gone over with patient. Bed alarm on. Denies any needs at this time

## 2024-02-06 NOTE — PROGRESS NOTES
Discharge Criteria    Inpatients must meet Criteria 1 through 7. All other patients are either YES or N/A. If a NO is chosen then Anesthesia or Surgeon must be notified.      1.  Minimum 30 minutes after last dose of sedative medication.    Yes      2.  Systolic BP between 90 - 160. Diastolic BP between 60 - 90.    Yes      3.  Pulse between 60 - 120    Yes      4.  Respirations between 8 - 25.    Yes      5.  SpO2 92% - 100%.    Yes      6.  Able to cough and swallow or return to baseline function.    Yes      7.  Alert and oriented or return to baseline mental status.    Yes      8.  Demonstrates controlled, coordinated movements, ambulates with steady gait, or return to baseline activity function.    N/A      9.  Minimal or no pain or nausea, or at a level tolerable and acceptable to patient.    N/A      10. Takes and retains oral fluids as allowed.    N/A      11. Procedural / perioperative site stable.  Minimal or no bleeding.    N/A          12. If GI endoscopy procedure, minimal or no abdominal distention or passing flatus.    N/A      13. Written discharge instructions and emergency telephone number provided.    N/A      14. Accompanied by a responsible adult.    N/A

## 2024-02-07 VITALS
BODY MASS INDEX: 24.01 KG/M2 | DIASTOLIC BLOOD PRESSURE: 70 MMHG | OXYGEN SATURATION: 98 % | TEMPERATURE: 97.2 F | SYSTOLIC BLOOD PRESSURE: 143 MMHG | WEIGHT: 177.25 LBS | HEART RATE: 81 BPM | RESPIRATION RATE: 18 BRPM | HEIGHT: 72 IN

## 2024-02-07 LAB
ANION GAP SERPL CALCULATED.3IONS-SCNC: 10 MMOL/L (ref 9–17)
BUN SERPL-MCNC: 36 MG/DL (ref 8–23)
BUN/CREAT SERPL: 24 (ref 9–20)
CALCIUM SERPL-MCNC: 8.1 MG/DL (ref 8.6–10.4)
CHLORIDE SERPL-SCNC: 108 MMOL/L (ref 98–107)
CO2 SERPL-SCNC: 18 MMOL/L (ref 20–31)
CREAT SERPL-MCNC: 1.5 MG/DL (ref 0.7–1.2)
ERYTHROCYTE [DISTWIDTH] IN BLOOD BY AUTOMATED COUNT: 13.1 % (ref 11.8–14.4)
GFR SERPL CREATININE-BSD FRML MDRD: 46 ML/MIN/1.73M2
GLUCOSE BLD-MCNC: 277 MG/DL (ref 74–100)
GLUCOSE SERPL-MCNC: 265 MG/DL (ref 70–99)
HCT VFR BLD AUTO: 33.2 % (ref 40.7–50.3)
HGB BLD-MCNC: 10.8 G/DL (ref 13–17)
MCH RBC QN AUTO: 30.1 PG (ref 25.2–33.5)
MCHC RBC AUTO-ENTMCNC: 32.5 G/DL (ref 28.4–34.8)
MCV RBC AUTO: 92.5 FL (ref 82.6–102.9)
NRBC BLD-RTO: 0 PER 100 WBC
PLATELET # BLD AUTO: 252 K/UL (ref 138–453)
PMV BLD AUTO: 11.8 FL (ref 8.1–13.5)
POTASSIUM SERPL-SCNC: 4.7 MMOL/L (ref 3.7–5.3)
RBC # BLD AUTO: 3.59 M/UL (ref 4.21–5.77)
SODIUM SERPL-SCNC: 136 MMOL/L (ref 135–144)
WBC OTHER # BLD: 9.1 K/UL (ref 3.5–11.3)

## 2024-02-07 PROCEDURE — 36415 COLL VENOUS BLD VENIPUNCTURE: CPT

## 2024-02-07 PROCEDURE — 2580000003 HC RX 258: Performed by: UROLOGY

## 2024-02-07 PROCEDURE — 6370000000 HC RX 637 (ALT 250 FOR IP): Performed by: NURSE PRACTITIONER

## 2024-02-07 PROCEDURE — G0378 HOSPITAL OBSERVATION PER HR: HCPCS

## 2024-02-07 PROCEDURE — 80048 BASIC METABOLIC PNL TOTAL CA: CPT

## 2024-02-07 PROCEDURE — 85027 COMPLETE CBC AUTOMATED: CPT

## 2024-02-07 PROCEDURE — 82947 ASSAY GLUCOSE BLOOD QUANT: CPT

## 2024-02-07 PROCEDURE — 6370000000 HC RX 637 (ALT 250 FOR IP): Performed by: UROLOGY

## 2024-02-07 PROCEDURE — 94761 N-INVAS EAR/PLS OXIMETRY MLT: CPT

## 2024-02-07 RX ORDER — PEN NEEDLE, DIABETIC 29 GAUGE
1 NEEDLE, DISPOSABLE MISCELLANEOUS DAILY
Qty: 100 EACH | Refills: 3 | Status: SHIPPED | OUTPATIENT
Start: 2024-02-07

## 2024-02-07 RX ORDER — SULFAMETHOXAZOLE AND TRIMETHOPRIM 400; 80 MG/1; MG/1
1 TABLET ORAL 2 TIMES DAILY
Qty: 14 TABLET | Refills: 0 | Status: SHIPPED | OUTPATIENT
Start: 2024-02-07 | End: 2024-02-14

## 2024-02-07 RX ORDER — INSULIN GLARGINE 100 [IU]/ML
15 INJECTION, SOLUTION SUBCUTANEOUS DAILY
Status: DISCONTINUED | OUTPATIENT
Start: 2024-02-07 | End: 2024-02-07 | Stop reason: HOSPADM

## 2024-02-07 RX ORDER — INSULIN GLARGINE 100 [IU]/ML
15 INJECTION, SOLUTION SUBCUTANEOUS DAILY
Qty: 5 ADJUSTABLE DOSE PRE-FILLED PEN SYRINGE | Refills: 3 | Status: SHIPPED | OUTPATIENT
Start: 2024-02-07

## 2024-02-07 RX ADMIN — TAMSULOSIN HYDROCHLORIDE 0.4 MG: 0.4 CAPSULE ORAL at 08:01

## 2024-02-07 RX ADMIN — PREDNISOLONE ACETATE 1 DROP: 10 SUSPENSION/ DROPS OPHTHALMIC at 08:04

## 2024-02-07 RX ADMIN — RIVAROXABAN 15 MG: 15 TABLET, FILM COATED ORAL at 08:01

## 2024-02-07 RX ADMIN — METOPROLOL SUCCINATE 50 MG: 50 TABLET, EXTENDED RELEASE ORAL at 08:01

## 2024-02-07 RX ADMIN — ISOSORBIDE MONONITRATE 30 MG: 30 TABLET, EXTENDED RELEASE ORAL at 08:01

## 2024-02-07 RX ADMIN — ATORVASTATIN CALCIUM 10 MG: 10 TABLET, FILM COATED ORAL at 08:01

## 2024-02-07 RX ADMIN — LISINOPRIL 2.5 MG: 2.5 TABLET ORAL at 08:01

## 2024-02-07 RX ADMIN — POLYETHYLENE GLYCOL 3350 17 G: 17 POWDER, FOR SOLUTION ORAL at 08:00

## 2024-02-07 RX ADMIN — INSULIN LISPRO 4 UNITS: 100 INJECTION, SOLUTION INTRAVENOUS; SUBCUTANEOUS at 08:01

## 2024-02-07 RX ADMIN — SODIUM CHLORIDE: 9 INJECTION, SOLUTION INTRAVENOUS at 02:01

## 2024-02-07 RX ADMIN — FINASTERIDE 5 MG: 5 TABLET, FILM COATED ORAL at 08:01

## 2024-02-07 RX ADMIN — FERROUS SULFATE TAB 325 MG (65 MG ELEMENTAL FE) 325 MG: 325 (65 FE) TAB at 08:01

## 2024-02-07 RX ADMIN — TROSPIUM CHLORIDE 20 MG: 20 TABLET, FILM COATED ORAL at 08:01

## 2024-02-07 RX ADMIN — PANTOPRAZOLE SODIUM 40 MG: 40 TABLET, DELAYED RELEASE ORAL at 08:01

## 2024-02-07 ASSESSMENT — PAIN SCALES - GENERAL: PAINLEVEL_OUTOF10: 0

## 2024-02-07 NOTE — PROGRESS NOTES
Urology Progress Note    Subjective:   POD#1 PVP greenlight.  Patient did have a planned overnight stay after his PVP greenlight per wife's request secondary to his dementia.    Objective:  Patient is tolerating catheter well.  Patient is an uncontrolled diabetic and sugars were running in the 400s after surgery.  Patient's fingerstick this morning was 277.  Hospitalist team is appreciated.  Hospitalist team did start him on some Lantus.  They already sent this prescription to the pharmacy.  No major events overnight.  Patient's white blood cell count was 9.1 this morning.  Creatinine slightly improved from prior.  Current creatinine is 1.5.  Patient's son is at bedside.    REVIEW OF SYSTEMS:  Constitutional: Negative for fever, chills and unexpected weight change.   Respiratory: Negative for shortness of breath and wheezing.   Cardiovascular: Negative for chest pain and palpitations.   Gastrointestinal: Negative for nausea, vomiting, diarrhea, constipation  Endocrine: Negative for polydipsia and polyuria.   Genitourinary: Positive for Spears catheter with hematuria  Musculoskeletal: Negative for myalgias and joint swelling.   Skin: Negative for rash and wound.   Neurological: Negative for dizziness and headaches.   Hematological: Negative for adenopathy. Does not bruise/bleed easily.     PHYSICAL EXAM:  Constitutional: Patient resting comfortably, in no acute distress.   Neuro: Alert and oriented to person place   Psych: Mood and affect normal.  Skin: Warm, dry, non-diaphoretic  HEENT: normocephalic, atraumatic  Lungs: Respiratory effort normal, unlabored  Cardiovascular:  Normal peripheral pulses  Abdomen: Soft, non-tender, non-distended  : No CVA tenderness   Genital/Rectal: Spears catheter in place  Extremities: Calves are non-tender, equal in circumference bilat without swelling    Patient Vitals for the past 24 hrs:   BP Temp Temp src Pulse Resp SpO2 Height Weight   02/07/24 0745 (!) 143/70 97.2 °F (36.2 °C)

## 2024-02-07 NOTE — DISCHARGE SUMMARY
strips  USE AS DIRECTED TWICE A DAY TO CHECK BLOOD SUGAR: WHATEVER METER IS PREFERRED BY MEDICARE: DX:E11.9     FeroSul 325 (65 Fe) MG tablet  Generic drug: ferrous sulfate  TAKE 1 TABLET BY MOUTH DAILY WITH BREAKFAST     finasteride 5 MG tablet  Commonly known as: PROSCAR  Take 1 tablet by mouth daily     glimepiride 4 MG tablet  Commonly known as: AMARYL  Take 1 tablet by mouth daily     glucose monitoring kit  USE AS DIRECTED TWICE A DAY TO CHECK BLOOD SUGAR: WHATEVER METER IS PREFERRED BY MEDICARE: DX:E11.9     isosorbide mononitrate 30 MG extended release tablet  Commonly known as: IMDUR  Take 1 tablet by mouth daily     Lancets Misc  USE AS DIRECTED TWICE A DAY TO CHECK BLOOD SUGAR: WHATEVER METER IS PREFERRED BY MEDICARE: DX:E11.9     lisinopril 2.5 MG tablet  Commonly known as: PRINIVIL;ZESTRIL  TAKE 1 TABLET BY MOUTH  DAILY     metoprolol succinate 50 MG extended release tablet  Commonly known as: TOPROL XL  Take 1 tablet by mouth daily     ofloxacin 0.3 % solution  Commonly known as: OCUFLOX     pantoprazole 40 MG tablet  Commonly known as: PROTONIX  Take 1 tablet by mouth every morning (before breakfast)     prednisoLONE acetate 1 % ophthalmic suspension  Commonly known as: PRED FORTE     rivaroxaban 15 MG Tabs tablet  Commonly known as: Xarelto  Take 1 tablet by mouth daily     simvastatin 40 MG tablet  Commonly known as: ZOCOR  Take 1 tablet by mouth nightly     solifenacin 5 MG tablet  Commonly known as: VESICARE  Take 1 tablet by mouth daily     tamsulosin 0.4 MG capsule  Commonly known as: FLOMAX  Take 1 capsule by mouth in the morning and at bedtime            ASK your doctor about these medications      nitroGLYCERIN 0.4 MG SL tablet  Commonly known as: NITROSTAT  DISSOLVE 1 TABLET UNDER THE TONGUE EVERY 5 MINUTES AS  NEEDED FOR CHEST PAIN. MAX  OF 3 TABLETS IN 15 MINUTES. CALL 911 IF PAIN PERSISTS.               Where to Get Your Medications        These medications were sent to [facility]

## 2024-02-07 NOTE — PROGRESS NOTES
Patient resting in bed, assessment and vitals complete, see flow sheet for documentation, patient denies pain at this time, noted some confusion per baseline, goode patent draining yellow urine. All needs met at this time, call light within reach.

## 2024-02-07 NOTE — PLAN OF CARE
Problem: Chronic Conditions and Co-morbidities  Goal: Patient's chronic conditions and co-morbidity symptoms are monitored and maintained or improved  2/7/2024 1045 by Ileana Corbin RN  Outcome: Completed  2/7/2024 0906 by Ileana Corbin RN  Outcome: Progressing     Problem: Discharge Planning  Goal: Discharge to home or other facility with appropriate resources  2/7/2024 1045 by Ileana Corbin RN  Outcome: Completed  2/7/2024 0906 by Ileana Corbin RN  Outcome: Progressing     Problem: Pain  Goal: Verbalizes/displays adequate comfort level or baseline comfort level  2/7/2024 1045 by Ileana Corbin RN  Outcome: Completed  2/7/2024 0906 by Ileana Corbin RN  Outcome: Progressing     Problem: Safety - Adult  Goal: Free from fall injury  2/7/2024 1045 by Ileana Corbin RN  Outcome: Completed  2/7/2024 0906 by Ileana Corbin RN  Outcome: Progressing  Flowsheets (Taken 2/7/2024 0906)  Free From Fall Injury: Instruct family/caregiver on patient safety     Problem: ABCDS Injury Assessment  Goal: Absence of physical injury  2/7/2024 1045 by Ileana Corbin RN  Outcome: Completed  2/7/2024 0906 by Ileana Corbin RN  Outcome: Progressing  Flowsheets (Taken 2/7/2024 0906)  Absence of Physical Injury: Implement safety measures based on patient assessment     
  Problem: Chronic Conditions and Co-morbidities  Goal: Patient's chronic conditions and co-morbidity symptoms are monitored and maintained or improved  Outcome: Progressing     Problem: Discharge Planning  Goal: Discharge to home or other facility with appropriate resources  Outcome: Progressing     Problem: Pain  Goal: Verbalizes/displays adequate comfort level or baseline comfort level  Outcome: Progressing     Problem: Safety - Adult  Goal: Free from fall injury  Outcome: Progressing  Flowsheets (Taken 2/7/2024 0906)  Free From Fall Injury: Instruct family/caregiver on patient safety     Problem: ABCDS Injury Assessment  Goal: Absence of physical injury  Outcome: Progressing  Flowsheets (Taken 2/7/2024 0906)  Absence of Physical Injury: Implement safety measures based on patient assessment     
family/caregiver on patient safety   Based on caregiver fall risk screen, instruct family/caregiver to ask for assistance with transferring infant if caregiver noted to have fall risk factors     Problem: ABCDS Injury Assessment  Goal: Absence of physical injury  Outcome: Progressing  Flowsheets (Taken 2/6/2024 1237)  Absence of Physical Injury: Implement safety measures based on patient assessment

## 2024-02-07 NOTE — CARE COORDINATION
Shopping, Mobility    PT AM-PAC:   /24  OT AM-PAC:   /24    Family can provide assistance at DC: Yes  Would you like Case Management to discuss the discharge plan with any other family members/significant others, and if so, who? No  Plans to Return to Present Housing: Yes  Other Identified Issues/Barriers to RETURNING to current housing: None Noted, Patient will return home with wife/family to assist;  Potential Assistance needed at discharge: N/A            Potential DME:    Patient expects to discharge to: House  Plan for transportation at discharge: Family    Financial    Payor: [facility] MEDICARE / Plan: [facility] MEDICARE ADVANTAGE / Product Type: *No Product type* /     Does insurance require precert for SNF: Yes    Potential assistance Purchasing Medications: No  Meds-to-Beds request:        [facility] [id number] - [facility] [address] [phone] - F [phone]  [address] [facility] [address]  Phone: [phone] Fax: [phone]    [facility] - [place], [place] - [address] [phone] - F [phone]  [address]  [facility] [address]  Phone: [phone] Fax: [phone]      Notes:    Factors facilitating achievement of predicted outcomes: Family support, Cooperative, and Pleasant    Barriers to discharge: Cognitive deficit, Medical complications, and history of Lymphoma and Alcohol use    Additional Case Management Notes: Patient to return home with his wife and family to assist.  No discharge planning needs or concerns identified at this time.  Provided Patient with blank Advanced Directive documents as he indicated a need to update these in the near future.    The Plan for Transition of Care is related to the following treatment goals of Stage 3 chronic kidney disease, unspecified whether stage 3a or 3b CKD (HCC) [N18.30]  Lower urinary tract symptoms due to benign prostatic hyperplasia [N40.1]    IF APPLICABLE: The Patient and/or patient

## 2024-02-07 NOTE — CONSULTS
tract symptoms  Active Problems:    Chronic renal disease, stage III ([facility]) [772520]    Essential hypertension    Type 2 diabetes mellitus with diabetic nephropathy, without long-term current use of insulin ([facility])    Chronic combined systolic and diastolic CHF, NYHA class 3 ([facility])    Lower urinary tract symptoms due to benign prostatic hyperplasia  Resolved Problems:    * No resolved hospital problems. *      Recommendation:     Primary Problem(s): BPH with obstruction/lower urinary tract symptoms  Differential diagnoses: Prostate CA, BPH, obstructive uropathy  Condition is a chronic illness with exacerbation, progression or side effects of treatment  Condition is stable  Treatment plan:   Appreciate urology  Spears catheter  Monitor labs  Imaging: no further imaging studies ordered today  Medications:   Continue Flomax, Proscar  Medication Monitoring / High Risk Medications: none      HTN    Condition is a chronic stable condition  Treatment plan: Continue current treatment  Imaging: no further imaging studies ordered today  Medications:   Continue lisinopril, Toprol-XL    Type 2 DM    Condition is a chronic stable condition  Treatment plan: Continue current treatment  A1C- 10.0  Imaging: no further imaging studies ordered today  Medications:   Continue Glucotrol  Start Lantus 15 units daily    Nutrition status:   Well developed, well nourished with no malnutrition  Dietician consult initiated    Hospital Prophylaxis:   DVT: Xarelto   Stress Ulcer: PPI       Disposition:  Shared decision making: All test results, treatment options and disposition options were discussed with the patient today  Social determinants of health that may impact management: none  Code status: DNR-CCA   Disposition: Discharge plan is home today pending urology      MIPS Medication Reconciliation documentation:    [x] I have utilized all available immediate resources to obtain, update, or review the patient's current medications (including all 
  Condition is a chronic stable condition  Treatment plan: Continue current treatment  Imaging: no further imaging studies ordered today  Medications:   Continue Glucotrol    Nutrition status:   Well developed, well nourished with no malnutrition  Dietician consult initiated    Hospital Prophylaxis:   DVT: Xarelto   Stress Ulcer: PPI       Disposition:  Shared decision making: All test results, treatment options and disposition options were discussed with the patient today  Social determinants of health that may impact management: none  Code status: DNR-CCA   Disposition: Discharge plan is pending        [facility] Medication Reconciliation documentation:    [x] I have utilized all available immediate resources to obtain, update, or review the patient's current medications (including all prescriptions, over-the-counter products, herbals, cannabinoid products and bitamin/mineral/dietary/nutritional supplements.  [If 'yes\", STOP HERE]     []  The patient is not eligible for medication reconciliation; the patient is in an emergent medical situation where delaying treatment would jeopardize the patient's health    []  I did NOT confirm, update or review the patient's current list of medications today.  [DOES NOT SATISFY [facility] PERFORMANCE]    Critical Care Time:  Total critical care time caring for this patient with life threatening, unstable organ failure, including direct patient contact, management of life support systems, review of data including imaging and labs, discussions with other team members and physicians at least 0 minutes so far today, excluding separately billable procedures.    Thank you very much for allowing me to participate in the care of this patient.     Ana Lance, APRN - CNP, APRN-CNP

## 2024-02-07 NOTE — PROGRESS NOTES
Wife at bedside, discharge instructions given to wife with correct verbal teach back, IV removed, patient discharged at this time home with wife.

## 2024-02-08 ENCOUNTER — OFFICE VISIT (OUTPATIENT)
Dept: UROLOGY | Age: 84
End: 2024-02-08

## 2024-02-08 ENCOUNTER — CARE COORDINATION (OUTPATIENT)
Dept: CASE MANAGEMENT | Age: 84
End: 2024-02-08

## 2024-02-08 VITALS
WEIGHT: 180 LBS | DIASTOLIC BLOOD PRESSURE: 68 MMHG | BODY MASS INDEX: 24.41 KG/M2 | TEMPERATURE: 96.9 F | SYSTOLIC BLOOD PRESSURE: 130 MMHG

## 2024-02-08 DIAGNOSIS — N40.1 BPH WITH OBSTRUCTION/LOWER URINARY TRACT SYMPTOMS: Primary | ICD-10-CM

## 2024-02-08 DIAGNOSIS — N13.8 BPH WITH OBSTRUCTION/LOWER URINARY TRACT SYMPTOMS: Primary | ICD-10-CM

## 2024-02-08 NOTE — CARE COORDINATION
Care Transitions Initial Follow Up Call    Call within 2 business days of discharge: Yes        Patient: Vikas Mccormack Patient : 1940   MRN: [id number]  Reason for Admission: BPH S/P Photoselective vaporization of the prostate.  Discharge Date: 24 RARS: Readmission Risk Score: 13.8      Last Discharge Facility       Date Complaint Diagnosis Description Type Department Provider    24   Admission (Discharged) [facility] Lucian Hollins MD            Was this an external facility discharge? No Discharge Facility: [facility]    Challenges to be reviewed by the provider   Additional needs identified to be addressed with provider: No  none               Method of communication with provider: none.    1st attempt to reach patient for Care Transitions. LMOM requesting return call. Contact information provided.  [phone]      Care Transitions 24 Hour Call    Do you have all of your prescriptions and are they filled?: Yes  Care Transitions Interventions     Other Services: Completed            Follow Up  Future Appointments   Date Time Provider Department Center   2024  1:00 PM William Rodriguez APRN - CNP [facility]   2024  2:30 PM Jessenia Mendoza APRN - CNP [facility]   2024  1:35 AM [facility]   2024  1:20 PM Saroj Maldonado MD [facility] C[facility]   2024  2:30 PM William Rodriguez APRN - CNP [facility]   10/16/2024  9:00 AM Saroj Maldonado MD TIFF CARD [facility]   2025  2:00 PM William Rodriguez APRN - CNP [facility] RN

## 2024-02-08 NOTE — PROGRESS NOTES
Patient's wife called in and advised that he has been urinating ok,  he denies any abdominal discomfort.  They will contact the office if anything changes.

## 2024-02-08 NOTE — PROGRESS NOTES
Pt had goode catheter placed on 02/06/2024 for PVP Greenlight.    Balloon deflated on catheter and catheter removed. Patient tolerated procedure well.    Pt instructed to drink plenty of fluids, try to urinate q 2 hrs, call office in 6 hrs with update of amount voided, and if not voiding will need to return to office to have goode replaced.   Also instructed to return to office or ER if goes >6 hrs without voiding or has strong urge to void/suprapubic discomfort and cannot urinate.  Pt verbalizes understanding of plan.  F/u 2 weeks.

## 2024-02-09 ENCOUNTER — CARE COORDINATION (OUTPATIENT)
Dept: CASE MANAGEMENT | Age: 84
End: 2024-02-09

## 2024-02-09 NOTE — CARE COORDINATION
Care Transitions Outreach Attempt #2    Call within 2 business days of discharge: Yes   Attempt #2 to reach patient for transitions of care follow up. Unable to reach patient. Left message requesting call back. CTN sign off if no return call received.    Patient: Vikas Mccormack Patient : 1940 MRN: [id number]    Last Discharge Facility       Date Complaint Diagnosis Description Type Department Provider    24   [facility]              Was this an external facility discharge? No Discharge Facility Name: [facility]    Noted following upcoming appointments from discharge chart review:   [facility] follow up appointment(s):   Future Appointments   Date Time Provider Department Center   2024  1:00 PM William Rodriguez APRN - CNP [facility]   2024  2:30 PM Jessenia Mendoza APRN - CNP TIFF UROLOGY [facility]   2024  1:35 AM Count includes the [facility], [facility] TIFFIN CAR [employer] TIFF CARD [facility]   2024  1:20 PM Saroj Maldonado MD TIFF CARD [facility]   2024  2:30 PM William Rodriguez APRN - CNP [facility]   10/16/2024  9:00 AM Saroj Maldonado MD TIFF CARD [facility]   2025  2:00 PM William Rodriguez APRN - CNP [facility] RN BSN [facility]  Care Transitions Nurse  [phone]   [email or web address]

## 2024-02-21 ENCOUNTER — TELEPHONE (OUTPATIENT)
Dept: UROLOGY | Age: 84
End: 2024-02-21

## 2024-02-21 NOTE — TELEPHONE ENCOUNTER
Patient's wife Maggi called office. She states patient has been getting up 4 times a night but this has been happening for a while. She asked if patient should stop taking Flomax. Writer advised patient has upcoming appointment and since Nocturia 4x isn't new he should continue to take medication as prescribed and follow up with provider to discuss this at upcoming appt. She voiced understanding.

## 2024-02-26 ENCOUNTER — HOSPITAL ENCOUNTER (OUTPATIENT)
Age: 84
Setting detail: SPECIMEN
Discharge: HOME | End: 2024-02-26
Payer: MEDICARE

## 2024-02-26 ENCOUNTER — OFFICE VISIT (OUTPATIENT)
Dept: UROLOGY | Age: 84
End: 2024-02-26
Payer: MEDICARE

## 2024-02-26 VITALS
DIASTOLIC BLOOD PRESSURE: 72 MMHG | HEART RATE: 100 BPM | BODY MASS INDEX: 24.41 KG/M2 | SYSTOLIC BLOOD PRESSURE: 106 MMHG | WEIGHT: 180 LBS | TEMPERATURE: 98.5 F

## 2024-02-26 DIAGNOSIS — N13.8 BPH WITH OBSTRUCTION/LOWER URINARY TRACT SYMPTOMS: ICD-10-CM

## 2024-02-26 DIAGNOSIS — N40.1 BPH WITH OBSTRUCTION/LOWER URINARY TRACT SYMPTOMS: ICD-10-CM

## 2024-02-26 DIAGNOSIS — R35.0 URINARY FREQUENCY: ICD-10-CM

## 2024-02-26 DIAGNOSIS — N32.81 OAB (OVERACTIVE BLADDER): ICD-10-CM

## 2024-02-26 DIAGNOSIS — K59.00 CONSTIPATION, UNSPECIFIED CONSTIPATION TYPE: ICD-10-CM

## 2024-02-26 DIAGNOSIS — R35.0 URINARY FREQUENCY: Primary | ICD-10-CM

## 2024-02-26 DIAGNOSIS — N39.44 NOCTURNAL ENURESIS: ICD-10-CM

## 2024-02-26 PROCEDURE — 99024 POSTOP FOLLOW-UP VISIT: CPT | Performed by: PHYSICIAN ASSISTANT

## 2024-02-26 PROCEDURE — 51798 US URINE CAPACITY MEASURE: CPT | Performed by: PHYSICIAN ASSISTANT

## 2024-02-26 PROCEDURE — 87088 URINE BACTERIA CULTURE: CPT

## 2024-02-26 PROCEDURE — 87186 SC STD MICRODIL/AGAR DIL: CPT

## 2024-02-26 PROCEDURE — 87086 URINE CULTURE/COLONY COUNT: CPT

## 2024-02-26 RX ORDER — LEVOFLOXACIN 250 MG/1
250 TABLET, FILM COATED ORAL DAILY
Qty: 10 TABLET | Refills: 0 | Status: SHIPPED | OUTPATIENT
Start: 2024-02-26 | End: 2024-03-07

## 2024-02-26 ASSESSMENT — ENCOUNTER SYMPTOMS
SHORTNESS OF BREATH: 0
CONSTIPATION: 1
WHEEZING: 0
ABDOMINAL PAIN: 0
COUGH: 0
EYE REDNESS: 0
VOMITING: 0
COLOR CHANGE: 0
BACK PAIN: 0
NAUSEA: 0

## 2024-02-26 NOTE — PATIENT INSTRUCTIONS
FOR CONSTIPATION    It is very important to have regular, soft, daily bowel movements, because it WILL improve your urinary symptoms.    Take Miralax (or generic equivalent) 17g once daily (one capful). Take every day, DO NOT skip days, as it must be taken daily in order to be effective. DO NOT take just \"as needed\". It is safe to take long term and is recommended for your symptoms.  If one dose daily causes loose stools/diarrhea, decrease to 1/2 capful or 1/4 capful. If you cannot tolerate this medication, please notify our office.     If one dose daily of Miralax is not sufficient to produce a soft, easy to pass daily stool, you may also add an over-the-counter stool softener capsule. For example: colace (docusate).     For Miralax to have maximal effectiveness, be sure to increase your water intake - aim for 80 oz daily unless you are on a fluid-restriction from another provider.     SURVEY:    You may be receiving a survey from [employer] regarding your visit today.    Please complete the survey to enable us to provide the highest quality of care to you and your family.    If you cannot score us a very good on any question, please call the office to discuss how we could have made your experience a very good one.    Thank you.

## 2024-02-26 NOTE — PROGRESS NOTES
HPI:      Patient is a 83 y.o. male in no acute distress.  He is alert and oriented to person, place, and time.         History  Referred by JOCE VERGARA for incontinence.  He does complain of urge incontinence.  He has nocturia 2-3 times per night, but denies nocturnal enuresis.  He only drinks 1-2 cups of coffee per day.  He does not consume water because he does not want to urinate often.  He does follow with nephrology for chronic kidney disease.  On Flomax and finasteride                   12/2022 cystoscopy showed extensive trilobar hyperplasia with prostatic varices              Offered PVP greenlight     1/2023 started vesicare 5mg, urged increased water consumption    10/2023 nocturnal enuresis  flomax increased to BID    02/06/2024 for PVP Greenlight.     Today:  Patient is here today status post PVP greenlight approximately 20 days ago.  Patient had Spears catheter removed at last visit.  He did pass some blood clots.  Patient is urinating very frequently.  He has nocturia x 4.  This was his baseline before all of this anyway.  Patient states that he had not been feeling well over the last 2 days.  He did pass a \"lima bean\" sized clot and does feel better.  Patient is on blood thinners.  Patient is taking the Flomax twice a day as well as the Vesicare.  He has stopped taking the finasteride.  Patient's wife is concerned about him.  He is not having a daily bowel movement      Past Medical History:   Diagnosis Date    Abnormal echocardiogram 05/02/2012    EF 15%. mildly dilated LV cavity.    Abnormal resting ECG findings 04/25/2012    atrial sensed, ventricular paced rhythm at 73 beats per minute. Wide QRS of 183 ms.    Alcohol use disorder     Biventricular ICD (implantable cardiac defibrillator) in place 08/26/2011    [facility] Med[employer]. CRT-D    Biventricular ICD (implantable cardioverter-defibrillator) in place 10/29/2015    Medtronic- Bi V ICD (Battery change)    Biventricular pacemaker

## 2024-02-28 ENCOUNTER — TELEPHONE (OUTPATIENT)
Dept: UROLOGY | Age: 84
End: 2024-02-28

## 2024-02-28 LAB
MICROORGANISM SPEC CULT: ABNORMAL
SPECIMEN DESCRIPTION: ABNORMAL

## 2024-02-28 NOTE — TELEPHONE ENCOUNTER
----- Message from Braden Mosqueda PA-C sent at 2/28/2024  4:04 PM EST -----  UACS is positive for infection.  Levaquin started in the office will cover this infection.  Take this antibiotic until gone. Take this with food and eat yogurt once per day to prevent GI upset. If you develop nausea, vomiting, or fevers call the office or go to the ER. If your urinary symptoms do not improve once completing the antibiotics call our office.

## 2024-02-28 NOTE — TELEPHONE ENCOUNTER
Left a detailed message on the machine of results/response.  Asked if he would contact the office to confirm that he did get this message.

## 2024-02-28 NOTE — RESULT ENCOUNTER NOTE
UACS is positive for infection.  Levaquin started in the office will cover this infection.  Take this antibiotic until gone. Take this with food and eat yogurt once per day to prevent GI upset. If you develop nausea, vomiting, or fevers call the office or go to the ER. If your urinary symptoms do not improve once completing the antibiotics call our office.

## 2024-03-12 ENCOUNTER — TELEPHONE (OUTPATIENT)
Dept: UROLOGY | Age: 84
End: 2024-03-12

## 2024-03-12 DIAGNOSIS — D50.9 IRON DEFICIENCY ANEMIA, UNSPECIFIED IRON DEFICIENCY ANEMIA TYPE: ICD-10-CM

## 2024-03-12 DIAGNOSIS — R35.0 URINARY FREQUENCY: Primary | ICD-10-CM

## 2024-03-12 DIAGNOSIS — R19.5 DARK STOOLS: ICD-10-CM

## 2024-03-12 NOTE — TELEPHONE ENCOUNTER
Writer called Maggi advised her of response. Faxed order to [facility] to complete lab work.

## 2024-03-12 NOTE — TELEPHONE ENCOUNTER
Urology staff:  Please have him drop off a urine culture to the lab, we will call with results     Increase water intake    If he develops fever or chills he needs to go to the emergency room    We recommend that he starts taking MiraLAX daily for the constipation    Patient does take iron and this may be a reason for his black stools however he might have a upper gastrointestinal bleed which also can cause black stools.  He may be anemic and that may be causing him increasing sleepiness we do recommend that they contact primary care for further evaluation of his stools.  He is also on blood thinners    William please see above

## 2024-03-12 NOTE — TELEPHONE ENCOUNTER
Patient's spouse called office they think he has a UTI. She states patient is unable to control his bladder, he is sleeping more, walking is more unstable than normal,little burning with urination. Patient has not had a bowel movement in a week and stool is black. Patient denies fever,nausea,vomiting.

## 2024-03-13 LAB
ABSOLUTE BASO #: 0.03 K/UL (ref 0–0.2)
ABSOLUTE EOS #: 0.09 K/UL (ref 0–0.5)
ABSOLUTE LYMPH #: 1.03 K/UL (ref 1–4)
ABSOLUTE MONO #: 0.34 K/UL (ref 0.2–1)
ABSOLUTE NEUT #: 3.4 K/UL (ref 1.5–7.5)
BASOPHILS RELATIVE PERCENT: 0.6 %
EOSINOPHILS RELATIVE PERCENT: 1.8 %
FERRITIN: 86 NG/ML (ref 30–400)
HCT VFR BLD CALC: 33.2 % (ref 40–51)
HEMOGLOBIN: 10.8 G/DL (ref 13.5–17)
IRON % SATURATION: 32 % (ref 20–50)
IRON, SERUM: 92 UG/DL (ref 59–158)
LYMPHOCYTE %: 20.9 %
MCH RBC QN AUTO: 30.4 PG (ref 25–33)
MCHC RBC AUTO-ENTMCNC: 32.5 G/DL (ref 31–36)
MCV RBC AUTO: 93.5 FL (ref 80–99)
MONOCYTES # BLD: 6.9 %
NEUTROPHILS RELATIVE PERCENT: 69.2 %
PDW BLD-RTO: 13.1 % (ref 11.5–15)
PLATELETS: 308 K/UL (ref 130–400)
PMV BLD AUTO: 11.3 FL (ref 9.3–13)
RBC: 3.55 M/UL (ref 4.5–6.1)
TOTAL IRON BINDING CAPACITY: 285 UG/DL (ref 250–450)
UNSATURATED IRON BINDING CAPACITY: 193 UG/DL (ref 112–347)
WBC: 4.9 K/UL (ref 3.5–11)

## 2024-03-14 ENCOUNTER — TELEPHONE (OUTPATIENT)
Dept: UROLOGY | Age: 84
End: 2024-03-14

## 2024-03-14 LAB — URINE CULTURE, ROUTINE: NORMAL

## 2024-03-14 NOTE — TELEPHONE ENCOUNTER
----- Message from JAI Berry - CNP sent at 3/14/2024  4:13 PM EDT -----  Call pt - urine cx reviewed and negative for UTI

## 2024-03-18 LAB
DATE OF COLLECTION: NORMAL
HEMOCCULT SP1 STL QL: NEGATIVE

## 2024-04-10 ENCOUNTER — OFFICE VISIT (OUTPATIENT)
Dept: UROLOGY | Age: 84
End: 2024-04-10
Payer: MEDICARE

## 2024-04-10 ENCOUNTER — HOSPITAL ENCOUNTER (OUTPATIENT)
Age: 84
Setting detail: SPECIMEN
Discharge: HOME | End: 2024-04-10
Payer: MEDICARE

## 2024-04-10 VITALS
DIASTOLIC BLOOD PRESSURE: 67 MMHG | SYSTOLIC BLOOD PRESSURE: 109 MMHG | TEMPERATURE: 98.7 F | HEART RATE: 66 BPM | BODY MASS INDEX: 24.41 KG/M2 | WEIGHT: 180 LBS

## 2024-04-10 DIAGNOSIS — N39.44 NOCTURNAL ENURESIS: ICD-10-CM

## 2024-04-10 DIAGNOSIS — K59.00 CONSTIPATION, UNSPECIFIED CONSTIPATION TYPE: ICD-10-CM

## 2024-04-10 DIAGNOSIS — N32.81 OAB (OVERACTIVE BLADDER): ICD-10-CM

## 2024-04-10 DIAGNOSIS — N39.41 URGE INCONTINENCE: ICD-10-CM

## 2024-04-10 DIAGNOSIS — N13.8 BPH WITH OBSTRUCTION/LOWER URINARY TRACT SYMPTOMS: Primary | ICD-10-CM

## 2024-04-10 DIAGNOSIS — N40.1 BPH WITH OBSTRUCTION/LOWER URINARY TRACT SYMPTOMS: Primary | ICD-10-CM

## 2024-04-10 PROCEDURE — 87086 URINE CULTURE/COLONY COUNT: CPT

## 2024-04-10 PROCEDURE — 99024 POSTOP FOLLOW-UP VISIT: CPT | Performed by: PHYSICIAN ASSISTANT

## 2024-04-10 PROCEDURE — 51798 US URINE CAPACITY MEASURE: CPT | Performed by: PHYSICIAN ASSISTANT

## 2024-04-10 ASSESSMENT — ENCOUNTER SYMPTOMS
BACK PAIN: 0
SHORTNESS OF BREATH: 0
VOMITING: 0
COUGH: 0
NAUSEA: 0
COLOR CHANGE: 0
CONSTIPATION: 1
WHEEZING: 0
EYE REDNESS: 0
ABDOMINAL PAIN: 0

## 2024-04-10 NOTE — PROGRESS NOTES
Bladderscan performed in office today:  Pt voided - 40 mL, PVR - 104 mL   
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 Urge incontinence  Culture, Urine      3. OAB (overactive bladder)  Culture, Urine      4. Constipation, unspecified constipation type        5. Nocturnal enuresis  Culture, Urine            PLAN:  Urine culture will call with results    Recommended maxing out the amount of fluid that is allowed by nephrology    Continue Flomax twice a day    Continue Proscar    Continue Vesicare 5 mg    Patient does have constipation.  We do recommend that he starts MiraLAX.  This will likely help but not fix his urinary incontinence.  They are aware that it can take upwards of 3 months for MiraLAX to be fully effective.    Patient will likely have leaking in the future secondary to longstanding prostate obstruction.    Follow-up in 6 weeks with PVR, we will consider increasing his Vesicare at that time.

## 2024-04-10 NOTE — PATIENT INSTRUCTIONS
FOR CONSTIPATION    It is very important to have regular, soft, daily bowel movements, because it WILL improve your urinary symptoms.    Take Miralax (or generic equivalent) 17g once daily (one capful). Take every day, DO NOT skip days, as it must be taken daily in order to be effective. DO NOT take just \"as needed\". It is safe to take long term and is recommended for your symptoms.  If one dose daily causes loose stools/diarrhea, decrease to 1/2 capful or 1/4 capful. If you cannot tolerate this medication, please notify our office.     If one dose daily of Miralax is not sufficient to produce a soft, easy to pass daily stool, you may also add an over-the-counter stool softener capsule. For example: colace (docusate).     For Miralax to have maximal effectiveness, be sure to increase your water intake - aim for 80 oz daily unless you are on a fluid-restriction from another provider.     SURVEY:    You may be receiving a survey from [employer] regarding your visit today.    Please complete the survey to enable us to provide the highest quality of care to you and your family.    If you cannot score us a very good on any question, please call the office to discuss how we could have made your experience a very good one.    Thank you.

## 2024-04-11 LAB
MICROORGANISM SPEC CULT: NO GROWTH
SPECIMEN DESCRIPTION: NORMAL

## 2024-04-12 ENCOUNTER — TELEPHONE (OUTPATIENT)
Dept: UROLOGY | Age: 84
End: 2024-04-12

## 2024-04-12 NOTE — TELEPHONE ENCOUNTER
----- Message from JAI Berry - CNP sent at 4/12/2024  8:23 AM EDT -----  Call pt - urine cx reviewed and negative for UTI

## 2024-04-23 ENCOUNTER — NURSE ONLY (OUTPATIENT)
Dept: CARDIOLOGY | Age: 84
End: 2024-04-23
Payer: MEDICARE

## 2024-04-23 DIAGNOSIS — I42.8 NON-ISCHEMIC CARDIOMYOPATHY (HCC): ICD-10-CM

## 2024-04-23 DIAGNOSIS — Z95.810 BIVENTRICULAR ICD (IMPLANTABLE CARDIOVERTER-DEFIBRILLATOR) IN PLACE: Primary | ICD-10-CM

## 2024-04-23 PROCEDURE — 93295 DEV INTERROG REMOTE 1/2/MLT: CPT | Performed by: FAMILY MEDICINE

## 2024-04-29 ENCOUNTER — OFFICE VISIT (OUTPATIENT)
Dept: CARDIOLOGY | Age: 84
End: 2024-04-29
Payer: MEDICARE

## 2024-04-29 VITALS
HEART RATE: 67 BPM | RESPIRATION RATE: 18 BRPM | HEIGHT: 72 IN | DIASTOLIC BLOOD PRESSURE: 60 MMHG | OXYGEN SATURATION: 97 % | WEIGHT: 186 LBS | BODY MASS INDEX: 25.19 KG/M2 | SYSTOLIC BLOOD PRESSURE: 110 MMHG

## 2024-04-29 DIAGNOSIS — Z79.01 CHRONIC ANTICOAGULATION: ICD-10-CM

## 2024-04-29 DIAGNOSIS — I10 ESSENTIAL HYPERTENSION: ICD-10-CM

## 2024-04-29 DIAGNOSIS — I42.8 NICM (NONISCHEMIC CARDIOMYOPATHY) (HCC): ICD-10-CM

## 2024-04-29 DIAGNOSIS — I50.42 CHRONIC COMBINED SYSTOLIC AND DIASTOLIC CONGESTIVE HEART FAILURE (HCC): ICD-10-CM

## 2024-04-29 DIAGNOSIS — I20.9 ANGINA, CLASS III (HCC): Primary | ICD-10-CM

## 2024-04-29 DIAGNOSIS — I48.0 PAF (PAROXYSMAL ATRIAL FIBRILLATION) (HCC): ICD-10-CM

## 2024-04-29 DIAGNOSIS — E78.00 PURE HYPERCHOLESTEROLEMIA: ICD-10-CM

## 2024-04-29 DIAGNOSIS — Z95.810 ICD (IMPLANTABLE CARDIOVERTER-DEFIBRILLATOR) IN PLACE: ICD-10-CM

## 2024-04-29 PROCEDURE — 99214 OFFICE O/P EST MOD 30 MIN: CPT | Performed by: FAMILY MEDICINE

## 2024-04-29 PROCEDURE — 3078F DIAST BP <80 MM HG: CPT | Performed by: FAMILY MEDICINE

## 2024-04-29 PROCEDURE — 3074F SYST BP LT 130 MM HG: CPT | Performed by: FAMILY MEDICINE

## 2024-04-29 PROCEDURE — 1123F ACP DISCUSS/DSCN MKR DOCD: CPT | Performed by: FAMILY MEDICINE

## 2024-04-29 NOTE — PROGRESS NOTES
worsening shortness of breath.       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   ACE Inibitor/ARB: Continue lisinopril 5 mg, 1/2 tab daily.    Paroxysmal Atrial Fibrillation: Rate control  Beta Blocker: Continue Metoprolol succinate (Toprol XL) 50 mg daily.   [id number] Score for Atrial Fibrillation Stroke Risk   Risk   Factors  Component Value   C CHF Yes 1   H HTN Yes 1   A2 Age >= 75 Yes,  (83 y.o.) 2   D DM Yes 1   S2 Prior Stroke/TIA No 0   V Vascular Disease No 0   A Age 65-74 No,  (83 y.o.) 0   Sc Sex male 0    [id number]  Score  5   Score last updated 11/9/20 1:59 PM EST  Click here for a link to the UpToDate guideline \"Atrial Fibrillation: Anticoagulation therapy to prevent embolization  Disclaimer: Risk Score calculation is dependent on accuracy of patient problem list and past encounter diagnosis.  His CHADS2 score is 5/9(6.7% stroke risk)  Anticoagulation: Continue Riveroxaban (Xarelto): 15 mg once daily with largest meal (typically dinner). Because of his atrial fibrillation, I also would also recommend that he continue with anticoagulation to decrease his risk of stoke but also reminded him to watch for signs of blood in his stool or black tarry stools and stop the medication immediately if this develops as this could be life threatening.      Essential Hypertension: Controlled  Beta Blocker: Continue Metoprolol succinate (Toprol XL) 50 mg daily.   ACE Inibitor/ARB: Continue lisinopril 5 mg, 1/2 tab daily.     Pure Hypercholesterolemia: Last LDL on 1/24/2024 was 68 mg/dL  Cholesterol Reduction Therapy: Continue simvastatin (Zocor) 40 mg daily.      Bi-Ventricular Implantable Cardioverter Defibrillator: RRT Met as of 9/16/2021 - Battery replaced on 9/23/2021  Indication for Device Placement: Cardiomyopathy with ejection fraction <35%   Last

## 2024-05-20 NOTE — PROGRESS NOTES
History  Referred by JOCE VERGARA for incontinence.  He does complain of urge incontinence.  He has nocturia 2-3 times per night, but denies nocturnal enuresis.  He only drinks 1-2 cups of coffee per day.  He does not consume water because he does not want to urinate often.  He does follow with nephrology for chronic kidney disease.  On Flomax and finasteride                   12/2022 cystoscopy showed extensive trilobar hyperplasia with prostatic varices              Offered PVP greenlight     1/2023 started vesicare 5mg, urged increased water consumption    10/2023 nocturnal enuresis  flomax increased to BID    2/2024  PVP Greenlight    5/2024 stopped proscar, decreased flomax to daily, increase vesicare to 10mg     Today  Here to today status post PVP greenlight.  He has nocturia x 3.  He is going 3-5 hours during the day without urinating.  He continues to complain of urge incontinence.  He is only having a bowel movement once per week.  He is not taking MiraLAX.  PVR is 119.  This is stable.  He denies any dysuria or gross hematuria.    Plan  Stressed the importance of MiraLAX daily to improve bowel movements and explained that improving bowel habits will improve his incontinence    Start Proscar    Decrease Flomax to daily    Increase Vesicare to 10 mg    Stressed the importance of timed voiding every 2-3 hours while awake to reduce incontinence    Follow-up in 6-8 weeks or sooner if needed

## 2024-05-21 ENCOUNTER — OFFICE VISIT (OUTPATIENT)
Dept: UROLOGY | Age: 84
End: 2024-05-21
Payer: MEDICARE

## 2024-05-21 VITALS
TEMPERATURE: 97.8 F | WEIGHT: 184 LBS | SYSTOLIC BLOOD PRESSURE: 118 MMHG | DIASTOLIC BLOOD PRESSURE: 64 MMHG | HEART RATE: 75 BPM | BODY MASS INDEX: 25.09 KG/M2

## 2024-05-21 DIAGNOSIS — N13.8 BPH WITH OBSTRUCTION/LOWER URINARY TRACT SYMPTOMS: Primary | ICD-10-CM

## 2024-05-21 DIAGNOSIS — N40.1 BPH WITH OBSTRUCTION/LOWER URINARY TRACT SYMPTOMS: Primary | ICD-10-CM

## 2024-05-21 DIAGNOSIS — N32.81 OAB (OVERACTIVE BLADDER): ICD-10-CM

## 2024-05-21 DIAGNOSIS — K59.00 CONSTIPATION, UNSPECIFIED CONSTIPATION TYPE: ICD-10-CM

## 2024-05-21 DIAGNOSIS — N39.41 URGE INCONTINENCE: ICD-10-CM

## 2024-05-21 PROCEDURE — 3078F DIAST BP <80 MM HG: CPT | Performed by: NURSE PRACTITIONER

## 2024-05-21 PROCEDURE — 3074F SYST BP LT 130 MM HG: CPT | Performed by: NURSE PRACTITIONER

## 2024-05-21 PROCEDURE — 99214 OFFICE O/P EST MOD 30 MIN: CPT | Performed by: NURSE PRACTITIONER

## 2024-05-21 PROCEDURE — 51798 US URINE CAPACITY MEASURE: CPT | Performed by: NURSE PRACTITIONER

## 2024-05-21 PROCEDURE — 1123F ACP DISCUSS/DSCN MKR DOCD: CPT | Performed by: NURSE PRACTITIONER

## 2024-05-21 RX ORDER — TAMSULOSIN HYDROCHLORIDE 0.4 MG/1
0.4 CAPSULE ORAL DAILY
Qty: 90 CAPSULE | Refills: 3 | Status: SHIPPED | OUTPATIENT
Start: 2024-05-21

## 2024-05-21 RX ORDER — SOLIFENACIN SUCCINATE 10 MG/1
10 TABLET, FILM COATED ORAL DAILY
Qty: 30 TABLET | Refills: 3 | Status: SHIPPED | OUTPATIENT
Start: 2024-05-21 | End: 2025-05-21

## 2024-05-21 NOTE — PATIENT INSTRUCTIONS
Take 1 tablespoon of miralax every day. If you develop loose stools or diarrhea, decrease dose to 3/4 tablespoon or 1/2 tablespoon but take miralax every day!    Make yourself urinate every 2-3 hours while awake, even if you dont feel like you have to urinate       SURVEY:    You may be receiving a survey from [facility] regarding your visit today.    Please complete the survey to enable us to provide the highest quality of care to you and your family.    If you cannot score us a very good on any question, please call the office to discuss how we could have made your experience a very good one.    Thank you.

## 2024-05-28 RX ORDER — FINASTERIDE 5 MG/1
5 TABLET, FILM COATED ORAL DAILY
Qty: 80 TABLET | Refills: 3 | OUTPATIENT
Start: 2024-05-28

## 2024-07-01 ENCOUNTER — HOSPITAL ENCOUNTER (OUTPATIENT)
Dept: CT IMAGING | Age: 84
Discharge: HOME | End: 2024-07-03
Payer: MEDICARE

## 2024-07-01 DIAGNOSIS — K59.00 CONSTIPATION, UNSPECIFIED CONSTIPATION TYPE: ICD-10-CM

## 2024-07-01 DIAGNOSIS — Z80.0 FAMILY HISTORY OF STOMACH CANCER: ICD-10-CM

## 2024-07-01 DIAGNOSIS — R10.84 GENERALIZED ABDOMINAL PAIN: ICD-10-CM

## 2024-07-01 PROCEDURE — 74176 CT ABD & PELVIS W/O CONTRAST: CPT

## 2024-07-23 ENCOUNTER — NURSE ONLY (OUTPATIENT)
Dept: CARDIOLOGY | Age: 84
End: 2024-07-23
Payer: MEDICARE

## 2024-07-23 DIAGNOSIS — I42.8 NICM (NONISCHEMIC CARDIOMYOPATHY) (HCC): Primary | ICD-10-CM

## 2024-07-23 DIAGNOSIS — Z95.810 ICD (IMPLANTABLE CARDIOVERTER-DEFIBRILLATOR) IN PLACE: ICD-10-CM

## 2024-07-23 PROCEDURE — 93295 DEV INTERROG REMOTE 1/2/MLT: CPT | Performed by: FAMILY MEDICINE

## 2024-07-29 RX ORDER — RIVAROXABAN 15 MG/1
15 TABLET, FILM COATED ORAL DAILY
Qty: 90 TABLET | Refills: 3 | Status: SHIPPED | OUTPATIENT
Start: 2024-07-29

## 2024-08-26 PROBLEM — D68.69 SECONDARY HYPERCOAGULABLE STATE (HCC): Status: RESOLVED | Noted: 2023-05-01 | Resolved: 2024-08-26

## 2024-08-26 PROBLEM — E44.1 MILD MALNUTRITION (HCC): Status: RESOLVED | Noted: 2023-10-26 | Resolved: 2024-08-26

## 2024-08-26 PROBLEM — I73.9 CLAUDICATION OF BOTH LOWER EXTREMITIES (HCC): Status: RESOLVED | Noted: 2022-01-14 | Resolved: 2024-08-26

## 2024-09-05 RX ORDER — TAMSULOSIN HYDROCHLORIDE 0.4 MG/1
0.4 CAPSULE ORAL 2 TIMES DAILY
Qty: 200 CAPSULE | Refills: 2 | OUTPATIENT
Start: 2024-09-05

## 2024-09-05 NOTE — TELEPHONE ENCOUNTER
Writer talked to patient wife she stated that Dr. Kohler is going to take care of all his medication and he is not taking Tamsulosin.

## 2024-10-16 ENCOUNTER — OFFICE VISIT (OUTPATIENT)
Dept: CARDIOLOGY | Age: 84
End: 2024-10-16
Payer: MEDICARE

## 2024-10-16 VITALS
SYSTOLIC BLOOD PRESSURE: 98 MMHG | OXYGEN SATURATION: 99 % | DIASTOLIC BLOOD PRESSURE: 62 MMHG | WEIGHT: 168 LBS | HEIGHT: 71 IN | BODY MASS INDEX: 23.52 KG/M2 | RESPIRATION RATE: 18 BRPM | HEART RATE: 90 BPM

## 2024-10-16 DIAGNOSIS — Z95.810 BIVENTRICULAR ICD (IMPLANTABLE CARDIOVERTER-DEFIBRILLATOR) IN PLACE: ICD-10-CM

## 2024-10-16 DIAGNOSIS — I42.8 NON-ISCHEMIC CARDIOMYOPATHY (HCC): ICD-10-CM

## 2024-10-16 DIAGNOSIS — R42 LIGHTHEADED: ICD-10-CM

## 2024-10-16 DIAGNOSIS — E78.00 PURE HYPERCHOLESTEROLEMIA: ICD-10-CM

## 2024-10-16 DIAGNOSIS — R42 DIZZINESS: ICD-10-CM

## 2024-10-16 DIAGNOSIS — I48.0 PAF (PAROXYSMAL ATRIAL FIBRILLATION) (HCC): ICD-10-CM

## 2024-10-16 DIAGNOSIS — I20.9 ANGINA, CLASS III (HCC): ICD-10-CM

## 2024-10-16 DIAGNOSIS — I10 ESSENTIAL HYPERTENSION: ICD-10-CM

## 2024-10-16 DIAGNOSIS — Z79.01 CHRONIC ANTICOAGULATION: ICD-10-CM

## 2024-10-16 DIAGNOSIS — I50.42 CHRONIC COMBINED SYSTOLIC AND DIASTOLIC CONGESTIVE HEART FAILURE (HCC): ICD-10-CM

## 2024-10-16 DIAGNOSIS — I42.8 NICM (NONISCHEMIC CARDIOMYOPATHY) (HCC): ICD-10-CM

## 2024-10-16 PROCEDURE — 3074F SYST BP LT 130 MM HG: CPT | Performed by: PHYSICIAN ASSISTANT

## 2024-10-16 PROCEDURE — 3078F DIAST BP <80 MM HG: CPT | Performed by: PHYSICIAN ASSISTANT

## 2024-10-16 PROCEDURE — 1123F ACP DISCUSS/DSCN MKR DOCD: CPT | Performed by: PHYSICIAN ASSISTANT

## 2024-10-16 PROCEDURE — 99214 OFFICE O/P EST MOD 30 MIN: CPT | Performed by: PHYSICIAN ASSISTANT

## 2024-10-16 RX ORDER — METOPROLOL SUCCINATE 25 MG/1
25 TABLET, EXTENDED RELEASE ORAL DAILY
Qty: 90 TABLET | Refills: 3 | Status: SHIPPED | OUTPATIENT
Start: 2024-10-16

## 2024-10-16 RX ORDER — MIDODRINE HYDROCHLORIDE 2.5 MG/1
2.5 TABLET ORAL 3 TIMES DAILY
Qty: 90 TABLET | Refills: 3 | Status: SHIPPED | OUTPATIENT
Start: 2024-10-16

## 2024-10-16 RX ORDER — MIDODRINE HYDROCHLORIDE 2.5 MG/1
2.5 TABLET ORAL 2 TIMES DAILY
Qty: 14 TABLET | Refills: 0 | Status: SHIPPED | OUTPATIENT
Start: 2024-10-16

## 2024-10-16 RX ORDER — METOPROLOL SUCCINATE 25 MG/1
25 TABLET, EXTENDED RELEASE ORAL DAILY
Qty: 90 TABLET | Refills: 3
Start: 2024-10-16 | End: 2024-10-18

## 2024-10-16 NOTE — PROGRESS NOTES
Pure Hypercholesterolemia: Last LDL on 8/20/2024 was 60 mg/dL  Cholesterol Reduction Therapy: Continue rosuvastatin (Crestor) 10 mg daily.      Bi-Ventricular Implantable Cardioverter Defibrillator: RRT Met as of 9/16/2021 - Battery replaced on 9/23/2021  Indication for Device Placement: Cardiomyopathy with ejection fraction <35%   Interogated in offcie today by rep: Battery life 2.9 years. Atrial pacing 25%, ventricular pacing 98%. Optivol elevated 2/20 to 4/3/2024. Reprogrammed device clock. Programmed D-fib RX to B>AX per [facility] recommendation.    In the meantime, I encouraged Mr. Mccormack to continue to take his current medications and follow up with you as previously scheduled.      FOLLOW UP:    I told Mr. Mccormack to call my office if he had any problems, but otherwise told him to Return in about 6 weeks (around 11/27/2024). However, I would be happy to see him sooner should the need arise.       Sincerely,  Haylie Gasca PA-C  [facility] Cardiology Specialist    [address] [place] [address]  Phone: [phone], Fax: [phone]     I believe that the risk of significant morbidity and mortality related to the patient's current medical conditions are: intermediate-high.  35 minutes      October 16, 2024

## 2024-10-22 RX ORDER — MIDODRINE HYDROCHLORIDE 2.5 MG/1
2.5 TABLET ORAL 2 TIMES DAILY
Qty: 180 TABLET | Refills: 3 | Status: SHIPPED | OUTPATIENT
Start: 2024-10-22

## 2024-10-29 ENCOUNTER — APPOINTMENT (OUTPATIENT)
Dept: CT IMAGING | Age: 84
End: 2024-10-29
Payer: MEDICARE

## 2024-10-29 ENCOUNTER — APPOINTMENT (OUTPATIENT)
Dept: GENERAL RADIOLOGY | Age: 84
End: 2024-10-29
Payer: MEDICARE

## 2024-10-29 ENCOUNTER — HOSPITAL ENCOUNTER (EMERGENCY)
Age: 84
Discharge: HOME | End: 2024-10-29
Attending: EMERGENCY MEDICINE
Payer: MEDICARE

## 2024-10-29 VITALS
HEART RATE: 78 BPM | OXYGEN SATURATION: 98 % | HEIGHT: 72 IN | RESPIRATION RATE: 16 BRPM | SYSTOLIC BLOOD PRESSURE: 99 MMHG | TEMPERATURE: 97.4 F | BODY MASS INDEX: 22.89 KG/M2 | WEIGHT: 169 LBS | DIASTOLIC BLOOD PRESSURE: 64 MMHG
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
## 2024-10-29 DIAGNOSIS — S70.02XA CONTUSION OF LEFT HIP, INITIAL ENCOUNTER: ICD-10-CM

## 2024-10-29 LAB
ALBUMIN SERPL-MCNC: 4 G/DL (ref 3.5–5.2)
ALBUMIN/GLOB SERPL: 1.4 {RATIO} (ref 1–2.5)
ALP SERPL-CCNC: 51 U/L (ref 40–129)
ALT SERPL-CCNC: 42 U/L (ref 10–50)
ANION GAP SERPL CALCULATED.3IONS-SCNC: 9 MMOL/L (ref 9–16)
AST SERPL-CCNC: 32 U/L (ref 10–50)
BASOPHILS # BLD: 0.03 K/UL (ref 0–0.2)
BASOPHILS NFR BLD: 1 % (ref 0–2)
BILIRUB SERPL-MCNC: 0.2 MG/DL (ref 0–1.2)
BUN SERPL-MCNC: 55 MG/DL (ref 8–23)
BUN/CREAT SERPL: 21 (ref 9–20)
CALCIUM SERPL-MCNC: 9 MG/DL (ref 8.6–10.4)
CHLORIDE SERPL-SCNC: 111 MMOL/L (ref 98–107)
CO2 SERPL-SCNC: 21 MMOL/L (ref 20–31)
CREAT SERPL-MCNC: 2.6 MG/DL (ref 0.7–1.2)
EOSINOPHIL # BLD: 0.13 K/UL (ref 0–0.44)
EOSINOPHILS RELATIVE PERCENT: 2 % (ref 1–4)
ERYTHROCYTE [DISTWIDTH] IN BLOOD BY AUTOMATED COUNT: 14.6 % (ref 11.8–14.4)
GFR, ESTIMATED: 24 ML/MIN/1.73M2
GLUCOSE SERPL-MCNC: 141 MG/DL (ref 74–99)
HCT VFR BLD AUTO: 34.1 % (ref 40.7–50.3)
HGB BLD-MCNC: 10.9 G/DL (ref 13–17)
IMM GRANULOCYTES # BLD AUTO: <0.03 K/UL (ref 0–0.3)
IMM GRANULOCYTES NFR BLD: 0 %
LYMPHOCYTES NFR BLD: 1.18 K/UL (ref 1.1–3.7)
LYMPHOCYTES RELATIVE PERCENT: 21 % (ref 24–43)
MCH RBC QN AUTO: 31.1 PG (ref 25.2–33.5)
MCHC RBC AUTO-ENTMCNC: 32 G/DL (ref 28.4–34.8)
MCV RBC AUTO: 97.2 FL (ref 82.6–102.9)
MONOCYTES NFR BLD: 0.46 K/UL (ref 0.1–1.2)
MONOCYTES NFR BLD: 8 % (ref 3–12)
NEUTROPHILS NFR BLD: 68 % (ref 36–65)
NEUTS SEG NFR BLD: 3.92 K/UL (ref 1.5–8.1)
NRBC BLD-RTO: 0 PER 100 WBC
PLATELET # BLD AUTO: 255 K/UL (ref 138–453)
PMV BLD AUTO: 10.9 FL (ref 8.1–13.5)
POTASSIUM SERPL-SCNC: 4.9 MMOL/L (ref 3.7–5.3)
PROT SERPL-MCNC: 6.8 G/DL (ref 6.6–8.7)
RBC # BLD AUTO: 3.51 M/UL (ref 4.21–5.77)
SODIUM SERPL-SCNC: 141 MMOL/L (ref 136–145)
WBC OTHER # BLD: 5.7 K/UL (ref 3.5–11.3)

## 2024-10-29 PROCEDURE — 70450 CT HEAD/BRAIN W/O DYE: CPT

## 2024-10-29 PROCEDURE — 85025 COMPLETE CBC W/AUTO DIFF WBC: CPT

## 2024-10-29 PROCEDURE — 80053 COMPREHEN METABOLIC PANEL: CPT

## 2024-10-29 PROCEDURE — 36415 COLL VENOUS BLD VENIPUNCTURE: CPT

## 2024-10-29 PROCEDURE — 99284 EMERGENCY DEPT VISIT MOD MDM: CPT

## 2024-10-29 PROCEDURE — 73502 X-RAY EXAM HIP UNI 2-3 VIEWS: CPT

## 2024-10-29 ASSESSMENT — LIFESTYLE VARIABLES
HOW MANY STANDARD DRINKS CONTAINING ALCOHOL DO YOU HAVE ON A TYPICAL DAY: PATIENT DOES NOT DRINK
HOW OFTEN DO YOU HAVE A DRINK CONTAINING ALCOHOL: NEVER

## 2024-10-29 ASSESSMENT — PAIN - FUNCTIONAL ASSESSMENT: PAIN_FUNCTIONAL_ASSESSMENT: 0-10

## 2024-10-29 ASSESSMENT — PAIN DESCRIPTION - DESCRIPTORS: DESCRIPTORS: ACHING

## 2024-10-29 ASSESSMENT — PAIN DESCRIPTION - LOCATION: LOCATION: HIP

## 2024-10-29 ASSESSMENT — PAIN SCALES - GENERAL: PAINLEVEL_OUTOF10: 10

## 2024-10-29 ASSESSMENT — PAIN DESCRIPTION - ORIENTATION: ORIENTATION: LEFT

## 2024-10-29 NOTE — ED PROVIDER NOTES
HEAD WO CONTRAST   Final Result   No acute intracranial hemorrhage or acute cerebral infarction identified.         XR HIP LEFT (2-3 VIEWS)   Final Result   No fracture or dislocation of the left hip.             LABS: Lab orders shown below, the results are reviewed by myself, and all abnormals are listed below.  Labs Reviewed   CBC WITH AUTO DIFFERENTIAL - Abnormal; Notable for the following components:       Result Value    RBC 3.51 (*)     Hemoglobin 10.9 (*)     Hematocrit 34.1 (*)     RDW 14.6 (*)     Neutrophils % 68 (*)     Lymphocytes % 21 (*)     All other components within normal limits   COMPREHENSIVE METABOLIC PANEL - Abnormal; Notable for the following components:    Chloride 111 (*)     Glucose 141 (*)     BUN 55 (*)     Creatinine 2.6 (*)     Est, Glom Filt Rate 24 (*)     BUN/Creatinine Ratio 21 (*)     All other components within normal limits       Vitals Reviewed:    Vitals:    10/29/24 1420 10/29/24 1422   BP: 99/64    Pulse: 78    Resp: 16    Temp:  97.4 °F (36.3 °C)   TempSrc:  Oral   SpO2: 98%    Weight: 76.7 kg (169 lb)    Height: 1.829 m (6')      MEDICATIONS GIVEN TO PATIENT THIS ENCOUNTER:  No orders of the defined types were placed in this encounter.    DISCHARGE PRESCRIPTIONS:  New Prescriptions    No medications on file     PHYSICIAN CONSULTS ORDERED THIS ENCOUNTER:  None  FINAL IMPRESSION      1. Fall, initial encounter    2. Contusion of left hip, initial encounter          DISPOSITION/PLAN   DISPOSITION Decision To Discharge 10/29/2024 04:17:29 PM           OUTPATIENT FOLLOW UP THE PATIENT:  Yonas Kohler MD  [address] [place] [address]  [phone]    Schedule an appointment as soon as possible for a visit in 1 day        MD Patricia Redd Wesley D, MD  10/29/24 0373

## 2024-11-07 ENCOUNTER — HOSPITAL ENCOUNTER (OUTPATIENT)
Age: 84
Discharge: HOME | End: 2024-11-09
Payer: MEDICARE

## 2024-11-07 DIAGNOSIS — I48.0 PAF (PAROXYSMAL ATRIAL FIBRILLATION) (HCC): ICD-10-CM

## 2024-11-07 DIAGNOSIS — I50.42 CHRONIC COMBINED SYSTOLIC AND DIASTOLIC CONGESTIVE HEART FAILURE (HCC): ICD-10-CM

## 2024-11-07 DIAGNOSIS — Z95.810 BIVENTRICULAR ICD (IMPLANTABLE CARDIOVERTER-DEFIBRILLATOR) IN PLACE: ICD-10-CM

## 2024-11-07 DIAGNOSIS — I42.8 NICM (NONISCHEMIC CARDIOMYOPATHY) (HCC): ICD-10-CM

## 2024-11-07 DIAGNOSIS — Z79.01 CHRONIC ANTICOAGULATION: ICD-10-CM

## 2024-11-07 DIAGNOSIS — I20.9 ANGINA, CLASS III (HCC): ICD-10-CM

## 2024-11-07 DIAGNOSIS — I10 ESSENTIAL HYPERTENSION: ICD-10-CM

## 2024-11-07 DIAGNOSIS — I42.8 NON-ISCHEMIC CARDIOMYOPATHY (HCC): ICD-10-CM

## 2024-11-07 DIAGNOSIS — E78.00 PURE HYPERCHOLESTEROLEMIA: ICD-10-CM

## 2024-11-07 LAB
TILT CV INITIAL SUPINE HEART RATE: 70 BPM
TILT CV INITIAL SUPINE MAX BP: NORMAL BPM
TILT CV INITIAL SUPINE RHYTHM: NORMAL
TILT CV INITIAL TILT BLOOD PRESSURE: NORMAL MMHG
TILT CV INITIAL TILT HEART RATE: 77 BPM
TILT CV INITIAL TILT RHYTHM: NORMAL
TILT CV MAX BP BLOOD PRESSURE: NORMAL MMHG
TILT CV MAX BP HEART RATE: 77 BPM
TILT CV MAX BP MINUTES: 1
TILT CV MAX BP RHYTHM: NORMAL
TILT CV MAX HEART RATE: 97 BPM
TILT CV MAX HR BLOOD PRESSURE: NORMAL MMHG
TILT CV MAX HR MINUTES: 3
TILT CV MAX HR RHYTHM: NORMAL
TILT CV MINIMUM BP BLOOD PRESSURE: NORMAL MMHG
TILT CV MINIMUM BP HEART RATE: 88 BPM
TILT CV MINIMUM BP MINUTES: 2
TILT CV MINIMUM BP RHYTHM: NORMAL
TILT CV MINIMUM HR BP: NORMAL MMHG
TILT CV MINIMUM HR HEART RATE: 77 BPM
TILT CV MINIMUM HR MINUTES: 1
TILT CV MINIMUM HR RHYTHM: NORMAL

## 2024-11-07 PROCEDURE — 93660 TILT TABLE EVALUATION: CPT

## 2024-11-08 ENCOUNTER — TELEPHONE (OUTPATIENT)
Dept: CARDIOLOGY | Age: 84
End: 2024-11-08

## 2024-11-08 NOTE — TELEPHONE ENCOUNTER
----- Message from Haylie Gasca PA-C sent at 11/8/2024  8:05 AM EST -----  Please let them know their tilt table test was abnormal just 2 minutes into the study. How are his symptoms, if he is having lightheaded/dizziness I will increase his midodrine. Please continue increase fluid intake, moderate salt intake, and compression socks. We will discuss at follow up.

## 2024-11-08 NOTE — RESULT ENCOUNTER NOTE
Please let them know their tilt table test was abnormal just 2 minutes into the study. How are his symptoms, if he is having lightheaded/dizziness I will increase his midodrine. Please continue increase fluid intake, moderate salt intake, and compression socks. We will discuss at follow up.

## 2024-11-18 ENCOUNTER — OFFICE VISIT (OUTPATIENT)
Dept: CARDIOLOGY | Age: 84
End: 2024-11-18
Payer: MEDICARE

## 2024-11-18 VITALS
BODY MASS INDEX: 22.54 KG/M2 | DIASTOLIC BLOOD PRESSURE: 64 MMHG | HEIGHT: 72 IN | SYSTOLIC BLOOD PRESSURE: 103 MMHG | WEIGHT: 166.4 LBS | OXYGEN SATURATION: 98 % | HEART RATE: 76 BPM | RESPIRATION RATE: 18 BRPM

## 2024-11-18 DIAGNOSIS — E86.0 DEHYDRATION, MODERATE: ICD-10-CM

## 2024-11-18 DIAGNOSIS — R42 DIZZINESS: ICD-10-CM

## 2024-11-18 DIAGNOSIS — I48.0 PAF (PAROXYSMAL ATRIAL FIBRILLATION) (HCC): ICD-10-CM

## 2024-11-18 DIAGNOSIS — I10 ESSENTIAL HYPERTENSION: Chronic | ICD-10-CM

## 2024-11-18 DIAGNOSIS — R42 LIGHTHEADED: ICD-10-CM

## 2024-11-18 DIAGNOSIS — I95.1 DYSAUTONOMIA ORTHOSTATIC HYPOTENSION SYNDROME: Primary | ICD-10-CM

## 2024-11-18 DIAGNOSIS — E78.00 PURE HYPERCHOLESTEROLEMIA: ICD-10-CM

## 2024-11-18 DIAGNOSIS — I42.8 NICM (NONISCHEMIC CARDIOMYOPATHY) (HCC): ICD-10-CM

## 2024-11-18 DIAGNOSIS — I20.9 ANGINA, CLASS III (HCC): ICD-10-CM

## 2024-11-18 DIAGNOSIS — I50.42 CHRONIC COMBINED SYSTOLIC AND DIASTOLIC CHF, NYHA CLASS 3 (HCC): ICD-10-CM

## 2024-11-18 DIAGNOSIS — Z79.01 CHRONIC ANTICOAGULATION: ICD-10-CM

## 2024-11-18 DIAGNOSIS — R29.6 FREQUENT FALLS: ICD-10-CM

## 2024-11-18 DIAGNOSIS — Z95.810 BIVENTRICULAR ICD (IMPLANTABLE CARDIOVERTER-DEFIBRILLATOR) IN PLACE: ICD-10-CM

## 2024-11-18 PROCEDURE — 3078F DIAST BP <80 MM HG: CPT | Performed by: FAMILY MEDICINE

## 2024-11-18 PROCEDURE — 3074F SYST BP LT 130 MM HG: CPT | Performed by: FAMILY MEDICINE

## 2024-11-18 PROCEDURE — 1123F ACP DISCUSS/DSCN MKR DOCD: CPT | Performed by: FAMILY MEDICINE

## 2024-11-18 PROCEDURE — 99214 OFFICE O/P EST MOD 30 MIN: CPT | Performed by: FAMILY MEDICINE

## 2024-11-18 PROCEDURE — 1159F MED LIST DOCD IN RCRD: CPT | Performed by: FAMILY MEDICINE

## 2024-11-18 NOTE — PATIENT INSTRUCTIONS
Drink if you are thirsty   Drink no less than 48 oz, 50-60 oz of fluids daily       SURVEY:    You may be receiving a survey from [employer] regarding your visit today.    Please complete the survey to enable us to provide the highest quality of care to you and your family.    If you cannot score us a very good on any question, please call the office to discuss how we could have made your experience a very good one.    Thank you.

## 2024-11-18 NOTE — PROGRESS NOTES
I, Meg Gould am scribing for and in the presence of Saroj Maldonado MD, MS, F.A.C.C..    Patient: Vikas Mccormack  : 1940  Date of Visit: 2024    REASON FOR VISIT / CONSULTATION: Atrial Fibrillation (HX: CHF, PAF. Tilt table test completed. //He has been feeling okay, leg issues for while now - troubles walking. Chest pain every once in awhile, minutes, dull pain. Depends on what he's doing. Lightheaded/dizziness, has fallen due to this. Did come to the ER for this. Palpitations at times. //Denies: SOB. )    Dear Yonas Kohler MD,     I had the pleasure of seeing your patient Vikas Mccormack in follow up today status post ICD RV lead extraction and new RV lead placement. As you know, Mr. Mccormack is a 84 y.o. male with a history of systolic heart failure with a reduced EF of around 35% that recently declined to around 15% leading to a biventricular ICD which required placement of a epicardial lead due to a history of diaphragmatic pacing with his previous LV lead. Also it was found that his right ventricular ICD cable appeared to have significantly fluctuating impedances with sitting and standing ranging from the seventies to the 140's as well as a possible malfunctioning RV ICD coil. 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 Therefore in  he underwent an RV lead extraction with RV lead replacement. In , we did a home ICD check which showed 28 runs of paroxysmal atrial fibrillation, the longest being 13 minutes in duration. His echocardiogram done on 3/1/2017 showed an ejection fraction of 5-10%. Echocardiogram done on 2018 showed EF of 15%. Mildly increased LV wall thickness with a severely increased LV cavity size. Severe global hypokineses. LA is moderately dilated. Mild mitral & tricuspid regurgitation. Evidence

## 2024-11-26 ENCOUNTER — TELEPHONE (OUTPATIENT)
Dept: CARDIOLOGY | Age: 84
End: 2024-11-26

## 2024-11-26 LAB
ANION GAP SERPL CALCULATED.3IONS-SCNC: 10 MMOL/L (ref 7–16)
BUN BLDV-MCNC: 65 MG/DL (ref 8–23)
CALCIUM SERPL-MCNC: 8.8 MG/DL (ref 8.6–10.5)
CHLORIDE BLD-SCNC: 108 MMOL/L (ref 96–107)
CO2: 22 MMOL/L (ref 18–32)
CREAT SERPL-MCNC: 3.24 MG/DL (ref 0.67–1.3)
EGFR IF NONAFRICAN AMERICAN: 18 ML/MIN/1.73M2
GLUCOSE: 152 MG/DL (ref 70–100)
POTASSIUM SERPL-SCNC: 5.2 MMOL/L (ref 3.5–5.4)
SODIUM BLD-SCNC: 140 MMOL/L (ref 135–148)

## 2024-11-26 NOTE — TELEPHONE ENCOUNTER
----- Message from Dr. Saroj Maldonado MD sent at 11/26/2024 11:29 AM EST -----  Let Patient know that his kidney function is worse than ever, creatine >3.24 which is definitely in dialysis range. His bloodwork suggests that he is dehydrated. Is he drinking more fluids as we discussed. How is he feeling? I have a call out to Dr. Wakefield to discuss now.    Thanks.

## 2024-11-26 NOTE — TELEPHONE ENCOUNTER
Spoke with pt wife who stated that pt has increased his fluid intake.   Pt feels awful,  is fatigue, has bilateral leg edema and some heart palpitations

## 2024-11-27 ENCOUNTER — APPOINTMENT (OUTPATIENT)
Dept: GENERAL RADIOLOGY | Age: 84
DRG: 683 | End: 2024-11-27
Payer: MEDICARE

## 2024-11-27 ENCOUNTER — HOSPITAL ENCOUNTER (INPATIENT)
Age: 84
LOS: 5 days | Discharge: HOME | DRG: 683 | End: 2024-12-02
Attending: INTERNAL MEDICINE | Admitting: INTERNAL MEDICINE
Payer: MEDICARE

## 2024-11-27 DIAGNOSIS — N17.9 AKI (ACUTE KIDNEY INJURY) (HCC): Primary | ICD-10-CM

## 2024-11-27 PROBLEM — N18.30 CHRONIC RENAL DISEASE, STAGE III (HCC): Chronic | Status: ACTIVE | Noted: 2022-06-06

## 2024-11-27 LAB
ANION GAP SERPL CALCULATED.3IONS-SCNC: 10 MMOL/L (ref 9–16)
BASOPHILS # BLD: 0.03 K/UL (ref 0–0.2)
BASOPHILS NFR BLD: 1 % (ref 0–2)
BILIRUB UR QL STRIP: NEGATIVE
BNP SERPL-MCNC: 1089 PG/ML (ref 0–450)
BUN SERPL-MCNC: 68 MG/DL (ref 8–23)
BUN/CREAT SERPL: 22 (ref 9–20)
CALCIUM SERPL-MCNC: 8.7 MG/DL (ref 8.6–10.4)
CHLORIDE SERPL-SCNC: 111 MMOL/L (ref 98–107)
CLARITY UR: CLEAR
CO2 SERPL-SCNC: 20 MMOL/L (ref 20–31)
COLOR UR: YELLOW
CREAT SERPL-MCNC: 3.1 MG/DL (ref 0.7–1.2)
EOSINOPHIL # BLD: 0.11 K/UL (ref 0–0.44)
EOSINOPHILS RELATIVE PERCENT: 3 % (ref 1–4)
EPI CELLS #/AREA URNS HPF: NORMAL /HPF (ref 0–5)
ERYTHROCYTE [DISTWIDTH] IN BLOOD BY AUTOMATED COUNT: 14.2 % (ref 11.8–14.4)
GFR, ESTIMATED: 19 ML/MIN/1.73M2
GLUCOSE BLD-MCNC: 111 MG/DL (ref 74–100)
GLUCOSE BLD-MCNC: 113 MG/DL (ref 74–100)
GLUCOSE SERPL-MCNC: 202 MG/DL (ref 74–99)
GLUCOSE UR STRIP-MCNC: NEGATIVE MG/DL
HCT VFR BLD AUTO: 34.4 % (ref 40.7–50.3)
HGB BLD-MCNC: 11.1 G/DL (ref 13–17)
HGB UR QL STRIP.AUTO: ABNORMAL
IMM GRANULOCYTES # BLD AUTO: <0.03 K/UL (ref 0–0.3)
IMM GRANULOCYTES NFR BLD: 0 %
KETONES UR STRIP-MCNC: NEGATIVE MG/DL
LEUKOCYTE ESTERASE UR QL STRIP: NEGATIVE
LYMPHOCYTES NFR BLD: 0.89 K/UL (ref 1.1–3.7)
LYMPHOCYTES RELATIVE PERCENT: 21 % (ref 24–43)
MAGNESIUM SERPL-MCNC: 1.9 MG/DL (ref 1.6–2.4)
MCH RBC QN AUTO: 30.8 PG (ref 25.2–33.5)
MCHC RBC AUTO-ENTMCNC: 32.3 G/DL (ref 28.4–34.8)
MCV RBC AUTO: 95.6 FL (ref 82.6–102.9)
MONOCYTES NFR BLD: 0.32 K/UL (ref 0.1–1.2)
MONOCYTES NFR BLD: 8 % (ref 3–12)
NEUTROPHILS NFR BLD: 67 % (ref 36–65)
NEUTS SEG NFR BLD: 2.89 K/UL (ref 1.5–8.1)
NITRITE UR QL STRIP: NEGATIVE
NRBC BLD-RTO: 0 PER 100 WBC
PH UR STRIP: 6 [PH] (ref 5–9)
PLATELET # BLD AUTO: 226 K/UL (ref 138–453)
PMV BLD AUTO: 11.3 FL (ref 8.1–13.5)
POTASSIUM SERPL-SCNC: 4.6 MMOL/L (ref 3.7–5.3)
PROT UR STRIP-MCNC: NEGATIVE MG/DL
RBC # BLD AUTO: 3.6 M/UL (ref 4.21–5.77)
RBC #/AREA URNS HPF: NORMAL /HPF (ref 0–2)
SODIUM SERPL-SCNC: 141 MMOL/L (ref 136–145)
SP GR UR STRIP: 1.01 (ref 1.01–1.02)
TROPONIN I SERPL HS-MCNC: 47 NG/L (ref 0–22)
TROPONIN I SERPL HS-MCNC: 48 NG/L (ref 0–22)
UROBILINOGEN UR STRIP-ACNC: NORMAL EU/DL (ref 0–1)
WBC #/AREA URNS HPF: NORMAL /HPF (ref 0–5)
WBC OTHER # BLD: 4.3 K/UL (ref 3.5–11.3)

## 2024-11-27 PROCEDURE — 1200000000 HC SEMI PRIVATE

## 2024-11-27 PROCEDURE — 97166 OT EVAL MOD COMPLEX 45 MIN: CPT

## 2024-11-27 PROCEDURE — 2580000003 HC RX 258: Performed by: INTERNAL MEDICINE

## 2024-11-27 PROCEDURE — 82947 ASSAY GLUCOSE BLOOD QUANT: CPT

## 2024-11-27 PROCEDURE — 83735 ASSAY OF MAGNESIUM: CPT

## 2024-11-27 PROCEDURE — 94761 N-INVAS EAR/PLS OXIMETRY MLT: CPT

## 2024-11-27 PROCEDURE — 83880 ASSAY OF NATRIURETIC PEPTIDE: CPT

## 2024-11-27 PROCEDURE — 71046 X-RAY EXAM CHEST 2 VIEWS: CPT

## 2024-11-27 PROCEDURE — 80048 BASIC METABOLIC PNL TOTAL CA: CPT

## 2024-11-27 PROCEDURE — 2580000003 HC RX 258: Performed by: PHYSICIAN ASSISTANT

## 2024-11-27 PROCEDURE — 85025 COMPLETE CBC W/AUTO DIFF WBC: CPT

## 2024-11-27 PROCEDURE — 6370000000 HC RX 637 (ALT 250 FOR IP): Performed by: INTERNAL MEDICINE

## 2024-11-27 PROCEDURE — 84484 ASSAY OF TROPONIN QUANT: CPT

## 2024-11-27 PROCEDURE — 99285 EMERGENCY DEPT VISIT HI MDM: CPT

## 2024-11-27 PROCEDURE — 81001 URINALYSIS AUTO W/SCOPE: CPT

## 2024-11-27 PROCEDURE — 93005 ELECTROCARDIOGRAM TRACING: CPT | Performed by: PHYSICIAN ASSISTANT

## 2024-11-27 RX ORDER — ROSUVASTATIN CALCIUM 10 MG/1
10 TABLET, COATED ORAL DAILY
Status: DISCONTINUED | OUTPATIENT
Start: 2024-11-28 | End: 2024-12-02 | Stop reason: HOSPADM

## 2024-11-27 RX ORDER — ALPRAZOLAM 0.5 MG
1 TABLET ORAL NIGHTLY PRN
Status: DISCONTINUED | OUTPATIENT
Start: 2024-11-27 | End: 2024-12-02 | Stop reason: HOSPADM

## 2024-11-27 RX ORDER — SODIUM CHLORIDE 0.9 % (FLUSH) 0.9 %
10 SYRINGE (ML) INJECTION PRN
Status: DISCONTINUED | OUTPATIENT
Start: 2024-11-27 | End: 2024-12-02 | Stop reason: HOSPADM

## 2024-11-27 RX ORDER — ONDANSETRON 2 MG/ML
4 INJECTION INTRAMUSCULAR; INTRAVENOUS EVERY 6 HOURS PRN
Status: DISCONTINUED | OUTPATIENT
Start: 2024-11-27 | End: 2024-12-02 | Stop reason: HOSPADM

## 2024-11-27 RX ORDER — SODIUM CHLORIDE 9 MG/ML
INJECTION, SOLUTION INTRAVENOUS CONTINUOUS
Status: ACTIVE | OUTPATIENT
Start: 2024-11-27 | End: 2024-11-28

## 2024-11-27 RX ORDER — TAMSULOSIN HYDROCHLORIDE 0.4 MG/1
0.4 CAPSULE ORAL DAILY
Status: DISCONTINUED | OUTPATIENT
Start: 2024-11-28 | End: 2024-12-02 | Stop reason: HOSPADM

## 2024-11-27 RX ORDER — NITROGLYCERIN 0.4 MG/1
0.4 TABLET SUBLINGUAL EVERY 5 MIN PRN
Status: DISCONTINUED | OUTPATIENT
Start: 2024-11-27 | End: 2024-12-02 | Stop reason: HOSPADM

## 2024-11-27 RX ORDER — INSULIN GLARGINE 100 [IU]/ML
10 INJECTION, SOLUTION SUBCUTANEOUS DAILY
Status: DISCONTINUED | OUTPATIENT
Start: 2024-11-28 | End: 2024-12-02 | Stop reason: HOSPADM

## 2024-11-27 RX ORDER — ONDANSETRON 4 MG/1
4 TABLET, ORALLY DISINTEGRATING ORAL EVERY 8 HOURS PRN
Status: DISCONTINUED | OUTPATIENT
Start: 2024-11-27 | End: 2024-12-02 | Stop reason: HOSPADM

## 2024-11-27 RX ORDER — SODIUM CHLORIDE 0.9 % (FLUSH) 0.9 %
10 SYRINGE (ML) INJECTION EVERY 12 HOURS SCHEDULED
Status: DISCONTINUED | OUTPATIENT
Start: 2024-11-27 | End: 2024-12-02 | Stop reason: HOSPADM

## 2024-11-27 RX ORDER — METOPROLOL SUCCINATE 25 MG/1
25 TABLET, EXTENDED RELEASE ORAL DAILY
Status: DISCONTINUED | OUTPATIENT
Start: 2024-11-28 | End: 2024-12-02 | Stop reason: HOSPADM

## 2024-11-27 RX ORDER — ACETAMINOPHEN 325 MG/1
650 TABLET ORAL EVERY 6 HOURS PRN
Status: DISCONTINUED | OUTPATIENT
Start: 2024-11-27 | End: 2024-12-02 | Stop reason: HOSPADM

## 2024-11-27 RX ORDER — SODIUM CHLORIDE 9 MG/ML
INJECTION, SOLUTION INTRAVENOUS PRN
Status: DISCONTINUED | OUTPATIENT
Start: 2024-11-27 | End: 2024-12-02 | Stop reason: HOSPADM

## 2024-11-27 RX ORDER — MIDODRINE HYDROCHLORIDE 2.5 MG/1
2.5 TABLET ORAL 2 TIMES DAILY WITH MEALS
Status: DISCONTINUED | OUTPATIENT
Start: 2024-11-28 | End: 2024-11-30

## 2024-11-27 RX ORDER — SODIUM CHLORIDE 9 MG/ML
INJECTION, SOLUTION INTRAVENOUS CONTINUOUS
Status: DISCONTINUED | OUTPATIENT
Start: 2024-11-27 | End: 2024-11-28

## 2024-11-27 RX ORDER — TROSPIUM CHLORIDE 20 MG/1
20 TABLET, FILM COATED ORAL NIGHTLY
Status: DISCONTINUED | OUTPATIENT
Start: 2024-11-28 | End: 2024-12-02 | Stop reason: HOSPADM

## 2024-11-27 RX ORDER — FAMOTIDINE 20 MG/1
20 TABLET, FILM COATED ORAL DAILY
Status: DISCONTINUED | OUTPATIENT
Start: 2024-11-27 | End: 2024-11-29

## 2024-11-27 RX ORDER — MIDODRINE HYDROCHLORIDE 2.5 MG/1
2.5 TABLET ORAL 2 TIMES DAILY
Status: DISCONTINUED | OUTPATIENT
Start: 2024-11-27 | End: 2024-11-27

## 2024-11-27 RX ORDER — OFLOXACIN 3 MG/ML
3 SOLUTION/ DROPS OPHTHALMIC 4 TIMES DAILY
Status: DISCONTINUED | OUTPATIENT
Start: 2024-11-27 | End: 2024-12-02 | Stop reason: HOSPADM

## 2024-11-27 RX ORDER — PREDNISOLONE ACETATE 10 MG/ML
1 SUSPENSION/ DROPS OPHTHALMIC 4 TIMES DAILY
Status: DISCONTINUED | OUTPATIENT
Start: 2024-11-27 | End: 2024-12-02 | Stop reason: HOSPADM

## 2024-11-27 RX ORDER — FERROUS SULFATE 325(65) MG
325 TABLET ORAL DAILY
Status: DISCONTINUED | OUTPATIENT
Start: 2024-11-27 | End: 2024-12-02 | Stop reason: HOSPADM

## 2024-11-27 RX ORDER — 0.9 % SODIUM CHLORIDE 0.9 %
500 INTRAVENOUS SOLUTION INTRAVENOUS ONCE
Status: COMPLETED | OUTPATIENT
Start: 2024-11-27 | End: 2024-11-27

## 2024-11-27 RX ORDER — FINASTERIDE 5 MG/1
5 TABLET, FILM COATED ORAL DAILY
Status: DISCONTINUED | OUTPATIENT
Start: 2024-11-28 | End: 2024-12-02 | Stop reason: HOSPADM

## 2024-11-27 RX ORDER — ACETAMINOPHEN 650 MG/1
650 SUPPOSITORY RECTAL EVERY 6 HOURS PRN
Status: DISCONTINUED | OUTPATIENT
Start: 2024-11-27 | End: 2024-12-02 | Stop reason: HOSPADM

## 2024-11-27 RX ORDER — POLYETHYLENE GLYCOL 3350 17 G/17G
17 POWDER, FOR SOLUTION ORAL DAILY PRN
Status: DISCONTINUED | OUTPATIENT
Start: 2024-11-27 | End: 2024-12-02 | Stop reason: HOSPADM

## 2024-11-27 RX ADMIN — OFLOXACIN 3 DROP: 3 SOLUTION OPHTHALMIC at 16:39

## 2024-11-27 RX ADMIN — PREDNISOLONE ACETATE 1 DROP: 10 SUSPENSION/ DROPS OPHTHALMIC at 16:39

## 2024-11-27 RX ADMIN — ALPRAZOLAM 1 MG: 0.5 TABLET ORAL at 20:56

## 2024-11-27 RX ADMIN — SODIUM CHLORIDE 500 ML: 9 INJECTION, SOLUTION INTRAVENOUS at 12:55

## 2024-11-27 RX ADMIN — SODIUM CHLORIDE: 9 INJECTION, SOLUTION INTRAVENOUS at 13:33

## 2024-11-27 RX ADMIN — PREDNISOLONE ACETATE 1 DROP: 10 SUSPENSION/ DROPS OPHTHALMIC at 20:19

## 2024-11-27 RX ADMIN — SODIUM CHLORIDE: 9 INJECTION, SOLUTION INTRAVENOUS at 16:41

## 2024-11-27 RX ADMIN — OFLOXACIN 3 DROP: 3 SOLUTION OPHTHALMIC at 20:19

## 2024-11-27 NOTE — ED NOTES
Patient reports that he has been feeling ok and only came out today due to abnormal labs. Does report increased fatigue.

## 2024-11-27 NOTE — FLOWSHEET NOTE
84 year old male admitted to room 330 per stretcher from ER. To bed per self. Vitals checked and assessment completed. Alert and oriented x 4. Denies pain. Call light within reach. Wife at bedside.

## 2024-11-27 NOTE — ED NOTES
Patient had 1 sugar free jello and pudding. Patient can be intermittently confused. Patient and wife updated with plan for admission and room number

## 2024-11-27 NOTE — ED PROVIDER NOTES
hours of laying down.    NITROGLYCERIN (NITROSTAT) 0.4 MG SL TABLET    DISSOLVE 1 TABLET UNDER THE  TONGUE EVERY 5 MINUTES AS NEEDED FOR CHEST PAIN. MAX OF 3 TABLETS IN 15 MINUTES. CALL 911 IF PAIN  PERSISTS.    OFLOXACIN (OCUFLOX) 0.3 % SOLUTION    Place 3 drops into the right eye 4 times daily    OMEPRAZOLE 20 MG EC TABLET    Take one tablet by mouth nightly    PANTOPRAZOLE (PROTONIX) 40 MG TABLET    Take 1 tablet by mouth every morning (before breakfast)    PREDNISOLONE ACETATE (PRED FORTE) 1 % OPHTHALMIC SUSPENSION    Place 1 drop into the right eye 4 times daily 5 mins after ofloxacin    RIVAROXABAN (XARELTO) 15 MG TABS TABLET    Take 1 tablet by mouth daily    ROSUVASTATIN (CRESTOR) 10 MG TABLET    Take 1 tablet by mouth daily    SOLIFENACIN (VESICARE) 10 MG TABLET    Take 1 tablet by mouth daily    TAMSULOSIN (FLOMAX) 0.4 MG CAPSULE    Take one capsule by mouth daily       ALLERGIES       Norco [hydrocodone-acetaminophen] and Seasonal    FAMILY HISTORY       Family History   Problem Relation Age of Onset    Heart Disease Mother     Cancer Father     Diabetes Brother     Cancer Sister         breast     Cancer Sister           SOCIAL HISTORY       Social History     Tobacco Use    Smoking status: Former     Current packs/day: 0.00     Types: Cigarettes     Quit date: 1978     Years since quittin.6    Smokeless tobacco: Never    Tobacco comments:     Patient only smoked when he drank beer   Vaping Use    Vaping status: Never Used   Substance Use Topics    Alcohol use: Not Currently     Types: 3 - 4 Cans of beer per week     Comment: 3 per week    Drug use: No         PHYSICAL EXAM       ED Triage Vitals [24 1149]   BP Systolic BP Percentile Diastolic BP Percentile Temp Temp Source Pulse Respirations SpO2   (!) 111/54 -- -- 97.3 °F (36.3 °C) Oral 72 16 99 %      Height Weight - Scale         1.829 m (6') 72.6 kg (160 lb)             Physical Exam  Constitutional:       General: He is not in acute

## 2024-11-27 NOTE — FLOWSHEET NOTE
Patients granddaughter Sharita called for medication updates. Granddaughter sets patients medication up at home.

## 2024-11-28 LAB
ANION GAP SERPL CALCULATED.3IONS-SCNC: 9 MMOL/L (ref 9–16)
BASOPHILS # BLD: 0.03 K/UL (ref 0–0.2)
BASOPHILS NFR BLD: 1 % (ref 0–2)
BUN SERPL-MCNC: 56 MG/DL (ref 8–23)
BUN/CREAT SERPL: 20 (ref 9–20)
CALCIUM SERPL-MCNC: 8.3 MG/DL (ref 8.6–10.4)
CHLORIDE SERPL-SCNC: 114 MMOL/L (ref 98–107)
CO2 SERPL-SCNC: 20 MMOL/L (ref 20–31)
CREAT SERPL-MCNC: 2.8 MG/DL (ref 0.7–1.2)
EKG ATRIAL RATE: 64 BPM
EKG P AXIS: 61 DEGREES
EKG P-R INTERVAL: 124 MS
EKG Q-T INTERVAL: 512 MS
EKG QRS DURATION: 176 MS
EKG QTC CALCULATION (BAZETT): 528 MS
EKG R AXIS: -106 DEGREES
EKG T AXIS: 99 DEGREES
EKG VENTRICULAR RATE: 64 BPM
EOSINOPHIL # BLD: 0.14 K/UL (ref 0–0.44)
EOSINOPHILS RELATIVE PERCENT: 3 % (ref 1–4)
ERYTHROCYTE [DISTWIDTH] IN BLOOD BY AUTOMATED COUNT: 14.1 % (ref 11.8–14.4)
GFR, ESTIMATED: 22 ML/MIN/1.73M2
GLUCOSE BLD-MCNC: 121 MG/DL (ref 74–100)
GLUCOSE BLD-MCNC: 173 MG/DL (ref 74–100)
GLUCOSE BLD-MCNC: 258 MG/DL (ref 74–100)
GLUCOSE BLD-MCNC: 262 MG/DL (ref 74–100)
GLUCOSE SERPL-MCNC: 94 MG/DL (ref 74–99)
HCT VFR BLD AUTO: 32.4 % (ref 40.7–50.3)
HGB BLD-MCNC: 10.3 G/DL (ref 13–17)
IMM GRANULOCYTES # BLD AUTO: <0.03 K/UL (ref 0–0.3)
IMM GRANULOCYTES NFR BLD: 0 %
LYMPHOCYTES NFR BLD: 1.3 K/UL (ref 1.1–3.7)
LYMPHOCYTES RELATIVE PERCENT: 26 % (ref 24–43)
MCH RBC QN AUTO: 30.4 PG (ref 25.2–33.5)
MCHC RBC AUTO-ENTMCNC: 31.8 G/DL (ref 28.4–34.8)
MCV RBC AUTO: 95.6 FL (ref 82.6–102.9)
MONOCYTES NFR BLD: 0.41 K/UL (ref 0.1–1.2)
MONOCYTES NFR BLD: 8 % (ref 3–12)
NEUTROPHILS NFR BLD: 62 % (ref 36–65)
NEUTS SEG NFR BLD: 3.07 K/UL (ref 1.5–8.1)
NRBC BLD-RTO: 0 PER 100 WBC
PLATELET # BLD AUTO: 212 K/UL (ref 138–453)
PMV BLD AUTO: 11 FL (ref 8.1–13.5)
POTASSIUM SERPL-SCNC: 4.2 MMOL/L (ref 3.7–5.3)
RBC # BLD AUTO: 3.39 M/UL (ref 4.21–5.77)
SODIUM SERPL-SCNC: 143 MMOL/L (ref 136–145)
WBC OTHER # BLD: 5 K/UL (ref 3.5–11.3)

## 2024-11-28 PROCEDURE — 6370000000 HC RX 637 (ALT 250 FOR IP): Performed by: INTERNAL MEDICINE

## 2024-11-28 PROCEDURE — 85025 COMPLETE CBC W/AUTO DIFF WBC: CPT

## 2024-11-28 PROCEDURE — 94761 N-INVAS EAR/PLS OXIMETRY MLT: CPT

## 2024-11-28 PROCEDURE — 1200000000 HC SEMI PRIVATE

## 2024-11-28 PROCEDURE — 82947 ASSAY GLUCOSE BLOOD QUANT: CPT

## 2024-11-28 PROCEDURE — 97110 THERAPEUTIC EXERCISES: CPT

## 2024-11-28 PROCEDURE — 97162 PT EVAL MOD COMPLEX 30 MIN: CPT

## 2024-11-28 PROCEDURE — 80048 BASIC METABOLIC PNL TOTAL CA: CPT

## 2024-11-28 PROCEDURE — 36415 COLL VENOUS BLD VENIPUNCTURE: CPT

## 2024-11-28 PROCEDURE — 93010 ELECTROCARDIOGRAM REPORT: CPT | Performed by: FAMILY MEDICINE

## 2024-11-28 PROCEDURE — 2580000003 HC RX 258: Performed by: INTERNAL MEDICINE

## 2024-11-28 RX ORDER — HYDROXYZINE PAMOATE 25 MG/1
25 CAPSULE ORAL EVERY 6 HOURS PRN
Status: DISCONTINUED | OUTPATIENT
Start: 2024-11-28 | End: 2024-12-02 | Stop reason: HOSPADM

## 2024-11-28 RX ORDER — TRAZODONE HYDROCHLORIDE 50 MG/1
50 TABLET, FILM COATED ORAL ONCE
Status: COMPLETED | OUTPATIENT
Start: 2024-11-28 | End: 2024-11-28

## 2024-11-28 RX ADMIN — PREDNISOLONE ACETATE 1 DROP: 10 SUSPENSION/ DROPS OPHTHALMIC at 09:50

## 2024-11-28 RX ADMIN — SODIUM CHLORIDE: 9 INJECTION, SOLUTION INTRAVENOUS at 13:31

## 2024-11-28 RX ADMIN — FAMOTIDINE 20 MG: 20 TABLET, FILM COATED ORAL at 09:03

## 2024-11-28 RX ADMIN — RIVAROXABAN 15 MG: 15 TABLET, FILM COATED ORAL at 09:04

## 2024-11-28 RX ADMIN — METOPROLOL SUCCINATE 25 MG: 25 TABLET, EXTENDED RELEASE ORAL at 09:03

## 2024-11-28 RX ADMIN — ROSUVASTATIN CALCIUM 10 MG: 10 TABLET, FILM COATED ORAL at 09:04

## 2024-11-28 RX ADMIN — FINASTERIDE 5 MG: 5 TABLET, FILM COATED ORAL at 09:04

## 2024-11-28 RX ADMIN — INSULIN GLARGINE 10 UNITS: 100 INJECTION, SOLUTION SUBCUTANEOUS at 09:05

## 2024-11-28 RX ADMIN — MIDODRINE HYDROCHLORIDE 2.5 MG: 2.5 TABLET ORAL at 09:03

## 2024-11-28 RX ADMIN — ALPRAZOLAM 1 MG: 0.5 TABLET ORAL at 20:12

## 2024-11-28 RX ADMIN — TRAZODONE HYDROCHLORIDE 50 MG: 50 TABLET ORAL at 22:28

## 2024-11-28 RX ADMIN — TROSPIUM CHLORIDE 20 MG: 20 TABLET, FILM COATED ORAL at 20:08

## 2024-11-28 RX ADMIN — OFLOXACIN 3 DROP: 3 SOLUTION OPHTHALMIC at 13:34

## 2024-11-28 RX ADMIN — MIDODRINE HYDROCHLORIDE 2.5 MG: 2.5 TABLET ORAL at 17:26

## 2024-11-28 RX ADMIN — TAMSULOSIN HYDROCHLORIDE 0.4 MG: 0.4 CAPSULE ORAL at 09:03

## 2024-11-28 RX ADMIN — OFLOXACIN 3 DROP: 3 SOLUTION OPHTHALMIC at 09:49

## 2024-11-28 RX ADMIN — PREDNISOLONE ACETATE 1 DROP: 10 SUSPENSION/ DROPS OPHTHALMIC at 13:34

## 2024-11-28 RX ADMIN — SODIUM CHLORIDE: 9 INJECTION, SOLUTION INTRAVENOUS at 03:10

## 2024-11-28 RX ADMIN — PREDNISOLONE ACETATE 1 DROP: 10 SUSPENSION/ DROPS OPHTHALMIC at 17:28

## 2024-11-28 RX ADMIN — SODIUM CHLORIDE, PRESERVATIVE FREE 10 ML: 5 INJECTION INTRAVENOUS at 20:08

## 2024-11-28 RX ADMIN — HYDROXYZINE PAMOATE 25 MG: 25 CAPSULE ORAL at 22:28

## 2024-11-28 RX ADMIN — OFLOXACIN 3 DROP: 3 SOLUTION OPHTHALMIC at 20:10

## 2024-11-28 RX ADMIN — FERROUS SULFATE TAB 325 MG (65 MG ELEMENTAL FE) 325 MG: 325 (65 FE) TAB at 09:04

## 2024-11-28 RX ADMIN — PREDNISOLONE ACETATE 1 DROP: 10 SUSPENSION/ DROPS OPHTHALMIC at 20:10

## 2024-11-28 RX ADMIN — OFLOXACIN 3 DROP: 3 SOLUTION OPHTHALMIC at 17:28

## 2024-11-28 NOTE — H&P
Systems:  Constitutional:negative  for fevers, and negative for chills.  Respiratory: negative for shortness of breath, negative for cough, and negative for wheezing  Cardiovascular: negative for chest pain, negative for palpitations, and negative for syncope  Gastrointestinal: negative for abdominal pain, negative for nausea,negative for vomiting, negative for diarrhea, negative for constipation, and negative for hematochezia or melena  Genitourinary: negative for dysuria, negative for urinary urgency, negative for urinary frequency, and negative for hematuria  Neurological: negative for unilateral weakness, numbness or tingling.    All other systems were reviewed with the patient and are negative except as stated above      Past Medical History:        Diagnosis Date    Abnormal echocardiogram 05/02/2012    EF 15%. mildly dilated LV cavity.    Abnormal resting ECG findings 04/25/2012    atrial sensed, ventricular paced rhythm at 73 beats per minute. Wide QRS of 183 ms.    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    Biventricular ICD (implantable cardioverter-defibrillator) in place 10/29/2015    [employer]- Bi V ICD (Battery change)    Biventricular pacemaker check 5/12 1/13    LV lead turned of 1/13 due to diaphragmatic pacing.    CAD (coronary artery disease)     CKD (chronic kidney disease)     Dr. Huff    Dementia ([facility]) 10/2023    Diabetes mellitus ([facility])     type 2    Esophageal reflux     History of echocardiogram 11/23/2018    EF 15%. Mildly increased LV wall thickness w/ severely increased LA cavity size. Severe global hypokinesis w/ no segmental variation. LA is moderately dilated w/ LA volume index of 35. Mild mitral and tricuspid regurg. Evidence of moderate diastolic dysfunction seen.    Hx of blood clots     Hx of left heart catheterization by ventricular puncture 04/19/2013    LAD-20-30%, LCX & RCA-10-20%,

## 2024-11-29 PROBLEM — E44.0 MODERATE MALNUTRITION (HCC): Status: ACTIVE | Noted: 2024-11-29

## 2024-11-29 LAB
ANION GAP SERPL CALCULATED.3IONS-SCNC: 8 MMOL/L (ref 9–16)
BASOPHILS # BLD: <0.03 K/UL (ref 0–0.2)
BASOPHILS NFR BLD: 0 % (ref 0–2)
BUN SERPL-MCNC: 47 MG/DL (ref 8–23)
BUN/CREAT SERPL: 17 (ref 9–20)
CALCIUM SERPL-MCNC: 8.6 MG/DL (ref 8.6–10.4)
CHLORIDE SERPL-SCNC: 112 MMOL/L (ref 98–107)
CO2 SERPL-SCNC: 22 MMOL/L (ref 20–31)
CREAT SERPL-MCNC: 2.8 MG/DL (ref 0.7–1.2)
EOSINOPHIL # BLD: 0.12 K/UL (ref 0–0.44)
EOSINOPHILS RELATIVE PERCENT: 3 % (ref 1–4)
ERYTHROCYTE [DISTWIDTH] IN BLOOD BY AUTOMATED COUNT: 13.7 % (ref 11.8–14.4)
GFR, ESTIMATED: 22 ML/MIN/1.73M2
GLUCOSE BLD-MCNC: 118 MG/DL (ref 74–100)
GLUCOSE BLD-MCNC: 135 MG/DL (ref 74–100)
GLUCOSE BLD-MCNC: 137 MG/DL (ref 74–100)
GLUCOSE BLD-MCNC: 191 MG/DL (ref 74–100)
GLUCOSE SERPL-MCNC: 127 MG/DL (ref 74–99)
HCT VFR BLD AUTO: 34.3 % (ref 40.7–50.3)
HGB BLD-MCNC: 11.2 G/DL (ref 13–17)
IMM GRANULOCYTES # BLD AUTO: <0.03 K/UL (ref 0–0.3)
IMM GRANULOCYTES NFR BLD: 0 %
LYMPHOCYTES NFR BLD: 1.28 K/UL (ref 1.1–3.7)
LYMPHOCYTES RELATIVE PERCENT: 28 % (ref 24–43)
MCH RBC QN AUTO: 30.8 PG (ref 25.2–33.5)
MCHC RBC AUTO-ENTMCNC: 32.7 G/DL (ref 28.4–34.8)
MCV RBC AUTO: 94.2 FL (ref 82.6–102.9)
MONOCYTES NFR BLD: 0.42 K/UL (ref 0.1–1.2)
MONOCYTES NFR BLD: 9 % (ref 3–12)
NEUTROPHILS NFR BLD: 60 % (ref 36–65)
NEUTS SEG NFR BLD: 2.72 K/UL (ref 1.5–8.1)
NRBC BLD-RTO: 0 PER 100 WBC
PLATELET # BLD AUTO: 218 K/UL (ref 138–453)
PMV BLD AUTO: 11 FL (ref 8.1–13.5)
POTASSIUM SERPL-SCNC: 4.5 MMOL/L (ref 3.7–5.3)
RBC # BLD AUTO: 3.64 M/UL (ref 4.21–5.77)
SODIUM SERPL-SCNC: 142 MMOL/L (ref 136–145)
WBC OTHER # BLD: 4.6 K/UL (ref 3.5–11.3)

## 2024-11-29 PROCEDURE — 85025 COMPLETE CBC W/AUTO DIFF WBC: CPT

## 2024-11-29 PROCEDURE — 2580000003 HC RX 258: Performed by: INTERNAL MEDICINE

## 2024-11-29 PROCEDURE — 6370000000 HC RX 637 (ALT 250 FOR IP): Performed by: INTERNAL MEDICINE

## 2024-11-29 PROCEDURE — 82947 ASSAY GLUCOSE BLOOD QUANT: CPT

## 2024-11-29 PROCEDURE — 36415 COLL VENOUS BLD VENIPUNCTURE: CPT

## 2024-11-29 PROCEDURE — 6360000002 HC RX W HCPCS: Performed by: INTERNAL MEDICINE

## 2024-11-29 PROCEDURE — 94761 N-INVAS EAR/PLS OXIMETRY MLT: CPT

## 2024-11-29 PROCEDURE — 80048 BASIC METABOLIC PNL TOTAL CA: CPT

## 2024-11-29 PROCEDURE — 1200000000 HC SEMI PRIVATE

## 2024-11-29 RX ORDER — SODIUM CHLORIDE 9 MG/ML
INJECTION, SOLUTION INTRAVENOUS CONTINUOUS
Status: DISCONTINUED | OUTPATIENT
Start: 2024-11-29 | End: 2024-11-29

## 2024-11-29 RX ORDER — HALOPERIDOL 5 MG/ML
2 INJECTION INTRAMUSCULAR ONCE
Status: COMPLETED | OUTPATIENT
Start: 2024-11-29 | End: 2024-11-29

## 2024-11-29 RX ORDER — ALPRAZOLAM 0.5 MG
1 TABLET ORAL ONCE
Status: COMPLETED | OUTPATIENT
Start: 2024-11-29 | End: 2024-11-29

## 2024-11-29 RX ORDER — FAMOTIDINE 10 MG
10 TABLET ORAL DAILY
Status: DISCONTINUED | OUTPATIENT
Start: 2024-11-30 | End: 2024-12-02 | Stop reason: HOSPADM

## 2024-11-29 RX ADMIN — PREDNISOLONE ACETATE 1 DROP: 10 SUSPENSION/ DROPS OPHTHALMIC at 19:45

## 2024-11-29 RX ADMIN — HALOPERIDOL LACTATE 2 MG: 5 INJECTION, SOLUTION INTRAMUSCULAR at 00:52

## 2024-11-29 RX ADMIN — INSULIN GLARGINE 10 UNITS: 100 INJECTION, SOLUTION SUBCUTANEOUS at 09:43

## 2024-11-29 RX ADMIN — SODIUM CHLORIDE, PRESERVATIVE FREE 10 ML: 5 INJECTION INTRAVENOUS at 19:47

## 2024-11-29 RX ADMIN — PREDNISOLONE ACETATE 1 DROP: 10 SUSPENSION/ DROPS OPHTHALMIC at 17:25

## 2024-11-29 RX ADMIN — SODIUM CHLORIDE: 9 INJECTION, SOLUTION INTRAVENOUS at 10:52

## 2024-11-29 RX ADMIN — SODIUM CHLORIDE, PRESERVATIVE FREE 10 ML: 5 INJECTION INTRAVENOUS at 09:43

## 2024-11-29 RX ADMIN — OFLOXACIN 3 DROP: 3 SOLUTION OPHTHALMIC at 19:45

## 2024-11-29 RX ADMIN — ALPRAZOLAM 1 MG: 0.5 TABLET ORAL at 00:51

## 2024-11-29 RX ADMIN — TROSPIUM CHLORIDE 20 MG: 20 TABLET, FILM COATED ORAL at 19:49

## 2024-11-29 RX ADMIN — OFLOXACIN 3 DROP: 3 SOLUTION OPHTHALMIC at 17:25

## 2024-11-29 NOTE — CARE COORDINATION
Case Management Assessment  Initial Evaluation    Date/Time of Evaluation: 11/29/2024 10:42 AM  Assessment Completed by: DAVION Muhammad    If patient is discharged prior to next notation, then this note serves as note for discharge by case management.    Patient Name: Vikas Mccormack                   YOB: 1940  Diagnosis: JACQUE (acute kidney injury) (HCC) [N17.9]  Acute kidney injury (HCC) [N17.9]                   Date / Time: 11/27/2024 11:54 AM    Patient Admission Status: Inpatient   Readmission Risk (Low < 19, Mod (19-27), High > 27): Readmission Risk Score: 17.4    Current PCP: Yonas Kohler MD  PCP verified by CM? Yes    Chart Reviewed: Yes      History Provided by: Significant Other  Patient Orientation: Sedated    Patient Cognition: Dementia / Early Alzheimer's    Hospitalization in the last 30 days (Readmission):  No    If yes, Readmission Assessment in CM Navigator will be completed.    Advance Directives:      Code Status: DNR-CCA   Patient's Primary Decision Maker is: Named in Scanned ACP Document    Primary Decision Maker: Maggi Mccormack - Spouse - [phone]    Discharge Planning:    Patient lives with: Spouse/Significant Other Type of Home: House  Primary Care Giver: Spouse  Patient Support Systems include: Spouse/Significant Other, Family Members   Current Financial resources: Medicare  Current community resources: None  Current services prior to admission: None            Current DME:              Type of Home Care services:  Aide Services    ADLS  Prior functional level: Assistance with the following:, Cooking, Housework, Shopping, Bathing  Current functional level: Shopping, Housework, Cooking, Bathing    PT AM-PAC: 16 /24  OT AM-PAC: 19 /24    Family can provide assistance at DC: Yes  Would you like Case Management to discuss the discharge plan with any other family members/significant others, and if so, who? Yes  Plans to Return to Present Housing: Yes  Other

## 2024-11-30 LAB
ALBUMIN SERPL-MCNC: 3.4 G/DL (ref 3.5–5.2)
ANION GAP SERPL CALCULATED.3IONS-SCNC: 7 MMOL/L (ref 9–16)
BASOPHILS # BLD: <0.03 K/UL (ref 0–0.2)
BASOPHILS NFR BLD: 0 % (ref 0–2)
BUN SERPL-MCNC: 45 MG/DL (ref 8–23)
BUN/CREAT SERPL: 17 (ref 9–20)
CALCIUM SERPL-MCNC: 8.5 MG/DL (ref 8.6–10.4)
CHLORIDE SERPL-SCNC: 114 MMOL/L (ref 98–107)
CO2 SERPL-SCNC: 23 MMOL/L (ref 20–31)
CREAT SERPL-MCNC: 2.7 MG/DL (ref 0.7–1.2)
EOSINOPHIL # BLD: 0.15 K/UL (ref 0–0.44)
EOSINOPHILS RELATIVE PERCENT: 3 % (ref 1–4)
ERYTHROCYTE [DISTWIDTH] IN BLOOD BY AUTOMATED COUNT: 14 % (ref 11.8–14.4)
GFR, ESTIMATED: 22 ML/MIN/1.73M2
GLUCOSE BLD-MCNC: 140 MG/DL (ref 74–100)
GLUCOSE BLD-MCNC: 195 MG/DL (ref 74–100)
GLUCOSE BLD-MCNC: 223 MG/DL (ref 74–100)
GLUCOSE BLD-MCNC: 70 MG/DL (ref 74–100)
GLUCOSE SERPL-MCNC: 81 MG/DL (ref 74–99)
HCT VFR BLD AUTO: 33.9 % (ref 40.7–50.3)
HGB BLD-MCNC: 11.2 G/DL (ref 13–17)
IMM GRANULOCYTES # BLD AUTO: <0.03 K/UL (ref 0–0.3)
IMM GRANULOCYTES NFR BLD: 0 %
LYMPHOCYTES NFR BLD: 1.39 K/UL (ref 1.1–3.7)
LYMPHOCYTES RELATIVE PERCENT: 29 % (ref 24–43)
MAGNESIUM SERPL-MCNC: 1.7 MG/DL (ref 1.6–2.4)
MCH RBC QN AUTO: 31.4 PG (ref 25.2–33.5)
MCHC RBC AUTO-ENTMCNC: 33 G/DL (ref 28.4–34.8)
MCV RBC AUTO: 95 FL (ref 82.6–102.9)
MONOCYTES NFR BLD: 0.48 K/UL (ref 0.1–1.2)
MONOCYTES NFR BLD: 10 % (ref 3–12)
NEUTROPHILS NFR BLD: 58 % (ref 36–65)
NEUTS SEG NFR BLD: 2.67 K/UL (ref 1.5–8.1)
NRBC BLD-RTO: 0 PER 100 WBC
PHOSPHATE SERPL-MCNC: 3.7 MG/DL (ref 2.5–4.5)
PLATELET # BLD AUTO: 204 K/UL (ref 138–453)
PMV BLD AUTO: 11.3 FL (ref 8.1–13.5)
POTASSIUM SERPL-SCNC: 4.5 MMOL/L (ref 3.7–5.3)
PTH-INTACT SERPL-MCNC: 58 PG/ML (ref 15–65)
RBC # BLD AUTO: 3.57 M/UL (ref 4.21–5.77)
SODIUM SERPL-SCNC: 144 MMOL/L (ref 136–145)
WBC OTHER # BLD: 4.7 K/UL (ref 3.5–11.3)

## 2024-11-30 PROCEDURE — 97110 THERAPEUTIC EXERCISES: CPT

## 2024-11-30 PROCEDURE — 6370000000 HC RX 637 (ALT 250 FOR IP): Performed by: INTERNAL MEDICINE

## 2024-11-30 PROCEDURE — 94761 N-INVAS EAR/PLS OXIMETRY MLT: CPT

## 2024-11-30 PROCEDURE — 36415 COLL VENOUS BLD VENIPUNCTURE: CPT

## 2024-11-30 PROCEDURE — 83735 ASSAY OF MAGNESIUM: CPT

## 2024-11-30 PROCEDURE — 85025 COMPLETE CBC W/AUTO DIFF WBC: CPT

## 2024-11-30 PROCEDURE — 2580000003 HC RX 258: Performed by: INTERNAL MEDICINE

## 2024-11-30 PROCEDURE — 97116 GAIT TRAINING THERAPY: CPT

## 2024-11-30 PROCEDURE — 84100 ASSAY OF PHOSPHORUS: CPT

## 2024-11-30 PROCEDURE — 82947 ASSAY GLUCOSE BLOOD QUANT: CPT

## 2024-11-30 PROCEDURE — 83970 ASSAY OF PARATHORMONE: CPT

## 2024-11-30 PROCEDURE — 82040 ASSAY OF SERUM ALBUMIN: CPT

## 2024-11-30 PROCEDURE — 1200000000 HC SEMI PRIVATE

## 2024-11-30 PROCEDURE — 80048 BASIC METABOLIC PNL TOTAL CA: CPT

## 2024-11-30 RX ADMIN — SODIUM CHLORIDE, PRESERVATIVE FREE 10 ML: 5 INJECTION INTRAVENOUS at 19:32

## 2024-11-30 RX ADMIN — METOPROLOL SUCCINATE 25 MG: 25 TABLET, EXTENDED RELEASE ORAL at 09:06

## 2024-11-30 RX ADMIN — PREDNISOLONE ACETATE 1 DROP: 10 SUSPENSION/ DROPS OPHTHALMIC at 09:06

## 2024-11-30 RX ADMIN — PREDNISOLONE ACETATE 1 DROP: 10 SUSPENSION/ DROPS OPHTHALMIC at 19:31

## 2024-11-30 RX ADMIN — INSULIN GLARGINE 10 UNITS: 100 INJECTION, SOLUTION SUBCUTANEOUS at 09:06

## 2024-11-30 RX ADMIN — ROSUVASTATIN CALCIUM 10 MG: 10 TABLET, FILM COATED ORAL at 09:06

## 2024-11-30 RX ADMIN — PREDNISOLONE ACETATE 1 DROP: 10 SUSPENSION/ DROPS OPHTHALMIC at 13:19

## 2024-11-30 RX ADMIN — ALPRAZOLAM 1 MG: 0.5 TABLET ORAL at 19:31

## 2024-11-30 RX ADMIN — RIVAROXABAN 15 MG: 15 TABLET, FILM COATED ORAL at 09:06

## 2024-11-30 RX ADMIN — TROSPIUM CHLORIDE 20 MG: 20 TABLET, FILM COATED ORAL at 19:31

## 2024-11-30 RX ADMIN — SODIUM CHLORIDE, PRESERVATIVE FREE 10 ML: 5 INJECTION INTRAVENOUS at 09:14

## 2024-11-30 RX ADMIN — FINASTERIDE 5 MG: 5 TABLET, FILM COATED ORAL at 09:05

## 2024-11-30 RX ADMIN — OFLOXACIN 3 DROP: 3 SOLUTION OPHTHALMIC at 19:31

## 2024-11-30 RX ADMIN — FERROUS SULFATE TAB 325 MG (65 MG ELEMENTAL FE) 325 MG: 325 (65 FE) TAB at 09:06

## 2024-11-30 RX ADMIN — OFLOXACIN 3 DROP: 3 SOLUTION OPHTHALMIC at 16:37

## 2024-11-30 RX ADMIN — PREDNISOLONE ACETATE 1 DROP: 10 SUSPENSION/ DROPS OPHTHALMIC at 16:37

## 2024-11-30 RX ADMIN — OFLOXACIN 3 DROP: 3 SOLUTION OPHTHALMIC at 09:06

## 2024-11-30 RX ADMIN — OFLOXACIN 3 DROP: 3 SOLUTION OPHTHALMIC at 13:19

## 2024-11-30 RX ADMIN — TAMSULOSIN HYDROCHLORIDE 0.4 MG: 0.4 CAPSULE ORAL at 09:05

## 2024-11-30 RX ADMIN — FAMOTIDINE 10 MG: 10 TABLET ORAL at 09:05

## 2024-11-30 ASSESSMENT — PAIN SCALES - GENERAL
PAINLEVEL_OUTOF10: 0
PAINLEVEL_OUTOF10: 0

## 2024-11-30 NOTE — CONSULTS
renal function. Educated the patient on the importance of 40-48oz daily as he reports 32 ounces daily. Limit caffeine and other stimulant intake. Encouraged to monitor Strict I&O.     2) HTN:  Chronic hypotension. On midodrine outpatient. Currently on the hypertensive end. Will hold midodrine at this time. Continue metoprolol 25mg daily. Continue to monitor vital signs per protocol.    3) Fluid overload: History of CHF. Home diuretic was discontinued outpatient due to dehydration, euvolemia and hypotension. Will continue with a fluid restriction of 40-48oz daily. Continue to monitor strict I&O.     4) Anemia: Likely secondary to anemia of chronic disease. Plan to monitor and evaluate out-patient on discharge.    5) DM: Recommendations for strict glycemic control with goal A1c of less than 6.5%. Recommend monitoring of glucose level with strict insulin regimen.

## 2024-12-01 LAB
ANION GAP SERPL CALCULATED.3IONS-SCNC: 10 MMOL/L (ref 9–16)
BASOPHILS # BLD: 0.03 K/UL (ref 0–0.2)
BASOPHILS NFR BLD: 1 % (ref 0–2)
BUN SERPL-MCNC: 47 MG/DL (ref 8–23)
BUN/CREAT SERPL: 17 (ref 9–20)
CALCIUM SERPL-MCNC: 8.4 MG/DL (ref 8.6–10.4)
CHLORIDE SERPL-SCNC: 111 MMOL/L (ref 98–107)
CO2 SERPL-SCNC: 21 MMOL/L (ref 20–31)
CREAT SERPL-MCNC: 2.7 MG/DL (ref 0.7–1.2)
EOSINOPHIL # BLD: 0.16 K/UL (ref 0–0.44)
EOSINOPHILS RELATIVE PERCENT: 4 % (ref 1–4)
ERYTHROCYTE [DISTWIDTH] IN BLOOD BY AUTOMATED COUNT: 13.8 % (ref 11.8–14.4)
GFR, ESTIMATED: 23 ML/MIN/1.73M2
GLUCOSE BLD-MCNC: 159 MG/DL (ref 74–100)
GLUCOSE BLD-MCNC: 171 MG/DL (ref 74–100)
GLUCOSE BLD-MCNC: 172 MG/DL (ref 74–100)
GLUCOSE BLD-MCNC: 192 MG/DL (ref 74–100)
GLUCOSE BLD-MCNC: 65 MG/DL (ref 74–100)
GLUCOSE SERPL-MCNC: 61 MG/DL (ref 74–99)
HCT VFR BLD AUTO: 32.4 % (ref 40.7–50.3)
HGB BLD-MCNC: 10.5 G/DL (ref 13–17)
IMM GRANULOCYTES # BLD AUTO: <0.03 K/UL (ref 0–0.3)
IMM GRANULOCYTES NFR BLD: 0 %
LYMPHOCYTES NFR BLD: 1.26 K/UL (ref 1.1–3.7)
LYMPHOCYTES RELATIVE PERCENT: 30 % (ref 24–43)
MCH RBC QN AUTO: 30.3 PG (ref 25.2–33.5)
MCHC RBC AUTO-ENTMCNC: 32.4 G/DL (ref 28.4–34.8)
MCV RBC AUTO: 93.6 FL (ref 82.6–102.9)
MONOCYTES NFR BLD: 0.5 K/UL (ref 0.1–1.2)
MONOCYTES NFR BLD: 12 % (ref 3–12)
NEUTROPHILS NFR BLD: 53 % (ref 36–65)
NEUTS SEG NFR BLD: 2.23 K/UL (ref 1.5–8.1)
NRBC BLD-RTO: 0 PER 100 WBC
PLATELET # BLD AUTO: 198 K/UL (ref 138–453)
PMV BLD AUTO: 11 FL (ref 8.1–13.5)
POTASSIUM SERPL-SCNC: 4.3 MMOL/L (ref 3.7–5.3)
RBC # BLD AUTO: 3.46 M/UL (ref 4.21–5.77)
SODIUM SERPL-SCNC: 142 MMOL/L (ref 136–145)
WBC OTHER # BLD: 4.2 K/UL (ref 3.5–11.3)

## 2024-12-01 PROCEDURE — 2580000003 HC RX 258: Performed by: INTERNAL MEDICINE

## 2024-12-01 PROCEDURE — 94761 N-INVAS EAR/PLS OXIMETRY MLT: CPT

## 2024-12-01 PROCEDURE — 6370000000 HC RX 637 (ALT 250 FOR IP): Performed by: INTERNAL MEDICINE

## 2024-12-01 PROCEDURE — 82947 ASSAY GLUCOSE BLOOD QUANT: CPT

## 2024-12-01 PROCEDURE — 80048 BASIC METABOLIC PNL TOTAL CA: CPT

## 2024-12-01 PROCEDURE — 97110 THERAPEUTIC EXERCISES: CPT

## 2024-12-01 PROCEDURE — 36415 COLL VENOUS BLD VENIPUNCTURE: CPT

## 2024-12-01 PROCEDURE — 85025 COMPLETE CBC W/AUTO DIFF WBC: CPT

## 2024-12-01 PROCEDURE — 97116 GAIT TRAINING THERAPY: CPT

## 2024-12-01 PROCEDURE — 1200000000 HC SEMI PRIVATE

## 2024-12-01 PROCEDURE — 97535 SELF CARE MNGMENT TRAINING: CPT

## 2024-12-01 RX ORDER — SODIUM CHLORIDE 9 MG/ML
INJECTION, SOLUTION INTRAVENOUS CONTINUOUS
Status: ACTIVE | OUTPATIENT
Start: 2024-12-01 | End: 2024-12-02

## 2024-12-01 RX ADMIN — METOPROLOL SUCCINATE 25 MG: 25 TABLET, EXTENDED RELEASE ORAL at 08:50

## 2024-12-01 RX ADMIN — ROSUVASTATIN CALCIUM 10 MG: 10 TABLET, FILM COATED ORAL at 08:50

## 2024-12-01 RX ADMIN — INSULIN GLARGINE 10 UNITS: 100 INJECTION, SOLUTION SUBCUTANEOUS at 08:50

## 2024-12-01 RX ADMIN — RIVAROXABAN 15 MG: 15 TABLET, FILM COATED ORAL at 08:50

## 2024-12-01 RX ADMIN — OFLOXACIN 3 DROP: 3 SOLUTION OPHTHALMIC at 08:50

## 2024-12-01 RX ADMIN — FINASTERIDE 5 MG: 5 TABLET, FILM COATED ORAL at 08:50

## 2024-12-01 RX ADMIN — PREDNISOLONE ACETATE 1 DROP: 10 SUSPENSION/ DROPS OPHTHALMIC at 16:29

## 2024-12-01 RX ADMIN — PREDNISOLONE ACETATE 1 DROP: 10 SUSPENSION/ DROPS OPHTHALMIC at 21:55

## 2024-12-01 RX ADMIN — SODIUM CHLORIDE: 9 INJECTION, SOLUTION INTRAVENOUS at 15:24

## 2024-12-01 RX ADMIN — OFLOXACIN 3 DROP: 3 SOLUTION OPHTHALMIC at 16:30

## 2024-12-01 RX ADMIN — OFLOXACIN 3 DROP: 3 SOLUTION OPHTHALMIC at 12:21

## 2024-12-01 RX ADMIN — SODIUM CHLORIDE, PRESERVATIVE FREE 10 ML: 5 INJECTION INTRAVENOUS at 08:50

## 2024-12-01 RX ADMIN — TAMSULOSIN HYDROCHLORIDE 0.4 MG: 0.4 CAPSULE ORAL at 08:50

## 2024-12-01 RX ADMIN — OFLOXACIN 3 DROP: 3 SOLUTION OPHTHALMIC at 21:55

## 2024-12-01 RX ADMIN — FAMOTIDINE 10 MG: 10 TABLET ORAL at 08:50

## 2024-12-01 RX ADMIN — TROSPIUM CHLORIDE 20 MG: 20 TABLET, FILM COATED ORAL at 21:55

## 2024-12-01 RX ADMIN — FERROUS SULFATE TAB 325 MG (65 MG ELEMENTAL FE) 325 MG: 325 (65 FE) TAB at 08:50

## 2024-12-01 RX ADMIN — PREDNISOLONE ACETATE 1 DROP: 10 SUSPENSION/ DROPS OPHTHALMIC at 12:21

## 2024-12-01 RX ADMIN — PREDNISOLONE ACETATE 1 DROP: 10 SUSPENSION/ DROPS OPHTHALMIC at 08:50

## 2024-12-01 ASSESSMENT — PAIN SCALES - GENERAL
PAINLEVEL_OUTOF10: 0
PAINLEVEL_OUTOF10: 0

## 2024-12-02 VITALS
HEIGHT: 72 IN | RESPIRATION RATE: 18 BRPM | TEMPERATURE: 97.6 F | WEIGHT: 168.21 LBS | OXYGEN SATURATION: 98 % | BODY MASS INDEX: 22.78 KG/M2 | SYSTOLIC BLOOD PRESSURE: 122 MMHG | HEART RATE: 84 BPM | DIASTOLIC BLOOD PRESSURE: 71 MMHG

## 2024-12-02 PROBLEM — F05 SUNDOWNING: Status: ACTIVE | Noted: 2024-12-02

## 2024-12-02 LAB
ALBUMIN SERPL-MCNC: 3.7 G/DL (ref 3.5–5.2)
ANION GAP SERPL CALCULATED.3IONS-SCNC: 10 MMOL/L (ref 9–16)
BASOPHILS # BLD: 0.03 K/UL (ref 0–0.2)
BASOPHILS NFR BLD: 1 % (ref 0–2)
BUN SERPL-MCNC: 46 MG/DL (ref 8–23)
BUN/CREAT SERPL: 18 (ref 9–20)
CALCIUM SERPL-MCNC: 8.6 MG/DL (ref 8.6–10.4)
CHLORIDE SERPL-SCNC: 109 MMOL/L (ref 98–107)
CO2 SERPL-SCNC: 22 MMOL/L (ref 20–31)
CREAT SERPL-MCNC: 2.6 MG/DL (ref 0.7–1.2)
EOSINOPHIL # BLD: 0.11 K/UL (ref 0–0.44)
EOSINOPHILS RELATIVE PERCENT: 2 % (ref 1–4)
ERYTHROCYTE [DISTWIDTH] IN BLOOD BY AUTOMATED COUNT: 13.6 % (ref 11.8–14.4)
GFR, ESTIMATED: 24 ML/MIN/1.73M2
GLUCOSE BLD-MCNC: 100 MG/DL (ref 74–100)
GLUCOSE BLD-MCNC: 156 MG/DL (ref 74–100)
GLUCOSE SERPL-MCNC: 108 MG/DL (ref 74–99)
HCT VFR BLD AUTO: 34.5 % (ref 40.7–50.3)
HGB BLD-MCNC: 11.4 G/DL (ref 13–17)
IMM GRANULOCYTES # BLD AUTO: <0.03 K/UL (ref 0–0.3)
IMM GRANULOCYTES NFR BLD: 0 %
LYMPHOCYTES NFR BLD: 0.96 K/UL (ref 1.1–3.7)
LYMPHOCYTES RELATIVE PERCENT: 20 % (ref 24–43)
MAGNESIUM SERPL-MCNC: 1.7 MG/DL (ref 1.6–2.4)
MCH RBC QN AUTO: 31 PG (ref 25.2–33.5)
MCHC RBC AUTO-ENTMCNC: 33 G/DL (ref 28.4–34.8)
MCV RBC AUTO: 93.8 FL (ref 82.6–102.9)
MONOCYTES NFR BLD: 0.4 K/UL (ref 0.1–1.2)
MONOCYTES NFR BLD: 8 % (ref 3–12)
NEUTROPHILS NFR BLD: 69 % (ref 36–65)
NEUTS SEG NFR BLD: 3.35 K/UL (ref 1.5–8.1)
NRBC BLD-RTO: 0 PER 100 WBC
PHOSPHATE SERPL-MCNC: 4.3 MG/DL (ref 2.5–4.5)
PLATELET # BLD AUTO: 214 K/UL (ref 138–453)
PMV BLD AUTO: 11.1 FL (ref 8.1–13.5)
POTASSIUM SERPL-SCNC: 4.5 MMOL/L (ref 3.7–5.3)
RBC # BLD AUTO: 3.68 M/UL (ref 4.21–5.77)
SODIUM SERPL-SCNC: 141 MMOL/L (ref 136–145)
WBC OTHER # BLD: 4.9 K/UL (ref 3.5–11.3)

## 2024-12-02 PROCEDURE — 2580000003 HC RX 258: Performed by: INTERNAL MEDICINE

## 2024-12-02 PROCEDURE — 84100 ASSAY OF PHOSPHORUS: CPT

## 2024-12-02 PROCEDURE — 6360000002 HC RX W HCPCS

## 2024-12-02 PROCEDURE — 82040 ASSAY OF SERUM ALBUMIN: CPT

## 2024-12-02 PROCEDURE — 80048 BASIC METABOLIC PNL TOTAL CA: CPT

## 2024-12-02 PROCEDURE — 82947 ASSAY GLUCOSE BLOOD QUANT: CPT

## 2024-12-02 PROCEDURE — 85025 COMPLETE CBC W/AUTO DIFF WBC: CPT

## 2024-12-02 PROCEDURE — 36415 COLL VENOUS BLD VENIPUNCTURE: CPT

## 2024-12-02 PROCEDURE — 6370000000 HC RX 637 (ALT 250 FOR IP): Performed by: INTERNAL MEDICINE

## 2024-12-02 PROCEDURE — 83735 ASSAY OF MAGNESIUM: CPT

## 2024-12-02 RX ORDER — HALOPERIDOL 5 MG/ML
2 INJECTION INTRAMUSCULAR ONCE
Status: COMPLETED | OUTPATIENT
Start: 2024-12-02 | End: 2024-12-02

## 2024-12-02 RX ADMIN — RIVAROXABAN 15 MG: 15 TABLET, FILM COATED ORAL at 08:43

## 2024-12-02 RX ADMIN — FERROUS SULFATE TAB 325 MG (65 MG ELEMENTAL FE) 325 MG: 325 (65 FE) TAB at 08:43

## 2024-12-02 RX ADMIN — PREDNISOLONE ACETATE 1 DROP: 10 SUSPENSION/ DROPS OPHTHALMIC at 08:43

## 2024-12-02 RX ADMIN — FAMOTIDINE 10 MG: 10 TABLET ORAL at 08:43

## 2024-12-02 RX ADMIN — METOPROLOL SUCCINATE 25 MG: 25 TABLET, EXTENDED RELEASE ORAL at 08:43

## 2024-12-02 RX ADMIN — HALOPERIDOL LACTATE 2 MG: 5 INJECTION, SOLUTION INTRAMUSCULAR at 03:53

## 2024-12-02 RX ADMIN — TAMSULOSIN HYDROCHLORIDE 0.4 MG: 0.4 CAPSULE ORAL at 08:43

## 2024-12-02 RX ADMIN — OFLOXACIN 3 DROP: 3 SOLUTION OPHTHALMIC at 08:43

## 2024-12-02 RX ADMIN — FINASTERIDE 5 MG: 5 TABLET, FILM COATED ORAL at 08:43

## 2024-12-02 RX ADMIN — SODIUM CHLORIDE, PRESERVATIVE FREE 10 ML: 5 INJECTION INTRAVENOUS at 08:44

## 2024-12-02 RX ADMIN — OFLOXACIN 3 DROP: 3 SOLUTION OPHTHALMIC at 12:41

## 2024-12-02 RX ADMIN — ROSUVASTATIN CALCIUM 10 MG: 10 TABLET, FILM COATED ORAL at 08:43

## 2024-12-02 RX ADMIN — INSULIN GLARGINE 10 UNITS: 100 INJECTION, SOLUTION SUBCUTANEOUS at 08:43

## 2024-12-02 RX ADMIN — PREDNISOLONE ACETATE 1 DROP: 10 SUSPENSION/ DROPS OPHTHALMIC at 12:40

## 2024-12-02 ASSESSMENT — PAIN SCALES - GENERAL: PAINLEVEL_OUTOF10: 0

## 2024-12-02 NOTE — DISCHARGE SUMMARY
NP-C  12/2/2024, 10:29 AM    [facility] Heart failure documentation  [] The patient has a history of heart transplant or Left Ventricular Assist Device (LVAD).  [If yes, STOP HERE] - [facility] PERFORMANCE EXCLUSION    [] The patient has a current or prior documentation of left ventricular ejection fraction (LVEF) less than or equal to 40%, or moderate or severely depressed left ventricular systolic function [if \"no\", STOP HERE]    ACE / ARB:  [] The patient was prescribed or is already taking and ACE or ARB  [] Reason why ACE or ARB was not prescirbed / taking: contraindicated due to CKD    BETA-BLOCKERS:  [x] The patient was prescribed or is already taking a Beta-blocker  [] Reason why Beta-blocker not prescribed / taking:  na

## 2024-12-02 NOTE — PROGRESS NOTES
[facility]          Department of Pharmacy   Pharmacy Renal Adjustment Note    Vikas Mccormack is a 84 y.o. male. Pharmacist assessment of renally cleared medications.    Recent Labs     11/27/24  1210 11/28/24  0612 11/29/24  0550   CREATININE 3.1* 2.8* 2.8*     Estimated Creatinine Clearance: 21 mL/min (A) (based on SCr of 2.8 mg/dL (H)).    Height:   Ht Readings from Last 1 Encounters:   11/27/24 1.829 m (6')     Weight:  Wt Readings from Last 1 Encounters:   11/29/24 75.2 kg (165 lb 12.6 oz)       The following medication(s) have been adjusted based upon renal function:             Pepcid decreased to 10 mg daily for CrCl less than 30 ml/min    Thank you,  Claudine Colon, [facility],11/29/2024,11:22 AM    
[facility]  Inpatient/Observation/Outpatient Rehabilitation    Date: 2024  Patient Name: Vikas Mccormack       [x] Inpatient Acute/Observation       []  Outpatient  : 1940      Plan of Care/Recert ends    [] Pt no showed for scheduled appointment    [] As a reminder, pt was contacted/attempted contact via phone of upcoming appointments.    [x] Pt refused/declined therapy at this time due to:  x2 attempts made, pt. sleeping at this time with wife present. Wife stated that they are going to try and get him up around noon. Will try back after lunch to see pt. for therapy if appropriate.       [] Pt cancelled due to:  [] No Reason Given   [] Sick/ill   [] Other:      [] Evaluation held by RN/Provider due to:    [] High Heart Rate   [] High Blood Pressure   [] Orthopedic Consult   [] Hgb < 7   [] Other:    [] Pt ordered brace per physician request:  [] Proper fit will be completed and education for wearing/skin checks    [] Pt does not require skilled services due to:      Therapist/Assistant will attempt to see this patient, at our earliest opportunity.       Sarah Beth Mitchell, PTA Date: 2024      
Afternoon assessment and vitals completed at this time. Patient resting in bed, alert to self. Respirations Easy and Nonlabored. Patient denies pain. Lung sounds clear throughout. Patient on tele, rhythm noted to be V-paced. Assessment and vital signs completed as charted. Patient ambulated to bathroom with assistance, return to bed bed alarm on. Care ongoing.     
Attempted to call wife to notify of discharge.  Wife did not answer.  
Called to speak to on-call for Dr Huff at this time to inform of consult, left number to return call with answering service. Awaiting answer.  
Called to speak to on-call for Dr Huff at this time, left number to return call with answering service.   
Checked in with patient prior to shift change. Patient denies any needs or concerns at this time. Call light within reach. Care ongoing.   
Comprehensive Nutrition Assessment    Type and Reason for Visit:  Initial    Nutrition Recommendations/Plan:   Continue current diet.   Start 4 oz ensure enlive TID with meals.      Malnutrition Assessment:  Malnutrition Status:  Moderate malnutrition (11/29/24 1339)    Context:  Acute Illness     Findings of the 6 clinical characteristics of malnutrition:  Energy Intake:  Unable to assess  Weight Loss:  No weight loss     Body Fat Loss:  Mild body fat loss Fat Overlying Ribs, Orbital   Muscle Mass Loss:  Mild muscle mass loss Clavicles (pectoralis & deltoids)  Fluid Accumulation:  No fluid accumulation     Strength:  Not Performed    Nutrition Assessment:    moderate malnutrition r/t inadequate protein/energy intakes aeb mild fat/muscle losses, no meal intakes today due to combativeness, sleeping and not alert enough for meals today. Will add ONS to aid oral intakes when Pt alert enough to consume them.    Nutrition Related Findings:    active bowel sounds, no edema Wound Type: None       Current Nutrition Intake & Therapies:    Average Meal Intake: 0%  Average Supplements Intake: None Ordered  ADULT DIET; Regular; 4 carb choices (60 gm/meal); Low Sodium (2 gm)    Anthropometric Measures:  Height: 182.9 cm (6')  Ideal Body Weight (IBW): 178 lbs (81 kg)    Admission Body Weight: 75.2 kg (165 lb 12.6 oz)  Current Body Weight: 75.2 kg (165 lb 12.6 oz), 93.1 % IBW. Weight Source: Bed scale  Current BMI (kg/m2): 22.5  Usual Body Weight: 81.2 kg (179 lb) (6/25/24)     % Weight Change (Calculated): -7.4  Weight Adjustment For: No Adjustment                 BMI Categories: Normal Weight (BMI 22.0 to 24.9) age over 65    Estimated Daily Nutrient Needs:  Energy Requirements Based On: Kcal/kg  Weight Used for Energy Requirements: Current  Energy (kcal/day): 8102-4015 (25-28/kg)  Weight Used for Protein Requirements: Current  Protein (g/day): 90-105g (1.2-1.4g/kg)  Method Used for Fluid Requirements: 1 ml/kcal  Fluid 
Department of Internal Medicine  Nephrology Attending Progress Note        SUBJECTIVE:  We are following this patient for JACQUE.   Patient resting in bed with son at bedside. Denies chest pain, abdominal pain, nausea, vomiting or diarrhea. Denies lightheadedness or dizziness. Denies shortness of breath or difficulty breathing. Resting on RA with non labored respirations. Reports good appetite with good oral intake.     Physical Exam:    VITALS:  /71   Pulse 84   Temp 97.6 °F (36.4 °C) (Temporal)   Resp 18   Ht 1.829 m (6')   Wt 76.3 kg (168 lb 3.4 oz)   SpO2 98%   BMI 22.81 kg/m²   TEMPERATURE:  Current - Temp: 97.6 °F (36.4 °C); Max - Temp  Av.7 °F (36.5 °C)  Min: 96.9 °F (36.1 °C)  Max: 98.6 °F (37 °C)  RESPIRATIONS RANGE: Resp  Av  Min: 18  Max: 18  PULSE RANGE: Pulse  Av  Min: 73  Max: 84  BLOOD PRESSURE RANGE:  Systolic (24hrs), Av , Min:122 , Max:140   ; Diastolic (24hrs), Av, Min:57, Max:71    PULSE OXIMETRY RANGE: SpO2  Av.3 %  Min: 98 %  Max: 99 %  24HR INTAKE/OUTPUT:    Intake/Output Summary (Last 24 hours) at 2024 1015  Last data filed at 2024 1732  Gross per 24 hour   Intake 1380 ml   Output --   Net 1380 ml     VENT SETTINGS:      Additional Respiratory Assessments  Pulse: 84  Respirations: 18  SpO2: 98 %    Constitutional:  Patient reports no clinical complaints   HEENT:  Normocephalic and no jugular venous distention, with moist mucous membranes  Cardiovascular/Edema:  Rate regular with regular rhythm with S1 and S2  Respiratory:  Lungs clear to auscultation bilaterally  Gastrointestinal:  Soft, none tender to palpation with bowel sounds   Lower extremities:  No edema    DATA:    CBC with Differential:    Lab Results   Component Value Date/Time    WBC 4.9 2024 05:00 AM    RBC 3.68 2024 05:00 AM    RBC 3.55 2024 11:22 AM    HGB 11.4 2024 05:00 AM    HCT 34.5 2024 05:00 AM     2024 05:00 AM     2012 
Dr Huff called asking questions regarding patient's care stating he will review chart and tele visit with patient tomorrow when family is bedside. Updated wife.   
Dr. Huff on an audio telehealth visit with patients wife at this time.   
Entered room and patient is resting in the chair. Vitals and assessment completed at this time, see flowsheet for details. A&O x4. Lung sounds clear throughout. Writer ambulated with patient to the bathroom and then to bed. Patient denies any further needs at this time. Call light in reach and bed alarm on.   
[facility]  Inpatient/Observation/Outpatient Rehabilitation    Date: 2024  Patient Name: Vikas Mccormack       [x] Inpatient Acute/Observation       []  Outpatient  : 1940      Plan of Care/Recert ends    [] Pt no showed for scheduled appointment    [] As a reminder, pt was contacted/attempted contact via phone of upcoming appointments.    [x] Pt refused/declined therapy at this time due to: Pt. not seen for PT this date d/t sleeping and having difficulty staying awake or able to wake up. Spoke with RN prior and stated that they have attempted to wake him up but have been unable to. Pt. not appropriate for therapy d/t being unsafe.      [] Pt cancelled due to:  [] No Reason Given   [] Sick/ill   [] Other:      [] Evaluation held by RN/Provider due to:    [] High Heart Rate   [] High Blood Pressure   [] Orthopedic Consult   [] Hgb < 7   [] Other:    [] Pt ordered brace per physician request:  [] Proper fit will be completed and education for wearing/skin checks    [] Pt does not require skilled services due to:      Therapist/Assistant will attempt to see this patient, at our earliest opportunity.       Sarah Beth Mitchell, PTA Date: 2024      
New orders from Dr. Kohler entered.  
Nurse at bedside for shift assessment.  Assessment and vitals obtained and documented.  Pt assist with walker up to bathroom.  Ambulated well with contact- guard assist.  Pt returned to bed.  Pt denies pain this morning.  White board updated, water refreshed.  Pt states all other needs met at this time.  Care ongoing.   
Nurse at bedside for shift assessment. Pt resting in bed with eyes closed. Vitals and assessment obtained and documented.  White board updated.  Pt denies any pain at this time.   Orange juice given for low blood sugar noted on morning labs.  Pt alert and oriented to self and place.  Pt states all other needs met at this time. Care ongoing.   
Nurse at patient bedside for morning shift assessment.  Upon entering, patient resting in bed with eyes open.  Vitals and assessment obtained and documented.  Patient currently on room air.  Patient denies pain at this time.   White board updated.  Water refreshed.  Patient slightly agitated this morning and eager to leave, repeatedly getting up out of bed.  Nurse reminded patient that wife will be in today after her doctor's appointment this afternoon. Patient states all other needs met at this time. Bed alarm on. Care ongoing.       
Occupational Therapy  Facility/Department: [facility] MED SURG  Daily Treatment Note  NAME: Vikas Mccormack  : 1940  MRN: [id number]    Date of Service: 2024    Discharge Recommendations:  Continue to assess pending progress         Patient Diagnosis(es): The encounter diagnosis was JACQUE (acute kidney injury) (HCC).     Assessment   Activity Tolerance: Patient tolerated treatment well  Discharge Recommendations: Continue to assess pending progress     Plan  Occupational Therapy Plan  Times Per Day: Once a day  Days Per Week: 7 Days  Current Treatment Recommendations: Strengthening;Balance training;Functional mobility training;Endurance training;Safety education & training;Patient/Caregiver education & training;Equipment evaluation, education, & procurement;Self-Care / ADL    Restrictions  Restrictions/Precautions  Restrictions/Precautions: General Precautions;Fall Risk    Subjective  Subjective  Subjective: Pt lying in bed upon arrival. Pt agreed to participate in therapy session.  Pain: Pt had no complaints of pain.            Objective  Vitals          ADL  Toileting: Contact guard assistance  Toileting Skilled Clinical Factors: CGA to complete claudia care and clothing mgmt.  Functional Mobility: Contact guard assistance  Functional Mobility Skilled Clinical Factors: CGA to complete func mob from bed to chair with RW.  Additional Comments: SBA to complete bed mob from supine to sit EOB. CGA to complete sit to stand transfer from EOB.  Skin Care: Bath wipes  OT Exercises  Exercise Treatment: Pt completed BUE Ther ex x 4 planes x 15 reps x 1 set to increase UE strength and endurance in order to ease completion of ADL tasks. Pt required RBs as needed secondary to fatigue.     Safety Devices  Type of Devices: Call light within reach;Chair alarm in place;Left in chair;Patient at risk for falls;Gait belt;Nurse notified    Patient Education  Education Given To: Patient  Education Provided: Role of Therapy;Plan of 
Occupational Therapy  Facility/Department: [facility] MED SURG  Daily Treatment Note  NAME: Vikas Mccormack  : 1940  MRN: [id number]    Date of Service: 2024    Discharge Recommendations:  Continue to assess pending progress       Patient Diagnosis(es): The encounter diagnosis was JACQUE (acute kidney injury) (HCC).     Assessment   Activity Tolerance: Patient tolerated treatment well;Treatment limited secondary to decreased cognition  Discharge Recommendations: Continue to assess pending progress     Plan  Occupational Therapy Plan  Times Per Day: Once a day  Days Per Week: 7 Days  Current Treatment Recommendations: Strengthening;Balance training;Functional mobility training;Endurance training;Safety education & training;Patient/Caregiver education & training;Equipment evaluation, education, & procurement;Self-Care / ADL    Restrictions  Restrictions/Precautions  Restrictions/Precautions: General Precautions;Fall Risk    Subjective  Subjective  Subjective: PT in room upon entry finishing eval with denying using the bathroom and agreeable with therex.  Pain: Pain in lower back with unable to rate pain.  Orientation  Overall Orientation Status: Impaired  Orientation Level: Oriented to person  Cognition  Cognition Comment: Unable to state where he was, the date, or his age.       Objective  Vitals     Bed Mobility Training  Bed Mobility Training: No     ADL  Skin Care: Bath wipes  OT Exercises  Exercise Treatment: Pt complete BUE 1# free weight x 15 reps x  6 olanes x 1 set to increase  strength and endurance.     Safety Devices  Type of Devices: Call light within reach;Chair alarm in place;Left in chair;Patient at risk for falls    Patient Education  Education Given To: Patient  Education Provided: Role of Therapy;Plan of Care  Education Method: Verbal  Education Outcome: Verbalized understanding    Goals  Short Term Goals  Time Frame for Short Term Goals: 21 visits  Short Term Goal 1: Patient to complete ADL 
Occupational Therapy  Facility/Department: [facility] MED SURG  Daily Treatment Note  NAME: Vikas Mccormack  : 1940  MRN: [id number]    Date of Service: 2024    Discharge Recommendations:  Continue to assess pending progress         Patient Diagnosis(es): The encounter diagnosis was JACQUE (acute kidney injury) (HCC).     Assessment   Assessment: Tx session limited d/t breakfast arrival.  Activity Tolerance: Patient tolerated treatment well  Discharge Recommendations: Continue to assess pending progress     Plan  Occupational Therapy Plan  Times Per Day: Once a day  Days Per Week: 7 Days  Current Treatment Recommendations: Strengthening;Balance training;Functional mobility training;Endurance training;Safety education & training;Patient/Caregiver education & training;Equipment evaluation, education, & procurement;Self-Care / ADL    Restrictions  Restrictions/Precautions  Restrictions/Precautions: General Precautions;Fall Risk    Subjective  Subjective  Subjective: Pt lying in bed upon arrival. Pt agreed to participate in therapy session.  Pain: Pt had no complaints of pain.            Objective  Vitals          ADL  Functional Mobility: Contact guard assistance  Functional Mobility Skilled Clinical Factors: CGA to complete func mob from bed to chair with RW.  Additional Comments: CGA to complete bed mob from supine to sit EOB. CGA to complete sit to stand transfer from EOB.  OT Exercises  Exercise Treatment: Pt completed BUE Ther ex x 4 planes x 15 reps x 1 set to increase UE strength and endurance in order to ease completion of ADL tasks. Pt required RBs as needed secondary to fatigue.     Safety Devices  Type of Devices: Call light within reach;Chair alarm in place;Left in chair;Patient at risk for falls;Gait belt;Nurse notified (Simultaneous filing. User may not have seen previous data.)    Patient Education  Education Given To: Patient  Education Provided: Role of Therapy;Plan of Care  Education Method: 
Occupational Therapy  Facility/Department: [facility] MED SURG  Occupational Therapy Initial Assessment    Name: Vikas Mccormack  : 1940  MRN: [id number]  Date of Service: 2024    Discharge Recommendations:  Continue to assess pending progress          Patient Diagnosis(es): The encounter diagnosis was JACQUE (acute kidney injury) (HCC).  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, Biventricular ICD (implantable cardioverter-defibrillator) in place, Biventricular pacemaker check, CAD (coronary artery disease), CKD (chronic kidney disease), Dementia (HCC), Diabetes mellitus (HCC), Esophageal reflux, History of echocardiogram, Hx of blood clots, Hx of left heart catheterization by ventricular puncture, Hyperlipidemia, Hypertension, Mantle cell lymphoma, unspecified site, extranodal and solid organ sites, Non Hodgkin's lymphoma (HCC), Nonischemic dilated cardiomyopathy (HCC), Pain in joint, lower leg, Pain in limb, S/P cardiac catheterization, Thoracic or lumbosacral neuritis or radiculitis, unspecified, and Unspecified acute reaction to stress.  Past Surgical History:  has a past surgical history that includes Elbow surgery (); Neck surgery (); Foot surgery (); Bladder surgery (); Cardiac catheterization; Cardiac pacemaker placement (); pacemaker placement (); Skin cancer excision (2013); Diagnostic Cardiac Cath Lab Procedure (2013); Colonoscopy (10/15/2015); Upper gastrointestinal endoscopy (10/15/2015); Lung lobectomy (N/A, 2017); thoracotomy (2017); Pacemaker Change (Left, 2021); Colonoscopy (); Eye surgery (Right, 10/09/2023); Eye surgery (Right, 10/23/2023); Eye surgery (Left, 2023); and TURP (N/A, 2024).    Treatment Diagnosis: Weakness      Assessment  Performance deficits / Impairments: 
Patient attempted to climb out of the bed because he had to use the bathroom. Writer and second nurse assisted patient to the bathroom and back to bed with a front wheeled walker. Patient tolerated fairly well. Patient is oriented to person and time and was able to tell writer he was in [place] and his home address. He was not clear on his reason for admission or the name of the hospital but was easily reoriented at this time.  Vitals and assessment were completed. Writer walked patient through the medications that would be administered tonight and encouraged patient to ask questions about the medications and therapies. Patient knew he had to do his eyedrops and how many pills he needed for the night.  Call light and bedside table remain within reach. Writer educated patient on the bed alarm. Patient denies needs at this time. Care ongoing.     
Patient attempted to get out of chair again, got up walked around the room and sat back down in the chair. Alarm on.  
Patient continues to be combative and sexually inappropriate to female staff. Writer notified Dr. Kohler due to security stating \"Patient either needs medication or restrained or we will have to safe care if we have to come up here again.\"    
Patient continues to set off alarms and be agitated and aggressive with staff. Writer and other staff members have called and left voicemails to family.  
Patient continues to set off bed alarm and is getting aggressive verbally and physically with staff. Patient consistently states \"I am getting out of here.\"  
Patient did well overnight. He rested comfortably and was calm and cooperative when walking back and forth to the restroom. Patient does not remember to call out but does wait for assistance when asked.  
Patient fell asleep and is resting in bed at this time comfortably.  
Patient has increased confusion, setting off bed alarm and has taken off his telemetry multiple times. Writer did notify Dr. Kohler for any further orders.   
Patient is resting in bed quietly at this time with eyes closed and respirations unlabored. Care ongoing.   
Patient is starting to wake up at this time. Patient is still confused but is talking to writer and did attempt to get out of bed once stating \"I have dinner in the oven.\" Writer attempted to reorient patient and helped patient to lay back down in bed. Patient was also changed for incontinence of urine at this time. Patient also states he is hungry so Amarilis HALL is at bedside to feed patient dinner. Care ongoing.   
Patient resting comfortably at this time. Patient denies any pain and denies any other needs at this time. Patient alert & oriented to self and month. Able to follow commands. Assessment and vitals as charted. Bed alarm on, bed locked, gripper socks on, call light within reach and able to use appropriately. Will continue to monitor this shift.    
Patient set off bed alarm stating he had to go to the bathroom again. Writer assisted patient to bathroom and back to bed. Call light in reach and bed alarm on.  
Patient set off bed alarm, upon entering the room patient stated that they needed to go to the bathroom and that they were incontinent. Patient was only oriented to self but was easily reoriented. Writer assisted patient to the bathroom, changed his brief, gown, and linens, and assisted patient in washing up using bath wipes. Patient denies further needs at this time, is back in bed and has call light in reach.  
Patient set off the bed alarm to use the bathroom. He remains pleasant and cooperative, ambulating to the bathroom and back to bed with his walker. Lights are dimmed and bed alarm set. Patient is aware. Call light and bedside table remain within reach. Care ongoing.    
Patient's family called back and was updated on patient's condition over night.  
Patient's wife was updated on how Harsh was doing tonight. She told writer she was going to stay home and would be back in the morning.  
Physical Therapy  Facility/Department: [facility] MED SURG  Daily Treatment Note  NAME: Vikas Mccormack  : 1940  MRN: [id number]    Date of Service: 2024    Discharge Recommendations:  Continue to assess pending progress, Outpatient PT, Home with Home health PT       Patient Diagnosis(es): The encounter diagnosis was JACQUE (acute kidney injury) (HCC).    Assessment  Assessment: Bed mobility:MinAx1. Transfers:CGA--additional time needed. Pt. ambulated 10ftx1 with FWW and CGA for safety. Declined further ambulation at this time. Slow cadance noted. No LOB or unsteadiness. Pt. completed seated BLE therex x15 in all planes of motion. Therapy session ended early d/t pt. breakfast arriving.  Activity Tolerance: Patient tolerated treatment well    Plan  Physical Therapy Plan  General Plan: 2 times a day 7 days a week  Specific Instructions for Next Treatment: 1x/ daily on weekends and holidays  Current Treatment Recommendations: Strengthening;Balance training;Functional mobility training;Transfer training;Endurance training;Pain management;Patient/Caregiver education & training;Stair training;Gait training;Home exercise program;Therapeutic activities    Restrictions  Restrictions/Precautions  Restrictions/Precautions: General Precautions, Fall Risk     Subjective   Subjective  Subjective: Pt. in bed upon arrival, agreeable to therapy at this time  Pain: denies  Orientation  Overall Orientation Status: Impaired    Objective  Bed Mobility Training  Bed Mobility Training: Yes  Overall Level of Assistance: Minimum assistance;Assist X1  Interventions: Verbal cues  Rolling: Minimum assistance;Assist X1  Supine to Sit: Minimum assistance;Assist X1  Scooting: Stand-by assistance;Assist X1  Transfer Training  Transfer Training: Yes  Overall Level of Assistance: Contact-guard assistance;Assist X1;Additional time  Interventions: Safety awareness training;Verbal cues  Sit to Stand: Contact-guard assistance;Assist X1;Additional 
Physical Therapy  Facility/Department: [facility] MED SURG  Daily Treatment Note  NAME: Vikas Mccormack  : 1940  MRN: [id number]    Date of Service: 2024    Discharge Recommendations:  Continue to assess pending progress, Outpatient PT, Home with Home health PT     Patient Diagnosis(es): The encounter diagnosis was JACQUE (acute kidney injury) (HCC).    Assessment  Assessment: Pt. ambulated 15ftx2 with FWW and CGA for safety. Commode use and transfer--CGA/SBA. Slow cadance with decreased BLE step length. Bed mobility:SBA. Transfers:CGA. Pt. completed seated BLE therex x15 in all planes of motion. RB throughout d/t fatigue.  Activity Tolerance: Patient tolerated treatment well    Plan  Physical Therapy Plan  General Plan: 2 times a day 7 days a week  Specific Instructions for Next Treatment: 1x/ daily on weekends and holidays  Current Treatment Recommendations: Strengthening;Balance training;Functional mobility training;Transfer training;Endurance training;Pain management;Patient/Caregiver education & training;Stair training;Gait training;Home exercise program;Therapeutic activities    Restrictions  Restrictions/Precautions  Restrictions/Precautions: General Precautions, Fall Risk     Subjective   Subjective  Subjective: Pt. in bed upon arrival, needing to use the commode  Pain: denies  Orientation  Overall Orientation Status: Impaired    Objective  Bed Mobility Training  Bed Mobility Training: Yes  Overall Level of Assistance: Stand-by assistance;Assist X1  Interventions: Verbal cues  Rolling: Stand-by assistance;Assist X1  Supine to Sit: Stand-by assistance;Assist X1  Scooting: Stand-by assistance;Assist X1  Transfer Training  Transfer Training: Yes  Overall Level of Assistance: Contact-guard assistance;Assist X1;Additional time  Interventions: Safety awareness training;Verbal cues  Sit to Stand: Contact-guard assistance;Assist X1;Additional time  Stand to Sit: Contact-guard assistance;Assist X1;Additional 
RN in to assess patient. Patient up to bathroom with Aide Josephine, Alert to self only, states the year is 2023, states he thinks he is in [facility].  Respirations Easy and Nonlabored. Patient denies pain. Lung sounds clear throughout, diminished in bases. Patient on tele, rhythm noted to be V-Paced. Assessment and vital signs completed as charted. Care ongoing.     This RN called and spoke to wife regarding change in orientation, wife states that he has dementia and it is normal for patient to be intermittently confused.   
Reassessment and vitals obtained at this time as charted. Vitals WNL. FLACC score 0. Patient is responsive to voice at this time but still will not answer questions. Lung sounds clear to diminished throughout. Abdomen soft and nontender with active bowel sounds. Assessment otherwise as charted, see flowsheets. Patient was also changed at this time for incontinence of urine, claudia care provided, new brief applied, and bed pad changed. Slight redness noted to buttocks, zinc paste applied and patient was repositioned on his left side with pillow support. Patient is resting in the bed with bed alarm on, call light in reach, and family at bedside. Care ongoing.   
Report received from Krista, checked in bedside with patient who is resting comfortably in bed, denies needs or concerns. Easy nonlabored respirations noted. No acute distress noted. Care ongoing.     
Reviewed discharge instructions with patient and spouse.   Aware of no new medication changes.   Reviewed home medications and when next dose is due.   Aware of need for repeat lab work to be done on 12/9/24.   Reviewed follow up appointments.  Wife stated to cancel Dr. Mcginnis appointment as she will contact Dr. Huff's office when she gets home as this is his Nephrologist.  Instructed to follow a Renal diet.   Reviewed importance of watching fluid intakes and to keep between 40-56 ounces a day plus sips of liquids with his medications.  Educational handout given on JACQUE.   Questions answered.   Verbalizes understanding.  Copy of discharge instructions given to patient.  
[facility]  Inpatient/Observation/Outpatient Rehabilitation    Date: 2024  Patient Name: Vikas Mccormack       [x] Inpatient Acute/Observation       []  Outpatient  : 1940       [] Pt no showed for scheduled appointment    [] As a reminder, pt was contacted/attempted contact via phone of upcoming appointments.    [] Pt refused/declined therapy at this time due to:           [x] Pt cancelled due to:  [] No Reason Given   [] Sick/ill   [] Other:  Pt. Had restless night, RN asked to allow pt. To sleep at this time.     [] Evaluation held by RN/Provider due to:    [] High Heart Rate   [] High Blood Pressure   [] Orthopedic Consult   [] Hgb < 7   [] Other:    [] Pt ordered brace per physician request:  [] Proper fit will be completed and education for wearing/skin checks    [] Pt does not require skilled services due to:      Therapist/Assistant will attempt to see this patient, at our earliest opportunity.       Jennifer Downs, PTA Date: 2024    
[facility]  Inpatient/Observation/Outpatient Rehabilitation    Date: 2024  Patient Name: Vikas Mccormack       [x] Inpatient Acute/Observation       []  Outpatient  : 1940     [] Pt no showed for scheduled appointment    [] As a reminder, pt was contacted/attempted contact via phone of upcoming appointments.    [] Pt refused/declined therapy at this time due to:           [x] Pt cancelled due to:  [] No Reason Given   [] Sick/ill   [x] Other: Attempted x 2, patient sleeping in bed, unable to arouse-patient given medication last night due to agitation.    [] Evaluation held by RN/Provider due to:    [] High Heart Rate   [] High Blood Pressure   [] Orthopedic Consult   [] Hgb < 7   [] Other:    [] Pt ordered brace per physician request:  [] Proper fit will be completed and education for wearing/skin checks    [] Pt does not require skilled services due to:      Therapist/Assistant will attempt to see this patient, at our earliest opportunity.       Chitra Tripp OT Date: 2024   
Shift assessment and vitals obtained at this time as charted. Vitals WNL. Patient does not respond to writer at this time when speaking to patient so FLACC score was completed and is a 0. Writer unable to assess patients orientation at this time due to patient being not interactive from medications received overnight for agitation. Patient does withdraw from pain. Lung sounds clear to diminished throughout. Respirations even and unlabored. Abdomen soft with active bowel sounds. Assessment otherwise as charted, see flowsheets. Writer gave patient insulin and normal saline flush, but is unable to give patient PO medications and eye drops at this time. Patient resting in the bed with bed alarm on and call light in reach. Care ongoing.   
Spoke to Nursing Supervisor Robin regarding consult to [place] not answered with multiple attempts, message sent via supervisor phone. Awaiting response.    
Unable to redirect patient. Multiple nurses in the room at this time as patient is trying to hit staff and stating \"I am fucking leaving this facility and I will pop you in the mouth.\" Supervisor LINN Pina at bedside as well. Writer will call family to attempt to redirect patient.   
Vitals and assessment were completed at this time. Patient is oriented to person place and time but not situation.   Writer walked patient through the medications that would be administered tonight and encouraged patient to ask questions about the medications and therapies.   Patient denies questions. Call light and bedside table remain within reach. Patient denies needs at this time. Care ongoing.     
Writer at bedside to complete evening assessment. Upon entry to room, pt awake and in bed, respirations normal and unlabored while on room air. Vitals obtained and assessment completed, see flow sheet for details. Pt denies needs from writer at this time. Call light in reach. Care is ongoing.     
Writer attempted to wake patient up at this time with help from his wife. Writer turned room lights on and sat patient almost all the way up in his bed. Patient opened eyes momentarily and muttered a few incomprehensible words before falling back asleep. Wife is continuing to attempt to wake patient up. Care ongoing.   
Writer called security due to patient getting combative.   
Writer entered room due to tele reading that it was disconnected and patient was attempting to get out of bed saying he had to go to the bathroom. After walking to the bathroom patient stated that they were ready to go home and started walking around the room. Other nurses entered the room and were asking the patient to sit in the chair. Patient removed gown and started grabbing clothes and shoes from closet saying he was going to walk home. Security was called and is coming up to the room due to patient becoming more agitated.  Jennifer KIRKPATRICK was notified by another nurse and ordered haloperidol. Supervisor notified and came to the room as well. Patient sat in the chair and haldol was given. Patient stated \"I'll pop you all in the face.\" Patient then stood up and tried to leave again. Wife and granddaughter called and voicemails left. Granddaughter called back, updates given and granddaughter talked to patient while talking to granddaughter patient sat down in the bed and after talking he laid down in the bed on his own. He was still agitated but was not attempting to get out of the bed. After a while he set off the bed alarm again and started walking to the bathroom, writer and the PCT entered and the patient started making inappropriate comments. Patient then went back to bed. After a while patient attempted to get out of bed again, writer and another RN entered the room and patient stated \"I'll pop you in the face\" and attempted to swing at other RN. Supervisor entered the room and chang morning labs. Patient then got up and started walking around the room. Patient is now sitting in the chair with the alarm on and call light in reach.  
Writer has messaged Dr. Huff twice about doing a telehealth visit with patient today but no response has been received. Will continue to attempt to get ahold of him.   
Writer left voicemail for wife to call writer back.   
Yonas Kohler M.D.  Internal Medicine Progress Note    Patient: Vikas Mccormack  Date of Admission: 11/27/2024 11:54 AM  Date of Evaluation: 11/29/2024      Subjective:  Vikas Mccormack is a 84 y.o. male who is seen for follow up of JACQUE superimposed on CKD due to poor oral intake.  Overnight he became confused / agitated and required Hydroxyzine and subsequently Haldol to calm him.  He finally calmed down and this AM is somnollent      Review of Systems:  Constitutional:negative  for fevers, and negative for chills.  Respiratory: negative for shortness of breath, negative for cough, and negative for wheezing  Cardiovascular: negative for chest pain, negative for palpitations, and negative for syncope  Gastrointestinal: negative for abdominal pain, negative for nausea,negative for vomiting, negative for diarrhea, negative for constipation, and negative for hematochezia or melena  Genitourinary: negative for dysuria, negative for urinary urgency, negative for urinary frequency, and negative for hematuria  Neurological: negative for unilateral weakness, numbness or tingling.    All other systems were reviewed with the patient and are negative except as stated above      VITALS:  Temp: 97.7 °F (36.5 °C)  BP: (!) 157/72  Respirations: 16  Pulse: 68  SpO2: 98 %    Weight  Wt Readings from Last 3 Encounters:   11/29/24 75.2 kg (165 lb 12.6 oz)   11/18/24 75.5 kg (166 lb 6.4 oz)   10/29/24 76.7 kg (169 lb)     Body mass index is 22.48 kg/m².  -----------------------------------------------------------------  EXAM:  GEN:    Somnollent.  Difficult to arouse  EYES:  EOMI, pupils equal   NECK: Supple. No lymphadenopathy.  No carotid bruit  CVS:    regular rate and rhythm, 2/6 systolic murmur  PULM:  CTA, no wheezes, rales or rhonchi, no acute respiratory distress  ABD:    Bowels sounds normal.  Abdomen is soft.  No distention.  no tenderness to palpation.   EXT:   no edema bilaterally .  No calf tenderness.   NEURO: Moves all 
Yonas Kohler M.D.  Internal Medicine Progress Note    Patient: Vikas Mccormack  Date of Admission: 11/27/2024 11:54 AM  Date of Evaluation: 11/30/2024      Subjective:  Vikas Mccormack is a 84 y.o. male who is seen for follow up of JACQUE superimposed on CKD due to poor oral intake.  He is more awake and alert this AM.  He has been eating well per wife.        Review of Systems:  Constitutional:negative  for fevers, and negative for chills.  Respiratory: negative for shortness of breath, negative for cough, and negative for wheezing  Cardiovascular: negative for chest pain, negative for palpitations, and negative for syncope  Gastrointestinal: negative for abdominal pain, negative for nausea,negative for vomiting, negative for diarrhea, negative for constipation, and negative for hematochezia or melena  Genitourinary: negative for dysuria, negative for urinary urgency, negative for urinary frequency, and negative for hematuria  Neurological: negative for unilateral weakness, numbness or tingling.    All other systems were reviewed with the patient and are negative except as stated above      VITALS:  Temp: 98.6 °F (37 °C)  BP: (!) 147/80  Respirations: 18  Pulse: 87  SpO2: 97 %    Weight  Wt Readings from Last 3 Encounters:   11/30/24 74 kg (163 lb 2.3 oz)   11/18/24 75.5 kg (166 lb 6.4 oz)   10/29/24 76.7 kg (169 lb)     Body mass index is 22.13 kg/m².  -----------------------------------------------------------------  EXAM:  GEN:    Somnollent.  Difficult to arouse  EYES:  EOMI, pupils equal   NECK: Supple. No lymphadenopathy.  No carotid bruit  CVS:    regular rate and rhythm, 2/6 systolic murmur  PULM:  CTA, no wheezes, rales or rhonchi, no acute respiratory distress  ABD:    Bowels sounds normal.  Abdomen is soft.  No distention.  no tenderness to palpation.   EXT:   no edema bilaterally .  No calf tenderness.   NEURO: Moves all extremities.  Motor and sensory are grossly intact  SKIN:  No rashes.  No skin 
Yonas Kohler M.D.  Internal Medicine Progress Note    Patient: Vikas Mccormack  Date of Admission: 11/27/2024 11:54 AM  Date of Evaluation: 12/1/2024      Subjective:  Vikas Mccormack is a 84 y.o. male who is seen for follow up of JACQUE superimposed on CKD due to poor oral intake.  He is more awake and alert this AM.  He denies any current complaints       Review of Systems:  Constitutional:negative  for fevers, and negative for chills.  Respiratory: negative for shortness of breath, negative for cough, and negative for wheezing  Cardiovascular: negative for chest pain, negative for palpitations, and negative for syncope  Gastrointestinal: negative for abdominal pain, negative for nausea,negative for vomiting, negative for diarrhea, negative for constipation, and negative for hematochezia or melena  Genitourinary: negative for dysuria, negative for urinary urgency, negative for urinary frequency, and negative for hematuria  Neurological: negative for unilateral weakness, numbness or tingling.    All other systems were reviewed with the patient and are negative except as stated above      VITALS:  Temp: (!) 96.3 °F (35.7 °C)  BP: 118/64  Respirations: 18  Pulse: 78  SpO2: 96 %    Weight  Wt Readings from Last 3 Encounters:   11/30/24 74 kg (163 lb 2.3 oz)   11/18/24 75.5 kg (166 lb 6.4 oz)   10/29/24 76.7 kg (169 lb)     Body mass index is 22.13 kg/m².  -----------------------------------------------------------------  EXAM:  GEN:    Somnollent.  Difficult to arouse  EYES:  EOMI, pupils equal   NECK: Supple. No lymphadenopathy.  No carotid bruit  CVS:    regular rate and rhythm, 2/6 systolic murmur  PULM:  CTA, no wheezes, rales or rhonchi, no acute respiratory distress  ABD:    Bowels sounds normal.  Abdomen is soft.  No distention.  no tenderness to palpation.   EXT:   no edema bilaterally .  No calf tenderness.   NEURO: Moves all extremities.  Motor and sensory are grossly intact  SKIN:  No rashes.  No skin 
  Labs ordered: BMP, CBC  Monitor I/O's  Daily weights  Appreciate Dr Huff  Imaging:   no further imaging studies ordered today  Medications:   Not on any diuretics  IVL  Medication Monitoring / High Risk Medications: none      Dysautonomia / hypotension    Condition is a chronic stable condition  Treatment plan:   Up with assistance / fall precautions  Check orthostatics  Medications:   Continue Midodrine     Chronic combined systolic and diastolic CHF    Condition is a chronic stable condition  Treatment plan:   Appreciate Dr Maldonado  I/O's  Daily weights  EF 30-35% on echo from 1/19/24  H/o ICD placement  Medications:   Continue Metoprolol  Not a candidate for ACE due to JACQUE     Paroxysmal atrial fibrillation with secondary hypercoagulable state    Condition is a chronic stable condition  Treatment plan:   Telemetry   Medications:   Continue Metoprolol for rate control  Continue Xarelto for stroke prophylaxis     Diabetes mellitus  Condition is a chronic stable condition  Treatment plan:   POC glucose  Medications:   Continue Lantus 10 units daily     Hospital Prophylaxis:   DVT: Xarelto   Stress Ulcer: PPI      Disposition:  Shared decision making: All test results, treatment options and disposition options were discussed with the patient today  Social determinants of health that may impact management: none  Code status: DNR-CCA   Disposition: Discharge plan is home    [facility] Advanced Care Planning documentation:  [x] I have confirmed that the patient's Advance Care Plan is present, Code Status is documented, or surrogate decision maker is listed in the patient's medical record  [If \"yes\", STOP HERE]     [] The patient's Advance Care Plan is NOT present because:    []  I confirmed today that the patient does not wish or was not able to name a   surrogate decision maker or provide and advance care plan.    [] Hospice care is currently being provided or has been provided within the   calendar year.    []  I did NOT 
Feet  Assistive Device: Walker, rolling  Interventions: Safety awareness training;Verbal cues  Stance: Time  Gait Abnormalities: Decreased step clearance      OutComes Score    AM-PAC - Mobility    AM-PAC Basic Mobility - Inpatient   How much help is needed turning from your back to your side while in a flat bed without using bedrails?: A Lot  How much help is needed moving from lying on your back to sitting on the side of a flat bed without using bedrails?: A Lot  How much help is needed moving to and from a bed to a chair?: A Little  How much help is needed standing up from a chair using your arms?: A Little  How much help is needed walking in hospital room?: A Little  How much help is needed climbing 3-5 steps with a railing?: A Little  AM-[facility] Inpatient Mobility Raw Score : 16  AM-PAC Inpatient T-Scale Score : 40.78  Mobility Inpatient CMS 0-100% Score: 54.16  Mobility Inpatient CMS G-Code Modifier : CK      Goals  Short Term Goals  Time Frame for Short Term Goals: 15 days  Short Term Goal 1: Pt will be initiated on HEP and will be independent, and have good understanding by discharge for strength, positioning, and mobility.  Short Term Goal 2: Pt will be independent with transfers with appropriate and least restrictive device with good safety to reduce risk of falls.  Short Term Goal 3: Pt will be be independent and safe with ambulation using least restrictive device for up to 350 feet to allow patient to ambulate within the community for grocery and needs.  Short Term Goal 4: Pt will be SBA to independent to negotiate 6 stairs with handrail to allow safe entry and exit to home.  Patient Goals   Patient Goals : go home       Education  Patient Education  Education Given To: Patient  Education Provided: Role of Therapy;Plan of Care  Education Method: Verbal  Barriers to Learning: Cognition  Education Outcome: Verbalized understanding;Continued education needed      Therapy Time   Individual Concurrent Group

## 2024-12-02 NOTE — DISCHARGE INSTR - DIET
Good nutrition is important when healing from an illness, injury, or surgery.  Follow any nutrition recommendations given to you during your hospital stay.   If you were given an oral nutrition supplement while in the hospital, continue to take this supplement at home.  You can take it with meals, in-between meals, and/or before bedtime. These supplements can be purchased at most local grocery stores, pharmacies, and chain [employer]-stores.   If you have any questions about your diet or nutrition, call the hospital and ask for the dietitian.  Renal Diet  Fluid restriction:  Keep between 40-56 ounces per day.  At least take in 40-48 ounces per day.

## 2024-12-02 NOTE — PLAN OF CARE
Problem: Chronic Conditions and Co-morbidities  Goal: Patient's chronic conditions and co-morbidity symptoms are monitored and maintained or improved  11/27/2024 2204 by Krista Borges RN  Outcome: Progressing     Problem: Discharge Planning  Goal: Discharge to home or other facility with appropriate resources  11/27/2024 2204 by Krista Borges RN  Outcome: Progressing     Problem: Safety - Adult  Goal: Free from fall injury  11/27/2024 2204 by Krista Borges RN  Outcome: Progressing     Problem: Pain  Goal: Verbalizes/displays adequate comfort level or baseline comfort level  11/27/2024 2204 by Krista Borges RN  Outcome: Progressing     
  Problem: Chronic Conditions and Co-morbidities  Goal: Patient's chronic conditions and co-morbidity symptoms are monitored and maintained or improved  11/28/2024 1032 by Nancy Kim RN  Outcome: Progressing  Flowsheets (Taken 11/28/2024 1032)  Care Plan - Patient's Chronic Conditions and Co-Morbidity Symptoms are Monitored and Maintained or Improved: Monitor and assess patient's chronic conditions and comorbid symptoms for stability, deterioration, or improvement     Problem: Discharge Planning  Goal: Discharge to home or other facility with appropriate resources  11/28/2024 1032 by Nancy Kim RN  Outcome: Progressing  Flowsheets (Taken 11/28/2024 1032)  Discharge to home or other facility with appropriate resources: Identify barriers to discharge with patient and caregiver     Problem: Safety - Adult  Goal: Free from fall injury  11/28/2024 1032 by Nancy Kim RN  Outcome: Progressing  Flowsheets (Taken 11/28/2024 1032)  Free From Fall Injury: Instruct family/caregiver on patient safety     Problem: Pain  Goal: Verbalizes/displays adequate comfort level or baseline comfort level  11/28/2024 1032 by Nancy Kim RN  Outcome: Progressing  Flowsheets (Taken 11/28/2024 1032)  Verbalizes/displays adequate comfort level or baseline comfort level:   Encourage patient to monitor pain and request assistance   Assess pain using appropriate pain scale     Problem: Neurosensory - Adult  Goal: Achieves stable or improved neurological status  Outcome: Progressing  Flowsheets (Taken 11/28/2024 1032)  Achieves stable or improved neurological status: Assess for and report changes in neurological status     Problem: Musculoskeletal - Adult  Goal: Return mobility to safest level of function  Outcome: Progressing  Flowsheets (Taken 11/28/2024 1032)  Return Mobility to Safest Level of Function: Assess patient stability and activity tolerance for standing, transferring and ambulating with or without assistive 
  Problem: Chronic Conditions and Co-morbidities  Goal: Patient's chronic conditions and co-morbidity symptoms are monitored and maintained or improved  11/28/2024 1911 by Tatianna Valdez RN  Outcome: Progressing  Flowsheets  Taken 11/28/2024 1911  Care Plan - Patient's Chronic Conditions and Co-Morbidity Symptoms are Monitored and Maintained or Improved: Monitor and assess patient's chronic conditions and comorbid symptoms for stability, deterioration, or improvement  Taken 11/28/2024 1846  Care Plan - Patient's Chronic Conditions and Co-Morbidity Symptoms are Monitored and Maintained or Improved: Monitor and assess patient's chronic conditions and comorbid symptoms for stability, deterioration, or improvement     Problem: Safety - Adult  Goal: Free from fall injury  11/28/2024 1911 by Tatianna Valdez RN  Outcome: Progressing  Note: Bed alarm on, bed locked, side rails up, gripper socks on, call light within reach and able to use appropriately. Will continue to monitor this shift.     Problem: Pain  Goal: Verbalizes/displays adequate comfort level or baseline comfort level  11/28/2024 1911 by Tatianna Valdez RN  Outcome: Progressing  Flowsheets  Taken 11/28/2024 1911  Verbalizes/displays adequate comfort level or baseline comfort level:   Encourage patient to monitor pain and request assistance   Assess pain using appropriate pain scale   Administer analgesics based on type and severity of pain and evaluate response   Implement non-pharmacological measures as appropriate and evaluate response  Taken 11/28/2024 1846  Verbalizes/displays adequate comfort level or baseline comfort level: Encourage patient to monitor pain and request assistance  Note: Patient denies any pain at this time. Will continue to monitor this shift.     Problem: Neurosensory - Adult  Goal: Achieves stable or improved neurological status  11/28/2024 1911 by Tatianna Valdez, RN  Outcome: Progressing  Flowsheets  Taken 11/28/2024 1911  Achieves stable or 
  Problem: Chronic Conditions and Co-morbidities  Goal: Patient's chronic conditions and co-morbidity symptoms are monitored and maintained or improved  11/29/2024 2209 by Karine Leal, RN  Outcome: Progressing  Flowsheets (Taken 11/29/2024 1930)  Care Plan - Patient's Chronic Conditions and Co-Morbidity Symptoms are Monitored and Maintained or Improved:   Monitor and assess patient's chronic conditions and comorbid symptoms for stability, deterioration, or improvement   Collaborate with multidisciplinary team to address chronic and comorbid conditions and prevent exacerbation or deterioration   Update acute care plan with appropriate goals if chronic or comorbid symptoms are exacerbated and prevent overall improvement and discharge     Problem: Discharge Planning  Goal: Discharge to home or other facility with appropriate resources  11/29/2024 2209 by Karine Leal, RN  Outcome: Progressing  Flowsheets (Taken 11/29/2024 1930)  Discharge to home or other facility with appropriate resources:   Identify barriers to discharge with patient and caregiver   Arrange for needed discharge resources and transportation as appropriate   Identify discharge learning needs (meds, wound care, etc)   Refer to discharge planning if patient needs post-hospital services based on physician order or complex needs related to functional status, cognitive ability or social support system     Problem: Safety - Adult  Goal: Free from fall injury  11/29/2024 2209 by Karine Leal, RN  Outcome: Progressing  Flowsheets (Taken 11/29/2024 2209)  Free From Fall Injury: Instruct family/caregiver on patient safety     Problem: Pain  Goal: Verbalizes/displays adequate comfort level or baseline comfort level  11/29/2024 2209 by Karine Leal, RN  Outcome: Progressing  Flowsheets (Taken 11/29/2024 2209)  Verbalizes/displays adequate comfort level or baseline comfort level:   Encourage patient to monitor pain and request assistance   Assess 
  Problem: Chronic Conditions and Co-morbidities  Goal: Patient's chronic conditions and co-morbidity symptoms are monitored and maintained or improved  11/30/2024 0653 by Perri Rojas, RN  Outcome: Progressing  Flowsheets (Taken 11/30/2024 0653)  Care Plan - Patient's Chronic Conditions and Co-Morbidity Symptoms are Monitored and Maintained or Improved:   Monitor and assess patient's chronic conditions and comorbid symptoms for stability, deterioration, or improvement   Collaborate with multidisciplinary team to address chronic and comorbid conditions and prevent exacerbation or deterioration   Update acute care plan with appropriate goals if chronic or comorbid symptoms are exacerbated and prevent overall improvement and discharge  11/29/2024 2209 by Karine Leal, RN  Outcome: Progressing  Flowsheets (Taken 11/29/2024 1930)  Care Plan - Patient's Chronic Conditions and Co-Morbidity Symptoms are Monitored and Maintained or Improved:   Monitor and assess patient's chronic conditions and comorbid symptoms for stability, deterioration, or improvement   Collaborate with multidisciplinary team to address chronic and comorbid conditions and prevent exacerbation or deterioration   Update acute care plan with appropriate goals if chronic or comorbid symptoms are exacerbated and prevent overall improvement and discharge     Problem: Discharge Planning  Goal: Discharge to home or other facility with appropriate resources  11/30/2024 0653 by Perri Rojas, RN  Outcome: Progressing  Flowsheets (Taken 11/30/2024 0653)  Discharge to home or other facility with appropriate resources:   Identify discharge learning needs (meds, wound care, etc)   Identify barriers to discharge with patient and caregiver   Refer to discharge planning if patient needs post-hospital services based on physician order or complex needs related to functional status, cognitive ability or social support system   Arrange for needed discharge 
  Problem: Chronic Conditions and Co-morbidities  Goal: Patient's chronic conditions and co-morbidity symptoms are monitored and maintained or improved  12/2/2024 0705 by Perri Rojas RN  Outcome: Progressing  Flowsheets (Taken 12/2/2024 0705)  Care Plan - Patient's Chronic Conditions and Co-Morbidity Symptoms are Monitored and Maintained or Improved:   Monitor and assess patient's chronic conditions and comorbid symptoms for stability, deterioration, or improvement   Collaborate with multidisciplinary team to address chronic and comorbid conditions and prevent exacerbation or deterioration   Update acute care plan with appropriate goals if chronic or comorbid symptoms are exacerbated and prevent overall improvement and discharge  12/1/2024 2258 by Adrienne Esparza RN  Outcome: Progressing  Flowsheets (Taken 12/1/2024 2258)  Care Plan - Patient's Chronic Conditions and Co-Morbidity Symptoms are Monitored and Maintained or Improved: Monitor and assess patient's chronic conditions and comorbid symptoms for stability, deterioration, or improvement     Problem: Discharge Planning  Goal: Discharge to home or other facility with appropriate resources  12/2/2024 0705 by Perri Rojas RN  Outcome: Progressing  Flowsheets (Taken 12/2/2024 0705)  Discharge to home or other facility with appropriate resources:   Identify barriers to discharge with patient and caregiver   Identify discharge learning needs (meds, wound care, etc)   Arrange for needed discharge resources and transportation as appropriate  12/1/2024 2258 by Adrienne Esparza RN  Outcome: Progressing  Flowsheets (Taken 12/1/2024 2258)  Discharge to home or other facility with appropriate resources: Identify barriers to discharge with patient and caregiver     Problem: Safety - Adult  Goal: Free from fall injury  12/2/2024 0705 by Perri Rojas RN  Outcome: Progressing  Flowsheets (Taken 12/2/2024 0705)  Free From Fall Injury: Instruct family/caregiver on 
  Problem: Chronic Conditions and Co-morbidities  Goal: Patient's chronic conditions and co-morbidity symptoms are monitored and maintained or improved  Outcome: Progressing     Problem: Discharge Planning  Goal: Discharge to home or other facility with appropriate resources  Outcome: Progressing     Problem: Safety - Adult  Goal: Free from fall injury  Outcome: Progressing     Problem: Pain  Goal: Verbalizes/displays adequate comfort level or baseline comfort level  Outcome: Progressing     
  Problem: Chronic Conditions and Co-morbidities  Goal: Patient's chronic conditions and co-morbidity symptoms are monitored and maintained or improved  Outcome: Progressing     Problem: Discharge Planning  Goal: Discharge to home or other facility with appropriate resources  Outcome: Progressing     Problem: Safety - Adult  Goal: Free from fall injury  Outcome: Progressing     Problem: Pain  Goal: Verbalizes/displays adequate comfort level or baseline comfort level  Outcome: Progressing     Problem: Neurosensory - Adult  Goal: Achieves stable or improved neurological status  Outcome: Progressing     Problem: Musculoskeletal - Adult  Goal: Return mobility to safest level of function  Outcome: Progressing     Problem: Metabolic/Fluid and Electrolytes - Adult  Goal: Electrolytes maintained within normal limits  Outcome: Progressing     Problem: ABCDS Injury Assessment  Goal: Absence of physical injury  Outcome: Progressing     
  Problem: Chronic Conditions and Co-morbidities  Goal: Patient's chronic conditions and co-morbidity symptoms are monitored and maintained or improved  Outcome: Progressing  Flowsheets (Taken 11/30/2024 1915)  Care Plan - Patient's Chronic Conditions and Co-Morbidity Symptoms are Monitored and Maintained or Improved:   Monitor and assess patient's chronic conditions and comorbid symptoms for stability, deterioration, or improvement   Collaborate with multidisciplinary team to address chronic and comorbid conditions and prevent exacerbation or deterioration   Update acute care plan with appropriate goals if chronic or comorbid symptoms are exacerbated and prevent overall improvement and discharge     Problem: Discharge Planning  Goal: Discharge to home or other facility with appropriate resources  Outcome: Progressing  Flowsheets (Taken 11/30/2024 1915)  Discharge to home or other facility with appropriate resources:   Identify barriers to discharge with patient and caregiver   Arrange for needed discharge resources and transportation as appropriate   Identify discharge learning needs (meds, wound care, etc)   Refer to discharge planning if patient needs post-hospital services based on physician order or complex needs related to functional status, cognitive ability or social support system     Problem: Safety - Adult  Goal: Free from fall injury  Outcome: Progressing  Flowsheets (Taken 12/1/2024 0321)  Free From Fall Injury: Instruct family/caregiver on patient safety     Problem: Pain  Goal: Verbalizes/displays adequate comfort level or baseline comfort level  Outcome: Progressing  Flowsheets (Taken 12/1/2024 0321)  Verbalizes/displays adequate comfort level or baseline comfort level:   Encourage patient to monitor pain and request assistance   Assess pain using appropriate pain scale   Administer analgesics based on type and severity of pain and evaluate response   Implement non-pharmacological measures as 
  Problem: Chronic Conditions and Co-morbidities  Goal: Patient's chronic conditions and co-morbidity symptoms are monitored and maintained or improved  Outcome: Progressing  Flowsheets (Taken 12/1/2024 2258)  Care Plan - Patient's Chronic Conditions and Co-Morbidity Symptoms are Monitored and Maintained or Improved: Monitor and assess patient's chronic conditions and comorbid symptoms for stability, deterioration, or improvement     Problem: Discharge Planning  Goal: Discharge to home or other facility with appropriate resources  Outcome: Progressing  Flowsheets (Taken 12/1/2024 2258)  Discharge to home or other facility with appropriate resources: Identify barriers to discharge with patient and caregiver     Problem: Safety - Adult  Goal: Free from fall injury  Outcome: Progressing  Flowsheets (Taken 12/1/2024 2258)  Free From Fall Injury: Instruct family/caregiver on patient safety     Problem: Pain  Goal: Verbalizes/displays adequate comfort level or baseline comfort level  Outcome: Progressing  Flowsheets (Taken 12/1/2024 2258)  Verbalizes/displays adequate comfort level or baseline comfort level:   Encourage patient to monitor pain and request assistance   Assess pain using appropriate pain scale     Problem: Neurosensory - Adult  Goal: Achieves stable or improved neurological status  Outcome: Progressing  Flowsheets (Taken 12/1/2024 2258)  Achieves stable or improved neurological status:   Assess for and report changes in neurological status   Monitor temperature, glucose, and sodium. Initiate appropriate interventions as ordered     Problem: Musculoskeletal - Adult  Goal: Return mobility to safest level of function  Outcome: Progressing  Flowsheets (Taken 12/1/2024 2258)  Return Mobility to Safest Level of Function:   Assess patient stability and activity tolerance for standing, transferring and ambulating with or without assistive devices   Assist with transfers and ambulation using safe patient handling 
injury  12/2/2024 1051 by Perri Rojas RN  Outcome: Adequate for Discharge  12/2/2024 0705 by Perri Rojas RN  Outcome: Progressing  Flowsheets (Taken 12/2/2024 0705)  Free From Fall Injury: Instruct family/caregiver on patient safety  12/1/2024 2258 by Adrienne Esparza RN  Outcome: Progressing  Flowsheets (Taken 12/1/2024 2258)  Free From Fall Injury: Instruct family/caregiver on patient safety     Problem: Pain  Goal: Verbalizes/displays adequate comfort level or baseline comfort level  12/2/2024 1051 by Perri Rojas RN  Outcome: Adequate for Discharge  12/2/2024 0705 by Perri Rojas RN  Outcome: Progressing  Flowsheets (Taken 12/2/2024 0705)  Verbalizes/displays adequate comfort level or baseline comfort level:   Encourage patient to monitor pain and request assistance   Administer analgesics based on type and severity of pain and evaluate response   Implement non-pharmacological measures as appropriate and evaluate response   Notify Licensed Independent Practitioner if interventions unsuccessful or patient reports new pain   Assess pain using appropriate pain scale  12/1/2024 2258 by Adrienne Esparza RN  Outcome: Progressing  Flowsheets (Taken 12/1/2024 2258)  Verbalizes/displays adequate comfort level or baseline comfort level:   Encourage patient to monitor pain and request assistance   Assess pain using appropriate pain scale     Problem: Neurosensory - Adult  Goal: Achieves stable or improved neurological status  12/2/2024 1051 by Perri Rojas RN  Outcome: Adequate for Discharge  12/2/2024 0705 by Perri Rojas RN  Outcome: Progressing  Flowsheets (Taken 12/2/2024 0705)  Achieves stable or improved neurological status:   Assess for and report changes in neurological status   Monitor temperature, glucose, and sodium. Initiate appropriate interventions as ordered  12/1/2024 2258 by Adrienne Esparza RN  Outcome: Progressing  Flowsheets (Taken 12/1/2024 2258)  Achieves stable or improved 
resources:   Identify barriers to discharge with patient and caregiver   Arrange for needed discharge resources and transportation as appropriate   Identify discharge learning needs (meds, wound care, etc)   Refer to discharge planning if patient needs post-hospital services based on physician order or complex needs related to functional status, cognitive ability or social support system     Problem: Safety - Adult  Goal: Free from fall injury  12/1/2024 0705 by Perri Rojas, RN  Outcome: Progressing  Flowsheets (Taken 12/1/2024 0705)  Free From Fall Injury: Instruct family/caregiver on patient safety  12/1/2024 0321 by Karine Leal, RN  Outcome: Progressing  Flowsheets (Taken 12/1/2024 0321)  Free From Fall Injury: Instruct family/caregiver on patient safety     Problem: Neurosensory - Adult  Goal: Achieves stable or improved neurological status  12/1/2024 0705 by Perri Rojas, RN  Outcome: Progressing  Flowsheets (Taken 12/1/2024 0705)  Achieves stable or improved neurological status:   Assess for and report changes in neurological status   Monitor temperature, glucose, and sodium. Initiate appropriate interventions as ordered  12/1/2024 0321 by Karine Leal, RN  Outcome: Progressing  Flowsheets  Taken 12/1/2024 0321  Achieves stable or improved neurological status: Assess for and report changes in neurological status  Taken 11/30/2024 1915  Achieves stable or improved neurological status: Assess for and report changes in neurological status     Problem: Musculoskeletal - Adult  Goal: Return mobility to safest level of function  12/1/2024 0705 by Perri Rojas, RN  Outcome: Progressing  Flowsheets (Taken 12/1/2024 0705)  Return Mobility to Safest Level of Function:   Assess patient stability and activity tolerance for standing, transferring and ambulating with or without assistive devices   Assist with transfers and ambulation using safe patient handling equipment as needed   Obtain physical

## 2024-12-03 NOTE — CARE COORDINATION
12/03/24 0903   IMM Letter   IMM Letter given to Patient/Family/Significant other/Guardian/POA/by: 2nd notice given to patient/Maximilian,RN   IMM Letter date given: 12/02/24   IMM Letter time given: 8943

## 2025-01-16 ENCOUNTER — HOSPITAL ENCOUNTER (EMERGENCY)
Age: 85
Discharge: LEFT BEFORE DISCHARGE | End: 2025-01-16
Payer: MEDICARE

## 2025-01-16 VITALS
HEART RATE: 84 BPM | DIASTOLIC BLOOD PRESSURE: 54 MMHG | TEMPERATURE: 96.7 F | RESPIRATION RATE: 14 BRPM | SYSTOLIC BLOOD PRESSURE: 111 MMHG | BODY MASS INDEX: 22.78 KG/M2 | OXYGEN SATURATION: 100 % | WEIGHT: 168 LBS

## 2025-01-16 DIAGNOSIS — R89.9 ABNORMAL LABORATORY TEST RESULT: Primary | ICD-10-CM

## 2025-01-16 PROCEDURE — 99282 EMERGENCY DEPT VISIT SF MDM: CPT

## 2025-01-16 ASSESSMENT — PAIN - FUNCTIONAL ASSESSMENT: PAIN_FUNCTIONAL_ASSESSMENT: NONE - DENIES PAIN

## 2025-01-16 NOTE — ED PROVIDER NOTES
CKD (chronic kidney disease), Dementia (HCC), Diabetes mellitus (HCC), Esophageal reflux, History of echocardiogram, Hx of blood clots, Hx of left heart catheterization by ventricular puncture, Hyperlipidemia, Hypertension, Mantle cell lymphoma, unspecified site, extranodal and solid organ sites, Non Hodgkin's lymphoma (HCC), Nonischemic dilated cardiomyopathy (HCC), Pain in joint, lower leg, Pain in limb, S/P cardiac catheterization, Thoracic or lumbosacral neuritis or radiculitis, unspecified, and Unspecified acute reaction to stress.    Past Surgical History:  has a past surgical history that includes Elbow surgery (2000); Neck surgery (1993); Foot surgery (1993); Bladder surgery (2008); Cardiac catheterization; Cardiac pacemaker placement (1996); pacemaker placement (1988); Skin cancer excision (09/11/2013); Diagnostic Cardiac Cath Lab Procedure (04/19/2013); Colonoscopy (10/15/2015); Upper gastrointestinal endoscopy (10/15/2015); Lung lobectomy (N/A, 02/28/2017); thoracotomy (02/28/2017); Pacemaker Change (Left, 09/23/2021); Colonoscopy (2018); Eye surgery (Right, 10/09/2023); Eye surgery (Right, 10/23/2023); Eye surgery (Left, 12/11/2023); and TURP (N/A, 02/06/2024).    Social History:  reports that he quit smoking about 46 years ago. His smoking use included cigarettes. He has never used smokeless tobacco. He reports that he does not currently use alcohol. He reports that he does not use drugs.    Family History: family history includes Cancer in his father, sister, and sister; Diabetes in his brother; Heart Disease in his mother.     The patient’s home medications have been reviewed.    Allergies: Norco [hydrocodone-acetaminophen] and Seasonal    REVIEW OF EXTERNAL NOTE :      Chart reviewed:         Previous Echo reviewed by me:  Lab Results   Component Value Date    LVEF 18 12/02/2020 01/19/24    ECHO (TTE) COMPLETE (PRN CONTRAST/BUBBLE/STRAIN/3D) 01/19/2024 11:07 AM (Final)    Interpretation

## (undated) DEVICE — PLATELET CONCENTRATION PACK PROC 14-20 ML SMARTPREP 2

## (undated) DEVICE — GOWN,AURORA,NONREINFORCED,LARGE: Brand: MEDLINE

## (undated) DEVICE — SUTURE PERMAHAND SZ 2-0 L30IN NONABSORBABLE BLK SH L26MM C016D

## (undated) DEVICE — SUTURE VCRL + SZ 2-0 L27IN ABSRB CLR CT-1 1/2 CIR TAPERCUT VCP259H

## (undated) DEVICE — BLADE 30D THK3.4MM 10.5X1.9MM ANG STAB

## (undated) DEVICE — DRAIN SURG 24FR L5/16IN DIA8MM SIL RND HUBLESS FULL FLUT

## (undated) DEVICE — PACK PROCEDURE SURG SVMMC THORACOTOMY

## (undated) DEVICE — GLOVE SURG SZ 7 CRM LTX FREE POLYISOPRENE POLYMER BEAD ANTI

## (undated) DEVICE — SOLUTION IRRIG 1000ML 0.9% SOD CHL USP POUR PLAS BTL

## (undated) DEVICE — CATHETER,FOLEY,3WAY,SILI-ELAST,22FR,30ML: Brand: MEDLINE

## (undated) DEVICE — SOLUTION IV 1000ML 0.9% SOD CHL FOR IRRIG PLAS CONT

## (undated) DEVICE — SUTURE PERMA-HAND SZ 3-0 L30IN NONABSORBABLE BLK L17MM RB-1 K872H

## (undated) DEVICE — BETADINE 5% EYE SOL

## (undated) DEVICE — CATH TRAY FOLEY 16FR SIL ANTIMICROB 350ML

## (undated) DEVICE — SUTURE VCRL + 1 L27IN ABSRB UD CT-1 L36MM 1/2 CIR TAPR PNT VCP261H

## (undated) DEVICE — TRAY CATHETER 16FR F INCLUDE BARDX IC COMPLT CARE URIN M STATLOK

## (undated) DEVICE — Device: Brand: MALYUGIN RING SYSTEM 7.0MM

## (undated) DEVICE — SOFT SHIELD® COLLAGEN SHIELD, 12 HOURS (CE): Brand: SOFT SHIELD® COLLAGEN SHIELDS

## (undated) DEVICE — STRAP,CATHETER,ELASTIC,HOOK&LOOP: Brand: MEDLINE

## (undated) DEVICE — GLOVE SURG SZ 7 L12IN FNGR THK87MIL WHT LTX FREE

## (undated) DEVICE — 3M™ STERI-STRIP™ REINFORCED ADHESIVE SKIN CLOSURES, R1547, 1/2 IN X 4 IN (12 MM X 100 MM), 6 STRIPS/ENVELOPE: Brand: 3M™ STERI-STRIP™

## (undated) DEVICE — SUTURE NONABSORBABLE MONOFILAMENT 4-0 RB-1 36 IN BLU PROLENE 8557H

## (undated) DEVICE — DBD-PACK,EENT,SIRUS,PK I: Brand: MEDLINE

## (undated) DEVICE — SOLUTION IRRIG 1000ML STRL H2O USP PLAS POUR BTL

## (undated) DEVICE — MINIDRIP SET W/ CK VLV AND 2 SMRTSITE NEEDLE-FREE VLV PORTS

## (undated) DEVICE — Device

## (undated) DEVICE — AMVISC PLUS  0.8ML: Brand: AMVISC PLUS

## (undated) DEVICE — SOLUTION IRRIG 3000ML 0.9% SOD CHL USP UROMATIC PLAS CONT

## (undated) DEVICE — WOUND RETRACTOR AND PROTECTOR: Brand: ALEXIS O WOUND PROTECTOR-RETRACTOR

## (undated) DEVICE — GLOVE SURG SZ 75 L12IN FNGR THK87MIL WHT LTX FREE

## (undated) DEVICE — MERCY TIFFIN CYSTO-LF: Brand: MEDLINE INDUSTRIES, INC.

## (undated) DEVICE — KNIFE OPHTH DIA22MM 45DEG SLT W HNDL SHRP ANG PNT DEL DBL

## (undated) DEVICE — BLADE SAW W6.35XL32MM STRNM CUT STRNOTMY

## (undated) DEVICE — DISCONTINUED USE 405792 GLOVE SURG SENSICARE ALOE LT LF PF ST GRN SZ 7

## (undated) DEVICE — CONVERTED USE 297712 TOWELS OR BL X-RAY ST

## (undated) DEVICE — PLUG,CATHETER,DRAINAGE PROTECTOR,TUBE: Brand: MEDLINE

## (undated) DEVICE — CHLORAPREP 26ML ORANGE

## (undated) DEVICE — 3M™ STERI-STRIP™ COMPOUND BENZOIN TINCTURE 40 BAGS/CARTON 4 CARTONS/CASE C1544: Brand: 3M™ STERI-STRIP™

## (undated) DEVICE — SYRINGE,PISTON,IRRIGATION,60ML,STERILE: Brand: MEDLINE

## (undated) DEVICE — LASER SURG W22XH58IN D36IN 475LB SLD STATE FREQ DOUBLED

## (undated) DEVICE — AGENT HEMSTAT W2XL14IN OXIDIZED REGENERATED CELOS ABSRB FOR

## (undated) DEVICE — ELECTRODE PT RET AD L9FT HI MOIST COND ADH HYDRGEL CORDED

## (undated) DEVICE — SYRINGE MED 50ML LUERLOCK TIP

## (undated) DEVICE — SUTURE VCRL + SZ 4-0 L18IN ABSRB UD L19MM PS-2 3/8 CIR PRIM VCP496H

## (undated) DEVICE — GLOVE SURG SZ 65 CRM LTX FREE POLYISOPRENE POLYMER BEAD ANTI

## (undated) DEVICE — KENDALL SCD EXPRESS SLEEVES, KNEE LENGTH, MEDIUM: Brand: KENDALL SCD

## (undated) DEVICE — CABLE PACE LNG L12IN CHN A OR V SM CLP EPG TEMP DISP

## (undated) DEVICE — Device: Brand: ALLEGRO 1X SILICONE I/A HANDPIECE (6)

## (undated) DEVICE — Z INACTIVE USE 2535480 CLIP LIG M BLU TI HRT SHP WIRE HORZ 180 PER BX

## (undated) DEVICE — HEADREST NEURO DONUT 7 IN

## (undated) DEVICE — SUTURE VCRL + SZ 3-0 L27IN ABSRB WHT CT-1 1/2 CIR VCP258H

## (undated) DEVICE — SUTURE VCRL + SZ 2 L27IN ABSRB UD L40MM CP 1/2 CIR REV CUT VCP195H

## (undated) DEVICE — PROTECTOR ULN NRV FOAM DISPOSABLE

## (undated) DEVICE — Z DISCONTINUED APPLICATOR SURG PREP 0.35OZ 2% CHG 70% ISO ALC W/ HI LT

## (undated) DEVICE — CANNULA TIP SPRY MAGELLAN

## (undated) DEVICE — OCCUCOAT SYRINGE 1ML 6PK: Brand: OCCUCOAT

## (undated) DEVICE — BOOT SUT STD RED IN BLU SMOOTH RADPQ

## (undated) DEVICE — SUTURE - NEEDLE TYPE CU-5,BLACK MONOFILAMENT NYLON(BMN), SUTURE SIZE 10-0, SUTURE LENGTH 12,DOUBLE ARMED: Brand: A.C.S® (ALCON CLOSURE SYSTEM)

## (undated) DEVICE — KIT WRNCH CARD W/ NO2 TORQ RATCH WHT HEX FOR CARD PACEMKR

## (undated) DEVICE — DRAPE,EYE,FEN,INC PCH,STERILE: Brand: MEDLINE

## (undated) DEVICE — GLOVE SURG SZ 65 L12IN FNGR THK87MIL WHT LTX FREE

## (undated) DEVICE — SHIELD EYE W3XL2.5IN UNIV CLR PLAS LTWT

## (undated) DEVICE — SOLUTION SCRB 10% POVIDONE IOD L8IN WNG SAT SPNG STK

## (undated) DEVICE — DRAINBAG,ANTI-REFLUX TOWER,L/F,2000ML,LL: Brand: MEDLINE

## (undated) DEVICE — CONNECTOR TBNG WHT PLAS SUCT STR 5IN1 LTWT W/ M CONN

## (undated) DEVICE — WOUND RETRACTOR AND PROTECTOR: Brand: ALEXIS WOUND PROTECTOR-RETRACTOR

## (undated) DEVICE — KIT APPL 11:1 PROC W/ FIBRIJET MED CUP APPL TIP TY

## (undated) DEVICE — 4-PORT MANIFOLD: Brand: NEPTUNE 2